# Patient Record
Sex: MALE | Race: WHITE | NOT HISPANIC OR LATINO | Employment: OTHER | URBAN - NONMETROPOLITAN AREA
[De-identification: names, ages, dates, MRNs, and addresses within clinical notes are randomized per-mention and may not be internally consistent; named-entity substitution may affect disease eponyms.]

---

## 2017-01-03 ENCOUNTER — ANESTHESIA EVENT (OUTPATIENT)
Dept: GASTROENTEROLOGY | Facility: HOSPITAL | Age: 72
End: 2017-01-03

## 2017-01-04 ENCOUNTER — ANESTHESIA (OUTPATIENT)
Dept: GASTROENTEROLOGY | Facility: HOSPITAL | Age: 72
End: 2017-01-04

## 2017-01-04 PROCEDURE — 25010000002 PROPOFOL 10 MG/ML EMULSION: Performed by: NURSE ANESTHETIST, CERTIFIED REGISTERED

## 2017-01-04 RX ORDER — PROPOFOL 10 MG/ML
VIAL (ML) INTRAVENOUS AS NEEDED
Status: DISCONTINUED | OUTPATIENT
Start: 2017-01-04 | End: 2017-01-04 | Stop reason: SURG

## 2017-01-04 RX ORDER — LIDOCAINE HYDROCHLORIDE 20 MG/ML
INJECTION, SOLUTION INFILTRATION; PERINEURAL AS NEEDED
Status: DISCONTINUED | OUTPATIENT
Start: 2017-01-04 | End: 2017-01-04 | Stop reason: SURG

## 2017-01-04 RX ADMIN — PROPOFOL 50 MG: 10 INJECTION, EMULSION INTRAVENOUS at 11:55

## 2017-01-04 RX ADMIN — PROPOFOL 100 MG: 10 INJECTION, EMULSION INTRAVENOUS at 11:52

## 2017-01-04 RX ADMIN — LIDOCAINE HYDROCHLORIDE 60 MG: 20 INJECTION, SOLUTION INFILTRATION; PERINEURAL at 11:50

## 2017-01-04 RX ADMIN — PROPOFOL 50 MG: 10 INJECTION, EMULSION INTRAVENOUS at 12:05

## 2017-01-04 RX ADMIN — PROPOFOL 50 MG: 10 INJECTION, EMULSION INTRAVENOUS at 12:00

## 2017-01-04 NOTE — ANESTHESIA PREPROCEDURE EVALUATION
Anesthesia Evaluation     Patient summary reviewed and Nursing notes reviewed    No history of anesthetic complications   Airway   Mallampati: II  TM distance: >3 FB  Neck ROM: full  Dental          Pulmonary    (+) hx of smoking,   Cardiovascular - negative cardio ROS    Neuro/Psych- negative ROS  GI/Hepatic/Renal/Endo    (+)  hiatal hernia,     ROS Comment: HLD    Musculoskeletal     Abdominal    Substance History - negative use     OB/GYN          Other                             Anesthesia Plan    ASA 2     general     intravenous induction   Anesthetic plan and risks discussed with patient.

## 2017-01-09 ENCOUNTER — TELEPHONE (OUTPATIENT)
Dept: GASTROENTEROLOGY | Facility: CLINIC | Age: 72
End: 2017-01-09

## 2017-01-09 NOTE — ANESTHESIA POSTPROCEDURE EVALUATION
Patient: Jonnie Sheets    Procedure Summary     Date Anesthesia Start Anesthesia Stop Room / Location    01/04/17 1148 1220 [facility] ENDOSCOPY 6 / [facility] ENDOSCOPY       Procedure Diagnosis Surgeon Provider    COLONOSCOPY WITH ANESTHESIA (N/A ); ESOPHAGOGASTRODUODENOSCOPY WITH ANESTHESIA (N/A Esophagus) Heme positive stool; Other iron deficiency anemia  (Heme positive stool [R19.5]; Other iron deficiency anemia [D50.8]) MD DAGOBERTO Mota CRNA          Anesthesia Type: general  Last vitals  BP      Temp      Pulse     Resp      SpO2        Post Anesthesia Care and Evaluation    Patient location during evaluation: PACU  Patient participation: complete - patient participated  Level of consciousness: awake and alert  Pain score: 0  Pain management: adequate  Airway patency: patent  Anesthetic complications: No anesthetic complications  Cardiovascular status: acceptable and stable  Respiratory status: acceptable and unassisted  Hydration status: acceptable

## 2017-01-09 NOTE — TELEPHONE ENCOUNTER
I called and let him know the pathology so far was negative for cancer but that does not mean there is not potentially cancerous cells somewhere else in the larger lesion.  As recommended he still needs to have surgical resection of the lesion.  He has an appointment with general surgery on the 17th.  He is to keep that appointment.  I asked him to call me if he had any questions or concerns.

## 2017-01-17 ENCOUNTER — HOSPITAL ENCOUNTER (OUTPATIENT)
Dept: GENERAL RADIOLOGY | Facility: HOSPITAL | Age: 72
Discharge: HOME | End: 2017-01-17
Admitting: SPECIALIST

## 2017-01-17 ENCOUNTER — APPOINTMENT (OUTPATIENT)
Dept: PREADMISSION TESTING | Facility: HOSPITAL | Age: 72
End: 2017-01-17

## 2017-01-17 VITALS
HEIGHT: 64 IN | SYSTOLIC BLOOD PRESSURE: 186 MMHG | RESPIRATION RATE: 20 BRPM | WEIGHT: 165 LBS | HEART RATE: 78 BPM | BODY MASS INDEX: 28.17 KG/M2 | DIASTOLIC BLOOD PRESSURE: 90 MMHG | OXYGEN SATURATION: 94 %

## 2017-01-17 LAB
ALBUMIN SERPL-MCNC: 4.4 G/DL (ref 3.5–5)
ALBUMIN/GLOB SERPL: 1.3 G/DL (ref 1.1–2.5)
ALP SERPL-CCNC: 74 U/L (ref 24–120)
ALT SERPL W P-5'-P-CCNC: 25 U/L (ref 0–54)
ANION GAP SERPL CALCULATED.3IONS-SCNC: 12 MMOL/L (ref 4–13)
AST SERPL-CCNC: 47 U/L (ref 7–45)
BASOPHILS # BLD AUTO: 0.05 10*3/MM3 (ref 0–0.2)
BASOPHILS NFR BLD AUTO: 0.5 % (ref 0–2)
BILIRUB SERPL-MCNC: 0.4 MG/DL (ref 0.1–1)
BUN BLD-MCNC: 8 MG/DL (ref 5–21)
BUN/CREAT SERPL: 7.8 (ref 7–25)
CALCIUM SPEC-SCNC: 8.7 MG/DL (ref 8.4–10.4)
CHLORIDE SERPL-SCNC: 96 MMOL/L (ref 98–110)
CO2 SERPL-SCNC: 32 MMOL/L (ref 24–31)
CREAT BLD-MCNC: 1.02 MG/DL (ref 0.5–1.4)
DEPRECATED RDW RBC AUTO: 55.7 FL (ref 40–54)
EOSINOPHIL # BLD AUTO: 0.16 10*3/MM3 (ref 0–0.7)
EOSINOPHIL NFR BLD AUTO: 1.6 % (ref 0–4)
ERYTHROCYTE [DISTWIDTH] IN BLOOD BY AUTOMATED COUNT: 17.4 % (ref 12–15)
GFR SERPL CREATININE-BSD FRML MDRD: 72 ML/MIN/1.73
GLOBULIN UR ELPH-MCNC: 3.5 GM/DL
GLUCOSE BLD-MCNC: 101 MG/DL (ref 70–100)
HCT VFR BLD AUTO: 42.4 % (ref 40–52)
HGB BLD-MCNC: 13.2 G/DL (ref 14–18)
IMM GRANULOCYTES # BLD: 0.02 10*3/MM3 (ref 0–0.03)
IMM GRANULOCYTES NFR BLD: 0.2 % (ref 0–5)
LYMPHOCYTES # BLD AUTO: 2.32 10*3/MM3 (ref 0.72–4.86)
LYMPHOCYTES NFR BLD AUTO: 23.6 % (ref 15–45)
MCH RBC QN AUTO: 27 PG (ref 28–32)
MCHC RBC AUTO-ENTMCNC: 31.1 G/DL (ref 33–36)
MCV RBC AUTO: 86.9 FL (ref 82–95)
MONOCYTES # BLD AUTO: 1.24 10*3/MM3 (ref 0.19–1.3)
MONOCYTES NFR BLD AUTO: 12.6 % (ref 4–12)
NEUTROPHILS # BLD AUTO: 6.05 10*3/MM3 (ref 1.87–8.4)
NEUTROPHILS NFR BLD AUTO: 61.5 % (ref 39–78)
PLAT MORPH BLD: NORMAL
PLATELET # BLD AUTO: 217 10*3/MM3 (ref 130–400)
PMV BLD AUTO: 12.3 FL (ref 6–12)
POTASSIUM BLD-SCNC: 3.9 MMOL/L (ref 3.5–5.3)
PROT SERPL-MCNC: 7.9 G/DL (ref 6.3–8.7)
RBC # BLD AUTO: 4.88 10*6/MM3 (ref 4.8–5.9)
RBC MORPH BLD: NORMAL
SODIUM BLD-SCNC: 140 MMOL/L (ref 135–145)
WBC MORPH BLD: NORMAL
WBC NRBC COR # BLD: 9.84 10*3/MM3 (ref 4.8–10.8)

## 2017-01-17 PROCEDURE — 93010 ELECTROCARDIOGRAM REPORT: CPT | Performed by: INTERNAL MEDICINE

## 2017-01-19 ENCOUNTER — ANESTHESIA (OUTPATIENT)
Dept: PERIOP | Facility: HOSPITAL | Age: 72
End: 2017-01-19

## 2017-01-19 ENCOUNTER — HOSPITAL ENCOUNTER (INPATIENT)
Facility: HOSPITAL | Age: 72
LOS: 9 days | Discharge: HOME | End: 2017-01-28
Attending: SPECIALIST | Admitting: SPECIALIST

## 2017-01-19 ENCOUNTER — ANESTHESIA EVENT (OUTPATIENT)
Dept: PERIOP | Facility: HOSPITAL | Age: 72
End: 2017-01-19

## 2017-01-19 DIAGNOSIS — C18.9 MALIGNANT NEOPLASM OF COLON, UNSPECIFIED PART OF COLON (HCC): ICD-10-CM

## 2017-01-19 DIAGNOSIS — C18.9 COLON CANCER (HCC): ICD-10-CM

## 2017-01-19 LAB
ABO GROUP BLD: NORMAL
BLD GP AB SCN SERPL QL: NEGATIVE
RH BLD: POSITIVE

## 2017-01-19 PROCEDURE — 25010000002 HYDROMORPHONE PER 4 MG: Performed by: ANESTHESIOLOGY

## 2017-01-19 PROCEDURE — 25010000002 MIDAZOLAM PER 1 MG: Performed by: ANESTHESIOLOGY

## 2017-01-19 PROCEDURE — 0WQF0ZZ REPAIR ABDOMINAL WALL, OPEN APPROACH: ICD-10-PCS | Performed by: SPECIALIST

## 2017-01-19 PROCEDURE — P9041 ALBUMIN (HUMAN),5%, 50ML: HCPCS | Performed by: NURSE ANESTHETIST, CERTIFIED REGISTERED

## 2017-01-19 PROCEDURE — 25010000002 DIAZEPAM PER 5 MG: Performed by: SPECIALIST

## 2017-01-19 PROCEDURE — 25010000002 ONDANSETRON PER 1 MG: Performed by: SPECIALIST

## 2017-01-19 PROCEDURE — 86850 RBC ANTIBODY SCREEN: CPT

## 2017-01-19 PROCEDURE — 94799 UNLISTED PULMONARY SVC/PX: CPT

## 2017-01-19 PROCEDURE — 25010000002 MORPHINE SULFATE (PF) 2 MG/ML SOLUTION: Performed by: SPECIALIST

## 2017-01-19 PROCEDURE — 25010000002 CEFOXITIN: Performed by: SPECIALIST

## 2017-01-19 PROCEDURE — 25010000002 SUCCINYLCHOLINE PER 20 MG: Performed by: NURSE ANESTHETIST, CERTIFIED REGISTERED

## 2017-01-19 PROCEDURE — 25010000002 ALBUMIN HUMAN 5% PER 50 ML: Performed by: NURSE ANESTHETIST, CERTIFIED REGISTERED

## 2017-01-19 PROCEDURE — 86901 BLOOD TYPING SEROLOGIC RH(D): CPT

## 2017-01-19 PROCEDURE — 25010000002 PROPOFOL 10 MG/ML EMULSION: Performed by: NURSE ANESTHETIST, CERTIFIED REGISTERED

## 2017-01-19 PROCEDURE — 25010000002 MIDAZOLAM PER 1 MG: Performed by: NURSE ANESTHETIST, CERTIFIED REGISTERED

## 2017-01-19 PROCEDURE — 94002 VENT MGMT INPAT INIT DAY: CPT

## 2017-01-19 PROCEDURE — 25010000002 CEFOXITIN PER 1 G: Performed by: SPECIALIST

## 2017-01-19 PROCEDURE — 88309 TISSUE EXAM BY PATHOLOGIST: CPT | Performed by: SPECIALIST

## 2017-01-19 PROCEDURE — 86900 BLOOD TYPING SEROLOGIC ABO: CPT

## 2017-01-19 PROCEDURE — C1765 ADHESION BARRIER: HCPCS | Performed by: SPECIALIST

## 2017-01-19 PROCEDURE — 25010000002 NALOXONE PER 1 MG: Performed by: NURSE ANESTHETIST, CERTIFIED REGISTERED

## 2017-01-19 PROCEDURE — 25010000002 ONDANSETRON PER 1 MG: Performed by: NURSE ANESTHETIST, CERTIFIED REGISTERED

## 2017-01-19 PROCEDURE — 25010000002 DEXAMETHASONE PER 1 MG: Performed by: NURSE ANESTHETIST, CERTIFIED REGISTERED

## 2017-01-19 PROCEDURE — 25010000002 METOCLOPRAMIDE PER 10 MG: Performed by: ANESTHESIOLOGY

## 2017-01-19 PROCEDURE — 25010000002 DEXAMETHASONE PER 1 MG: Performed by: ANESTHESIOLOGY

## 2017-01-19 PROCEDURE — 0DTF0ZZ RESECTION OF RIGHT LARGE INTESTINE, OPEN APPROACH: ICD-10-PCS | Performed by: SPECIALIST

## 2017-01-19 RX ORDER — LORAZEPAM 2 MG/ML
1 INJECTION INTRAMUSCULAR EVERY 4 HOURS PRN
Status: DISCONTINUED | OUTPATIENT
Start: 2017-01-19 | End: 2017-01-24 | Stop reason: SDUPTHER

## 2017-01-19 RX ORDER — NALOXONE HCL 0.4 MG/ML
0.4 VIAL (ML) INJECTION
Status: DISCONTINUED | OUTPATIENT
Start: 2017-01-19 | End: 2017-01-28 | Stop reason: HOSPADM

## 2017-01-19 RX ORDER — DEXAMETHASONE SODIUM PHOSPHATE 4 MG/ML
4 INJECTION, SOLUTION INTRA-ARTICULAR; INTRALESIONAL; INTRAMUSCULAR; INTRAVENOUS; SOFT TISSUE ONCE AS NEEDED
Status: COMPLETED | OUTPATIENT
Start: 2017-01-19 | End: 2017-01-19

## 2017-01-19 RX ORDER — LABETALOL HYDROCHLORIDE 5 MG/ML
5 INJECTION, SOLUTION INTRAVENOUS
Status: DISCONTINUED | OUTPATIENT
Start: 2017-01-19 | End: 2017-01-19 | Stop reason: HOSPADM

## 2017-01-19 RX ORDER — DEXTROSE AND SODIUM CHLORIDE 5; .45 G/100ML; G/100ML
75 INJECTION, SOLUTION INTRAVENOUS CONTINUOUS
Status: DISCONTINUED | OUTPATIENT
Start: 2017-01-19 | End: 2017-01-23

## 2017-01-19 RX ORDER — MIDAZOLAM HYDROCHLORIDE 1 MG/ML
INJECTION INTRAMUSCULAR; INTRAVENOUS AS NEEDED
Status: DISCONTINUED | OUTPATIENT
Start: 2017-01-19 | End: 2017-01-19 | Stop reason: SURG

## 2017-01-19 RX ORDER — VASOPRESSIN 20 U/ML
INJECTION PARENTERAL AS NEEDED
Status: DISCONTINUED | OUTPATIENT
Start: 2017-01-19 | End: 2017-01-19 | Stop reason: SURG

## 2017-01-19 RX ORDER — FLUMAZENIL 0.1 MG/ML
0.2 INJECTION INTRAVENOUS AS NEEDED
Status: DISCONTINUED | OUTPATIENT
Start: 2017-01-19 | End: 2017-01-19 | Stop reason: HOSPADM

## 2017-01-19 RX ORDER — SUFENTANIL CITRATE 50 UG/ML
INJECTION EPIDURAL; INTRAVENOUS AS NEEDED
Status: DISCONTINUED | OUTPATIENT
Start: 2017-01-19 | End: 2017-01-19 | Stop reason: SURG

## 2017-01-19 RX ORDER — MIDAZOLAM HYDROCHLORIDE 1 MG/ML
1 INJECTION INTRAMUSCULAR; INTRAVENOUS
Status: DISCONTINUED | OUTPATIENT
Start: 2017-01-19 | End: 2017-01-19 | Stop reason: HOSPADM

## 2017-01-19 RX ORDER — HYDRALAZINE HYDROCHLORIDE 20 MG/ML
5 INJECTION INTRAMUSCULAR; INTRAVENOUS
Status: DISCONTINUED | OUTPATIENT
Start: 2017-01-19 | End: 2017-01-19 | Stop reason: HOSPADM

## 2017-01-19 RX ORDER — NALOXONE HCL 0.4 MG/ML
0.04 VIAL (ML) INJECTION AS NEEDED
Status: DISCONTINUED | OUTPATIENT
Start: 2017-01-19 | End: 2017-01-19 | Stop reason: HOSPADM

## 2017-01-19 RX ORDER — ONDANSETRON 2 MG/ML
4 INJECTION INTRAMUSCULAR; INTRAVENOUS AS NEEDED
Status: DISCONTINUED | OUTPATIENT
Start: 2017-01-19 | End: 2017-01-19 | Stop reason: HOSPADM

## 2017-01-19 RX ORDER — MORPHINE SULFATE 2 MG/ML
1 INJECTION, SOLUTION INTRAMUSCULAR; INTRAVENOUS EVERY 4 HOURS PRN
Status: DISCONTINUED | OUTPATIENT
Start: 2017-01-19 | End: 2017-01-28 | Stop reason: HOSPADM

## 2017-01-19 RX ORDER — ENALAPRILAT 2.5 MG/2ML
1.25 INJECTION INTRAVENOUS EVERY 6 HOURS PRN
Status: DISCONTINUED | OUTPATIENT
Start: 2017-01-19 | End: 2017-01-28 | Stop reason: HOSPADM

## 2017-01-19 RX ORDER — METOCLOPRAMIDE HYDROCHLORIDE 5 MG/ML
5 INJECTION INTRAMUSCULAR; INTRAVENOUS
Status: DISCONTINUED | OUTPATIENT
Start: 2017-01-19 | End: 2017-01-19 | Stop reason: HOSPADM

## 2017-01-19 RX ORDER — NALOXONE HYDROCHLORIDE 0.4 MG/ML
INJECTION, SOLUTION INTRAMUSCULAR; INTRAVENOUS; SUBCUTANEOUS AS NEEDED
Status: DISCONTINUED | OUTPATIENT
Start: 2017-01-19 | End: 2017-01-19 | Stop reason: SURG

## 2017-01-19 RX ORDER — MEPERIDINE HYDROCHLORIDE 25 MG/ML
12.5 INJECTION INTRAMUSCULAR; INTRAVENOUS; SUBCUTANEOUS
Status: DISCONTINUED | OUTPATIENT
Start: 2017-01-19 | End: 2017-01-19 | Stop reason: HOSPADM

## 2017-01-19 RX ORDER — OXYCODONE HYDROCHLORIDE AND ACETAMINOPHEN 5; 325 MG/1; MG/1
1 TABLET ORAL EVERY 4 HOURS PRN
Status: DISCONTINUED | OUTPATIENT
Start: 2017-01-19 | End: 2017-01-27 | Stop reason: SDUPTHER

## 2017-01-19 RX ORDER — PROPOFOL 10 MG/ML
VIAL (ML) INTRAVENOUS AS NEEDED
Status: DISCONTINUED | OUTPATIENT
Start: 2017-01-19 | End: 2017-01-19 | Stop reason: SURG

## 2017-01-19 RX ORDER — IPRATROPIUM BROMIDE AND ALBUTEROL SULFATE 2.5; .5 MG/3ML; MG/3ML
3 SOLUTION RESPIRATORY (INHALATION) ONCE AS NEEDED
Status: DISCONTINUED | OUTPATIENT
Start: 2017-01-19 | End: 2017-01-19 | Stop reason: HOSPADM

## 2017-01-19 RX ORDER — PHENYLEPHRINE HCL IN 0.9% NACL 0.8MG/10ML
SYRINGE (ML) INTRAVENOUS AS NEEDED
Status: DISCONTINUED | OUTPATIENT
Start: 2017-01-19 | End: 2017-01-19 | Stop reason: SURG

## 2017-01-19 RX ORDER — MIDAZOLAM HYDROCHLORIDE 1 MG/ML
2 INJECTION INTRAMUSCULAR; INTRAVENOUS
Status: DISCONTINUED | OUTPATIENT
Start: 2017-01-19 | End: 2017-01-19 | Stop reason: HOSPADM

## 2017-01-19 RX ORDER — SUCCINYLCHOLINE CHLORIDE 20 MG/ML
INJECTION INTRAMUSCULAR; INTRAVENOUS AS NEEDED
Status: DISCONTINUED | OUTPATIENT
Start: 2017-01-19 | End: 2017-01-19 | Stop reason: SURG

## 2017-01-19 RX ORDER — MORPHINE SULFATE 2 MG/ML
2 INJECTION, SOLUTION INTRAMUSCULAR; INTRAVENOUS AS NEEDED
Status: DISCONTINUED | OUTPATIENT
Start: 2017-01-19 | End: 2017-01-19 | Stop reason: HOSPADM

## 2017-01-19 RX ORDER — ONDANSETRON 2 MG/ML
INJECTION INTRAMUSCULAR; INTRAVENOUS AS NEEDED
Status: DISCONTINUED | OUTPATIENT
Start: 2017-01-19 | End: 2017-01-19 | Stop reason: SURG

## 2017-01-19 RX ORDER — ONDANSETRON 2 MG/ML
4 INJECTION INTRAMUSCULAR; INTRAVENOUS EVERY 6 HOURS PRN
Status: COMPLETED | OUTPATIENT
Start: 2017-01-19 | End: 2017-01-23

## 2017-01-19 RX ORDER — NALOXONE HCL 0.4 MG/ML
0.1 VIAL (ML) INJECTION
Status: DISCONTINUED | OUTPATIENT
Start: 2017-01-19 | End: 2017-01-20 | Stop reason: SDUPTHER

## 2017-01-19 RX ORDER — NALOXONE HCL 0.4 MG/ML
0.4 VIAL (ML) INJECTION
Status: DISCONTINUED | OUTPATIENT
Start: 2017-01-19 | End: 2017-01-27

## 2017-01-19 RX ORDER — DEXAMETHASONE SODIUM PHOSPHATE 4 MG/ML
INJECTION, SOLUTION INTRA-ARTICULAR; INTRALESIONAL; INTRAMUSCULAR; INTRAVENOUS; SOFT TISSUE AS NEEDED
Status: DISCONTINUED | OUTPATIENT
Start: 2017-01-19 | End: 2017-01-19 | Stop reason: SURG

## 2017-01-19 RX ORDER — PANTOPRAZOLE SODIUM 40 MG/10ML
40 INJECTION, POWDER, LYOPHILIZED, FOR SOLUTION INTRAVENOUS
Status: DISCONTINUED | OUTPATIENT
Start: 2017-01-20 | End: 2017-01-24 | Stop reason: SDUPTHER

## 2017-01-19 RX ORDER — SUCRALFATE ORAL 1 G/10ML
1 SUSPENSION ORAL EVERY 6 HOURS SCHEDULED
Status: DISCONTINUED | OUTPATIENT
Start: 2017-01-19 | End: 2017-01-28 | Stop reason: HOSPADM

## 2017-01-19 RX ORDER — ALBUMIN, HUMAN INJ 5% 5 %
SOLUTION INTRAVENOUS CONTINUOUS PRN
Status: DISCONTINUED | OUTPATIENT
Start: 2017-01-19 | End: 2017-01-19 | Stop reason: SURG

## 2017-01-19 RX ORDER — FENTANYL CITRATE 50 UG/ML
25 INJECTION, SOLUTION INTRAMUSCULAR; INTRAVENOUS AS NEEDED
Status: DISCONTINUED | OUTPATIENT
Start: 2017-01-19 | End: 2017-01-19 | Stop reason: HOSPADM

## 2017-01-19 RX ORDER — SODIUM CHLORIDE, SODIUM LACTATE, POTASSIUM CHLORIDE, CALCIUM CHLORIDE 600; 310; 30; 20 MG/100ML; MG/100ML; MG/100ML; MG/100ML
100 INJECTION, SOLUTION INTRAVENOUS CONTINUOUS
Status: DISCONTINUED | OUTPATIENT
Start: 2017-01-19 | End: 2017-01-19

## 2017-01-19 RX ORDER — ONDANSETRON 8 MG/1
8 TABLET, ORALLY DISINTEGRATING ORAL EVERY 6 HOURS PRN
Status: DISCONTINUED | OUTPATIENT
Start: 2017-01-19 | End: 2017-01-19 | Stop reason: SDUPTHER

## 2017-01-19 RX ORDER — MORPHINE SULFATE 2 MG/ML
2 INJECTION, SOLUTION INTRAMUSCULAR; INTRAVENOUS EVERY 4 HOURS PRN
Status: DISCONTINUED | OUTPATIENT
Start: 2017-01-19 | End: 2017-01-28 | Stop reason: HOSPADM

## 2017-01-19 RX ORDER — ONDANSETRON 2 MG/ML
4 INJECTION INTRAMUSCULAR; INTRAVENOUS EVERY 6 HOURS PRN
Status: DISCONTINUED | OUTPATIENT
Start: 2017-01-19 | End: 2017-01-20 | Stop reason: SDUPTHER

## 2017-01-19 RX ORDER — ROCURONIUM BROMIDE 10 MG/ML
INJECTION, SOLUTION INTRAVENOUS AS NEEDED
Status: DISCONTINUED | OUTPATIENT
Start: 2017-01-19 | End: 2017-01-19 | Stop reason: SURG

## 2017-01-19 RX ORDER — ACETAMINOPHEN 10 MG/ML
1000 INJECTION, SOLUTION INTRAVENOUS ONCE
Status: COMPLETED | OUTPATIENT
Start: 2017-01-19 | End: 2017-01-19

## 2017-01-19 RX ORDER — SODIUM CHLORIDE, SODIUM LACTATE, POTASSIUM CHLORIDE, CALCIUM CHLORIDE 600; 310; 30; 20 MG/100ML; MG/100ML; MG/100ML; MG/100ML
20 INJECTION, SOLUTION INTRAVENOUS CONTINUOUS
Status: DISCONTINUED | OUTPATIENT
Start: 2017-01-19 | End: 2017-01-19

## 2017-01-19 RX ORDER — METOCLOPRAMIDE HYDROCHLORIDE 5 MG/ML
10 INJECTION INTRAMUSCULAR; INTRAVENOUS ONCE AS NEEDED
Status: COMPLETED | OUTPATIENT
Start: 2017-01-19 | End: 2017-01-19

## 2017-01-19 RX ORDER — SODIUM CHLORIDE 9 MG/ML
INJECTION, SOLUTION INTRAVENOUS AS NEEDED
Status: DISCONTINUED | OUTPATIENT
Start: 2017-01-19 | End: 2017-01-19 | Stop reason: HOSPADM

## 2017-01-19 RX ORDER — LIDOCAINE HYDROCHLORIDE 20 MG/ML
INJECTION, SOLUTION INFILTRATION; PERINEURAL AS NEEDED
Status: DISCONTINUED | OUTPATIENT
Start: 2017-01-19 | End: 2017-01-19 | Stop reason: SURG

## 2017-01-19 RX ORDER — LIDOCAINE HYDROCHLORIDE 40 MG/ML
SOLUTION TOPICAL AS NEEDED
Status: DISCONTINUED | OUTPATIENT
Start: 2017-01-19 | End: 2017-01-19 | Stop reason: SURG

## 2017-01-19 RX ORDER — ONDANSETRON 8 MG/1
8 TABLET, ORALLY DISINTEGRATING ORAL EVERY 6 HOURS PRN
Status: DISCONTINUED | OUTPATIENT
Start: 2017-01-19 | End: 2017-01-27 | Stop reason: SDUPTHER

## 2017-01-19 RX ORDER — DIAZEPAM 5 MG/ML
5 INJECTION, SOLUTION INTRAMUSCULAR; INTRAVENOUS EVERY 4 HOURS PRN
Status: DISCONTINUED | OUTPATIENT
Start: 2017-01-19 | End: 2017-01-28 | Stop reason: HOSPADM

## 2017-01-19 RX ORDER — FAMOTIDINE 10 MG/ML
20 INJECTION, SOLUTION INTRAVENOUS ONCE
Status: COMPLETED | OUTPATIENT
Start: 2017-01-19 | End: 2017-01-19

## 2017-01-19 RX ORDER — MAGNESIUM HYDROXIDE 1200 MG/15ML
LIQUID ORAL AS NEEDED
Status: DISCONTINUED | OUTPATIENT
Start: 2017-01-19 | End: 2017-01-19 | Stop reason: HOSPADM

## 2017-01-19 RX ORDER — PROMETHAZINE HYDROCHLORIDE 25 MG/ML
12.5 INJECTION, SOLUTION INTRAMUSCULAR; INTRAVENOUS EVERY 6 HOURS PRN
Status: DISCONTINUED | OUTPATIENT
Start: 2017-01-19 | End: 2017-01-28 | Stop reason: HOSPADM

## 2017-01-19 RX ORDER — SODIUM CHLORIDE 0.9 % (FLUSH) 0.9 %
1-10 SYRINGE (ML) INJECTION AS NEEDED
Status: DISCONTINUED | OUTPATIENT
Start: 2017-01-19 | End: 2017-01-19 | Stop reason: HOSPADM

## 2017-01-19 RX ADMIN — ALBUMIN (HUMAN): 2.5 SOLUTION INTRAVENOUS at 08:30

## 2017-01-19 RX ADMIN — ALBUMIN (HUMAN): 2.5 SOLUTION INTRAVENOUS at 08:39

## 2017-01-19 RX ADMIN — LIDOCAINE HYDROCHLORIDE 100 MG: 20 INJECTION, SOLUTION INFILTRATION; PERINEURAL at 08:08

## 2017-01-19 RX ADMIN — SUFENTANIL CITRATE 10 MCG: 50 INJECTION, SOLUTION EPIDURAL; INTRAVENOUS at 08:48

## 2017-01-19 RX ADMIN — ONDANSETRON HYDROCHLORIDE 4 MG: 2 SOLUTION INTRAMUSCULAR; INTRAVENOUS at 15:46

## 2017-01-19 RX ADMIN — MIDAZOLAM 3 MG: 1 INJECTION INTRAMUSCULAR; INTRAVENOUS at 08:07

## 2017-01-19 RX ADMIN — NALOXONE HYDROCHLORIDE 0.1 MG: 0.4 INJECTION, SOLUTION INTRAMUSCULAR; INTRAVENOUS; SUBCUTANEOUS at 11:59

## 2017-01-19 RX ADMIN — SODIUM CHLORIDE, POTASSIUM CHLORIDE, SODIUM LACTATE AND CALCIUM CHLORIDE: 600; 310; 30; 20 INJECTION, SOLUTION INTRAVENOUS at 11:10

## 2017-01-19 RX ADMIN — SUFENTANIL CITRATE 10 MCG: 50 INJECTION, SOLUTION EPIDURAL; INTRAVENOUS at 08:08

## 2017-01-19 RX ADMIN — VASOPRESSIN 1 UNITS: 20 INJECTION INTRAVENOUS at 08:21

## 2017-01-19 RX ADMIN — SODIUM CHLORIDE, POTASSIUM CHLORIDE, SODIUM LACTATE AND CALCIUM CHLORIDE: 600; 310; 30; 20 INJECTION, SOLUTION INTRAVENOUS at 09:36

## 2017-01-19 RX ADMIN — NALOXONE HYDROCHLORIDE 0.1 MG: 0.4 INJECTION, SOLUTION INTRAMUSCULAR; INTRAVENOUS; SUBCUTANEOUS at 11:58

## 2017-01-19 RX ADMIN — ROCURONIUM BROMIDE 10 MG: 10 INJECTION INTRAVENOUS at 08:08

## 2017-01-19 RX ADMIN — METRONIDAZOLE 500 MG: 500 INJECTION, SOLUTION INTRAVENOUS at 07:49

## 2017-01-19 RX ADMIN — Medication 80 MCG: at 08:12

## 2017-01-19 RX ADMIN — Medication 80 MCG: at 08:15

## 2017-01-19 RX ADMIN — HYDROMORPHONE HYDROCHLORIDE 0.5 MG: 1 INJECTION, SOLUTION INTRAMUSCULAR; INTRAVENOUS; SUBCUTANEOUS at 13:43

## 2017-01-19 RX ADMIN — ACETAMINOPHEN 1000 MG: 10 INJECTION, SOLUTION INTRAVENOUS at 07:23

## 2017-01-19 RX ADMIN — DIAZEPAM 5 MG: 5 INJECTION, SOLUTION INTRAMUSCULAR; INTRAVENOUS at 17:18

## 2017-01-19 RX ADMIN — SUCCINYLCHOLINE CHLORIDE 140 MG: 20 INJECTION, SOLUTION INTRAMUSCULAR; INTRAVENOUS at 08:08

## 2017-01-19 RX ADMIN — MORPHINE SULFATE 2 MG: 2 INJECTION, SOLUTION INTRAMUSCULAR; INTRAVENOUS at 23:55

## 2017-01-19 RX ADMIN — PROPOFOL 150 MG: 10 INJECTION, EMULSION INTRAVENOUS at 08:08

## 2017-01-19 RX ADMIN — ENALAPRILAT 1.25 MG: 1.25 INJECTION INTRAVENOUS at 16:50

## 2017-01-19 RX ADMIN — METRONIDAZOLE 500 MG: 500 INJECTION, SOLUTION INTRAVENOUS at 22:11

## 2017-01-19 RX ADMIN — SUFENTANIL CITRATE 30 MCG: 50 INJECTION, SOLUTION EPIDURAL; INTRAVENOUS at 08:52

## 2017-01-19 RX ADMIN — DEXAMETHASONE SODIUM PHOSPHATE 4 MG: 4 INJECTION, SOLUTION INTRA-ARTICULAR; INTRALESIONAL; INTRAMUSCULAR; INTRAVENOUS; SOFT TISSUE at 07:23

## 2017-01-19 RX ADMIN — METRONIDAZOLE 500 MG: 500 INJECTION, SOLUTION INTRAVENOUS at 16:49

## 2017-01-19 RX ADMIN — MIDAZOLAM HYDROCHLORIDE 2 MG: 1 INJECTION, SOLUTION INTRAMUSCULAR; INTRAVENOUS at 07:23

## 2017-01-19 RX ADMIN — SUFENTANIL CITRATE 10 MCG: 50 INJECTION, SOLUTION EPIDURAL; INTRAVENOUS at 08:30

## 2017-01-19 RX ADMIN — SUFENTANIL CITRATE 20 MCG: 50 INJECTION, SOLUTION EPIDURAL; INTRAVENOUS at 08:55

## 2017-01-19 RX ADMIN — LIDOCAINE HYDROCHLORIDE 1 EACH: 40 SOLUTION TOPICAL at 08:09

## 2017-01-19 RX ADMIN — SODIUM CHLORIDE, POTASSIUM CHLORIDE, SODIUM LACTATE AND CALCIUM CHLORIDE 20 ML/HR: 600; 310; 30; 20 INJECTION, SOLUTION INTRAVENOUS at 07:23

## 2017-01-19 RX ADMIN — MORPHINE SULFATE 1 MG: 2 INJECTION, SOLUTION INTRAMUSCULAR; INTRAVENOUS at 15:32

## 2017-01-19 RX ADMIN — DEXTROSE AND SODIUM CHLORIDE 125 ML/HR: 5; 450 INJECTION, SOLUTION INTRAVENOUS at 16:49

## 2017-01-19 RX ADMIN — METOCLOPRAMIDE 10 MG: 5 INJECTION, SOLUTION INTRAMUSCULAR; INTRAVENOUS at 07:23

## 2017-01-19 RX ADMIN — CEFOXITIN 2 G: 2 INJECTION, POWDER, FOR SOLUTION INTRAVENOUS at 08:15

## 2017-01-19 RX ADMIN — CEFOXITIN SODIUM 1 G: 1 POWDER, FOR SOLUTION INTRAVENOUS at 15:46

## 2017-01-19 RX ADMIN — Medication 80 MCG: at 08:20

## 2017-01-19 RX ADMIN — SODIUM CHLORIDE, POTASSIUM CHLORIDE, SODIUM LACTATE AND CALCIUM CHLORIDE 100 ML/HR: 600; 310; 30; 20 INJECTION, SOLUTION INTRAVENOUS at 07:21

## 2017-01-19 RX ADMIN — SUFENTANIL CITRATE 10 MCG: 50 INJECTION, SOLUTION EPIDURAL; INTRAVENOUS at 08:45

## 2017-01-19 RX ADMIN — DEXAMETHASONE SODIUM PHOSPHATE 4 MG: 4 INJECTION, SOLUTION INTRAMUSCULAR; INTRAVENOUS at 10:53

## 2017-01-19 RX ADMIN — MIDAZOLAM HYDROCHLORIDE 1 MG: 1 INJECTION, SOLUTION INTRAMUSCULAR; INTRAVENOUS at 07:50

## 2017-01-19 RX ADMIN — SUFENTANIL CITRATE 10 MCG: 50 INJECTION, SOLUTION EPIDURAL; INTRAVENOUS at 08:40

## 2017-01-19 RX ADMIN — CEFOXITIN SODIUM 1 G: 1 POWDER, FOR SOLUTION INTRAVENOUS at 21:13

## 2017-01-19 RX ADMIN — MIDAZOLAM 2 MG: 1 INJECTION INTRAMUSCULAR; INTRAVENOUS at 08:10

## 2017-01-19 RX ADMIN — VASOPRESSIN 1 UNITS: 20 INJECTION INTRAVENOUS at 08:19

## 2017-01-19 RX ADMIN — SODIUM CHLORIDE, POTASSIUM CHLORIDE, SODIUM LACTATE AND CALCIUM CHLORIDE: 600; 310; 30; 20 INJECTION, SOLUTION INTRAVENOUS at 09:22

## 2017-01-19 RX ADMIN — ONDANSETRON 4 MG: 2 INJECTION INTRAMUSCULAR; INTRAVENOUS at 10:53

## 2017-01-19 RX ADMIN — LABETALOL HYDROCHLORIDE 5 MG: 5 INJECTION, SOLUTION INTRAVENOUS at 12:28

## 2017-01-19 RX ADMIN — FAMOTIDINE 20 MG: 10 INJECTION, SOLUTION INTRAVENOUS at 07:23

## 2017-01-19 NOTE — ANESTHESIA POSTPROCEDURE EVALUATION
Patient: Jonnie Sheets    Procedure Summary     Date Anesthesia Start Anesthesia Stop Room / Location    01/19/17 0759 1215 [facility] OR 04 / [facility] OR       Procedure Diagnosis Surgeon Provider    LAPAROSCOPIC ASSISTED RIGHT COLON RESECTION RIGHT, UMBILICAL HERNIA REPAIR (Right Abdomen) (COLON POLYP) MD Ann Marie Ross CRNA          Anesthesia Type: general  Last vitals  /75 (01/19/17 1256)    Temp 97.8 °F (36.6 °C) (01/19/17 1326)    Pulse 83 (01/19/17 1256)   Resp 18 (01/19/17 1326)    SpO2 99 % (01/19/17 1256)      Post Anesthesia Care and Evaluation    Patient location during evaluation: PACU  Patient participation: complete - patient participated  Level of consciousness: awake and alert  Pain management: adequate  Airway patency: patent  Anesthetic complications: No anesthetic complications    Cardiovascular status: acceptable and hemodynamically stable  Respiratory status: acceptable  Hydration status: acceptable    Comments: Pt had prolonged paralysis with succinylcholine while under anesthesia. Still had some weakness at the end of case, pulling 200 cc tidal volumes, so was brought out with ett in place. Shortly thereafter pt had 5 sec head lift, good hand strength, tidal volume with 5 of cpap were 550-600. Extubated. I discussed with pt the new diagnosis of pseudocholinesterase deficiency. WIll follow up tomorrow. Dibucaine sensitivity testing ordered with morning labs.

## 2017-01-19 NOTE — ANESTHESIA PREPROCEDURE EVALUATION
Anesthesia Evaluation     Patient summary reviewed and Nursing notes reviewed    No history of anesthetic complications   Airway   Mallampati: I  TM distance: <3 FB  Neck ROM: full  possible difficult intubation  Dental      Pulmonary     breath sounds clear to auscultation  (+) hx of smoking,   Cardiovascular   Exercise tolerance: good (4-7 METS)    ECG reviewed  Rhythm: regular  Rate: normal    Neuro/Psych  GI/Hepatic/Renal/Endo    (+)  hiatal hernia, GERD,     Musculoskeletal     Abdominal    Substance History      OB/GYN          Other         Other Comment: Glaucoma                  Anesthesia Plan    ASA 2     general   (Pt currently nauseated and vomited several tiimes this morning. Reports he has felt this way siinc taking antibioticsc on empty stomach last night> Recommend RSI.Will pre-tx with pepcid.)  intravenous induction   Anesthetic plan and risks discussed with patient.

## 2017-01-19 NOTE — ANESTHESIA PROCEDURE NOTES
Airway  Urgency: elective    Date/Time: 1/19/2017 8:08 AM  End Time:1/19/2017 8:10 AM  Airway not difficult    General Information and Staff    Patient location during procedure: OR  CRNA: ZULEMA DENISE    Indications and Patient Condition  Indications for airway management: airway protection    Preoxygenated: yes  MILS maintained throughout  Mask difficulty assessment: 1 - vent by mask    Final Airway Details  Final airway type: endotracheal airway      Successful airway: ETT  Cuffed: yes   Successful intubation technique: direct laryngoscopy  Facilitating devices/methods: intubating stylet  Endotracheal tube insertion site: oral  Blade: Yanez  Blade size: #2  ETT size: 7.0 mm  Cormack-Lehane Classification: grade IIa - partial view of glottis  Placement verified by: chest auscultation and capnometry   Cuff volume (mL): 8  Measured from: teeth  ETT to teeth (cm): 22  Number of attempts at approach: 1

## 2017-01-20 LAB
ALBUMIN SERPL-MCNC: 3.9 G/DL (ref 3.5–5)
ALBUMIN/GLOB SERPL: 1.3 G/DL (ref 1.1–2.5)
ALP SERPL-CCNC: 48 U/L (ref 24–120)
ALT SERPL W P-5'-P-CCNC: 18 U/L (ref 0–54)
ANION GAP SERPL CALCULATED.3IONS-SCNC: 12 MMOL/L (ref 4–13)
AST SERPL-CCNC: 40 U/L (ref 7–45)
BILIRUB SERPL-MCNC: 0.4 MG/DL (ref 0.1–1)
BUN BLD-MCNC: 6 MG/DL (ref 5–21)
BUN/CREAT SERPL: 6.9 (ref 7–25)
CALCIUM SPEC-SCNC: 7.9 MG/DL (ref 8.4–10.4)
CHLORIDE SERPL-SCNC: 102 MMOL/L (ref 98–110)
CO2 SERPL-SCNC: 28 MMOL/L (ref 24–31)
CREAT BLD-MCNC: 0.87 MG/DL (ref 0.5–1.4)
CYTO UR: NORMAL
DEPRECATED RDW RBC AUTO: 53.5 FL (ref 40–54)
ERYTHROCYTE [DISTWIDTH] IN BLOOD BY AUTOMATED COUNT: 17 % (ref 12–15)
GFR SERPL CREATININE-BSD FRML MDRD: 86 ML/MIN/1.73
GLOBULIN UR ELPH-MCNC: 3.1 GM/DL
GLUCOSE BLD-MCNC: 141 MG/DL (ref 70–100)
HCT VFR BLD AUTO: 36.2 % (ref 40–52)
HGB BLD-MCNC: 11.2 G/DL (ref 14–18)
LAB AP CASE REPORT: NORMAL
LAB AP CLINICAL INFORMATION: NORMAL
LAB AP SYNOPTIC CHECKLIST: NORMAL
Lab: NORMAL
MCH RBC QN AUTO: 26.8 PG (ref 28–32)
MCHC RBC AUTO-ENTMCNC: 30.9 G/DL (ref 33–36)
MCV RBC AUTO: 86.6 FL (ref 82–95)
PATH REPORT.FINAL DX SPEC: NORMAL
PATH REPORT.GROSS SPEC: NORMAL
PLATELET # BLD AUTO: 201 10*3/MM3 (ref 130–400)
POTASSIUM BLD-SCNC: 3.8 MMOL/L (ref 3.5–5.3)
PROT SERPL-MCNC: 7 G/DL (ref 6.3–8.7)
RBC # BLD AUTO: 4.18 10*6/MM3 (ref 4.8–5.9)
SODIUM BLD-SCNC: 142 MMOL/L (ref 135–145)
WBC NRBC COR # BLD: 15.43 10*3/MM3 (ref 4.8–10.8)

## 2017-01-20 PROCEDURE — 25010000002 DIAZEPAM PER 5 MG: Performed by: SPECIALIST

## 2017-01-20 PROCEDURE — 25010000002 HYDROMORPHONE 1 MG/ML SOLUTION: Performed by: SPECIALIST

## 2017-01-20 PROCEDURE — 25010000002 ENOXAPARIN PER 10 MG: Performed by: SPECIALIST

## 2017-01-20 PROCEDURE — 85027 COMPLETE CBC AUTOMATED: CPT | Performed by: SPECIALIST

## 2017-01-20 PROCEDURE — 80053 COMPREHEN METABOLIC PANEL: CPT | Performed by: SPECIALIST

## 2017-01-20 PROCEDURE — 25010000002 CEFOXITIN: Performed by: SPECIALIST

## 2017-01-20 PROCEDURE — 25010000002 MORPHINE SULFATE (PF) 2 MG/ML SOLUTION: Performed by: SPECIALIST

## 2017-01-20 RX ORDER — LABETALOL HYDROCHLORIDE 5 MG/ML
10 INJECTION, SOLUTION INTRAVENOUS
Status: DISCONTINUED | OUTPATIENT
Start: 2017-01-20 | End: 2017-01-28 | Stop reason: HOSPADM

## 2017-01-20 RX ORDER — NALOXONE HCL 0.4 MG/ML
0.1 VIAL (ML) INJECTION
Status: DISCONTINUED | OUTPATIENT
Start: 2017-01-20 | End: 2017-01-28 | Stop reason: HOSPADM

## 2017-01-20 RX ADMIN — ENALAPRILAT 1.25 MG: 1.25 INJECTION INTRAVENOUS at 16:34

## 2017-01-20 RX ADMIN — DEXTROSE AND SODIUM CHLORIDE 125 ML/HR: 5; 450 INJECTION, SOLUTION INTRAVENOUS at 05:17

## 2017-01-20 RX ADMIN — DEXTROSE AND SODIUM CHLORIDE 125 ML/HR: 5; 450 INJECTION, SOLUTION INTRAVENOUS at 14:25

## 2017-01-20 RX ADMIN — MORPHINE SULFATE 1 MG: 2 INJECTION, SOLUTION INTRAMUSCULAR; INTRAVENOUS at 10:19

## 2017-01-20 RX ADMIN — Medication: at 16:27

## 2017-01-20 RX ADMIN — MORPHINE SULFATE 2 MG: 2 INJECTION, SOLUTION INTRAMUSCULAR; INTRAVENOUS at 03:40

## 2017-01-20 RX ADMIN — METRONIDAZOLE 500 MG: 500 INJECTION, SOLUTION INTRAVENOUS at 16:15

## 2017-01-20 RX ADMIN — METRONIDAZOLE 500 MG: 500 INJECTION, SOLUTION INTRAVENOUS at 09:55

## 2017-01-20 RX ADMIN — ENALAPRILAT 1.25 MG: 1.25 INJECTION INTRAVENOUS at 22:17

## 2017-01-20 RX ADMIN — SUCRALFATE 1 G: 1 SUSPENSION ORAL at 07:44

## 2017-01-20 RX ADMIN — METRONIDAZOLE 500 MG: 500 INJECTION, SOLUTION INTRAVENOUS at 03:34

## 2017-01-20 RX ADMIN — MORPHINE SULFATE 2 MG: 2 INJECTION, SOLUTION INTRAMUSCULAR; INTRAVENOUS at 07:43

## 2017-01-20 RX ADMIN — LABETALOL HYDROCHLORIDE 10 MG: 5 INJECTION, SOLUTION INTRAVENOUS at 19:57

## 2017-01-20 RX ADMIN — DIAZEPAM 5 MG: 5 INJECTION, SOLUTION INTRAMUSCULAR; INTRAVENOUS at 10:57

## 2017-01-20 RX ADMIN — SUCRALFATE 1 G: 1 SUSPENSION ORAL at 12:46

## 2017-01-20 RX ADMIN — SUCRALFATE 1 G: 1 SUSPENSION ORAL at 17:29

## 2017-01-20 RX ADMIN — ENOXAPARIN SODIUM 30 MG: 30 INJECTION SUBCUTANEOUS at 12:48

## 2017-01-20 RX ADMIN — CEFOXITIN SODIUM 1 G: 1 POWDER, FOR SOLUTION INTRAVENOUS at 02:32

## 2017-01-20 RX ADMIN — PANTOPRAZOLE SODIUM 40 MG: 40 INJECTION, POWDER, FOR SOLUTION INTRAVENOUS at 05:13

## 2017-01-20 RX ADMIN — METRONIDAZOLE 500 MG: 500 INJECTION, SOLUTION INTRAVENOUS at 21:19

## 2017-01-21 PROCEDURE — 25010000002 ENOXAPARIN PER 10 MG: Performed by: SPECIALIST

## 2017-01-21 RX ADMIN — SUCRALFATE 1 G: 1 SUSPENSION ORAL at 05:20

## 2017-01-21 RX ADMIN — METRONIDAZOLE 500 MG: 500 INJECTION, SOLUTION INTRAVENOUS at 21:16

## 2017-01-21 RX ADMIN — SUCRALFATE 1 G: 1 SUSPENSION ORAL at 17:07

## 2017-01-21 RX ADMIN — SUCRALFATE 1 G: 1 SUSPENSION ORAL at 12:01

## 2017-01-21 RX ADMIN — ENOXAPARIN SODIUM 30 MG: 30 INJECTION SUBCUTANEOUS at 04:57

## 2017-01-21 RX ADMIN — METHYLCELLULOSE 1000 MG: 500 TABLET ORAL at 17:07

## 2017-01-21 RX ADMIN — DEXTROSE AND SODIUM CHLORIDE 75 ML/HR: 5; 450 INJECTION, SOLUTION INTRAVENOUS at 10:34

## 2017-01-21 RX ADMIN — METHYLCELLULOSE 1000 MG: 500 TABLET ORAL at 08:06

## 2017-01-21 RX ADMIN — ENOXAPARIN SODIUM 30 MG: 30 INJECTION SUBCUTANEOUS at 17:07

## 2017-01-21 RX ADMIN — SUCRALFATE 1 G: 1 SUSPENSION ORAL at 23:23

## 2017-01-21 RX ADMIN — METRONIDAZOLE 500 MG: 500 INJECTION, SOLUTION INTRAVENOUS at 17:06

## 2017-01-21 RX ADMIN — METRONIDAZOLE 500 MG: 500 INJECTION, SOLUTION INTRAVENOUS at 10:29

## 2017-01-21 RX ADMIN — METRONIDAZOLE 500 MG: 500 INJECTION, SOLUTION INTRAVENOUS at 04:58

## 2017-01-21 RX ADMIN — LABETALOL HYDROCHLORIDE 10 MG: 5 INJECTION, SOLUTION INTRAVENOUS at 00:41

## 2017-01-21 RX ADMIN — PANTOPRAZOLE SODIUM 40 MG: 40 INJECTION, POWDER, FOR SOLUTION INTRAVENOUS at 05:20

## 2017-01-21 RX ADMIN — DEXTROSE AND SODIUM CHLORIDE 125 ML/HR: 5; 450 INJECTION, SOLUTION INTRAVENOUS at 01:14

## 2017-01-22 LAB
ALBUMIN SERPL-MCNC: 3.6 G/DL (ref 3.5–5)
ALBUMIN/GLOB SERPL: 1.1 G/DL (ref 1.1–2.5)
ALP SERPL-CCNC: 50 U/L (ref 24–120)
ALT SERPL W P-5'-P-CCNC: 29 U/L (ref 0–54)
ANION GAP SERPL CALCULATED.3IONS-SCNC: 9 MMOL/L (ref 4–13)
AST SERPL-CCNC: 40 U/L (ref 7–45)
BASOPHILS # BLD AUTO: 0.03 10*3/MM3 (ref 0–0.2)
BASOPHILS NFR BLD AUTO: 0.2 % (ref 0–2)
BILIRUB SERPL-MCNC: 0.4 MG/DL (ref 0.1–1)
BUN BLD-MCNC: 5 MG/DL (ref 5–21)
BUN/CREAT SERPL: 6 (ref 7–25)
CALCIUM SPEC-SCNC: 7.5 MG/DL (ref 8.4–10.4)
CHLORIDE SERPL-SCNC: 99 MMOL/L (ref 98–110)
CO2 SERPL-SCNC: 31 MMOL/L (ref 24–31)
CREAT BLD-MCNC: 0.84 MG/DL (ref 0.5–1.4)
DEPRECATED RDW RBC AUTO: 56.3 FL (ref 40–54)
EOSINOPHIL # BLD AUTO: 0.15 10*3/MM3 (ref 0–0.7)
EOSINOPHIL NFR BLD AUTO: 1.1 % (ref 0–4)
ERYTHROCYTE [DISTWIDTH] IN BLOOD BY AUTOMATED COUNT: 17.3 % (ref 12–15)
GFR SERPL CREATININE-BSD FRML MDRD: 90 ML/MIN/1.73
GLOBULIN UR ELPH-MCNC: 3.2 GM/DL
GLUCOSE BLD-MCNC: 138 MG/DL (ref 70–100)
HCT VFR BLD AUTO: 41 % (ref 40–52)
HGB BLD-MCNC: 12.7 G/DL (ref 14–18)
IMM GRANULOCYTES # BLD: 0.06 10*3/MM3 (ref 0–0.03)
IMM GRANULOCYTES NFR BLD: 0.4 % (ref 0–5)
LYMPHOCYTES # BLD AUTO: 1.64 10*3/MM3 (ref 0.72–4.86)
LYMPHOCYTES NFR BLD AUTO: 12.2 % (ref 15–45)
MCH RBC QN AUTO: 27.3 PG (ref 28–32)
MCHC RBC AUTO-ENTMCNC: 31 G/DL (ref 33–36)
MCV RBC AUTO: 88.2 FL (ref 82–95)
MONOCYTES # BLD AUTO: 1.99 10*3/MM3 (ref 0.19–1.3)
MONOCYTES NFR BLD AUTO: 14.8 % (ref 4–12)
NEUTROPHILS # BLD AUTO: 9.57 10*3/MM3 (ref 1.87–8.4)
NEUTROPHILS NFR BLD AUTO: 71.3 % (ref 39–78)
NRBC BLD MANUAL-RTO: 0 /100 WBC (ref 0–0)
PLAT MORPH BLD: NORMAL
PLATELET # BLD AUTO: 241 10*3/MM3 (ref 130–400)
PMV BLD AUTO: 12.5 FL (ref 6–12)
POIKILOCYTOSIS BLD QL SMEAR: NORMAL
POTASSIUM BLD-SCNC: 3 MMOL/L (ref 3.5–5.3)
PROT SERPL-MCNC: 6.8 G/DL (ref 6.3–8.7)
RBC # BLD AUTO: 4.65 10*6/MM3 (ref 4.8–5.9)
SODIUM BLD-SCNC: 139 MMOL/L (ref 135–145)
WBC MORPH BLD: NORMAL
WBC NRBC COR # BLD: 13.44 10*3/MM3 (ref 4.8–10.8)

## 2017-01-22 PROCEDURE — 85007 BL SMEAR W/DIFF WBC COUNT: CPT | Performed by: SPECIALIST

## 2017-01-22 PROCEDURE — 25010000002 ENOXAPARIN PER 10 MG: Performed by: SPECIALIST

## 2017-01-22 PROCEDURE — 25010000002 ONDANSETRON PER 1 MG: Performed by: SPECIALIST

## 2017-01-22 PROCEDURE — 85025 COMPLETE CBC W/AUTO DIFF WBC: CPT | Performed by: SPECIALIST

## 2017-01-22 PROCEDURE — 80053 COMPREHEN METABOLIC PANEL: CPT | Performed by: SPECIALIST

## 2017-01-22 PROCEDURE — 25010000002 PROMETHAZINE PER 50 MG: Performed by: SPECIALIST

## 2017-01-22 RX ORDER — DEXTROSE, SODIUM CHLORIDE, AND POTASSIUM CHLORIDE 5; .45; .3 G/100ML; G/100ML; G/100ML
125 INJECTION INTRAVENOUS CONTINUOUS
Status: DISCONTINUED | OUTPATIENT
Start: 2017-01-22 | End: 2017-01-25

## 2017-01-22 RX ADMIN — DEXTROSE MONOHYDRATE, SODIUM CHLORIDE, AND POTASSIUM CHLORIDE 125 ML/HR: 50; 4.5; 2.98 INJECTION, SOLUTION INTRAVENOUS at 23:43

## 2017-01-22 RX ADMIN — PANTOPRAZOLE SODIUM 40 MG: 40 INJECTION, POWDER, FOR SOLUTION INTRAVENOUS at 05:42

## 2017-01-22 RX ADMIN — LABETALOL HYDROCHLORIDE 10 MG: 5 INJECTION, SOLUTION INTRAVENOUS at 01:00

## 2017-01-22 RX ADMIN — ONDANSETRON HYDROCHLORIDE 4 MG: 2 SOLUTION INTRAMUSCULAR; INTRAVENOUS at 23:43

## 2017-01-22 RX ADMIN — ENALAPRILAT 1.25 MG: 1.25 INJECTION INTRAVENOUS at 22:51

## 2017-01-22 RX ADMIN — DEXTROSE AND SODIUM CHLORIDE 75 ML/HR: 5; 450 INJECTION, SOLUTION INTRAVENOUS at 02:45

## 2017-01-22 RX ADMIN — METRONIDAZOLE 500 MG: 500 INJECTION, SOLUTION INTRAVENOUS at 16:21

## 2017-01-22 RX ADMIN — PROMETHAZINE HYDROCHLORIDE 12.5 MG: 25 INJECTION INTRAMUSCULAR; INTRAVENOUS at 05:39

## 2017-01-22 RX ADMIN — ENOXAPARIN SODIUM 30 MG: 30 INJECTION SUBCUTANEOUS at 05:42

## 2017-01-22 RX ADMIN — DEXTROSE MONOHYDRATE, SODIUM CHLORIDE, AND POTASSIUM CHLORIDE 125 ML/HR: 50; 4.5; 2.98 INJECTION, SOLUTION INTRAVENOUS at 13:52

## 2017-01-22 RX ADMIN — ENOXAPARIN SODIUM 30 MG: 30 INJECTION SUBCUTANEOUS at 18:01

## 2017-01-22 RX ADMIN — PROMETHAZINE HYDROCHLORIDE 12.5 MG: 25 INJECTION INTRAMUSCULAR; INTRAVENOUS at 23:48

## 2017-01-22 RX ADMIN — ONDANSETRON HYDROCHLORIDE 4 MG: 2 SOLUTION INTRAMUSCULAR; INTRAVENOUS at 02:41

## 2017-01-22 RX ADMIN — METRONIDAZOLE 500 MG: 500 INJECTION, SOLUTION INTRAVENOUS at 10:59

## 2017-01-22 RX ADMIN — METRONIDAZOLE 500 MG: 500 INJECTION, SOLUTION INTRAVENOUS at 03:17

## 2017-01-22 RX ADMIN — METRONIDAZOLE 500 MG: 500 INJECTION, SOLUTION INTRAVENOUS at 23:48

## 2017-01-23 LAB
ALBUMIN SERPL-MCNC: 3.4 G/DL (ref 3.5–5)
ALBUMIN/GLOB SERPL: 1.2 G/DL (ref 1.1–2.5)
ALP SERPL-CCNC: 54 U/L (ref 24–120)
ALT SERPL W P-5'-P-CCNC: 27 U/L (ref 0–54)
ANION GAP SERPL CALCULATED.3IONS-SCNC: 12 MMOL/L (ref 4–13)
AST SERPL-CCNC: 36 U/L (ref 7–45)
BACTERIA UR QL AUTO: ABNORMAL /HPF
BILIRUB SERPL-MCNC: 0.4 MG/DL (ref 0.1–1)
BILIRUB UR QL STRIP: ABNORMAL
BUN BLD-MCNC: 6 MG/DL (ref 5–21)
BUN/CREAT SERPL: 7.1 (ref 7–25)
CALCIUM SPEC-SCNC: 8.2 MG/DL (ref 8.4–10.4)
CHLORIDE SERPL-SCNC: 100 MMOL/L (ref 98–110)
CLARITY UR: CLEAR
CO2 SERPL-SCNC: 28 MMOL/L (ref 24–31)
COLOR UR: ABNORMAL
CREAT BLD-MCNC: 0.85 MG/DL (ref 0.5–1.4)
DEPRECATED RDW RBC AUTO: 54.2 FL (ref 40–54)
ERYTHROCYTE [DISTWIDTH] IN BLOOD BY AUTOMATED COUNT: 17 % (ref 12–15)
ERYTHROCYTE [SEDIMENTATION RATE] IN BLOOD: 75 MM/HR (ref 0–15)
GFR SERPL CREATININE-BSD FRML MDRD: 88 ML/MIN/1.73
GLOBULIN UR ELPH-MCNC: 2.9 GM/DL
GLUCOSE BLD-MCNC: 129 MG/DL (ref 70–100)
GLUCOSE UR STRIP-MCNC: NEGATIVE MG/DL
HCT VFR BLD AUTO: 42.2 % (ref 40–52)
HGB BLD-MCNC: 13.1 G/DL (ref 14–18)
HGB UR QL STRIP.AUTO: NEGATIVE
HYALINE CASTS UR QL AUTO: ABNORMAL /LPF
KETONES UR QL STRIP: NEGATIVE
LEUKOCYTE ESTERASE UR QL STRIP.AUTO: ABNORMAL
MCH RBC QN AUTO: 27.2 PG (ref 28–32)
MCHC RBC AUTO-ENTMCNC: 31 G/DL (ref 33–36)
MCV RBC AUTO: 87.7 FL (ref 82–95)
MUCOUS THREADS URNS QL MICRO: ABNORMAL /HPF
NITRITE UR QL STRIP: POSITIVE
PH UR STRIP.AUTO: 5.5 [PH] (ref 5–8)
PLATELET # BLD AUTO: 272 10*3/MM3 (ref 130–400)
PMV BLD AUTO: 12.8 FL (ref 6–12)
POTASSIUM BLD-SCNC: 3.5 MMOL/L (ref 3.5–5.3)
PROT SERPL-MCNC: 6.3 G/DL (ref 6.3–8.7)
PROT UR QL STRIP: ABNORMAL
RBC # BLD AUTO: 4.81 10*6/MM3 (ref 4.8–5.9)
RBC # UR: ABNORMAL /HPF
REF LAB TEST METHOD: ABNORMAL
SODIUM BLD-SCNC: 140 MMOL/L (ref 135–145)
SP GR UR STRIP: 1.02 (ref 1–1.03)
SQUAMOUS #/AREA URNS HPF: ABNORMAL /HPF
UNIDENT CRYS URNS QL MICRO: ABNORMAL /HPF
UROBILINOGEN UR QL STRIP: ABNORMAL
WBC NRBC COR # BLD: 13.95 10*3/MM3 (ref 4.8–10.8)
WBC UR QL AUTO: ABNORMAL /HPF

## 2017-01-23 PROCEDURE — 85651 RBC SED RATE NONAUTOMATED: CPT | Performed by: SPECIALIST

## 2017-01-23 PROCEDURE — 85027 COMPLETE CBC AUTOMATED: CPT | Performed by: SPECIALIST

## 2017-01-23 PROCEDURE — 87086 URINE CULTURE/COLONY COUNT: CPT | Performed by: SPECIALIST

## 2017-01-23 PROCEDURE — 81001 URINALYSIS AUTO W/SCOPE: CPT | Performed by: SPECIALIST

## 2017-01-23 PROCEDURE — 25010000003 POTASSIUM CHLORIDE 10 MEQ/100ML SOLUTION: Performed by: SPECIALIST

## 2017-01-23 PROCEDURE — 25010000002 ONDANSETRON PER 1 MG: Performed by: SPECIALIST

## 2017-01-23 PROCEDURE — 25010000002 ENOXAPARIN PER 10 MG: Performed by: SPECIALIST

## 2017-01-23 PROCEDURE — 25010000002 PROMETHAZINE PER 50 MG: Performed by: SPECIALIST

## 2017-01-23 PROCEDURE — 80053 COMPREHEN METABOLIC PANEL: CPT | Performed by: SPECIALIST

## 2017-01-23 RX ORDER — POTASSIUM CHLORIDE 7.45 MG/ML
10 INJECTION INTRAVENOUS
Status: DISPENSED | OUTPATIENT
Start: 2017-01-23 | End: 2017-01-23

## 2017-01-23 RX ORDER — ONDANSETRON 2 MG/ML
4 INJECTION INTRAMUSCULAR; INTRAVENOUS EVERY 6 HOURS PRN
Status: DISCONTINUED | OUTPATIENT
Start: 2017-01-23 | End: 2017-01-28 | Stop reason: HOSPADM

## 2017-01-23 RX ORDER — ALVIMOPAN 12 MG/1
12 CAPSULE ORAL 2 TIMES DAILY
Status: DISCONTINUED | OUTPATIENT
Start: 2017-01-23 | End: 2017-01-27

## 2017-01-23 RX ORDER — TAMSULOSIN HYDROCHLORIDE 0.4 MG/1
0.8 CAPSULE ORAL DAILY
Status: DISCONTINUED | OUTPATIENT
Start: 2017-01-23 | End: 2017-01-28 | Stop reason: HOSPADM

## 2017-01-23 RX ADMIN — METRONIDAZOLE 500 MG: 500 INJECTION, SOLUTION INTRAVENOUS at 16:29

## 2017-01-23 RX ADMIN — SODIUM CHLORIDE 500 ML: 9 INJECTION, SOLUTION INTRAVENOUS at 00:29

## 2017-01-23 RX ADMIN — POTASSIUM CHLORIDE 10 MEQ: 7.46 INJECTION, SOLUTION INTRAVENOUS at 16:50

## 2017-01-23 RX ADMIN — POTASSIUM CHLORIDE 10 MEQ: 7.46 INJECTION, SOLUTION INTRAVENOUS at 21:59

## 2017-01-23 RX ADMIN — METRONIDAZOLE 500 MG: 500 INJECTION, SOLUTION INTRAVENOUS at 11:08

## 2017-01-23 RX ADMIN — ENOXAPARIN SODIUM 30 MG: 30 INJECTION SUBCUTANEOUS at 06:38

## 2017-01-23 RX ADMIN — SODIUM CHLORIDE, POTASSIUM CHLORIDE, SODIUM LACTATE AND CALCIUM CHLORIDE 1000 ML: 600; 310; 30; 20 INJECTION, SOLUTION INTRAVENOUS at 16:27

## 2017-01-23 RX ADMIN — TAMSULOSIN HYDROCHLORIDE 0.8 MG: 0.4 CAPSULE ORAL at 16:49

## 2017-01-23 RX ADMIN — ONDANSETRON HYDROCHLORIDE 4 MG: 2 SOLUTION INTRAMUSCULAR; INTRAVENOUS at 06:38

## 2017-01-23 RX ADMIN — ALVIMOPAN 12 MG: 12 CAPSULE ORAL at 17:37

## 2017-01-23 RX ADMIN — PANTOPRAZOLE SODIUM 40 MG: 40 INJECTION, POWDER, FOR SOLUTION INTRAVENOUS at 06:59

## 2017-01-23 RX ADMIN — ONDANSETRON HYDROCHLORIDE 4 MG: 2 SOLUTION INTRAMUSCULAR; INTRAVENOUS at 22:01

## 2017-01-23 RX ADMIN — METRONIDAZOLE 500 MG: 500 INJECTION, SOLUTION INTRAVENOUS at 22:00

## 2017-01-23 RX ADMIN — DEXTROSE MONOHYDRATE, SODIUM CHLORIDE, AND POTASSIUM CHLORIDE 125 ML/HR: 50; 4.5; 2.98 INJECTION, SOLUTION INTRAVENOUS at 19:45

## 2017-01-23 RX ADMIN — METRONIDAZOLE 500 MG: 500 INJECTION, SOLUTION INTRAVENOUS at 04:34

## 2017-01-23 RX ADMIN — ENOXAPARIN SODIUM 30 MG: 30 INJECTION SUBCUTANEOUS at 17:39

## 2017-01-23 RX ADMIN — PROMETHAZINE HYDROCHLORIDE 12.5 MG: 25 INJECTION INTRAMUSCULAR; INTRAVENOUS at 06:59

## 2017-01-23 RX ADMIN — POTASSIUM CHLORIDE 10 MEQ: 7.46 INJECTION, SOLUTION INTRAVENOUS at 18:55

## 2017-01-24 LAB
ALBUMIN SERPL-MCNC: 3.3 G/DL (ref 3.5–5)
ALBUMIN/GLOB SERPL: 1 G/DL (ref 1.1–2.5)
ALP SERPL-CCNC: 47 U/L (ref 24–120)
ALT SERPL W P-5'-P-CCNC: 28 U/L (ref 0–54)
ANION GAP SERPL CALCULATED.3IONS-SCNC: 8 MMOL/L (ref 4–13)
AST SERPL-CCNC: 36 U/L (ref 7–45)
BILIRUB SERPL-MCNC: 0.3 MG/DL (ref 0.1–1)
BUN BLD-MCNC: 4 MG/DL (ref 5–21)
BUN/CREAT SERPL: 4.8 (ref 7–25)
CALCIUM SPEC-SCNC: 8.1 MG/DL (ref 8.4–10.4)
CHLORIDE SERPL-SCNC: 100 MMOL/L (ref 98–110)
CO2 SERPL-SCNC: 30 MMOL/L (ref 24–31)
CREAT BLD-MCNC: 0.84 MG/DL (ref 0.5–1.4)
DEPRECATED RDW RBC AUTO: 52.3 FL (ref 40–54)
ERYTHROCYTE [DISTWIDTH] IN BLOOD BY AUTOMATED COUNT: 16.6 % (ref 12–15)
GFR SERPL CREATININE-BSD FRML MDRD: 90 ML/MIN/1.73
GLOBULIN UR ELPH-MCNC: 3.2 GM/DL
GLUCOSE BLD-MCNC: 124 MG/DL (ref 70–100)
HCT VFR BLD AUTO: 37.8 % (ref 40–52)
HGB BLD-MCNC: 11.8 G/DL (ref 14–18)
MCH RBC QN AUTO: 27.1 PG (ref 28–32)
MCHC RBC AUTO-ENTMCNC: 31.2 G/DL (ref 33–36)
MCV RBC AUTO: 86.9 FL (ref 82–95)
PLATELET # BLD AUTO: 235 10*3/MM3 (ref 130–400)
PMV BLD AUTO: 11.9 FL (ref 6–12)
POTASSIUM BLD-SCNC: 4 MMOL/L (ref 3.5–5.3)
PROT SERPL-MCNC: 6.5 G/DL (ref 6.3–8.7)
RBC # BLD AUTO: 4.35 10*6/MM3 (ref 4.8–5.9)
SODIUM BLD-SCNC: 138 MMOL/L (ref 135–145)
WBC NRBC COR # BLD: 10.4 10*3/MM3 (ref 4.8–10.8)

## 2017-01-24 PROCEDURE — 02HV33Z INSERTION OF INFUSION DEVICE INTO SUPERIOR VENA CAVA, PERCUTANEOUS APPROACH: ICD-10-PCS | Performed by: SPECIALIST

## 2017-01-24 PROCEDURE — 25010000003 POTASSIUM CHLORIDE 10 MEQ/100ML SOLUTION: Performed by: SPECIALIST

## 2017-01-24 PROCEDURE — 0D9670Z DRAINAGE OF STOMACH WITH DRAINAGE DEVICE, VIA NATURAL OR ARTIFICIAL OPENING: ICD-10-PCS | Performed by: SPECIALIST

## 2017-01-24 PROCEDURE — 25010000002 ONDANSETRON PER 1 MG: Performed by: SPECIALIST

## 2017-01-24 PROCEDURE — 85027 COMPLETE CBC AUTOMATED: CPT | Performed by: SPECIALIST

## 2017-01-24 PROCEDURE — 25010000002 ENOXAPARIN PER 10 MG: Performed by: SPECIALIST

## 2017-01-24 PROCEDURE — C1751 CATH, INF, PER/CENT/MIDLINE: HCPCS

## 2017-01-24 PROCEDURE — 25010000002 PROMETHAZINE PER 50 MG: Performed by: SPECIALIST

## 2017-01-24 PROCEDURE — 80053 COMPREHEN METABOLIC PANEL: CPT | Performed by: SPECIALIST

## 2017-01-24 RX ORDER — POTASSIUM CHLORIDE 7.45 MG/ML
10 INJECTION INTRAVENOUS
Status: COMPLETED | OUTPATIENT
Start: 2017-01-24 | End: 2017-01-24

## 2017-01-24 RX ORDER — PANTOPRAZOLE SODIUM 40 MG/10ML
40 INJECTION, POWDER, LYOPHILIZED, FOR SOLUTION INTRAVENOUS
Status: DISCONTINUED | OUTPATIENT
Start: 2017-01-25 | End: 2017-01-28 | Stop reason: HOSPADM

## 2017-01-24 RX ORDER — LORAZEPAM 2 MG/ML
1 INJECTION INTRAMUSCULAR EVERY 4 HOURS PRN
Status: DISCONTINUED | OUTPATIENT
Start: 2017-01-24 | End: 2017-01-28 | Stop reason: HOSPADM

## 2017-01-24 RX ADMIN — ENOXAPARIN SODIUM 30 MG: 30 INJECTION SUBCUTANEOUS at 17:31

## 2017-01-24 RX ADMIN — SODIUM CHLORIDE, POTASSIUM CHLORIDE, SODIUM LACTATE AND CALCIUM CHLORIDE 1000 ML: 600; 310; 30; 20 INJECTION, SOLUTION INTRAVENOUS at 08:20

## 2017-01-24 RX ADMIN — SODIUM CHLORIDE, POTASSIUM CHLORIDE, SODIUM LACTATE AND CALCIUM CHLORIDE 500 ML: 600; 310; 30; 20 INJECTION, SOLUTION INTRAVENOUS at 15:30

## 2017-01-24 RX ADMIN — METRONIDAZOLE 500 MG: 500 INJECTION, SOLUTION INTRAVENOUS at 22:44

## 2017-01-24 RX ADMIN — LABETALOL HYDROCHLORIDE 10 MG: 5 INJECTION, SOLUTION INTRAVENOUS at 22:51

## 2017-01-24 RX ADMIN — POTASSIUM CHLORIDE 10 MEQ: 7.46 INJECTION, SOLUTION INTRAVENOUS at 01:55

## 2017-01-24 RX ADMIN — SUCRALFATE 1 G: 1 SUSPENSION ORAL at 23:55

## 2017-01-24 RX ADMIN — PROMETHAZINE HYDROCHLORIDE 12.5 MG: 25 INJECTION INTRAMUSCULAR; INTRAVENOUS at 00:07

## 2017-01-24 RX ADMIN — METRONIDAZOLE 500 MG: 500 INJECTION, SOLUTION INTRAVENOUS at 04:39

## 2017-01-24 RX ADMIN — POTASSIUM CHLORIDE 10 MEQ: 7.46 INJECTION, SOLUTION INTRAVENOUS at 04:38

## 2017-01-24 RX ADMIN — PROMETHAZINE HYDROCHLORIDE 12.5 MG: 25 INJECTION INTRAMUSCULAR; INTRAVENOUS at 06:24

## 2017-01-24 RX ADMIN — ONDANSETRON HYDROCHLORIDE 4 MG: 2 SOLUTION INTRAMUSCULAR; INTRAVENOUS at 10:08

## 2017-01-24 RX ADMIN — SUCRALFATE 1 G: 1 SUSPENSION ORAL at 17:31

## 2017-01-24 RX ADMIN — METHYLCELLULOSE 1000 MG: 500 TABLET ORAL at 17:32

## 2017-01-24 RX ADMIN — PANTOPRAZOLE SODIUM 40 MG: 40 INJECTION, POWDER, FOR SOLUTION INTRAVENOUS at 06:17

## 2017-01-24 RX ADMIN — DEXTROSE MONOHYDRATE, SODIUM CHLORIDE, AND POTASSIUM CHLORIDE 125 ML/HR: 50; 4.5; 2.98 INJECTION, SOLUTION INTRAVENOUS at 06:22

## 2017-01-24 RX ADMIN — METRONIDAZOLE 500 MG: 500 INJECTION, SOLUTION INTRAVENOUS at 17:30

## 2017-01-24 RX ADMIN — ENOXAPARIN SODIUM 30 MG: 30 INJECTION SUBCUTANEOUS at 06:16

## 2017-01-24 RX ADMIN — METRONIDAZOLE 500 MG: 500 INJECTION, SOLUTION INTRAVENOUS at 10:45

## 2017-01-25 LAB
ALBUMIN SERPL-MCNC: 3.4 G/DL (ref 3.5–5)
ALBUMIN/GLOB SERPL: 1.1 G/DL (ref 1.1–2.5)
ALP SERPL-CCNC: 46 U/L (ref 24–120)
ALT SERPL W P-5'-P-CCNC: 27 U/L (ref 0–54)
ANION GAP SERPL CALCULATED.3IONS-SCNC: 8 MMOL/L (ref 4–13)
AST SERPL-CCNC: 44 U/L (ref 7–45)
BACTERIA SPEC AEROBE CULT: NORMAL
BILIRUB SERPL-MCNC: 0.4 MG/DL (ref 0.1–1)
BUN BLD-MCNC: 4 MG/DL (ref 5–21)
BUN/CREAT SERPL: 4.9 (ref 7–25)
CALCIUM SPEC-SCNC: 8 MG/DL (ref 8.4–10.4)
CHLORIDE SERPL-SCNC: 100 MMOL/L (ref 98–110)
CO2 SERPL-SCNC: 32 MMOL/L (ref 24–31)
CREAT BLD-MCNC: 0.81 MG/DL (ref 0.5–1.4)
DEPRECATED RDW RBC AUTO: 52.1 FL (ref 40–54)
ERYTHROCYTE [DISTWIDTH] IN BLOOD BY AUTOMATED COUNT: 16.5 % (ref 12–15)
GFR SERPL CREATININE-BSD FRML MDRD: 93 ML/MIN/1.73
GLOBULIN UR ELPH-MCNC: 3 GM/DL
GLUCOSE BLD-MCNC: 114 MG/DL (ref 70–100)
HCT VFR BLD AUTO: 36.1 % (ref 40–52)
HGB BLD-MCNC: 11.1 G/DL (ref 14–18)
MCH RBC QN AUTO: 26.9 PG (ref 28–32)
MCHC RBC AUTO-ENTMCNC: 30.7 G/DL (ref 33–36)
MCV RBC AUTO: 87.4 FL (ref 82–95)
PLATELET # BLD AUTO: 228 10*3/MM3 (ref 130–400)
PMV BLD AUTO: 12.4 FL (ref 6–12)
POTASSIUM BLD-SCNC: 3.7 MMOL/L (ref 3.5–5.3)
PROT SERPL-MCNC: 6.4 G/DL (ref 6.3–8.7)
RBC # BLD AUTO: 4.13 10*6/MM3 (ref 4.8–5.9)
SODIUM BLD-SCNC: 140 MMOL/L (ref 135–145)
WBC NRBC COR # BLD: 9.87 10*3/MM3 (ref 4.8–10.8)

## 2017-01-25 PROCEDURE — 25010000002 FUROSEMIDE PER 20 MG: Performed by: SPECIALIST

## 2017-01-25 PROCEDURE — 25010000002 ENOXAPARIN PER 10 MG: Performed by: SPECIALIST

## 2017-01-25 PROCEDURE — 85027 COMPLETE CBC AUTOMATED: CPT | Performed by: SPECIALIST

## 2017-01-25 PROCEDURE — 80053 COMPREHEN METABOLIC PANEL: CPT | Performed by: SPECIALIST

## 2017-01-25 RX ORDER — DEXTROSE, SODIUM CHLORIDE, AND POTASSIUM CHLORIDE 5; .45; .15 G/100ML; G/100ML; G/100ML
50 INJECTION INTRAVENOUS CONTINUOUS
Status: DISCONTINUED | OUTPATIENT
Start: 2017-01-25 | End: 2017-01-28 | Stop reason: HOSPADM

## 2017-01-25 RX ORDER — FUROSEMIDE 10 MG/ML
20 INJECTION INTRAMUSCULAR; INTRAVENOUS ONCE
Status: COMPLETED | OUTPATIENT
Start: 2017-01-25 | End: 2017-01-25

## 2017-01-25 RX ORDER — DEXTROSE MONOHYDRATE 100 MG/ML
75 INJECTION, SOLUTION INTRAVENOUS CONTINUOUS
Status: DISCONTINUED | OUTPATIENT
Start: 2017-01-25 | End: 2017-01-25

## 2017-01-25 RX ORDER — GENTAMICIN SULFATE 3 MG/ML
2 SOLUTION/ DROPS OPHTHALMIC 4 TIMES DAILY
Status: DISCONTINUED | OUTPATIENT
Start: 2017-01-25 | End: 2017-01-28 | Stop reason: HOSPADM

## 2017-01-25 RX ADMIN — ALVIMOPAN 12 MG: 12 CAPSULE ORAL at 11:22

## 2017-01-25 RX ADMIN — SUCRALFATE 1 G: 1 SUSPENSION ORAL at 11:22

## 2017-01-25 RX ADMIN — PANTOPRAZOLE SODIUM 40 MG: 40 INJECTION, POWDER, FOR SOLUTION INTRAVENOUS at 07:05

## 2017-01-25 RX ADMIN — METRONIDAZOLE 500 MG: 500 INJECTION, SOLUTION INTRAVENOUS at 09:55

## 2017-01-25 RX ADMIN — TAMSULOSIN HYDROCHLORIDE 0.8 MG: 0.4 CAPSULE ORAL at 11:22

## 2017-01-25 RX ADMIN — POTASSIUM CHLORIDE, DEXTROSE MONOHYDRATE AND SODIUM CHLORIDE 50 ML/HR: 150; 5; 450 INJECTION, SOLUTION INTRAVENOUS at 18:01

## 2017-01-25 RX ADMIN — ALVIMOPAN 12 MG: 12 CAPSULE ORAL at 18:00

## 2017-01-25 RX ADMIN — SUCRALFATE 1 G: 1 SUSPENSION ORAL at 07:05

## 2017-01-25 RX ADMIN — ENOXAPARIN SODIUM 30 MG: 30 INJECTION SUBCUTANEOUS at 18:57

## 2017-01-25 RX ADMIN — METRONIDAZOLE 500 MG: 500 INJECTION, SOLUTION INTRAVENOUS at 22:28

## 2017-01-25 RX ADMIN — SUCRALFATE 1 G: 1 SUSPENSION ORAL at 18:00

## 2017-01-25 RX ADMIN — SUCRALFATE 1 G: 1 SUSPENSION ORAL at 23:50

## 2017-01-25 RX ADMIN — DEXTROSE MONOHYDRATE 75 ML/HR: 100 INJECTION, SOLUTION INTRAVENOUS at 11:22

## 2017-01-25 RX ADMIN — FUROSEMIDE 20 MG: 10 INJECTION, SOLUTION INTRAMUSCULAR; INTRAVENOUS at 10:00

## 2017-01-25 RX ADMIN — ENOXAPARIN SODIUM 30 MG: 30 INJECTION SUBCUTANEOUS at 07:05

## 2017-01-25 RX ADMIN — METRONIDAZOLE 500 MG: 500 INJECTION, SOLUTION INTRAVENOUS at 16:04

## 2017-01-25 RX ADMIN — GENTAMICIN SULFATE 2 DROP: 3 SOLUTION/ DROPS OPHTHALMIC at 20:50

## 2017-01-25 RX ADMIN — DEXTROSE MONOHYDRATE, SODIUM CHLORIDE, AND POTASSIUM CHLORIDE 125 ML/HR: 50; 4.5; 2.98 INJECTION, SOLUTION INTRAVENOUS at 02:24

## 2017-01-25 RX ADMIN — METRONIDAZOLE 500 MG: 500 INJECTION, SOLUTION INTRAVENOUS at 04:41

## 2017-01-26 LAB
ALBUMIN SERPL-MCNC: 3.6 G/DL (ref 3.5–5)
ALBUMIN/GLOB SERPL: 1.2 G/DL (ref 1.1–2.5)
ALP SERPL-CCNC: 49 U/L (ref 24–120)
ALT SERPL W P-5'-P-CCNC: 33 U/L (ref 0–54)
ANION GAP SERPL CALCULATED.3IONS-SCNC: 7 MMOL/L (ref 4–13)
AST SERPL-CCNC: 47 U/L (ref 7–45)
BILIRUB SERPL-MCNC: 0.3 MG/DL (ref 0.1–1)
BUN BLD-MCNC: <2 MG/DL (ref 5–21)
BUN/CREAT SERPL: ABNORMAL (ref 7–25)
CALCIUM SPEC-SCNC: 8.3 MG/DL (ref 8.4–10.4)
CHLORIDE SERPL-SCNC: 96 MMOL/L (ref 98–110)
CO2 SERPL-SCNC: 34 MMOL/L (ref 24–31)
CREAT BLD-MCNC: 0.85 MG/DL (ref 0.5–1.4)
DEPRECATED RDW RBC AUTO: 51.8 FL (ref 40–54)
ERYTHROCYTE [DISTWIDTH] IN BLOOD BY AUTOMATED COUNT: 16.6 % (ref 12–15)
GFR SERPL CREATININE-BSD FRML MDRD: 88 ML/MIN/1.73
GLOBULIN UR ELPH-MCNC: 2.9 GM/DL
GLUCOSE BLD-MCNC: 114 MG/DL (ref 70–100)
HCT VFR BLD AUTO: 37 % (ref 40–52)
HGB BLD-MCNC: 11.4 G/DL (ref 14–18)
MCH RBC QN AUTO: 27 PG (ref 28–32)
MCHC RBC AUTO-ENTMCNC: 30.8 G/DL (ref 33–36)
MCV RBC AUTO: 87.5 FL (ref 82–95)
PLATELET # BLD AUTO: 204 10*3/MM3 (ref 130–400)
PMV BLD AUTO: 12.7 FL (ref 6–12)
POTASSIUM BLD-SCNC: 3.2 MMOL/L (ref 3.5–5.3)
PROT SERPL-MCNC: 6.5 G/DL (ref 6.3–8.7)
RBC # BLD AUTO: 4.23 10*6/MM3 (ref 4.8–5.9)
SODIUM BLD-SCNC: 137 MMOL/L (ref 135–145)
WBC NRBC COR # BLD: 8.78 10*3/MM3 (ref 4.8–10.8)

## 2017-01-26 PROCEDURE — 25010000002 ENOXAPARIN PER 10 MG: Performed by: SPECIALIST

## 2017-01-26 PROCEDURE — 25010000002 FUROSEMIDE PER 20 MG: Performed by: SPECIALIST

## 2017-01-26 PROCEDURE — 85027 COMPLETE CBC AUTOMATED: CPT | Performed by: SPECIALIST

## 2017-01-26 PROCEDURE — 25010000002 POTASSIUM CHLORIDE PER 2 MEQ: Performed by: SPECIALIST

## 2017-01-26 PROCEDURE — 80053 COMPREHEN METABOLIC PANEL: CPT | Performed by: SPECIALIST

## 2017-01-26 RX ORDER — OXYCODONE HYDROCHLORIDE AND ACETAMINOPHEN 5; 325 MG/1; MG/1
1 TABLET ORAL EVERY 4 HOURS PRN
Status: DISCONTINUED | OUTPATIENT
Start: 2017-01-26 | End: 2017-01-27 | Stop reason: SDUPTHER

## 2017-01-26 RX ORDER — POTASSIUM CHLORIDE 14.9 MG/ML
20 INJECTION INTRAVENOUS
Status: COMPLETED | OUTPATIENT
Start: 2017-01-26 | End: 2017-01-26

## 2017-01-26 RX ORDER — MORPHINE SULFATE 4 MG/ML
4 INJECTION, SOLUTION INTRAMUSCULAR; INTRAVENOUS
Status: DISCONTINUED | OUTPATIENT
Start: 2017-01-26 | End: 2017-01-28 | Stop reason: HOSPADM

## 2017-01-26 RX ORDER — FUROSEMIDE 10 MG/ML
20 INJECTION INTRAMUSCULAR; INTRAVENOUS ONCE
Status: COMPLETED | OUTPATIENT
Start: 2017-01-26 | End: 2017-01-26

## 2017-01-26 RX ADMIN — FUROSEMIDE 20 MG: 10 INJECTION, SOLUTION INTRAMUSCULAR; INTRAVENOUS at 11:17

## 2017-01-26 RX ADMIN — METRONIDAZOLE 500 MG: 500 INJECTION, SOLUTION INTRAVENOUS at 21:55

## 2017-01-26 RX ADMIN — ALVIMOPAN 12 MG: 12 CAPSULE ORAL at 17:42

## 2017-01-26 RX ADMIN — ENOXAPARIN SODIUM 30 MG: 30 INJECTION SUBCUTANEOUS at 06:20

## 2017-01-26 RX ADMIN — GENTAMICIN SULFATE 2 DROP: 3 SOLUTION/ DROPS OPHTHALMIC at 17:42

## 2017-01-26 RX ADMIN — POTASSIUM CHLORIDE 20 MEQ: 200 INJECTION, SOLUTION INTRAVENOUS at 17:43

## 2017-01-26 RX ADMIN — GENTAMICIN SULFATE 2 DROP: 3 SOLUTION/ DROPS OPHTHALMIC at 20:29

## 2017-01-26 RX ADMIN — POTASSIUM CHLORIDE 20 MEQ: 200 INJECTION, SOLUTION INTRAVENOUS at 15:20

## 2017-01-26 RX ADMIN — POTASSIUM CHLORIDE 20 MEQ: 200 INJECTION, SOLUTION INTRAVENOUS at 20:28

## 2017-01-26 RX ADMIN — METRONIDAZOLE 500 MG: 500 INJECTION, SOLUTION INTRAVENOUS at 10:27

## 2017-01-26 RX ADMIN — ALVIMOPAN 12 MG: 12 CAPSULE ORAL at 08:17

## 2017-01-26 RX ADMIN — METRONIDAZOLE 500 MG: 500 INJECTION, SOLUTION INTRAVENOUS at 04:24

## 2017-01-26 RX ADMIN — POTASSIUM CHLORIDE 20 MEQ: 200 INJECTION, SOLUTION INTRAVENOUS at 13:52

## 2017-01-26 RX ADMIN — SUCRALFATE 1 G: 1 SUSPENSION ORAL at 17:42

## 2017-01-26 RX ADMIN — POTASSIUM CHLORIDE, DEXTROSE MONOHYDRATE AND SODIUM CHLORIDE 50 ML/HR: 150; 5; 450 INJECTION, SOLUTION INTRAVENOUS at 18:53

## 2017-01-26 RX ADMIN — SUCRALFATE 1 G: 1 SUSPENSION ORAL at 11:19

## 2017-01-26 RX ADMIN — GENTAMICIN SULFATE 2 DROP: 3 SOLUTION/ DROPS OPHTHALMIC at 07:57

## 2017-01-26 RX ADMIN — SUCRALFATE 1 G: 1 SUSPENSION ORAL at 06:20

## 2017-01-26 RX ADMIN — SUCRALFATE 1 G: 1 SUSPENSION ORAL at 23:32

## 2017-01-26 RX ADMIN — GENTAMICIN SULFATE 2 DROP: 3 SOLUTION/ DROPS OPHTHALMIC at 11:20

## 2017-01-26 RX ADMIN — PANTOPRAZOLE SODIUM 40 MG: 40 INJECTION, POWDER, FOR SOLUTION INTRAVENOUS at 06:20

## 2017-01-26 RX ADMIN — LABETALOL HYDROCHLORIDE 10 MG: 5 INJECTION, SOLUTION INTRAVENOUS at 04:50

## 2017-01-26 RX ADMIN — TAMSULOSIN HYDROCHLORIDE 0.8 MG: 0.4 CAPSULE ORAL at 08:16

## 2017-01-26 RX ADMIN — LABETALOL HYDROCHLORIDE 10 MG: 5 INJECTION, SOLUTION INTRAVENOUS at 23:33

## 2017-01-26 RX ADMIN — METRONIDAZOLE 500 MG: 500 INJECTION, SOLUTION INTRAVENOUS at 15:30

## 2017-01-26 RX ADMIN — ASCORBIC ACID, VITAMIN A PALMITATE, CHOLECALCIFEROL, THIAMINE HYDROCHLORIDE, RIBOFLAVIN-5 PHOSPHATE SODIUM, PYRIDOXINE HYDROCHLORIDE, NIACINAMIDE, DEXPANTHENOL, ALPHA-TOCOPHEROL ACETATE, VITAMIN K1, FOLIC ACID, BIOTIN, CYANOCOBALAMIN 75 ML/HR: 200; 3300; 200; 6; 3.6; 6; 40; 15; 10; 150; 600; 60; 5 INJECTION, SOLUTION INTRAVENOUS at 06:21

## 2017-01-26 RX ADMIN — POTASSIUM CHLORIDE 20 MEQ: 200 INJECTION, SOLUTION INTRAVENOUS at 11:17

## 2017-01-26 RX ADMIN — ENOXAPARIN SODIUM 30 MG: 30 INJECTION SUBCUTANEOUS at 17:43

## 2017-01-27 ENCOUNTER — TRANSCRIBE ORDERS (OUTPATIENT)
Dept: ADMINISTRATIVE | Facility: HOSPITAL | Age: 72
End: 2017-01-27

## 2017-01-27 DIAGNOSIS — C18.0 MALIGNANT NEOPLASM OF CECUM (HCC): Primary | ICD-10-CM

## 2017-01-27 LAB — CEA SERPL-MCNC: 2.98 NG/ML (ref 0–5)

## 2017-01-27 PROCEDURE — 82378 CARCINOEMBRYONIC ANTIGEN: CPT | Performed by: NURSE PRACTITIONER

## 2017-01-27 PROCEDURE — 25010000002 ENOXAPARIN PER 10 MG: Performed by: SPECIALIST

## 2017-01-27 PROCEDURE — 86301 IMMUNOASSAY TUMOR CA 19-9: CPT | Performed by: NURSE PRACTITIONER

## 2017-01-27 RX ORDER — OXYCODONE HYDROCHLORIDE AND ACETAMINOPHEN 5; 325 MG/1; MG/1
1 TABLET ORAL EVERY 4 HOURS PRN
Status: DISCONTINUED | OUTPATIENT
Start: 2017-01-27 | End: 2017-01-28 | Stop reason: HOSPADM

## 2017-01-27 RX ORDER — ONDANSETRON 8 MG/1
8 TABLET, ORALLY DISINTEGRATING ORAL EVERY 6 HOURS PRN
Status: DISCONTINUED | OUTPATIENT
Start: 2017-01-27 | End: 2017-01-28 | Stop reason: HOSPADM

## 2017-01-27 RX ORDER — POTASSIUM CHLORIDE 14.9 MG/ML
20 INJECTION INTRAVENOUS
Status: DISCONTINUED | OUTPATIENT
Start: 2017-01-27 | End: 2017-01-27

## 2017-01-27 RX ADMIN — SUCRALFATE 1 G: 1 SUSPENSION ORAL at 11:44

## 2017-01-27 RX ADMIN — GENTAMICIN SULFATE 2 DROP: 3 SOLUTION/ DROPS OPHTHALMIC at 08:22

## 2017-01-27 RX ADMIN — POTASSIUM CHLORIDE, DEXTROSE MONOHYDRATE AND SODIUM CHLORIDE 50 ML/HR: 150; 5; 450 INJECTION, SOLUTION INTRAVENOUS at 16:57

## 2017-01-27 RX ADMIN — ENALAPRILAT 1.25 MG: 1.25 INJECTION INTRAVENOUS at 04:25

## 2017-01-27 RX ADMIN — METRONIDAZOLE 500 MG: 500 INJECTION, SOLUTION INTRAVENOUS at 21:56

## 2017-01-27 RX ADMIN — METRONIDAZOLE 500 MG: 500 INJECTION, SOLUTION INTRAVENOUS at 16:58

## 2017-01-27 RX ADMIN — ALVIMOPAN 12 MG: 12 CAPSULE ORAL at 08:22

## 2017-01-27 RX ADMIN — PANTOPRAZOLE SODIUM 40 MG: 40 INJECTION, POWDER, FOR SOLUTION INTRAVENOUS at 05:39

## 2017-01-27 RX ADMIN — ENOXAPARIN SODIUM 30 MG: 30 INJECTION SUBCUTANEOUS at 05:39

## 2017-01-27 RX ADMIN — GENTAMICIN SULFATE 2 DROP: 3 SOLUTION/ DROPS OPHTHALMIC at 17:03

## 2017-01-27 RX ADMIN — METHYLCELLULOSE 1000 MG: 500 TABLET ORAL at 08:30

## 2017-01-27 RX ADMIN — ENOXAPARIN SODIUM 30 MG: 30 INJECTION SUBCUTANEOUS at 17:03

## 2017-01-27 RX ADMIN — SUCRALFATE 1 G: 1 SUSPENSION ORAL at 05:39

## 2017-01-27 RX ADMIN — TAMSULOSIN HYDROCHLORIDE 0.8 MG: 0.4 CAPSULE ORAL at 08:22

## 2017-01-27 RX ADMIN — METRONIDAZOLE 500 MG: 500 INJECTION, SOLUTION INTRAVENOUS at 04:25

## 2017-01-27 RX ADMIN — SUCRALFATE 1 G: 1 SUSPENSION ORAL at 17:03

## 2017-01-27 RX ADMIN — GENTAMICIN SULFATE 2 DROP: 3 SOLUTION/ DROPS OPHTHALMIC at 11:44

## 2017-01-27 RX ADMIN — GENTAMICIN SULFATE 2 DROP: 3 SOLUTION/ DROPS OPHTHALMIC at 21:56

## 2017-01-27 RX ADMIN — METHYLCELLULOSE 1000 MG: 500 TABLET ORAL at 17:03

## 2017-01-27 RX ADMIN — METRONIDAZOLE 500 MG: 500 INJECTION, SOLUTION INTRAVENOUS at 10:15

## 2017-01-28 VITALS
HEART RATE: 91 BPM | TEMPERATURE: 98.3 F | HEIGHT: 64 IN | DIASTOLIC BLOOD PRESSURE: 88 MMHG | OXYGEN SATURATION: 92 % | SYSTOLIC BLOOD PRESSURE: 149 MMHG | RESPIRATION RATE: 18 BRPM | WEIGHT: 165 LBS | BODY MASS INDEX: 28.17 KG/M2

## 2017-01-28 LAB
ALBUMIN SERPL-MCNC: 3.3 G/DL (ref 3.5–5)
ALBUMIN/GLOB SERPL: 1 G/DL (ref 1.1–2.5)
ALP SERPL-CCNC: 46 U/L (ref 24–120)
ALT SERPL W P-5'-P-CCNC: 29 U/L (ref 0–54)
ANION GAP SERPL CALCULATED.3IONS-SCNC: 8 MMOL/L (ref 4–13)
AST SERPL-CCNC: 43 U/L (ref 7–45)
BILIRUB SERPL-MCNC: 0.3 MG/DL (ref 0.1–1)
BUN BLD-MCNC: 4 MG/DL (ref 5–21)
BUN/CREAT SERPL: 4 (ref 7–25)
CALCIUM SPEC-SCNC: 8.3 MG/DL (ref 8.4–10.4)
CANCER AG19-9 SERPL-ACNC: 1 U/ML (ref 0–35)
CHLORIDE SERPL-SCNC: 106 MMOL/L (ref 98–110)
CO2 SERPL-SCNC: 27 MMOL/L (ref 24–31)
CREAT BLD-MCNC: 0.99 MG/DL (ref 0.5–1.4)
DEPRECATED RDW RBC AUTO: 51 FL (ref 40–54)
ERYTHROCYTE [DISTWIDTH] IN BLOOD BY AUTOMATED COUNT: 16.4 % (ref 12–15)
ERYTHROCYTE [SEDIMENTATION RATE] IN BLOOD: 58 MM/HR (ref 0–15)
GFR SERPL CREATININE-BSD FRML MDRD: 74 ML/MIN/1.73
GLOBULIN UR ELPH-MCNC: 3.2 GM/DL
GLUCOSE BLD-MCNC: 99 MG/DL (ref 70–100)
HCT VFR BLD AUTO: 34.2 % (ref 40–52)
HGB BLD-MCNC: 10.7 G/DL (ref 14–18)
MCH RBC QN AUTO: 27 PG (ref 28–32)
MCHC RBC AUTO-ENTMCNC: 31.3 G/DL (ref 33–36)
MCV RBC AUTO: 86.1 FL (ref 82–95)
PLATELET # BLD AUTO: 195 10*3/MM3 (ref 130–400)
PMV BLD AUTO: 11.7 FL (ref 6–12)
POTASSIUM BLD-SCNC: 3.5 MMOL/L (ref 3.5–5.3)
PROT SERPL-MCNC: 6.5 G/DL (ref 6.3–8.7)
RBC # BLD AUTO: 3.97 10*6/MM3 (ref 4.8–5.9)
SODIUM BLD-SCNC: 141 MMOL/L (ref 135–145)
WBC NRBC COR # BLD: 9.17 10*3/MM3 (ref 4.8–10.8)

## 2017-01-28 PROCEDURE — 85027 COMPLETE CBC AUTOMATED: CPT | Performed by: SPECIALIST

## 2017-01-28 PROCEDURE — 80053 COMPREHEN METABOLIC PANEL: CPT | Performed by: SPECIALIST

## 2017-01-28 PROCEDURE — 85651 RBC SED RATE NONAUTOMATED: CPT | Performed by: SPECIALIST

## 2017-01-28 PROCEDURE — 25010000002 ENOXAPARIN PER 10 MG: Performed by: SPECIALIST

## 2017-01-28 RX ORDER — ONDANSETRON HCL 8 MG
8 TABLET ORAL EVERY 8 HOURS PRN
Qty: 10 TABLET | Refills: 1 | Status: SHIPPED | OUTPATIENT
Start: 2017-01-28 | End: 2018-01-24

## 2017-01-28 RX ORDER — METHYLCELLULOSE 2 G/19G
2 POWDER, FOR SOLUTION ORAL DAILY
Qty: 60 G | Refills: 6 | Status: SHIPPED | OUTPATIENT
Start: 2017-01-28 | End: 2017-02-27

## 2017-01-28 RX ORDER — OXYCODONE HYDROCHLORIDE AND ACETAMINOPHEN 5; 325 MG/1; MG/1
1 TABLET ORAL EVERY 4 HOURS PRN
Qty: 40 TABLET | Refills: 0 | Status: SHIPPED | OUTPATIENT
Start: 2017-01-28 | End: 2018-01-24

## 2017-01-28 RX ADMIN — SUCRALFATE 1 G: 1 SUSPENSION ORAL at 05:54

## 2017-01-28 RX ADMIN — GENTAMICIN SULFATE 2 DROP: 3 SOLUTION/ DROPS OPHTHALMIC at 12:12

## 2017-01-28 RX ADMIN — METHYLCELLULOSE 1000 MG: 500 TABLET ORAL at 08:15

## 2017-01-28 RX ADMIN — SUCRALFATE 1 G: 1 SUSPENSION ORAL at 12:11

## 2017-01-28 RX ADMIN — TAMSULOSIN HYDROCHLORIDE 0.8 MG: 0.4 CAPSULE ORAL at 08:14

## 2017-01-28 RX ADMIN — METRONIDAZOLE 500 MG: 500 INJECTION, SOLUTION INTRAVENOUS at 10:25

## 2017-01-28 RX ADMIN — ENOXAPARIN SODIUM 30 MG: 30 INJECTION SUBCUTANEOUS at 05:54

## 2017-01-28 RX ADMIN — SUCRALFATE 1 G: 1 SUSPENSION ORAL at 00:50

## 2017-01-28 RX ADMIN — PANTOPRAZOLE SODIUM 40 MG: 40 INJECTION, POWDER, FOR SOLUTION INTRAVENOUS at 05:54

## 2017-01-28 RX ADMIN — METRONIDAZOLE 500 MG: 500 INJECTION, SOLUTION INTRAVENOUS at 04:32

## 2017-01-28 RX ADMIN — GENTAMICIN SULFATE 2 DROP: 3 SOLUTION/ DROPS OPHTHALMIC at 08:14

## 2017-03-02 ENCOUNTER — HOSPITAL ENCOUNTER (OUTPATIENT)
Dept: CT IMAGING | Facility: HOSPITAL | Age: 72
Discharge: HOME | End: 2017-03-02
Attending: INTERNAL MEDICINE | Admitting: INTERNAL MEDICINE

## 2017-03-02 DIAGNOSIS — C18.0 MALIGNANT NEOPLASM OF CECUM (HCC): ICD-10-CM

## 2017-03-02 LAB — CREAT BLDA-MCNC: 0.9 MG/DL (ref 0.6–1.3)

## 2017-03-02 PROCEDURE — 74177 CT ABD & PELVIS W/CONTRAST: CPT

## 2017-03-02 PROCEDURE — 0 IOPAMIDOL 61 % SOLUTION: Performed by: INTERNAL MEDICINE

## 2017-03-02 PROCEDURE — 82565 ASSAY OF CREATININE: CPT

## 2017-03-02 RX ADMIN — IOPAMIDOL 100 ML: 612 INJECTION, SOLUTION INTRAVENOUS at 09:15

## 2017-03-24 ENCOUNTER — TRANSCRIBE ORDERS (OUTPATIENT)
Dept: ADMINISTRATIVE | Facility: HOSPITAL | Age: 72
End: 2017-03-24

## 2017-03-24 DIAGNOSIS — Z12.89 ENCOUNTER FOR SCREENING FOR MALIGNANT NEOPLASM OF OTHER SITES: Primary | ICD-10-CM

## 2017-03-24 DIAGNOSIS — C18.2 MALIGNANT NEOPLASM OF ASCENDING COLON (HCC): ICD-10-CM

## 2017-04-11 ENCOUNTER — HOSPITAL ENCOUNTER (OUTPATIENT)
Dept: CT IMAGING | Facility: HOSPITAL | Age: 72
Discharge: HOME | End: 2017-04-11
Attending: INTERNAL MEDICINE | Admitting: INTERNAL MEDICINE

## 2017-04-11 DIAGNOSIS — C18.2 MALIGNANT NEOPLASM OF ASCENDING COLON (HCC): ICD-10-CM

## 2017-04-11 DIAGNOSIS — Z12.89 ENCOUNTER FOR SCREENING FOR MALIGNANT NEOPLASM OF OTHER SITES: ICD-10-CM

## 2017-04-11 PROCEDURE — 82565 ASSAY OF CREATININE: CPT

## 2017-04-11 PROCEDURE — 0 IOPAMIDOL 61 % SOLUTION: Performed by: INTERNAL MEDICINE

## 2017-04-11 PROCEDURE — 71260 CT THORAX DX C+: CPT

## 2017-04-11 RX ADMIN — IOPAMIDOL 100 ML: 612 INJECTION, SOLUTION INTRAVENOUS at 11:30

## 2017-04-12 LAB — CREAT BLDA-MCNC: 1.1 MG/DL (ref 0.6–1.3)

## 2017-04-17 ENCOUNTER — APPOINTMENT (OUTPATIENT)
Dept: LAB | Facility: HOSPITAL | Age: 72
End: 2017-04-17
Attending: SPECIALIST

## 2017-04-17 ENCOUNTER — TRANSCRIBE ORDERS (OUTPATIENT)
Dept: ADMINISTRATIVE | Facility: HOSPITAL | Age: 72
End: 2017-04-17

## 2017-04-17 DIAGNOSIS — Z13.228 SCREENING FOR PHENYLKETONURIA (PKU): Primary | ICD-10-CM

## 2017-04-17 LAB
BASOPHILS # BLD AUTO: 0.05 10*3/MM3 (ref 0–0.2)
BASOPHILS NFR BLD AUTO: 0.4 % (ref 0–2)
DEPRECATED RDW RBC AUTO: 57.3 FL (ref 40–54)
EOSINOPHIL # BLD AUTO: 0.14 10*3/MM3 (ref 0–0.7)
EOSINOPHIL NFR BLD AUTO: 1.1 % (ref 0–4)
ERYTHROCYTE [DISTWIDTH] IN BLOOD BY AUTOMATED COUNT: 18.5 % (ref 12–15)
HCT VFR BLD AUTO: 41.8 % (ref 40–52)
HGB BLD-MCNC: 13.4 G/DL (ref 14–18)
IMM GRANULOCYTES # BLD: 0.03 10*3/MM3 (ref 0–0.03)
IMM GRANULOCYTES NFR BLD: 0.2 % (ref 0–5)
LYMPHOCYTES # BLD AUTO: 2.18 10*3/MM3 (ref 0.72–4.86)
LYMPHOCYTES NFR BLD AUTO: 16.4 % (ref 15–45)
MCH RBC QN AUTO: 27.1 PG (ref 28–32)
MCHC RBC AUTO-ENTMCNC: 32.1 G/DL (ref 33–36)
MCV RBC AUTO: 84.4 FL (ref 82–95)
MONOCYTES # BLD AUTO: 1.48 10*3/MM3 (ref 0.19–1.3)
MONOCYTES NFR BLD AUTO: 11.1 % (ref 4–12)
NEUTROPHILS # BLD AUTO: 9.43 10*3/MM3 (ref 1.87–8.4)
NEUTROPHILS NFR BLD AUTO: 70.8 % (ref 39–78)
PLATELET # BLD AUTO: 227 10*3/MM3 (ref 130–400)
PMV BLD AUTO: 12.4 FL (ref 6–12)
RBC # BLD AUTO: 4.95 10*6/MM3 (ref 4.8–5.9)
WBC NRBC COR # BLD: 13.31 10*3/MM3 (ref 4.8–10.8)

## 2017-04-17 PROCEDURE — 85025 COMPLETE CBC W/AUTO DIFF WBC: CPT | Performed by: SPECIALIST

## 2017-04-17 PROCEDURE — 36415 COLL VENOUS BLD VENIPUNCTURE: CPT | Performed by: SPECIALIST

## 2017-10-09 ENCOUNTER — ANESTHESIA EVENT (OUTPATIENT)
Dept: OPERATING ROOM | Age: 72
End: 2017-10-09

## 2017-10-09 ENCOUNTER — OFFICE VISIT (OUTPATIENT)
Dept: OTOLARYNGOLOGY | Age: 72
End: 2017-10-09
Payer: MEDICARE

## 2017-10-09 VITALS
OXYGEN SATURATION: 98 % | DIASTOLIC BLOOD PRESSURE: 70 MMHG | HEART RATE: 83 BPM | SYSTOLIC BLOOD PRESSURE: 130 MMHG | HEIGHT: 65 IN | TEMPERATURE: 96.6 F | BODY MASS INDEX: 24.66 KG/M2 | RESPIRATION RATE: 20 BRPM | WEIGHT: 148 LBS

## 2017-10-09 DIAGNOSIS — K13.70 LESION OF ORAL MUCOSA: Primary | ICD-10-CM

## 2017-10-09 PROCEDURE — 99203 OFFICE O/P NEW LOW 30 MIN: CPT | Performed by: OTOLARYNGOLOGY

## 2017-10-09 RX ORDER — LOVASTATIN 40 MG/1
40 TABLET ORAL
COMMUNITY
Start: 2016-12-04

## 2017-10-09 RX ORDER — OMEPRAZOLE 20 MG/1
20 CAPSULE, DELAYED RELEASE ORAL
COMMUNITY
Start: 2016-10-06

## 2017-10-09 NOTE — PROGRESS NOTES
[facility]  [address]  [address] [place]  Dept: [phone]  Dept Fax: [phone]  Loc: [phone]    Jodie Faulkner is a 74 y.o. male who presents today for his medical conditions/complaints as noted below. Jodie Faulkner is c/o of Establish Care (ref from [facility] for white spot on throat) and Pharyngitis      History reviewed. No pertinent past medical history. History reviewed. No pertinent surgical history. History reviewed. No pertinent family history. Social History   Substance Use Topics    Smoking status: Current Every Day Smoker     Packs/day: 1.00    Smokeless tobacco: Not on file    Alcohol use Yes      Current Outpatient Prescriptions   Medication Sig Dispense Refill    lovastatin (MEVACOR) 40 MG tablet Take 40 mg by mouth      omeprazole (PRILOSEC) 20 MG delayed release capsule Take 20 mg by mouth       No current facility-administered medications for this visit. No Known Allergies    Subjective:    Dentist recently noted oral mucosal changes and referred pt. No other symptoms but 50 pk-yr history cigarettes. Review of Systems  A 12 point review of systems was completed, reviewed, and scanned to chart per staff. Patient medical history reviewed. Objective:     Physical Exam   Constitutional: He is oriented to person, place, and time. He appears well-developed and well-nourished. HENT:   Head: Normocephalic and atraumatic. Right Ear: Tympanic membrane, external ear and ear canal normal. No drainage. No decreased hearing is noted. Left Ear: Tympanic membrane, external ear and ear canal normal. No drainage. No decreased hearing is noted. Nose: Nose normal. No mucosal edema, rhinorrhea or septal deviation. Right sinus exhibits no maxillary sinus tenderness and no frontal sinus tenderness. Left sinus exhibits no maxillary sinus tenderness and no frontal sinus tenderness.    Mouth/Throat: Uvula is midline and oropharynx is clear and moist. No oral lesions. Lesion of oral mucosa. Leukoplastic whitish discoloration near right faucial arch. No bleeding or pain. Eyes: Conjunctivae and EOM are normal. Pupils are equal, round, and reactive to light. Neck: Normal range of motion. Neck supple. No tracheal deviation present. No thyromegaly present. Pulmonary/Chest: No stridor. Lymphadenopathy:     He has no cervical adenopathy. Neurological: He is alert and oriented to person, place, and time. No cranial nerve deficit. Coordination normal.   Psychiatric: He has a normal mood and affect. His behavior is normal.   Nursing note and vitals reviewed. /70   Pulse 83   Temp 96.6 °F (35.9 °C) (Temporal)   Resp 20   Ht 5' 5\" (1.651 m)   Wt 148 lb (67.1 kg)   SpO2 98%   BMI 24.63 kg/m²     Assessment:     1. Lesion of oral mucosa         Plan:    Area of concern in difficult area to biopsy thus easier for patient to have GA and will bx multiple sites as well as perform Lx Bx. Risk / benefits explained. Pt agrees. HP completed. Laryngoscopy with biopsy possible  And biopsy of oral / OP mucosa right faucial area--multiple sites. Kahlil Babb am scribing for and in the presence of Dr. Tamiko Paulson October 9, 2017/10:29 AM/ Karo Harris. Ross Walsh MD, personally performed the services described in this documentation as scribed by Karo Harris in my presence, and it is both accurate and complete.

## 2017-10-12 ENCOUNTER — TRANSCRIBE ORDERS (OUTPATIENT)
Dept: ADMINISTRATIVE | Facility: HOSPITAL | Age: 72
End: 2017-10-12

## 2017-10-12 DIAGNOSIS — C18.2 MALIGNANT NEOPLASM OF ASCENDING COLON (HCC): ICD-10-CM

## 2017-10-12 DIAGNOSIS — R97.0 ELEVATED CARCINOEMBRYONIC ANTIGEN (CEA): Primary | ICD-10-CM

## 2017-10-17 ENCOUNTER — HOSPITAL ENCOUNTER (OUTPATIENT)
Age: 72
Setting detail: SPECIMEN
Discharge: HOME | End: 2017-10-17
Payer: MEDICARE

## 2017-10-17 ENCOUNTER — HOSPITAL ENCOUNTER (OUTPATIENT)
Age: 72
Setting detail: OUTPATIENT SURGERY
Discharge: HOME | End: 2017-10-17
Attending: OTOLARYNGOLOGY | Admitting: OTOLARYNGOLOGY

## 2017-10-17 ENCOUNTER — ANESTHESIA (OUTPATIENT)
Dept: OPERATING ROOM | Age: 72
End: 2017-10-17
Payer: MEDICARE

## 2017-10-17 VITALS
OXYGEN SATURATION: 96 % | HEIGHT: 65 IN | RESPIRATION RATE: 18 BRPM | BODY MASS INDEX: 24.99 KG/M2 | DIASTOLIC BLOOD PRESSURE: 80 MMHG | SYSTOLIC BLOOD PRESSURE: 160 MMHG | TEMPERATURE: 97 F | WEIGHT: 150 LBS | HEART RATE: 66 BPM

## 2017-10-17 VITALS
RESPIRATION RATE: 1 BRPM | SYSTOLIC BLOOD PRESSURE: 117 MMHG | TEMPERATURE: 97 F | DIASTOLIC BLOOD PRESSURE: 69 MMHG | OXYGEN SATURATION: 99 %

## 2017-10-17 PROCEDURE — G8907 PT DOC NO EVENTS ON DISCHARG: HCPCS

## 2017-10-17 PROCEDURE — 82948 REAGENT STRIP/BLOOD GLUCOSE: CPT

## 2017-10-17 PROCEDURE — G8918 PT W/O PREOP ORDER IV AB PRO: HCPCS

## 2017-10-17 PROCEDURE — 00320 ANES ALL PX NECK NOS 1YR/>: CPT | Performed by: NURSE ANESTHETIST, CERTIFIED REGISTERED

## 2017-10-17 PROCEDURE — 88305 TISSUE EXAM BY PATHOLOGIST: CPT

## 2017-10-17 PROCEDURE — 31535 LARYNGOSCOPY W/BIOPSY: CPT

## 2017-10-17 RX ORDER — GLYCOPYRROLATE 0.2 MG/ML
INJECTION INTRAMUSCULAR; INTRAVENOUS PRN
Status: DISCONTINUED | OUTPATIENT
Start: 2017-10-17 | End: 2017-10-17 | Stop reason: SDUPTHER

## 2017-10-17 RX ORDER — LIDOCAINE HYDROCHLORIDE 10 MG/ML
1 INJECTION, SOLUTION EPIDURAL; INFILTRATION; INTRACAUDAL; PERINEURAL
Status: DISCONTINUED | OUTPATIENT
Start: 2017-10-17 | End: 2017-10-17 | Stop reason: HOSPADM

## 2017-10-17 RX ORDER — ONDANSETRON 2 MG/ML
INJECTION INTRAMUSCULAR; INTRAVENOUS PRN
Status: DISCONTINUED | OUTPATIENT
Start: 2017-10-17 | End: 2017-10-17 | Stop reason: SDUPTHER

## 2017-10-17 RX ORDER — NEOSTIGMINE METHYLSULFATE 1 MG/ML
INJECTION, SOLUTION INTRAVENOUS PRN
Status: DISCONTINUED | OUTPATIENT
Start: 2017-10-17 | End: 2017-10-17 | Stop reason: SDUPTHER

## 2017-10-17 RX ORDER — PROPOFOL 10 MG/ML
INJECTION, EMULSION INTRAVENOUS PRN
Status: DISCONTINUED | OUTPATIENT
Start: 2017-10-17 | End: 2017-10-17 | Stop reason: SDUPTHER

## 2017-10-17 RX ORDER — ROCURONIUM BROMIDE 10 MG/ML
INJECTION, SOLUTION INTRAVENOUS PRN
Status: DISCONTINUED | OUTPATIENT
Start: 2017-10-17 | End: 2017-10-17 | Stop reason: SDUPTHER

## 2017-10-17 RX ORDER — MIDAZOLAM HYDROCHLORIDE 1 MG/ML
INJECTION INTRAMUSCULAR; INTRAVENOUS PRN
Status: DISCONTINUED | OUTPATIENT
Start: 2017-10-17 | End: 2017-10-17 | Stop reason: SDUPTHER

## 2017-10-17 RX ORDER — DEXAMETHASONE SODIUM PHOSPHATE 4 MG/ML
INJECTION, SOLUTION INTRA-ARTICULAR; INTRALESIONAL; INTRAMUSCULAR; INTRAVENOUS; SOFT TISSUE PRN
Status: DISCONTINUED | OUTPATIENT
Start: 2017-10-17 | End: 2017-10-17 | Stop reason: SDUPTHER

## 2017-10-17 RX ORDER — BUPIVACAINE HYDROCHLORIDE 2.5 MG/ML
INJECTION, SOLUTION EPIDURAL; INFILTRATION; INTRACAUDAL PRN
Status: DISCONTINUED | OUTPATIENT
Start: 2017-10-17 | End: 2017-10-17 | Stop reason: HOSPADM

## 2017-10-17 RX ORDER — FENTANYL CITRATE 50 UG/ML
INJECTION, SOLUTION INTRAMUSCULAR; INTRAVENOUS PRN
Status: DISCONTINUED | OUTPATIENT
Start: 2017-10-17 | End: 2017-10-17 | Stop reason: SDUPTHER

## 2017-10-17 RX ORDER — SODIUM CHLORIDE, SODIUM LACTATE, POTASSIUM CHLORIDE, CALCIUM CHLORIDE 600; 310; 30; 20 MG/100ML; MG/100ML; MG/100ML; MG/100ML
INJECTION, SOLUTION INTRAVENOUS CONTINUOUS
Status: DISCONTINUED | OUTPATIENT
Start: 2017-10-17 | End: 2017-10-17 | Stop reason: HOSPADM

## 2017-10-17 RX ADMIN — FENTANYL CITRATE 50 MCG: 50 INJECTION, SOLUTION INTRAMUSCULAR; INTRAVENOUS at 09:06

## 2017-10-17 RX ADMIN — ONDANSETRON 4 MG: 2 INJECTION INTRAMUSCULAR; INTRAVENOUS at 09:27

## 2017-10-17 RX ADMIN — SODIUM CHLORIDE, SODIUM LACTATE, POTASSIUM CHLORIDE, CALCIUM CHLORIDE: 600; 310; 30; 20 INJECTION, SOLUTION INTRAVENOUS at 08:38

## 2017-10-17 RX ADMIN — PROPOFOL 160 MG: 10 INJECTION, EMULSION INTRAVENOUS at 09:10

## 2017-10-17 RX ADMIN — ROCURONIUM BROMIDE 5 MG: 10 INJECTION, SOLUTION INTRAVENOUS at 09:07

## 2017-10-17 RX ADMIN — ROCURONIUM BROMIDE 15 MG: 10 INJECTION, SOLUTION INTRAVENOUS at 09:11

## 2017-10-17 RX ADMIN — MIDAZOLAM HYDROCHLORIDE 2 MG: 1 INJECTION INTRAMUSCULAR; INTRAVENOUS at 09:04

## 2017-10-17 RX ADMIN — GLYCOPYRROLATE 0.4 MG: 0.2 INJECTION INTRAMUSCULAR; INTRAVENOUS at 09:40

## 2017-10-17 RX ADMIN — Medication 2.5 MG: at 10:08

## 2017-10-17 RX ADMIN — DEXAMETHASONE SODIUM PHOSPHATE 12 MG: 4 INJECTION, SOLUTION INTRA-ARTICULAR; INTRALESIONAL; INTRAMUSCULAR; INTRAVENOUS; SOFT TISSUE at 09:28

## 2017-10-17 RX ADMIN — NEOSTIGMINE METHYLSULFATE 3 MG: 1 INJECTION, SOLUTION INTRAVENOUS at 09:41

## 2017-10-17 NOTE — OP NOTE
abnormalities with the  exception of the mucosa of the right oropharyngeal area described  before. The direct laryngoscopy portion of the procedure was  completed and the scope withdrawn. There were no complications. A Ras-Aly mouth retractor was inserted into the oral cavity and  expanded. The mucosa was incised through the mucosa only and in the  plane of the submucosa, several 5 to 5 mm segments of mucosa were  carefully removed and treated atraumatically and sent in two separate  containers. The first labeled anterior superior palate and the second  labeled posterior inferior palate. These were approximately 6 mm x 5  mm sections of mucosa. The integrity of the muscular bed was left  intact. There was no significant bleeding and blood loss for the  entire procedure was less than 5 mL. After applying a small amount of  direct pressure for minimal amounts of oozing, the Ras-Aly mouth  retractor was removed and the patient was awakened and transferred to  Postanesthesia Recovery following the procedure without complication. SUMMARY OF OPERATIVE FINDINGS:  No complications, blood loss less than  5 mL, stable to postop anesthesia.         Alfredo Oseguera    D: 10/17/2017 10:58:11       T: 10/17/2017 17:48:55     [email or web address] [email or web address]  Job#: [id number]     Doc#: [id number]

## 2017-10-17 NOTE — ANESTHESIA POSTPROCEDURE EVALUATION
Department of Anesthesiology  Postprocedure Note    Patient: Joanna Crowell  MRN: [id number]  YOB: 1945  Date of evaluation: 10/17/2017  Time:  9:54 AM     Procedure Summary     Date:  10/17/17 Room / Location:  [facility] OR  / [facility] OR    Anesthesia Start:  0904 Anesthesia Stop:      Procedure:  DIRECT LARYNGOSCOPY AND BIOPSY OF MUCOSA OP OROPHARYNX (N/A Throat) Diagnosis:  (ORAL CAVITY RT TONSIL AREA LESION)    Surgeon:  Brice Kuhn MD Responsible Provider:  Nereyda Belcher CRNA    Anesthesia Type:  general ASA Status:  3          Anesthesia Type: No value filed. Santo Phase I: Santo Score: 8    Santo Phase II:      Last vitals: Reviewed and per EMR flowsheets.        Anesthesia Post Evaluation    Patient location during evaluation: PACU  Patient participation: complete - patient participated  Level of consciousness: awake  Pain score: 0  Airway patency: patent  Nausea & Vomiting: no nausea and no vomiting  Complications: no  Cardiovascular status: blood pressure returned to baseline  Respiratory status: acceptable  Hydration status: euvolemic

## 2017-10-17 NOTE — ANESTHESIA PRE PROCEDURE
Department of Anesthesiology  Preprocedure Note       Name:  Gracia Aguiar   Age:  74 y.o.  :  1945                                          MRN:  [id number]         Date:  10/17/2017      Surgeon: Andreia Torres):  Marcell Brooks MD    Procedure: Procedure(s):  DIRECT LARYNGOSCOPY AND BIOPSY OF MUCOSA OP OROPHARYNX    Medications prior to admission:   Prior to Admission medications    Medication Sig Start Date End Date Taking? Authorizing Provider   lovastatin (MEVACOR) 40 MG tablet Take 40 mg by mouth 16   Historical Provider, MD   omeprazole (PRILOSEC) 20 MG delayed release capsule Take 20 mg by mouth 10/6/16   Historical Provider, MD       Current medications:    Current Facility-Administered Medications   Medication Dose Route Frequency Provider Last Rate Last Dose    lactated ringers infusion   Intravenous Continuous Yvette Starring Rakel, CRNA 125 mL/hr at 10/17/17 0838      lidocaine PF 1 % injection 1 mL  1 mL Intradermal Once PRN Benjamín Roughen, CRNA         Facility-Administered Medications Ordered in Other Encounters   Medication Dose Route Frequency Provider Last Rate Last Dose    midazolam (VERSED) injection    PRN Benjamín Roughen, CRNA   2 mg at 10/17/17 0904    fentaNYL (SUBLIMAZE) injection    PRN Benjamín Roughen, CRNA   50 mcg at 10/17/17 0906    rocuronium (ZEMURON) injection    PRN Benjamín Roughen, CRNA   15 mg at 10/17/17 0911    propofol injection    PRN Benjamín Roughen, CRNA   160 mg at 10/17/17 0910       Allergies: Allergies   Allergen Reactions    Acetylcholinesterase Other (See Comments)     Had trouble coming out of anesthesia, Dr Kitty Galarza inst pt family to make sure pt never receive this again, if he does he will wind up on respirator       Problem List:  There is no problem list on file for this patient.       Past Medical History:        Diagnosis Date    Cancer [facility])     colon    Hyperlipidemia     Prolonged emergence from general anesthesia Past Surgical History:        Procedure Laterality Date    COLECTOMY      18 inches of ascending colon removed January 2017       Social History:    Social History   Substance Use Topics    Smoking status: Current Every Day Smoker     Packs/day: 1.00    Smokeless tobacco: Never Used    Alcohol use Yes                                Ready to quit: Not Answered  Counseling given: Not Answered      Vital Signs (Current):   Vitals:    10/17/17 0825   BP: (!) 169/94   Pulse: 77   Resp: 18   Temp: 99 °F (37.2 °C)   SpO2: 95%   Weight: 150 lb (68 kg)   Height: 5' 5\" (1.651 m)                                              BP Readings from Last 3 Encounters:   10/17/17 (!) 169/94   10/09/17 130/70       NPO Status: Time of last liquid consumption: 2300                        Time of last solid consumption: 2300                        Date of last liquid consumption: 10/16/17                        Date of last solid food consumption: 10/16/17    BMI:   Wt Readings from Last 3 Encounters:   10/17/17 150 lb (68 kg)   10/09/17 148 lb (67.1 kg)     Body mass index is 24.96 kg/m². CBC: No results found for: WBC, RBC, HGB, HCT, MCV, RDW, PLT    CMP: No results found for: NA, K, CL, CO2, BUN, CREATININE, GFRAA, AGRATIO, LABGLOM, GLUCOSE, PROT, CALCIUM, BILITOT, ALKPHOS, AST, ALT    POC Tests: No results for input(s): POCGLU, POCNA, POCK, POCCL, POCBUN, POCHEMO, POCHCT in the last 72 hours.     Coags: No results found for: PROTIME, INR, APTT    HCG (If Applicable): No results found for: PREGTESTUR, PREGSERUM, HCG, HCGQUANT     ABGs: No results found for: PHART, PO2ART, BEA6DQY, CPH8EBX, BEART, S6CATINU     Type & Screen (If Applicable):  No results found for: LABABO, [address]    Anesthesia Evaluation  Patient summary reviewed and Nursing notes reviewed history of anesthetic complications:   Airway: Mallampati: I     Neck ROM: limited  Mouth opening: > = 3 FB Dental: normal exam         Pulmonary:negative ROS and normal exam breath sounds clear to auscultation  (+) decreased breath sounds,                             Cardiovascular:    (+) hyperlipidemia                  Neuro/Psych:   {neg ROS              GI/Hepatic/Renal:   (+) GERD:,           Endo/Other: negative ROS                    Abdominal:           Vascular:                                      Anesthesia Plan      general     ASA 3       Induction: intravenous. MIPS: Prophylactic antiemetics administered. Anesthetic plan and risks discussed with patient and spouse.                       Rebeca High CRNA   10/17/2017

## 2017-10-17 NOTE — BRIEF OP NOTE
Brief Postoperative Note  ______________________________________________________________    Patient: Lori Farias  YOB: 1945  MRN: [id number]  Date of Procedure: 10/17/2017    Pre-Op Diagnosis: ORAL CAVITY RT TONSIL AREA LESION    Post-Op Diagnosis: Same       Procedure(s):  DIRECT LARYNGOSCOPY AND BIOPSY OF MUCOSA OP OROPHARYNX    Anesthesia: General    Surgeon(s):  Leslie Sheikh MD    Staff:  [employer]: Bryant Seaman     Estimated Blood Loss: * No values recorded between 10/17/2017  9:03 AM and 10/17/2017  7:67 AM *    Complications: None    Specimens:   ID Type Source Tests Collected by Time Destination   A : ANTERIOR SUPERIOR PALATE BIOPSY  Tissue Nasopharynx/Oropharynx SURGICAL PATHOLOGY Leslie Sheikh MD 10/17/2017 0932    B : POSTERIOR INFERIOR PALATE BIOPSY  Tissue Nasopharynx/Oropharynx SURGICAL PATHOLOGY Leslie Sheikh MD 10/17/2017 9508        Findings: area of ?  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

## 2017-10-18 ENCOUNTER — APPOINTMENT (OUTPATIENT)
Dept: CT IMAGING | Facility: HOSPITAL | Age: 72
End: 2017-10-18
Attending: INTERNAL MEDICINE

## 2017-10-25 ENCOUNTER — HOSPITAL ENCOUNTER (OUTPATIENT)
Dept: CT IMAGING | Facility: HOSPITAL | Age: 72
Discharge: HOME | End: 2017-10-25
Attending: INTERNAL MEDICINE | Admitting: INTERNAL MEDICINE

## 2017-10-25 DIAGNOSIS — R97.0 ELEVATED CARCINOEMBRYONIC ANTIGEN (CEA): ICD-10-CM

## 2017-10-25 DIAGNOSIS — C18.2 MALIGNANT NEOPLASM OF ASCENDING COLON (HCC): ICD-10-CM

## 2017-10-25 LAB — CREAT BLDA-MCNC: 1 MG/DL (ref 0.6–1.3)

## 2017-10-25 PROCEDURE — 0 IOPAMIDOL 61 % SOLUTION: Performed by: INTERNAL MEDICINE

## 2017-10-25 PROCEDURE — 82565 ASSAY OF CREATININE: CPT

## 2017-10-25 PROCEDURE — 71260 CT THORAX DX C+: CPT

## 2017-10-25 PROCEDURE — 74177 CT ABD & PELVIS W/CONTRAST: CPT

## 2017-10-25 RX ADMIN — IOPAMIDOL 100 ML: 612 INJECTION, SOLUTION INTRAVENOUS at 11:00

## 2017-11-06 ENCOUNTER — OFFICE VISIT (OUTPATIENT)
Dept: OTOLARYNGOLOGY | Age: 72
End: 2017-11-06

## 2017-11-06 VITALS
DIASTOLIC BLOOD PRESSURE: 68 MMHG | OXYGEN SATURATION: 96 % | TEMPERATURE: 96.9 F | HEART RATE: 92 BPM | BODY MASS INDEX: 25.83 KG/M2 | WEIGHT: 155 LBS | HEIGHT: 65 IN | RESPIRATION RATE: 20 BRPM | SYSTOLIC BLOOD PRESSURE: 130 MMHG

## 2017-11-06 DIAGNOSIS — K13.70 LESION OF ORAL MUCOSA: Primary | ICD-10-CM

## 2017-11-06 DIAGNOSIS — Z98.890 POST-OPERATIVE STATE: ICD-10-CM

## 2017-11-06 PROCEDURE — 99024 POSTOP FOLLOW-UP VISIT: CPT | Performed by: OTOLARYNGOLOGY

## 2017-11-06 NOTE — PROGRESS NOTES
The patient is doing well post-operatively with no signs of any complications or infection.     .rbpo

## 2017-11-06 NOTE — PROGRESS NOTES
Minor procedure   The patient is here for followup following their procedure. They have had an uneventful postoperative course and the surgical site is well healed. Questions were answered and there is no need for followup. Biopsy findings indicating dysplasia were discussed and explained. Impression/plan: Followup visit for successful uncomplicated surgical procedure. Follow up 4 months.   Advised to quit tobacco.

## 2018-01-24 ENCOUNTER — OFFICE VISIT (OUTPATIENT)
Dept: GASTROENTEROLOGY | Facility: CLINIC | Age: 73
End: 2018-01-24

## 2018-01-24 VITALS
HEART RATE: 79 BPM | BODY MASS INDEX: 26.33 KG/M2 | OXYGEN SATURATION: 99 % | SYSTOLIC BLOOD PRESSURE: 158 MMHG | TEMPERATURE: 96 F | HEIGHT: 65 IN | WEIGHT: 158 LBS | DIASTOLIC BLOOD PRESSURE: 92 MMHG

## 2018-01-24 DIAGNOSIS — Z85.038 PERSONAL HISTORY OF COLON CANCER: Primary | ICD-10-CM

## 2018-01-24 DIAGNOSIS — I10 HYPERTENSION, UNSPECIFIED TYPE: ICD-10-CM

## 2018-01-24 DIAGNOSIS — Z72.0 TOBACCO USE: ICD-10-CM

## 2018-01-24 PROCEDURE — S0260 H&P FOR SURGERY: HCPCS | Performed by: NURSE PRACTITIONER

## 2018-01-24 NOTE — PROGRESS NOTES
[facility]    Primary Physician Dandy Burton MD    1/24/2018    Jonnie Sheets   1945      Chief Complaint   Patient presents with   • Colonoscopy       Subjective     HPI    Jonnie Sheets is a 72 y.o. male who presents as a referral for preventative maintenance. He has no complaints of nausea or vomiting. No change in bowels. No wt loss. No BRBPR. No melena. No abdominal pain.  Last colonoscopy was 1/2017, recall rec 1 year.  .   The patient doies have history of colon cancer.   There is no family history of colon polyps. There  Is no family history of colon cancer.     COLONOSCOPY BY DR SWAIN,  1/4/17   - Likely malignant tumor in the proximal ascending colon and in the mid ascending colon. Biopsied.  - One 6 mm polyp in the proximal transverse colon, removed with a hot snare. Resected and retrieved.  - Diverticulosis in the sigmoid colon.  - The examination was otherwise normal on direct and retroflexion views.    Clinical Information See result below   Pre-Op Diagnosis:      Anemia, heme-positive stool.   Post-Op Diagnosis:    Colon polyp, colon mass.     Final Diagnosis   1.    Proximal transverse colon, polypectomy:   Adenomatous polyp.       2.    Ascending colon mass, biopsy:   Fragments of tubulovillous adenoma demonstrating moderate cytologic and architectural atypia.      Comment:    If these biopsies represent a small portion of a significantly larger mass, the findings may not be representative.  Clinical correlation and follow-up are recommended.                  1/19/17 dr davies   Procedures Performed  Procedure(s):  LAPAROSCOPIC ASSISTED RIGHT COLON RESECTION RIGHT, UMBILICAL HERNIA REPAIR  Pre-Op Diagnosis: TVA right colon.    Final Diagnosis   Terminal ileum, cecum, appendix, and ascending colon, right hemicolectomy:  Moderately differentiated adenocarcinoma (4.5 cm) arising in a tubulovillous adenoma.  All margins clear of invasive carcinoma.   Negative for lymph  vascular space and perineural invasion.   Incidental separate tubular adenoma identified.   15 lymph nodes negative for metasttaic carcinoma (0/15).  AJCC Pathologic Stage: pT2 N0.    His colon resection was completed it was 4.5 cm a T2 N0 tumor was 0 of 15 lymph nodes the margins are clear.  Currently we will be prepared and discharged to his home to follow-up with me in one week sooner if questions or problems arise.      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    NOTED PROLONG PARALYSIS >2 HOURS WITH SUCCINYLCHOLINE UNDER ANESTHESIA   • Squamous cell carcinoma of oral mucosa    • Umbilical hernia        Past Surgical History:   Procedure Laterality Date   • COLON RESECTION Right 1/19/2017    Procedure: LAPAROSCOPIC ASSISTED RIGHT COLON RESECTION RIGHT, UMBILICAL HERNIA REPAIR;  Surgeon: Mario Ellison MD;  Location: [facility] OR;  Service:    • COLONOSCOPY     • COLONOSCOPY N/A 1/4/2017    Procedure: COLONOSCOPY WITH ANESTHESIA;  Surgeon: Tee Kong MD;  Location: [facility] ENDOSCOPY;  Service:    • ENDOSCOPY N/A 1/4/2017    Procedure: ESOPHAGOGASTRODUODENOSCOPY WITH ANESTHESIA;  Surgeon: Tee Kong MD;  Location: [facility] ENDOSCOPY;  Service:    • KNEE SURGERY      PINS PLACED AND LATER REMOVED S/P FRACTURE   • MOUTH BIOPSY     • TONSILLECTOMY         Outpatient Prescriptions Marked as Taking for the 1/24/18 encounter (Office Visit) with PRATEEK Eisenberg   Medication Sig Dispense Refill   • FIBER, GUAR GUM, PO Take 1 tablet by mouth Daily.     • lovastatin (MEVACOR) 40 MG tablet Take 40 mg by mouth Every Night.     • omeprazole (priLOSEC) 20 MG capsule Take 20 mg by mouth Daily.         Allergies   Allergen Reactions   • Acetylcholinesterase Other (See Comments)     Had trouble coming out of anesthesia, Dr Ellison inst pt  "family to make sure pt never receive this again, if he does he will wind up on respirator       Social History     Social History   • Marital status:      Spouse name: N/A   • Number of children: N/A   • Years of education: N/A     Occupational History   • Not on file.     Social History Main Topics   • Smoking status: Current Every Day Smoker     Years: 55.00     Types: Cigarettes   • Smokeless tobacco: Never Used   • Alcohol use Yes      Comment: 3-4 times a week, liquor   • Drug use: No   • Sexual activity: Defer     Other Topics Concern   • Not on file     Social History Narrative       Family History   Problem Relation Age of Onset   • Heart disease Father    • Colon cancer Neg Hx    • Colon polyps Neg Hx        Review of Systems   Constitutional: Negative for chills and fever.   Respiratory: Negative for cough, shortness of breath and wheezing.    Cardiovascular: Negative for chest pain and palpitations.   Gastrointestinal: Negative for abdominal distention, abdominal pain, anal bleeding, blood in stool, constipation, diarrhea, nausea, rectal pain and vomiting.       Objective     Vitals:    01/24/18 1008   BP: 158/92   Pulse:    Temp:    SpO2:      Last 2 weights    01/24/18  0939   Weight: 71.7 kg (158 lb)     Body mass index is 26.29 kg/(m^2).         1/24/18 9:39 AM  1/24/18 10:08 AM     BP  190/98   158/92     BP Location  Left arm      Patient Position  Sitting      Cuff Size  Adult      Pulse  79      Temp  96 F (35.6 C)      SpO2  99%      Weight   71.7 kg (158 lb)      Height  165.1 cm (65\")            Physical Exam   Constitutional: He appears well-developed and well-nourished. No distress.   Cardiovascular: Normal rate, regular rhythm and normal heart sounds.    Pulmonary/Chest: Effort normal and breath sounds normal.   Abdominal: Soft. Bowel sounds are normal. He exhibits no distension and no mass. There is no tenderness. There is no rebound and no guarding.   Musculoskeletal: He exhibits no " edema.   Neurological: He is alert.   Skin: Skin is warm and dry.   Psychiatric: He has a normal mood and affect. His behavior is normal.   Vitals reviewed.      Imaging Results (most recent)     None          Assessment/Plan     Jonnie was seen today for colonoscopy.    Diagnoses and all orders for this visit:    Personal history of colon cancer  -     Case Request; Standing  -     Case Request    Tobacco use    Hypertension, unspecified type    Other orders  -     Implement Anesthesia Orders Day of Procedure; Standing  -     Obtain Informed Consent; Standing  -     Sod Picosulfate-Mag Ox-Cit Acd (PREPOPIK) 10-3.5-12 MG-GM-GM pack; Take 1 package by mouth See Admin Instructions.       plan for colonoscopy.  I have asked him to start taking b/p readings at home and if continues to run high then he is to contact dr mcdaniel , he verbalizes understanding.  He says has appt either 2/2018 bor 3/2018.       COLONOSCOPY WITH ANESTHESIA (N/A)  All risks, benefits, alternatives, and indications of colonoscopy procedure have been discussed with the patient. Risks to include perforation of the colon requiring possible surgery or colostomy, risk of bleeding from biopsies or removal of colon tissue, possibility of missing a colon polyp or cancer, or adverse drug reaction.  Benefits to include the diagnosis and management of disease of the colon and rectum. Alternatives to include barium enema, radiographic evaluation, lab testing or no intervention. Pt verbalizes understanding and agrees.    I advised the patient of the risks in continuing to use tobacco, and I provided this patient with smoking cessation educational materials.  I also discussed how to quit smoking and the patient has expressed the willingness to quit.      During this visit, I spent 3-10 mintues counseling the patient regarding smoking cessation.                PRATEEK Vazquez      EMR Dragon/transcription disclaimer:  Much of this encounter note is  electronic transcription/translation of spoken language to printed text.  The electronic translation of spoken language may be erroneous, or at times, nonsensical words or phrases may be inadvertently transcribed.  Although I have reviewed the note for such errors, some may still exist.

## 2018-02-05 ENCOUNTER — HOSPITAL ENCOUNTER (OUTPATIENT)
Facility: HOSPITAL | Age: 73
Setting detail: HOSPITAL OUTPATIENT SURGERY
Discharge: HOME | End: 2018-02-05
Attending: INTERNAL MEDICINE | Admitting: INTERNAL MEDICINE

## 2018-02-05 ENCOUNTER — ANESTHESIA EVENT (OUTPATIENT)
Dept: GASTROENTEROLOGY | Facility: HOSPITAL | Age: 73
End: 2018-02-05

## 2018-02-05 ENCOUNTER — ANESTHESIA (OUTPATIENT)
Dept: GASTROENTEROLOGY | Facility: HOSPITAL | Age: 73
End: 2018-02-05

## 2018-02-05 VITALS
HEART RATE: 83 BPM | TEMPERATURE: 96.9 F | OXYGEN SATURATION: 97 % | WEIGHT: 150 LBS | DIASTOLIC BLOOD PRESSURE: 91 MMHG | SYSTOLIC BLOOD PRESSURE: 119 MMHG | BODY MASS INDEX: 24.99 KG/M2 | HEIGHT: 65 IN | RESPIRATION RATE: 14 BRPM

## 2018-02-05 DIAGNOSIS — Z85.038 PERSONAL HISTORY OF COLON CANCER: ICD-10-CM

## 2018-02-05 PROCEDURE — 45385 COLONOSCOPY W/LESION REMOVAL: CPT | Performed by: INTERNAL MEDICINE

## 2018-02-05 PROCEDURE — 25010000002 PROPOFOL 10 MG/ML EMULSION: Performed by: NURSE ANESTHETIST, CERTIFIED REGISTERED

## 2018-02-05 PROCEDURE — 88305 TISSUE EXAM BY PATHOLOGIST: CPT | Performed by: INTERNAL MEDICINE

## 2018-02-05 RX ORDER — SODIUM CHLORIDE 0.9 % (FLUSH) 0.9 %
3 SYRINGE (ML) INJECTION AS NEEDED
Status: DISCONTINUED | OUTPATIENT
Start: 2018-02-05 | End: 2018-02-05 | Stop reason: HOSPADM

## 2018-02-05 RX ORDER — ONDANSETRON 2 MG/ML
4 INJECTION INTRAMUSCULAR; INTRAVENOUS ONCE AS NEEDED
Status: DISCONTINUED | OUTPATIENT
Start: 2018-02-05 | End: 2018-02-05 | Stop reason: HOSPADM

## 2018-02-05 RX ORDER — LIDOCAINE HYDROCHLORIDE 20 MG/ML
INJECTION, SOLUTION INFILTRATION; PERINEURAL AS NEEDED
Status: DISCONTINUED | OUTPATIENT
Start: 2018-02-05 | End: 2018-02-05 | Stop reason: SURG

## 2018-02-05 RX ORDER — SODIUM CHLORIDE 9 MG/ML
500 INJECTION, SOLUTION INTRAVENOUS CONTINUOUS PRN
Status: DISCONTINUED | OUTPATIENT
Start: 2018-02-05 | End: 2018-02-05 | Stop reason: HOSPADM

## 2018-02-05 RX ORDER — PROPOFOL 10 MG/ML
VIAL (ML) INTRAVENOUS AS NEEDED
Status: DISCONTINUED | OUTPATIENT
Start: 2018-02-05 | End: 2018-02-05 | Stop reason: SURG

## 2018-02-05 RX ADMIN — PROPOFOL 100 MG: 10 INJECTION, EMULSION INTRAVENOUS at 12:06

## 2018-02-05 RX ADMIN — PROPOFOL 50 MG: 10 INJECTION, EMULSION INTRAVENOUS at 12:14

## 2018-02-05 RX ADMIN — PROPOFOL 50 MG: 10 INJECTION, EMULSION INTRAVENOUS at 12:10

## 2018-02-05 RX ADMIN — LIDOCAINE HYDROCHLORIDE 50 MG: 20 INJECTION, SOLUTION INFILTRATION; PERINEURAL at 12:03

## 2018-02-05 RX ADMIN — PROPOFOL 50 MG: 10 INJECTION, EMULSION INTRAVENOUS at 12:03

## 2018-02-05 RX ADMIN — LIDOCAINE HYDROCHLORIDE 0.5 ML: 10 INJECTION, SOLUTION EPIDURAL; INFILTRATION; INTRACAUDAL; PERINEURAL at 10:11

## 2018-02-05 RX ADMIN — SODIUM CHLORIDE 500 ML: 9 INJECTION, SOLUTION INTRAVENOUS at 10:11

## 2018-02-05 NOTE — PLAN OF CARE
Problem: Patient Care Overview (Adult)  Goal: Adult Individualization and Mutuality  Outcome: Ongoing (interventions implemented as appropriate)   02/05/18 0953   Individualization   Patient Specific Preferences none   Patient Specific Goals none   Patient Specific Interventions none   Mutuality/Individual Preferences   What Anxieties, Fears or Concerns Do You Have About Your Health or Care? none   What Questions Do You Have About Your Health or Care? none   What Information Would Help Us Give You More Personalized Care? none

## 2018-02-05 NOTE — PLAN OF CARE
Problem: Patient Care Overview (Adult)  Goal: Plan of Care Review  Outcome: Ongoing (interventions implemented as appropriate)   02/05/18 1214   Coping/Psychosocial Response Interventions   Plan Of Care Reviewed With patient   Patient Care Overview   Progress progress toward functional goals as expected   Outcome Evaluation   Outcome Summary/Follow up Plan tolerated procedure well

## 2018-02-05 NOTE — NURSING NOTE
PT. STATES JUST STARTED A BLOOD PRESSURE MEDICATIONS A WEEK AGO. DOESN'T REMEMBER NAME. HE TOOK MEDICATION YESTERDAY AT 0800.

## 2018-02-05 NOTE — ANESTHESIA POSTPROCEDURE EVALUATION
Patient: Jonnie Sheets    Procedure Summary     Date Anesthesia Start Anesthesia Stop Room / Location    02/05/18 1203 1222  [facility] /  [facility]       Procedure Diagnosis Surgeon Provider    COLONOSCOPY WITH ANESTHESIA (N/A ) Personal history of colon cancer  (Personal history of colon cancer [Z85.038]) MD DAGOBERTO Mota CRNA          Anesthesia Type: general  Last vitals  BP   129/75 (02/05/18 0955)   Temp   96.9 °F (36.1 °C) (02/05/18 0955)   Pulse   98 (02/05/18 0955)   Resp   18 (02/05/18 0955)     SpO2   96 % (02/05/18 0955)     Post Anesthesia Care and Evaluation    Patient location during evaluation: PACU  Patient participation: complete - patient participated  Level of consciousness: awake and alert  Pain score: 0  Pain management: adequate  Airway patency: patent  Anesthetic complications: No anesthetic complications    Cardiovascular status: acceptable and stable  Respiratory status: acceptable and unassisted  Hydration status: acceptable

## 2018-02-05 NOTE — PLAN OF CARE
Problem: GI Endoscopy (Adult)  Goal: Signs and Symptoms of Listed Potential Problems Will be Absent or Manageable (GI Endoscopy)  Outcome: Ongoing (interventions implemented as appropriate)   02/05/18 1214   GI Endoscopy   Problems Assessed (GI Endoscopy) all   Problems Present (GI Endoscopy) none

## 2018-02-05 NOTE — H&P (VIEW-ONLY)
[facility]    Primary Physician Dandy Burton MD    1/24/2018    Jonnie Sheets   1945      Chief Complaint   Patient presents with   • Colonoscopy       Subjective     HPI    Jonnie Sheets is a 72 y.o. male who presents as a referral for preventative maintenance. He has no complaints of nausea or vomiting. No change in bowels. No wt loss. No BRBPR. No melena. No abdominal pain.  Last colonoscopy was 1/2017, recall rec 1 year.  .   The patient doies have history of colon cancer.   There is no family history of colon polyps. There  Is no family history of colon cancer.     COLONOSCOPY BY DR SWAIN,  1/4/17   - Likely malignant tumor in the proximal ascending colon and in the mid ascending colon. Biopsied.  - One 6 mm polyp in the proximal transverse colon, removed with a hot snare. Resected and retrieved.  - Diverticulosis in the sigmoid colon.  - The examination was otherwise normal on direct and retroflexion views.    Clinical Information See result below   Pre-Op Diagnosis:      Anemia, heme-positive stool.   Post-Op Diagnosis:    Colon polyp, colon mass.     Final Diagnosis   1.    Proximal transverse colon, polypectomy:   Adenomatous polyp.       2.    Ascending colon mass, biopsy:   Fragments of tubulovillous adenoma demonstrating moderate cytologic and architectural atypia.      Comment:    If these biopsies represent a small portion of a significantly larger mass, the findings may not be representative.  Clinical correlation and follow-up are recommended.                  1/19/17 dr davies   Procedures Performed  Procedure(s):  LAPAROSCOPIC ASSISTED RIGHT COLON RESECTION RIGHT, UMBILICAL HERNIA REPAIR  Pre-Op Diagnosis: TVA right colon.    Final Diagnosis   Terminal ileum, cecum, appendix, and ascending colon, right hemicolectomy:  Moderately differentiated adenocarcinoma (4.5 cm) arising in a tubulovillous adenoma.  All margins clear of invasive carcinoma.   Negative for lymph  vascular space and perineural invasion.   Incidental separate tubular adenoma identified.   15 lymph nodes negative for metasttaic carcinoma (0/15).  AJCC Pathologic Stage: pT2 N0.    His colon resection was completed it was 4.5 cm a T2 N0 tumor was 0 of 15 lymph nodes the margins are clear.  Currently we will be prepared and discharged to his home to follow-up with me in one week sooner if questions or problems arise.      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    NOTED PROLONG PARALYSIS >2 HOURS WITH SUCCINYLCHOLINE UNDER ANESTHESIA   • Squamous cell carcinoma of oral mucosa    • Umbilical hernia        Past Surgical History:   Procedure Laterality Date   • COLON RESECTION Right 1/19/2017    Procedure: LAPAROSCOPIC ASSISTED RIGHT COLON RESECTION RIGHT, UMBILICAL HERNIA REPAIR;  Surgeon: Mario Ellison MD;  Location: [facility] OR;  Service:    • COLONOSCOPY     • COLONOSCOPY N/A 1/4/2017    Procedure: COLONOSCOPY WITH ANESTHESIA;  Surgeon: Tee Kong MD;  Location: [facility] ENDOSCOPY;  Service:    • ENDOSCOPY N/A 1/4/2017    Procedure: ESOPHAGOGASTRODUODENOSCOPY WITH ANESTHESIA;  Surgeon: Tee Kong MD;  Location: [facility] ENDOSCOPY;  Service:    • KNEE SURGERY      PINS PLACED AND LATER REMOVED S/P FRACTURE   • MOUTH BIOPSY     • TONSILLECTOMY         Outpatient Prescriptions Marked as Taking for the 1/24/18 encounter (Office Visit) with PRATEEK Eisenberg   Medication Sig Dispense Refill   • FIBER, GUAR GUM, PO Take 1 tablet by mouth Daily.     • lovastatin (MEVACOR) 40 MG tablet Take 40 mg by mouth Every Night.     • omeprazole (priLOSEC) 20 MG capsule Take 20 mg by mouth Daily.         Allergies   Allergen Reactions   • Acetylcholinesterase Other (See Comments)     Had trouble coming out of anesthesia, Dr Ellison inst pt  "family to make sure pt never receive this again, if he does he will wind up on respirator       Social History     Social History   • Marital status:      Spouse name: N/A   • Number of children: N/A   • Years of education: N/A     Occupational History   • Not on file.     Social History Main Topics   • Smoking status: Current Every Day Smoker     Years: 55.00     Types: Cigarettes   • Smokeless tobacco: Never Used   • Alcohol use Yes      Comment: 3-4 times a week, liquor   • Drug use: No   • Sexual activity: Defer     Other Topics Concern   • Not on file     Social History Narrative       Family History   Problem Relation Age of Onset   • Heart disease Father    • Colon cancer Neg Hx    • Colon polyps Neg Hx        Review of Systems   Constitutional: Negative for chills and fever.   Respiratory: Negative for cough, shortness of breath and wheezing.    Cardiovascular: Negative for chest pain and palpitations.   Gastrointestinal: Negative for abdominal distention, abdominal pain, anal bleeding, blood in stool, constipation, diarrhea, nausea, rectal pain and vomiting.       Objective     Vitals:    01/24/18 1008   BP: 158/92   Pulse:    Temp:    SpO2:      Last 2 weights    01/24/18  0939   Weight: 71.7 kg (158 lb)     Body mass index is 26.29 kg/(m^2).         1/24/18 9:39 AM  1/24/18 10:08 AM     BP  190/98   158/92     BP Location  Left arm      Patient Position  Sitting      Cuff Size  Adult      Pulse  79      Temp  96 F (35.6 C)      SpO2  99%      Weight   71.7 kg (158 lb)      Height  165.1 cm (65\")            Physical Exam   Constitutional: He appears well-developed and well-nourished. No distress.   Cardiovascular: Normal rate, regular rhythm and normal heart sounds.    Pulmonary/Chest: Effort normal and breath sounds normal.   Abdominal: Soft. Bowel sounds are normal. He exhibits no distension and no mass. There is no tenderness. There is no rebound and no guarding.   Musculoskeletal: He exhibits no " edema.   Neurological: He is alert.   Skin: Skin is warm and dry.   Psychiatric: He has a normal mood and affect. His behavior is normal.   Vitals reviewed.      Imaging Results (most recent)     None          Assessment/Plan     Jonnie was seen today for colonoscopy.    Diagnoses and all orders for this visit:    Personal history of colon cancer  -     Case Request; Standing  -     Case Request    Tobacco use    Hypertension, unspecified type    Other orders  -     Implement Anesthesia Orders Day of Procedure; Standing  -     Obtain Informed Consent; Standing  -     Sod Picosulfate-Mag Ox-Cit Acd (PREPOPIK) 10-3.5-12 MG-GM-GM pack; Take 1 package by mouth See Admin Instructions.       plan for colonoscopy.  I have asked him to start taking b/p readings at home and if continues to run high then he is to contact dr mcdaniel , he verbalizes understanding.  He says has appt either 2/2018 bor 3/2018.       COLONOSCOPY WITH ANESTHESIA (N/A)  All risks, benefits, alternatives, and indications of colonoscopy procedure have been discussed with the patient. Risks to include perforation of the colon requiring possible surgery or colostomy, risk of bleeding from biopsies or removal of colon tissue, possibility of missing a colon polyp or cancer, or adverse drug reaction.  Benefits to include the diagnosis and management of disease of the colon and rectum. Alternatives to include barium enema, radiographic evaluation, lab testing or no intervention. Pt verbalizes understanding and agrees.    I advised the patient of the risks in continuing to use tobacco, and I provided this patient with smoking cessation educational materials.  I also discussed how to quit smoking and the patient has expressed the willingness to quit.      During this visit, I spent 3-10 mintues counseling the patient regarding smoking cessation.                PRATEEK Vazquez      EMR Dragon/transcription disclaimer:  Much of this encounter note is  electronic transcription/translation of spoken language to printed text.  The electronic translation of spoken language may be erroneous, or at times, nonsensical words or phrases may be inadvertently transcribed.  Although I have reviewed the note for such errors, some may still exist.

## 2018-02-05 NOTE — PLAN OF CARE
Problem: Patient Care Overview (Adult)  Goal: Plan of Care Review  Outcome: Outcome(s) achieved Date Met: 02/05/18 02/05/18 1226   Coping/Psychosocial Response Interventions   Plan Of Care Reviewed With patient;family   Patient Care Overview   Progress no change   Outcome Evaluation   Outcome Summary/Follow up Plan meets discharge criteria       Problem: GI Endoscopy (Adult)  Goal: Signs and Symptoms of Listed Potential Problems Will be Absent or Manageable (GI Endoscopy)  Outcome: Outcome(s) achieved Date Met: 02/05/18 02/05/18 1226   GI Endoscopy   Problems Assessed (GI Endoscopy) all   Problems Present (GI Endoscopy) none

## 2018-02-05 NOTE — ANESTHESIA PREPROCEDURE EVALUATION
Anesthesia Evaluation     Patient summary reviewed   history of anesthetic complications (pseudocholinesterase deficiency):  NPO Solid Status: > 8 hours       Airway   Mallampati: II  TM distance: >3 FB  Neck ROM: full  Dental      Pulmonary    (+) a smoker,   Cardiovascular   Exercise tolerance: good (4-7 METS)    (+) hyperlipidemia      Neuro/Psych- negative ROS  GI/Hepatic/Renal/Endo    (+)  GERD,     Musculoskeletal     Abdominal    Substance History      OB/GYN          Other                                                Anesthesia Plan    ASA 2     general     intravenous induction   Anesthetic plan and risks discussed with patient.

## 2018-02-07 LAB
LAB AP CASE REPORT: NORMAL
LAB AP CLINICAL INFORMATION: NORMAL
Lab: NORMAL
PATH REPORT.FINAL DX SPEC: NORMAL
PATH REPORT.GROSS SPEC: NORMAL

## 2018-03-05 ENCOUNTER — OFFICE VISIT (OUTPATIENT)
Dept: GASTROENTEROLOGY | Facility: CLINIC | Age: 73
End: 2018-03-05

## 2018-03-05 ENCOUNTER — HOSPITAL ENCOUNTER (INPATIENT)
Facility: HOSPITAL | Age: 73
LOS: 4 days | Discharge: HOME | End: 2018-03-09
Attending: EMERGENCY MEDICINE | Admitting: FAMILY MEDICINE

## 2018-03-05 ENCOUNTER — TELEPHONE (OUTPATIENT)
Dept: GASTROENTEROLOGY | Facility: CLINIC | Age: 73
End: 2018-03-05

## 2018-03-05 ENCOUNTER — LAB (OUTPATIENT)
Dept: LAB | Facility: HOSPITAL | Age: 73
End: 2018-03-05

## 2018-03-05 ENCOUNTER — APPOINTMENT (OUTPATIENT)
Dept: CT IMAGING | Facility: HOSPITAL | Age: 73
End: 2018-03-05

## 2018-03-05 VITALS
TEMPERATURE: 93.5 F | SYSTOLIC BLOOD PRESSURE: 99 MMHG | WEIGHT: 146 LBS | HEART RATE: 83 BPM | BODY MASS INDEX: 24.32 KG/M2 | HEIGHT: 65 IN | DIASTOLIC BLOOD PRESSURE: 58 MMHG | OXYGEN SATURATION: 97 %

## 2018-03-05 DIAGNOSIS — K92.1 BLACK STOOL: ICD-10-CM

## 2018-03-05 DIAGNOSIS — K62.5 RECTAL BLEEDING: ICD-10-CM

## 2018-03-05 DIAGNOSIS — R63.4 WEIGHT LOSS: Primary | ICD-10-CM

## 2018-03-05 DIAGNOSIS — R63.0 ANOREXIA: ICD-10-CM

## 2018-03-05 DIAGNOSIS — E87.5 HYPERKALEMIA: ICD-10-CM

## 2018-03-05 DIAGNOSIS — Z74.09 IMPAIRED FUNCTIONAL MOBILITY, BALANCE, AND ENDURANCE: ICD-10-CM

## 2018-03-05 DIAGNOSIS — R74.8 ELEVATED LIPASE: ICD-10-CM

## 2018-03-05 DIAGNOSIS — N17.9 ACUTE RENAL FAILURE, UNSPECIFIED ACUTE RENAL FAILURE TYPE (HCC): Primary | ICD-10-CM

## 2018-03-05 LAB
ALBUMIN SERPL-MCNC: 3.9 G/DL (ref 3.5–5)
ALBUMIN SERPL-MCNC: 4.4 G/DL (ref 3.5–5)
ALBUMIN/GLOB SERPL: 1.1 G/DL (ref 1.1–2.5)
ALBUMIN/GLOB SERPL: 1.2 G/DL (ref 1.1–2.5)
ALP SERPL-CCNC: 56 U/L (ref 24–120)
ALP SERPL-CCNC: 69 U/L (ref 24–120)
ALT SERPL W P-5'-P-CCNC: 15 U/L (ref 0–54)
ALT SERPL W P-5'-P-CCNC: <15 U/L (ref 0–54)
ANION GAP SERPL CALCULATED.3IONS-SCNC: 21 MMOL/L (ref 4–13)
ANION GAP SERPL CALCULATED.3IONS-SCNC: 26 MMOL/L (ref 4–13)
APTT PPP: 40.9 SECONDS (ref 24.1–34.8)
AST SERPL-CCNC: 22 U/L (ref 7–45)
AST SERPL-CCNC: 22 U/L (ref 7–45)
BASOPHILS # BLD AUTO: 0.04 10*3/MM3 (ref 0–0.2)
BASOPHILS # BLD AUTO: 0.05 10*3/MM3 (ref 0–0.2)
BASOPHILS NFR BLD AUTO: 0.4 % (ref 0–2)
BASOPHILS NFR BLD AUTO: 0.4 % (ref 0–2)
BILIRUB SERPL-MCNC: 0.2 MG/DL (ref 0.1–1)
BILIRUB SERPL-MCNC: 0.2 MG/DL (ref 0.1–1)
BUN BLD-MCNC: 111 MG/DL (ref 5–21)
BUN BLD-MCNC: 114 MG/DL (ref 5–21)
BUN/CREAT SERPL: 8 (ref 7–25)
BUN/CREAT SERPL: 9 (ref 7–25)
CALCIUM SPEC-SCNC: 8.2 MG/DL (ref 8.4–10.4)
CALCIUM SPEC-SCNC: 8.8 MG/DL (ref 8.4–10.4)
CHLORIDE SERPL-SCNC: 100 MMOL/L (ref 98–110)
CHLORIDE SERPL-SCNC: 101 MMOL/L (ref 98–110)
CK SERPL-CCNC: <20 U/L (ref 0–203)
CO2 SERPL-SCNC: 14 MMOL/L (ref 24–31)
CO2 SERPL-SCNC: 15 MMOL/L (ref 24–31)
CREAT BLD-MCNC: 12.73 MG/DL (ref 0.5–1.4)
CREAT BLD-MCNC: 13.9 MG/DL (ref 0.5–1.4)
CRP SERPL-MCNC: 4.73 MG/DL (ref 0–0.99)
DEPRECATED RDW RBC AUTO: 43.8 FL (ref 40–54)
DEPRECATED RDW RBC AUTO: 44.1 FL (ref 40–54)
EOSINOPHIL # BLD AUTO: 0.1 10*3/MM3 (ref 0–0.7)
EOSINOPHIL # BLD AUTO: 0.15 10*3/MM3 (ref 0–0.7)
EOSINOPHIL NFR BLD AUTO: 0.9 % (ref 0–4)
EOSINOPHIL NFR BLD AUTO: 1.1 % (ref 0–4)
ERYTHROCYTE [DISTWIDTH] IN BLOOD BY AUTOMATED COUNT: 13.4 % (ref 12–15)
ERYTHROCYTE [DISTWIDTH] IN BLOOD BY AUTOMATED COUNT: 13.4 % (ref 12–15)
ERYTHROCYTE [SEDIMENTATION RATE] IN BLOOD: 64 MM/HR (ref 0–15)
FERRITIN SERPL-MCNC: 128 NG/ML (ref 17.9–464)
FOLATE SERPL-MCNC: 8.75 NG/ML
GFR SERPL CREATININE-BSD FRML MDRD: 4 ML/MIN/1.73
GFR SERPL CREATININE-BSD FRML MDRD: 4 ML/MIN/1.73
GLOBULIN UR ELPH-MCNC: 3.6 GM/DL
GLOBULIN UR ELPH-MCNC: 3.8 GM/DL
GLUCOSE BLD-MCNC: 128 MG/DL (ref 70–100)
GLUCOSE BLD-MCNC: 96 MG/DL (ref 70–100)
HCT VFR BLD AUTO: 33.9 % (ref 40–52)
HCT VFR BLD AUTO: 34.5 % (ref 40–52)
HGB BLD-MCNC: 11.3 G/DL (ref 14–18)
HGB BLD-MCNC: 11.3 G/DL (ref 14–18)
IMM GRANULOCYTES # BLD: 0.07 10*3/MM3 (ref 0–0.03)
IMM GRANULOCYTES # BLD: 0.15 10*3/MM3 (ref 0–0.03)
IMM GRANULOCYTES NFR BLD: 0.6 % (ref 0–5)
IMM GRANULOCYTES NFR BLD: 1.1 % (ref 0–5)
INR PPP: 1.15 (ref 0.91–1.09)
INR PPP: 1.17 (ref 0.91–1.09)
IRON 24H UR-MRATE: 27 MCG/DL (ref 42–180)
IRON SATN MFR SERPL: 9 % (ref 20–45)
LIPASE SERPL-CCNC: 401 U/L (ref 23–203)
LYMPHOCYTES # BLD AUTO: 1.4 10*3/MM3 (ref 0.72–4.86)
LYMPHOCYTES # BLD AUTO: 1.44 10*3/MM3 (ref 0.72–4.86)
LYMPHOCYTES NFR BLD AUTO: 10.6 % (ref 15–45)
LYMPHOCYTES NFR BLD AUTO: 12.5 % (ref 15–45)
MAGNESIUM SERPL-MCNC: 0.9 MG/DL (ref 1.4–2.2)
MCH RBC QN AUTO: 29.5 PG (ref 28–32)
MCH RBC QN AUTO: 29.7 PG (ref 28–32)
MCHC RBC AUTO-ENTMCNC: 32.8 G/DL (ref 33–36)
MCHC RBC AUTO-ENTMCNC: 33.3 G/DL (ref 33–36)
MCV RBC AUTO: 89.2 FL (ref 82–95)
MCV RBC AUTO: 90.1 FL (ref 82–95)
MONOCYTES # BLD AUTO: 0.91 10*3/MM3 (ref 0.19–1.3)
MONOCYTES # BLD AUTO: 1.19 10*3/MM3 (ref 0.19–1.3)
MONOCYTES NFR BLD AUTO: 8.1 % (ref 4–12)
MONOCYTES NFR BLD AUTO: 8.7 % (ref 4–12)
NEUTROPHILS # BLD AUTO: 10.64 10*3/MM3 (ref 1.87–8.4)
NEUTROPHILS # BLD AUTO: 8.72 10*3/MM3 (ref 1.87–8.4)
NEUTROPHILS NFR BLD AUTO: 77.5 % (ref 39–78)
NEUTROPHILS NFR BLD AUTO: 78.1 % (ref 39–78)
NRBC BLD MANUAL-RTO: 0 /100 WBC (ref 0–0)
NRBC BLD MANUAL-RTO: 0 /100 WBC (ref 0–0)
NT-PROBNP SERPL-MCNC: 307 PG/ML (ref 0–900)
PHOSPHATE SERPL-MCNC: 7.9 MG/DL (ref 2.5–4.5)
PLATELET # BLD AUTO: 258 10*3/MM3 (ref 130–400)
PLATELET # BLD AUTO: 282 10*3/MM3 (ref 130–400)
PMV BLD AUTO: 12.5 FL (ref 6–12)
PMV BLD AUTO: 12.5 FL (ref 6–12)
POTASSIUM BLD-SCNC: 5.2 MMOL/L (ref 3.5–5.3)
POTASSIUM BLD-SCNC: 6.7 MMOL/L (ref 3.5–5.3)
PROT SERPL-MCNC: 7.5 G/DL (ref 6.3–8.7)
PROT SERPL-MCNC: 8.2 G/DL (ref 6.3–8.7)
PROTHROMBIN TIME: 15.1 SECONDS (ref 11.9–14.6)
PROTHROMBIN TIME: 15.3 SECONDS (ref 11.9–14.6)
RBC # BLD AUTO: 3.8 10*6/MM3 (ref 4.8–5.9)
RBC # BLD AUTO: 3.83 10*6/MM3 (ref 4.8–5.9)
RETICS #: 0.05 10*6/MM3 (ref 0.02–0.13)
RETICS/RBC NFR AUTO: 1.2 % (ref 0.6–1.8)
SODIUM BLD-SCNC: 137 MMOL/L (ref 135–145)
SODIUM BLD-SCNC: 140 MMOL/L (ref 135–145)
TIBC SERPL-MCNC: 292 MCG/DL (ref 225–420)
TROPONIN I SERPL-MCNC: <0.012 NG/ML (ref 0–0.03)
TSH SERPL DL<=0.05 MIU/L-ACNC: 2.48 MIU/ML (ref 0.47–4.68)
URATE SERPL-MCNC: 10.7 MG/DL (ref 3.5–8.5)
VIT B12 BLD-MCNC: 923 PG/ML (ref 239–931)
WBC NRBC COR # BLD: 11.24 10*3/MM3 (ref 4.8–10.8)
WBC NRBC COR # BLD: 13.62 10*3/MM3 (ref 4.8–10.8)

## 2018-03-05 PROCEDURE — 82550 ASSAY OF CK (CPK): CPT | Performed by: INTERNAL MEDICINE

## 2018-03-05 PROCEDURE — 86140 C-REACTIVE PROTEIN: CPT | Performed by: INTERNAL MEDICINE

## 2018-03-05 PROCEDURE — 85025 COMPLETE CBC W/AUTO DIFF WBC: CPT | Performed by: NURSE PRACTITIONER

## 2018-03-05 PROCEDURE — 84484 ASSAY OF TROPONIN QUANT: CPT | Performed by: EMERGENCY MEDICINE

## 2018-03-05 PROCEDURE — 82728 ASSAY OF FERRITIN: CPT | Performed by: INTERNAL MEDICINE

## 2018-03-05 PROCEDURE — 93010 ELECTROCARDIOGRAM REPORT: CPT | Performed by: INTERNAL MEDICINE

## 2018-03-05 PROCEDURE — 36415 COLL VENOUS BLD VENIPUNCTURE: CPT | Performed by: NURSE PRACTITIONER

## 2018-03-05 PROCEDURE — 83735 ASSAY OF MAGNESIUM: CPT | Performed by: INTERNAL MEDICINE

## 2018-03-05 PROCEDURE — 84100 ASSAY OF PHOSPHORUS: CPT | Performed by: INTERNAL MEDICINE

## 2018-03-05 PROCEDURE — 84550 ASSAY OF BLOOD/URIC ACID: CPT | Performed by: INTERNAL MEDICINE

## 2018-03-05 PROCEDURE — 86225 DNA ANTIBODY NATIVE: CPT | Performed by: INTERNAL MEDICINE

## 2018-03-05 PROCEDURE — 85610 PROTHROMBIN TIME: CPT | Performed by: EMERGENCY MEDICINE

## 2018-03-05 PROCEDURE — 25010000002 ONDANSETRON PER 1 MG: Performed by: INTERNAL MEDICINE

## 2018-03-05 PROCEDURE — 25010000002 CALCIUM GLUCONATE PER 10 ML: Performed by: INTERNAL MEDICINE

## 2018-03-05 PROCEDURE — 84443 ASSAY THYROID STIM HORMONE: CPT | Performed by: INTERNAL MEDICINE

## 2018-03-05 PROCEDURE — 80053 COMPREHEN METABOLIC PANEL: CPT | Performed by: EMERGENCY MEDICINE

## 2018-03-05 PROCEDURE — 63710000001 INSULIN REGULAR HUMAN PER 5 UNITS: Performed by: INTERNAL MEDICINE

## 2018-03-05 PROCEDURE — 85045 AUTOMATED RETICULOCYTE COUNT: CPT | Performed by: INTERNAL MEDICINE

## 2018-03-05 PROCEDURE — 80053 COMPREHEN METABOLIC PANEL: CPT | Performed by: NURSE PRACTITIONER

## 2018-03-05 PROCEDURE — 85730 THROMBOPLASTIN TIME PARTIAL: CPT | Performed by: EMERGENCY MEDICINE

## 2018-03-05 PROCEDURE — 82746 ASSAY OF FOLIC ACID SERUM: CPT | Performed by: INTERNAL MEDICINE

## 2018-03-05 PROCEDURE — 85025 COMPLETE CBC W/AUTO DIFF WBC: CPT | Performed by: EMERGENCY MEDICINE

## 2018-03-05 PROCEDURE — 85610 PROTHROMBIN TIME: CPT | Performed by: INTERNAL MEDICINE

## 2018-03-05 PROCEDURE — 99284 EMERGENCY DEPT VISIT MOD MDM: CPT

## 2018-03-05 PROCEDURE — 99214 OFFICE O/P EST MOD 30 MIN: CPT | Performed by: NURSE PRACTITIONER

## 2018-03-05 PROCEDURE — 86038 ANTINUCLEAR ANTIBODIES: CPT | Performed by: INTERNAL MEDICINE

## 2018-03-05 PROCEDURE — 71250 CT THORAX DX C-: CPT

## 2018-03-05 PROCEDURE — 83550 IRON BINDING TEST: CPT | Performed by: INTERNAL MEDICINE

## 2018-03-05 PROCEDURE — 82607 VITAMIN B-12: CPT | Performed by: INTERNAL MEDICINE

## 2018-03-05 PROCEDURE — 74176 CT ABD & PELVIS W/O CONTRAST: CPT

## 2018-03-05 PROCEDURE — 93005 ELECTROCARDIOGRAM TRACING: CPT | Performed by: EMERGENCY MEDICINE

## 2018-03-05 PROCEDURE — 83690 ASSAY OF LIPASE: CPT | Performed by: EMERGENCY MEDICINE

## 2018-03-05 PROCEDURE — 85651 RBC SED RATE NONAUTOMATED: CPT | Performed by: INTERNAL MEDICINE

## 2018-03-05 PROCEDURE — 83516 IMMUNOASSAY NONANTIBODY: CPT | Performed by: INTERNAL MEDICINE

## 2018-03-05 PROCEDURE — 86060 ANTISTREPTOLYSIN O TITER: CPT | Performed by: INTERNAL MEDICINE

## 2018-03-05 PROCEDURE — 83880 ASSAY OF NATRIURETIC PEPTIDE: CPT | Performed by: INTERNAL MEDICINE

## 2018-03-05 PROCEDURE — 83540 ASSAY OF IRON: CPT | Performed by: INTERNAL MEDICINE

## 2018-03-05 RX ORDER — MAGNESIUM SULFATE 1 G/100ML
1 INJECTION INTRAVENOUS AS NEEDED
Status: DISCONTINUED | OUTPATIENT
Start: 2018-03-05 | End: 2018-03-06

## 2018-03-05 RX ORDER — SODIUM CHLORIDE 0.9 % (FLUSH) 0.9 %
1-10 SYRINGE (ML) INJECTION AS NEEDED
Status: DISCONTINUED | OUTPATIENT
Start: 2018-03-05 | End: 2018-03-09 | Stop reason: HOSPADM

## 2018-03-05 RX ORDER — CALCIUM GLUCONATE 94 MG/ML
1 INJECTION, SOLUTION INTRAVENOUS ONCE
Status: DISCONTINUED | OUTPATIENT
Start: 2018-03-05 | End: 2018-03-05

## 2018-03-05 RX ORDER — ACETAMINOPHEN 325 MG/1
650 TABLET ORAL EVERY 4 HOURS PRN
Status: DISCONTINUED | OUTPATIENT
Start: 2018-03-05 | End: 2018-03-09 | Stop reason: HOSPADM

## 2018-03-05 RX ORDER — SODIUM CHLORIDE 0.9 % (FLUSH) 0.9 %
10 SYRINGE (ML) INJECTION AS NEEDED
Status: DISCONTINUED | OUTPATIENT
Start: 2018-03-05 | End: 2018-03-09 | Stop reason: HOSPADM

## 2018-03-05 RX ORDER — PANTOPRAZOLE SODIUM 40 MG/1
40 TABLET, DELAYED RELEASE ORAL EVERY MORNING
Status: DISCONTINUED | OUTPATIENT
Start: 2018-03-06 | End: 2018-03-09 | Stop reason: HOSPADM

## 2018-03-05 RX ORDER — SODIUM POLYSTYRENE SULFONATE 15 G/60ML
15 SUSPENSION ORAL; RECTAL ONCE
Status: COMPLETED | OUTPATIENT
Start: 2018-03-05 | End: 2018-03-05

## 2018-03-05 RX ORDER — LISINOPRIL AND HYDROCHLOROTHIAZIDE 12.5; 1 MG/1; MG/1
1 TABLET ORAL DAILY
COMMUNITY
Start: 2018-02-27 | End: 2018-03-09 | Stop reason: HOSPADM

## 2018-03-05 RX ORDER — DEXTROSE MONOHYDRATE 25 G/50ML
50 INJECTION, SOLUTION INTRAVENOUS
Status: DISCONTINUED | OUTPATIENT
Start: 2018-03-05 | End: 2018-03-09 | Stop reason: HOSPADM

## 2018-03-05 RX ORDER — CALCIUM GLUCONATE 94 MG/ML
1 INJECTION, SOLUTION INTRAVENOUS ONCE
Status: COMPLETED | OUTPATIENT
Start: 2018-03-05 | End: 2018-03-05

## 2018-03-05 RX ORDER — SODIUM POLYSTYRENE SULFONATE 15 G/60ML
15 SUSPENSION ORAL; RECTAL ONCE
Status: DISCONTINUED | OUTPATIENT
Start: 2018-03-05 | End: 2018-03-05

## 2018-03-05 RX ORDER — SODIUM CHLORIDE 9 MG/ML
500 INJECTION, SOLUTION INTRAVENOUS ONCE
Status: COMPLETED | OUTPATIENT
Start: 2018-03-05 | End: 2018-03-05

## 2018-03-05 RX ORDER — BISACODYL 5 MG/1
5 TABLET, DELAYED RELEASE ORAL DAILY PRN
Status: DISCONTINUED | OUTPATIENT
Start: 2018-03-05 | End: 2018-03-09 | Stop reason: HOSPADM

## 2018-03-05 RX ORDER — MAGNESIUM SULFATE HEPTAHYDRATE 40 MG/ML
2 INJECTION, SOLUTION INTRAVENOUS AS NEEDED
Status: DISCONTINUED | OUTPATIENT
Start: 2018-03-05 | End: 2018-03-06

## 2018-03-05 RX ORDER — NICOTINE 21 MG/24HR
1 PATCH, TRANSDERMAL 24 HOURS TRANSDERMAL EVERY 24 HOURS
Status: DISCONTINUED | OUTPATIENT
Start: 2018-03-05 | End: 2018-03-09 | Stop reason: HOSPADM

## 2018-03-05 RX ORDER — ACETAMINOPHEN 650 MG/1
650 SUPPOSITORY RECTAL EVERY 4 HOURS PRN
Status: DISCONTINUED | OUTPATIENT
Start: 2018-03-05 | End: 2018-03-09 | Stop reason: HOSPADM

## 2018-03-05 RX ORDER — MAGNESIUM SULFATE HEPTAHYDRATE 40 MG/ML
4 INJECTION, SOLUTION INTRAVENOUS AS NEEDED
Status: DISCONTINUED | OUTPATIENT
Start: 2018-03-05 | End: 2018-03-06

## 2018-03-05 RX ORDER — ONDANSETRON 2 MG/ML
4 INJECTION INTRAMUSCULAR; INTRAVENOUS EVERY 6 HOURS PRN
Status: DISCONTINUED | OUTPATIENT
Start: 2018-03-05 | End: 2018-03-09 | Stop reason: HOSPADM

## 2018-03-05 RX ORDER — DEXTROSE MONOHYDRATE 25 G/50ML
50 INJECTION, SOLUTION INTRAVENOUS
Status: DISCONTINUED | OUTPATIENT
Start: 2018-03-05 | End: 2018-03-05

## 2018-03-05 RX ADMIN — ONDANSETRON 4 MG: 2 INJECTION, SOLUTION INTRAMUSCULAR; INTRAVENOUS at 22:36

## 2018-03-05 RX ADMIN — SODIUM BICARBONATE 175 ML/HR: 84 INJECTION, SOLUTION INTRAVENOUS at 23:07

## 2018-03-05 RX ADMIN — SODIUM CHLORIDE 500 ML: 9 INJECTION, SOLUTION INTRAVENOUS at 19:03

## 2018-03-05 RX ADMIN — SODIUM POLYSTYRENE SULFONATE 15 G: 15 SUSPENSION ORAL; RECTAL at 22:29

## 2018-03-05 RX ADMIN — INSULIN HUMAN 10 UNITS: 100 INJECTION, SOLUTION PARENTERAL at 22:29

## 2018-03-05 RX ADMIN — CALCIUM GLUCONATE 1 G: 94 INJECTION, SOLUTION INTRAVENOUS at 22:29

## 2018-03-05 NOTE — TELEPHONE ENCOUNTER
I called and spoke with him and his wife.  Again the results of his lab test, creatinine is 13.9 with a , I told him to come to the emergency room now.  They are in agreement.  She is going to bring him here to [facility] emergency room.  I did call and speak with the er  Charge nurse /julissa and gave them information/details. I also called Dr. Burton's office as well and informed them.  Dr. Burton is aware.

## 2018-03-05 NOTE — PROGRESS NOTES
"[facility] GASTROENTEROLOGY - OFFICE NOTE    3/5/2018    Jonnie Sheets Jr.   1945    Primary Physician: Dandy Burton MD    Chief Complaint   Patient presents with   • GI Bleeding         HISTORY OF PRESENT ILLNESS    Jonnie Sheets Jr. is a 72 y.o. male presents  with Recent rectal bleeding, weight loss, decreased appetite, and weakness.  He had a colonoscopy by Dr. Kong 02/05/2018 he had one polyp, sigmoid diverticulosis, and internal hemorrhoids.  He was recommended recall colonoscopy in 3 years.  About 12 days ago he noted rectal bleeding and at first he said was red blood but then said was more like a dark color,  may be even black.  Takes that the bleeding lasted about 5 days.  Typically moves his bowels about 4 times a day.  The last few weeks he has noted increased weakness and decreased appetite.  In review of his weight, he has lost 12 pounds since January.  His wife has been giving him nutritional supplements \"bubble hardly take anything in by mouth\".  He did have a fall a few weeks ago and now is tender in his right lower rib area.  She is unsure if he really had a syncopal episode.  He did take some Aleve after the fall for the right rib pain.  No nausea or vomiting.  No abdominal pain.  No fevers or chills.  No hematemesis.  Saw Dr. Burton about a week ago and had labs and was told that his hemoglobin was okay.      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    NOTED PROLONG PARALYSIS >2 HOURS WITH SUCCINYLCHOLINE UNDER ANESTHESIA   • Squamous cell carcinoma of oral mucosa    • Umbilical hernia        Past Surgical History:   Procedure Laterality Date   • COLON RESECTION Right 1/19/2017    Procedure: LAPAROSCOPIC ASSISTED RIGHT COLON RESECTION RIGHT, UMBILICAL HERNIA REPAIR;  " Surgeon: Mario Ellison MD;  Location: [facility] OR;  Service:    • COLONOSCOPY     • COLONOSCOPY N/A 1/4/2017    Procedure: COLONOSCOPY WITH ANESTHESIA;  Surgeon: Tee Kong MD;  Location: [facility] ENDOSCOPY;  Service:    • COLONOSCOPY N/A 2/5/2018    Procedure: COLONOSCOPY WITH ANESTHESIA;  Surgeon: Tee Kong MD;  Location: [facility] ENDOSCOPY;  Service:    • ENDOSCOPY N/A 1/4/2017    Procedure: ESOPHAGOGASTRODUODENOSCOPY WITH ANESTHESIA;  Surgeon: Tee Kong MD;  Location: [facility] ENDOSCOPY;  Service:    • KNEE SURGERY      PINS PLACED AND LATER REMOVED S/P FRACTURE   • MOUTH BIOPSY     • TONSILLECTOMY         Outpatient Prescriptions Marked as Taking for the 3/5/18 encounter (Office Visit) with PRATEEK Eisenberg   Medication Sig Dispense Refill   • FIBER, GUAR GUM, PO Take 1 tablet by mouth As Needed.     • lisinopril-hydrochlorothiazide (PRINZIDE,ZESTORETIC) 10-12.5 MG per tablet      • lovastatin (MEVACOR) 40 MG tablet Take 40 mg by mouth Every Night.     • omeprazole (priLOSEC) 20 MG capsule Take 20 mg by mouth Daily.         Allergies   Allergen Reactions   • Acetylcholinesterase Other (See Comments)     Had trouble coming out of anesthesia, Dr Ellison inst pt family to make sure pt never receive this again, if he does he will wind up on respirator       Social History     Social History   • Marital status:      Spouse name: N/A   • Number of children: N/A   • Years of education: N/A     Occupational History   • Not on file.     Social History Main Topics   • Smoking status: Current Every Day Smoker     Packs/day: 1.00     Years: 55.00     Types: Cigarettes   • Smokeless tobacco: Never Used   • Alcohol use Yes      Comment: 3-4 times a week, liquor   • Drug use: No   • Sexual activity: Defer     Other Topics Concern   • Not on file     Social History Narrative       Family History   Problem Relation Age of Onset   • Heart disease Father    • Colon cancer Neg Hx    • Colon polyps Neg  "Hx        Review of Systems   Constitutional: Positive for appetite change, fatigue and unexpected weight change. Negative for chills and fever.   Respiratory: Negative for cough, shortness of breath and wheezing.    Cardiovascular: Negative for chest pain and palpitations.   Gastrointestinal: Negative for abdominal distention, abdominal pain, constipation, diarrhea, nausea, rectal pain and vomiting.   Genitourinary: Negative for difficulty urinating and hematuria.   Musculoskeletal:        He fell a few weeks ago and now has pain in the right lower rib area   Neurological: Positive for weakness.        Vitals:    03/05/18 1248   BP: 99/58   BP Location: Left arm   Patient Position: Sitting   Cuff Size: Adult   Pulse: 83   Temp: 93.5 °F (34.2 °C)   SpO2: 97%   Weight: 66.2 kg (146 lb)   Height: 165.1 cm (65\")      Body mass index is 24.3 kg/(m^2).    Physical Exam   Constitutional: He is oriented to person, place, and time. He appears well-developed and well-nourished. No distress.   Cardiovascular: Normal rate, regular rhythm and normal heart sounds.    Pulmonary/Chest: Effort normal and breath sounds normal.   Abdominal: Soft. Bowel sounds are normal. He exhibits no distension and no mass. There is no tenderness. There is no rebound and no guarding.   Musculoskeletal: He exhibits no edema.   Right lower rib area tender to palpate.   Neurological: He is alert and oriented to person, place, and time.   Skin: Skin is warm and dry.   Psychiatric: He has a normal mood and affect. His behavior is normal.   Vitals reviewed.      Results for orders placed or performed in visit on 03/05/18   Comprehensive Metabolic Panel   Result Value Ref Range    Glucose 128 (H) 70 - 100 mg/dL     (H) 5 - 21 mg/dL    Creatinine 13.90 (H) 0.50 - 1.40 mg/dL    Sodium 140 135 - 145 mmol/L    Potassium 5.2 3.5 - 5.3 mmol/L    Chloride 100 98 - 110 mmol/L    CO2 14.0 (L) 24.0 - 31.0 mmol/L    Calcium 8.8 8.4 - 10.4 mg/dL    Total " Protein 8.2 6.3 - 8.7 g/dL    Albumin 4.40 3.50 - 5.00 g/dL    ALT (SGPT) <15 0 - 54 U/L    AST (SGOT) 22 7 - 45 U/L    Alkaline Phosphatase 69 24 - 120 U/L    Total Bilirubin 0.2 0.1 - 1.0 mg/dL    eGFR Non African Amer 4 (L) >60 mL/min/1.73    Globulin 3.8 gm/dL    A/G Ratio 1.2 1.1 - 2.5 g/dL    BUN/Creatinine Ratio 8.0 7.0 - 25.0    Anion Gap 26.0 (H) 4.0 - 13.0 mmol/L   CBC Auto Differential   Result Value Ref Range    WBC 13.62 (H) 4.80 - 10.80 10*3/mm3    RBC 3.83 (L) 4.80 - 5.90 10*6/mm3    Hemoglobin 11.3 (L) 14.0 - 18.0 g/dL    Hematocrit 34.5 (L) 40.0 - 52.0 %    MCV 90.1 82.0 - 95.0 fL    MCH 29.5 28.0 - 32.0 pg    MCHC 32.8 (L) 33.0 - 36.0 g/dL    RDW 13.4 12.0 - 15.0 %    RDW-SD 43.8 40.0 - 54.0 fl    MPV 12.5 (H) 6.0 - 12.0 fL    Platelets 282 130 - 400 10*3/mm3    Neutrophil % 78.1 (H) 39.0 - 78.0 %    Lymphocyte % 10.6 (L) 15.0 - 45.0 %    Monocyte % 8.7 4.0 - 12.0 %    Eosinophil % 1.1 0.0 - 4.0 %    Basophil % 0.4 0.0 - 2.0 %    Immature Grans % 1.1 0.0 - 5.0 %    Neutrophils, Absolute 10.64 (H) 1.87 - 8.40 10*3/mm3    Lymphocytes, Absolute 1.44 0.72 - 4.86 10*3/mm3    Monocytes, Absolute 1.19 0.19 - 1.30 10*3/mm3    Eosinophils, Absolute 0.15 0.00 - 0.70 10*3/mm3    Basophils, Absolute 0.05 0.00 - 0.20 10*3/mm3    Immature Grans, Absolute 0.15 (H) 0.00 - 0.03 10*3/mm3    nRBC 0.0 0.0 - 0.0 /100 WBC           ASSESSMENT AND PLAN    Jonnie was seen today for gi bleeding.    Diagnoses and all orders for this visit:    Weight loss    Anorexia  -     Case Request; Standing  -     Case Request  -     Comprehensive Metabolic Panel    Rectal bleeding  -     CBC & Differential  -     CBC Auto Differential    Black stool  -     Case Request; Standing  -     Case Request    Other orders  -     Implement Anesthesia Orders Day of Procedure; Standing  -     Obtain Informed Consent; Standing    Will request recent CBC from Dr. Burton's office.  We will order an additional CBC today as well as chemistry panel.  He  "does have history of colon cancer and is followed by oncology , he had a CT scan of his abdomen and pelvis with contrast October 2017 here at [facility], see report.  I am going schedule him for an EGD to be done this week by Dr. Kong.  He takes Prilosec on a daily basis and have asked him to increase that to twice a day.  I have asked him to stop taking any anti-inflammatories.  May use Tylenol for moderate some pains.  He is very weak, did have a hard time getting up on the exam table today.  His wife states that it was hard for her to get him to this appointment.  She has been encouraging nutritional supplements and I have reinforced that.  After seeing how weak he was today I did recommend that he go to the emergency room.  Dr. Kong did talk to him as well and also recommend going to the emergency room.  The patient declines states that he may \"go in the next couple days\".  We will get him scheduled for the upper endoscopy Wednesday of this week.  In the meantime I have recommended emergency room if worsening symptoms.     ESOPHAGOGASTRODUODENOSCOPY WITH ANESTHESIA (N/A)   Risk, benefits, and alternatives of endoscopy were explained in full.  They understand that there is a risk of bleeding, perforation, and infection.  The risk of perforation goes up with esophageal dilation.  Other options to evaluate UGI complaints could involve barium swallow or UGI series, but these would be diagnostic tests only.  Patient was given time to ask questions.  I answered them to their satisfaction and they are agreeable to proceeding.    I advised the patient of the risks in continuing to use tobacco, and I provided this patient with smoking cessation educational materials.    During this visit, I spent > 3-10 minutes counseling the patient regarding smoking cessation.        Body mass index is 24.3 kg/(m^2).         PRATEEK Vazquez    EMR Dragon/transcription disclaimer:  Much of this encounter note is electronic " transcription/translation of spoken language to printed text.  The electronic translation of spoken language may be erroneous, or at times, nonsensical words or phrases may be inadvertently transcribed.  Although I have reviewed the note for such errors, some may still exist.      Received CBC report from Dr. Burton's office that was done February 27, white blood cell count was 14.3, hemoglobin 12.8, MCV normal, platelets 309.

## 2018-03-06 ENCOUNTER — APPOINTMENT (OUTPATIENT)
Dept: GENERAL RADIOLOGY | Facility: HOSPITAL | Age: 73
End: 2018-03-06

## 2018-03-06 ENCOUNTER — APPOINTMENT (OUTPATIENT)
Dept: ULTRASOUND IMAGING | Facility: HOSPITAL | Age: 73
End: 2018-03-06

## 2018-03-06 LAB
ALBUMIN SERPL-MCNC: 3.6 G/DL (ref 3.5–5)
ALBUMIN SERPL-MCNC: 3.7 G/DL (ref 3.5–5)
ALBUMIN/GLOB SERPL: 1.1 G/DL (ref 1.1–2.5)
ALBUMIN/GLOB SERPL: 1.2 G/DL (ref 1.1–2.5)
ALP SERPL-CCNC: 52 U/L (ref 24–120)
ALP SERPL-CCNC: 54 U/L (ref 24–120)
ALT SERPL W P-5'-P-CCNC: 16 U/L (ref 0–54)
ALT SERPL W P-5'-P-CCNC: <15 U/L (ref 0–54)
AMPHET+METHAMPHET UR QL: NEGATIVE
ANION GAP SERPL CALCULATED.3IONS-SCNC: 20 MMOL/L (ref 4–13)
ANION GAP SERPL CALCULATED.3IONS-SCNC: 22 MMOL/L (ref 4–13)
ARTICHOKE IGE QN: 81 MG/DL (ref 0–99)
AST SERPL-CCNC: 22 U/L (ref 7–45)
AST SERPL-CCNC: 25 U/L (ref 7–45)
BACTERIA UR QL AUTO: ABNORMAL /HPF
BARBITURATES UR QL SCN: NEGATIVE
BENZODIAZ UR QL SCN: NEGATIVE
BILIRUB SERPL-MCNC: 0.1 MG/DL (ref 0.1–1)
BILIRUB SERPL-MCNC: 0.2 MG/DL (ref 0.1–1)
BILIRUB UR QL STRIP: NEGATIVE
BUN BLD-MCNC: 112 MG/DL (ref 5–21)
BUN BLD-MCNC: 113 MG/DL (ref 5–21)
BUN/CREAT SERPL: 9.2 (ref 7–25)
BUN/CREAT SERPL: 9.5 (ref 7–25)
CA-I BLD-MCNC: 4.26 MG/DL (ref 4.6–5.4)
CALCIUM SPEC-SCNC: 8.2 MG/DL (ref 8.4–10.4)
CALCIUM SPEC-SCNC: 8.5 MG/DL (ref 8.4–10.4)
CANNABINOIDS SERPL QL: NEGATIVE
CHLORIDE SERPL-SCNC: 102 MMOL/L (ref 98–110)
CHLORIDE SERPL-SCNC: 104 MMOL/L (ref 98–110)
CHOLEST SERPL-MCNC: 123 MG/DL (ref 130–200)
CK SERPL-CCNC: <20 U/L (ref 0–203)
CLARITY UR: ABNORMAL
CO2 SERPL-SCNC: 16 MMOL/L (ref 24–31)
CO2 SERPL-SCNC: 18 MMOL/L (ref 24–31)
COCAINE UR QL: NEGATIVE
COLOR UR: YELLOW
CREAT BLD-MCNC: 11.88 MG/DL (ref 0.5–1.4)
CREAT BLD-MCNC: 12.19 MG/DL (ref 0.5–1.4)
CREAT UR-MCNC: 312.6 MG/DL
GFR SERPL CREATININE-BSD FRML MDRD: 4 ML/MIN/1.73
GFR SERPL CREATININE-BSD FRML MDRD: 4 ML/MIN/1.73
GLOBULIN UR ELPH-MCNC: 3.1 GM/DL
GLOBULIN UR ELPH-MCNC: 3.2 GM/DL
GLUCOSE BLD-MCNC: 115 MG/DL (ref 70–100)
GLUCOSE BLD-MCNC: 88 MG/DL (ref 70–100)
GLUCOSE BLDC GLUCOMTR-MCNC: 155 MG/DL (ref 70–130)
GLUCOSE UR STRIP-MCNC: NEGATIVE MG/DL
HBV SURFACE AG SERPL QL IA: NEGATIVE
HDLC SERPL-MCNC: 13 MG/DL
HGB UR QL STRIP.AUTO: NEGATIVE
HIV1+2 AB SER QL: NEGATIVE
HYALINE CASTS UR QL AUTO: ABNORMAL /LPF
KETONES UR QL STRIP: ABNORMAL
LDLC/HDLC SERPL: 5.69 {RATIO}
LEUKOCYTE ESTERASE UR QL STRIP.AUTO: ABNORMAL
LIPASE SERPL-CCNC: 357 U/L (ref 23–203)
Lab: ABNORMAL
MAGNESIUM SERPL-MCNC: 0.9 MG/DL (ref 1.4–2.2)
METHADONE UR QL SCN: NEGATIVE
NITRITE UR QL STRIP: NEGATIVE
NT-PROBNP SERPL-MCNC: 311 PG/ML (ref 0–900)
OPIATES UR QL: NEGATIVE
OSMOLALITY UR: 365 MOSM/KG (ref 601–850)
PCP UR QL SCN: NEGATIVE
PH UR STRIP.AUTO: <=5 [PH] (ref 5–8)
PHOSPHATE SERPL-MCNC: 6.8 MG/DL (ref 2.5–4.5)
POTASSIUM BLD-SCNC: 4.8 MMOL/L (ref 3.5–5.3)
POTASSIUM BLD-SCNC: 5 MMOL/L (ref 3.5–5.3)
PROT SERPL-MCNC: 6.8 G/DL (ref 6.3–8.7)
PROT SERPL-MCNC: 6.8 G/DL (ref 6.3–8.7)
PROT UR QL STRIP: ABNORMAL
RBC # UR: ABNORMAL /HPF
REF LAB TEST METHOD: ABNORMAL
SODIUM BLD-SCNC: 140 MMOL/L (ref 135–145)
SODIUM BLD-SCNC: 142 MMOL/L (ref 135–145)
SODIUM UR-SCNC: 39 MMOL/L (ref 30–90)
SP GR UR STRIP: 1.02 (ref 1–1.03)
SQUAMOUS #/AREA URNS HPF: ABNORMAL /HPF
TRIGL SERPL-MCNC: 180 MG/DL (ref 0–149)
UNIDENT CRYS URNS QL MICRO: ABNORMAL /HPF
UROBILINOGEN UR QL STRIP: ABNORMAL
WBC UR QL AUTO: ABNORMAL /HPF

## 2018-03-06 PROCEDURE — 87340 HEPATITIS B SURFACE AG IA: CPT | Performed by: INTERNAL MEDICINE

## 2018-03-06 PROCEDURE — 25010000002 IRON SUCROSE PER 1 MG: Performed by: FAMILY MEDICINE

## 2018-03-06 PROCEDURE — 84100 ASSAY OF PHOSPHORUS: CPT | Performed by: INTERNAL MEDICINE

## 2018-03-06 PROCEDURE — 25010000002 MAGNESIUM SULFATE 2 GM/50ML SOLUTION: Performed by: FAMILY MEDICINE

## 2018-03-06 PROCEDURE — 80307 DRUG TEST PRSMV CHEM ANLYZR: CPT | Performed by: INTERNAL MEDICINE

## 2018-03-06 PROCEDURE — 71045 X-RAY EXAM CHEST 1 VIEW: CPT

## 2018-03-06 PROCEDURE — 87205 SMEAR GRAM STAIN: CPT | Performed by: INTERNAL MEDICINE

## 2018-03-06 PROCEDURE — 80061 LIPID PANEL: CPT | Performed by: INTERNAL MEDICINE

## 2018-03-06 PROCEDURE — 82962 GLUCOSE BLOOD TEST: CPT

## 2018-03-06 PROCEDURE — 82550 ASSAY OF CK (CPK): CPT | Performed by: INTERNAL MEDICINE

## 2018-03-06 PROCEDURE — G0432 EIA HIV-1/HIV-2 SCREEN: HCPCS | Performed by: INTERNAL MEDICINE

## 2018-03-06 PROCEDURE — 76775 US EXAM ABDO BACK WALL LIM: CPT

## 2018-03-06 PROCEDURE — 80053 COMPREHEN METABOLIC PANEL: CPT | Performed by: INTERNAL MEDICINE

## 2018-03-06 PROCEDURE — 25010000002 ONDANSETRON PER 1 MG: Performed by: INTERNAL MEDICINE

## 2018-03-06 PROCEDURE — 83690 ASSAY OF LIPASE: CPT | Performed by: INTERNAL MEDICINE

## 2018-03-06 PROCEDURE — 84300 ASSAY OF URINE SODIUM: CPT | Performed by: INTERNAL MEDICINE

## 2018-03-06 PROCEDURE — 83735 ASSAY OF MAGNESIUM: CPT | Performed by: INTERNAL MEDICINE

## 2018-03-06 PROCEDURE — 87086 URINE CULTURE/COLONY COUNT: CPT | Performed by: INTERNAL MEDICINE

## 2018-03-06 PROCEDURE — 99222 1ST HOSP IP/OBS MODERATE 55: CPT | Performed by: INTERNAL MEDICINE

## 2018-03-06 PROCEDURE — 82330 ASSAY OF CALCIUM: CPT

## 2018-03-06 PROCEDURE — 83880 ASSAY OF NATRIURETIC PEPTIDE: CPT | Performed by: INTERNAL MEDICINE

## 2018-03-06 PROCEDURE — 83935 ASSAY OF URINE OSMOLALITY: CPT | Performed by: INTERNAL MEDICINE

## 2018-03-06 PROCEDURE — 81001 URINALYSIS AUTO W/SCOPE: CPT | Performed by: EMERGENCY MEDICINE

## 2018-03-06 PROCEDURE — 82570 ASSAY OF URINE CREATININE: CPT | Performed by: INTERNAL MEDICINE

## 2018-03-06 RX ORDER — MAGNESIUM SULFATE HEPTAHYDRATE 40 MG/ML
2 INJECTION, SOLUTION INTRAVENOUS ONCE
Status: COMPLETED | OUTPATIENT
Start: 2018-03-06 | End: 2018-03-06

## 2018-03-06 RX ORDER — POLYVINYL ALCOHOL 14 MG/ML
1 SOLUTION/ DROPS OPHTHALMIC
Status: DISCONTINUED | OUTPATIENT
Start: 2018-03-06 | End: 2018-03-09 | Stop reason: HOSPADM

## 2018-03-06 RX ADMIN — POLYVINYL ALCOHOL 1 DROP: 14 SOLUTION/ DROPS OPHTHALMIC at 13:25

## 2018-03-06 RX ADMIN — MAGNESIUM SULFATE HEPTAHYDRATE 4 G: 40 INJECTION, SOLUTION INTRAVENOUS at 03:42

## 2018-03-06 RX ADMIN — IRON SUCROSE 300 MG: 20 INJECTION, SOLUTION INTRAVENOUS at 13:11

## 2018-03-06 RX ADMIN — SODIUM BICARBONATE 150 ML/HR: 84 INJECTION, SOLUTION INTRAVENOUS at 23:49

## 2018-03-06 RX ADMIN — POLYVINYL ALCOHOL 1 DROP: 14 SOLUTION/ DROPS OPHTHALMIC at 15:06

## 2018-03-06 RX ADMIN — MAGNESIUM SULFATE HEPTAHYDRATE 2 G: 40 INJECTION, SOLUTION INTRAVENOUS at 10:32

## 2018-03-06 RX ADMIN — ONDANSETRON 4 MG: 2 INJECTION, SOLUTION INTRAMUSCULAR; INTRAVENOUS at 20:12

## 2018-03-06 RX ADMIN — SODIUM CHLORIDE 1000 ML: 9 INJECTION, SOLUTION INTRAVENOUS at 13:12

## 2018-03-06 RX ADMIN — SODIUM BICARBONATE 150 ML/HR: 84 INJECTION, SOLUTION INTRAVENOUS at 15:06

## 2018-03-06 RX ADMIN — PANTOPRAZOLE SODIUM 40 MG: 40 TABLET, DELAYED RELEASE ORAL at 07:02

## 2018-03-06 NOTE — H&P
[facility] Medicine Services  HISTORY AND PHYSICAL    Date of Admission: 3/5/2018  Primary Care Physician: Dandy Burton MD    Subjective     Chief Complaint: Sent for abnormal labs    History of Present Illness  Patient is a 72-year-old white male past medical history of colon cancer diagnosed approximately a year ago with successful resection.  Patient did not get any chemotherapy or radiation as far as her.  He been his usual state of health until he was seen for his follow-up colonoscopy.  Apparently his blood pressure was elevated at that visit he was referred to his primary care doctor and was placed on a blood pressure medication as a combination of lisinopril and hydrochlorothiazide.  Patient subsequently underwent colonoscopy he has not felt well since.  He had a near syncopal episode followed off the couch after that as well.  He seen his primary care doctor and told his hemoglobin was stable I do not know if her creatinine was done.  He presents with continued rectal bleeding after this his hemoglobin is normal but his creatinine was 13.  He states that all of this problem started after he started the medication approximately a month ago.  He also taken some nonsteroidals Aleve for a few days the last one being taken several days ago for some right-sided pain in his ribs after he fell.  He is also had a poor appetite felt very weak not been drinking much recently he denies fevers or chills he has no history of renal failure his last creatinine was noted to be 1 in October.        Review of Systems   14 point review of systems negative except for as per HPI  Otherwise complete ROS reviewed and negative except as mentioned in the HPI.    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    NOTED PROLONG PARALYSIS >2 HOURS WITH SUCCINYLCHOLINE UNDER ANESTHESIA   • Squamous cell carcinoma of oral mucosa    • Umbilical hernia      Past Surgical History:  Past Surgical History:   Procedure Laterality Date   • COLON RESECTION Right 1/19/2017    Procedure: LAPAROSCOPIC ASSISTED RIGHT COLON RESECTION RIGHT, UMBILICAL HERNIA REPAIR;  Surgeon: Mario Ellison MD;  Location: [facility] OR;  Service:    • COLONOSCOPY     • COLONOSCOPY N/A 1/4/2017    Procedure: COLONOSCOPY WITH ANESTHESIA;  Surgeon: Tee Kong MD;  Location: [facility] ENDOSCOPY;  Service:    • COLONOSCOPY N/A 2/5/2018    Procedure: COLONOSCOPY WITH ANESTHESIA;  Surgeon: Tee Kong MD;  Location: [facility] ENDOSCOPY;  Service:    • ENDOSCOPY N/A 1/4/2017    Procedure: ESOPHAGOGASTRODUODENOSCOPY WITH ANESTHESIA;  Surgeon: Tee Kong MD;  Location: [facility] ENDOSCOPY;  Service:    • KNEE SURGERY      PINS PLACED AND LATER REMOVED S/P FRACTURE   • MOUTH BIOPSY     • TONSILLECTOMY       Social History:  reports that he has been smoking Cigarettes.  He has a 55.00 pack-year smoking history. He has never used smokeless tobacco. He reports that he drinks alcohol. He reports that he does not use illicit drugs.    Family History: family history includes Heart disease in his father. There is no history of Colon cancer or Colon polyps.       Allergies:  Allergies   Allergen Reactions   • Acetylcholinesterase Other (See Comments)     Had trouble coming out of anesthesia, Dr Ellison inst pt family to make sure pt never receive this again, if he does he will wind up on respirator     Medications:  Prior to Admission medications    Medication Sig Start Date End Date Taking? Authorizing Provider   FIBER, GUAR GUM, PO Take 1 tablet by mouth As Needed.    Historical Provider, MD   lisinopril-hydrochlorothiazide (PRINZIDE,ZESTORETIC) 10-12.5 MG per tablet  2/27/18   Historical Provider, MD   lovastatin  "(MEVACOR) 40 MG tablet Take 40 mg by mouth Every Night. 12/4/16   Historical Provider, MD   omeprazole (priLOSEC) 20 MG capsule Take 20 mg by mouth Daily. 10/6/16   Historical Provider, MD     Objective     Vital Signs: /91  Pulse 83  Temp 98.7 °F (37.1 °C) (Oral)   Resp 18  Ht 167.6 cm (66\")  Wt 64.1 kg (141 lb 4.8 oz)  SpO2 99%  BMI 22.81 kg/m2  Physical Exam  Gen. well-nourished well-developed WM in no acute distress  HEENT: Atraumatic normocephalic pupils equal round reactive to light extraocular movements intact sclerae anicteric TMs negative mucous membranes moist neck is supple without lymphadenopathy no JVD is noted no carotid bruits are auscultated oropharynx is without erythema or exudate  Chest: Clear to auscultation percussion  CV: Regular rate and rhythm normal S1-S2 no gallops murmurs or rubs  Abdomen: Soft nontender nondistended active bowel sounds no hepatosplenomegaly no masses no hernias  Extremities: No clubbing edema or cyanosis capillary refill is normal pulses are 2+ and symmetric at radial and dorsalis pedis posterior tibial pulses are intact and 2+ palpable  Neuro: Patient is awake alert and oriented ×3, cranial nerves II through XII are grossly intact, motor is 5 out of 5 bilaterally, DTRs are 2+ and symmetric bilaterally sensory exam is nonfocal  Skin: Warm dry and intact no evidence of breakdown  Sensorium: Normal thought and affect  Nursing notes and vital signs reviewed        Results Reviewed:  Lab Results (last 24 hours)     Procedure Component Value Units Date/Time    CBC & Differential [[id number]] Collected:  03/05/18 1902    Specimen:  Blood Updated:  03/05/18 1911    Narrative:       The following orders were created for panel order CBC & Differential.  Procedure                               Abnormality         Status                     ---------                               -----------         ------                     CBC Auto Differential[[id number]]        " Abnormal            Final result                 Please view results for these tests on the individual orders.    CBC Auto Differential [[id number]]  (Abnormal) Collected:  03/05/18 1902    Specimen:  Blood Updated:  03/05/18 1911     WBC 11.24 (H) 10*3/mm3      RBC 3.80 (L) 10*6/mm3      Hemoglobin 11.3 (L) g/dL      Hematocrit 33.9 (L) %      MCV 89.2 fL      MCH 29.7 pg      MCHC 33.3 g/dL      RDW 13.4 %      RDW-SD 44.1 fl      MPV 12.5 (H) fL      Platelets 258 10*3/mm3      Neutrophil % 77.5 %      Lymphocyte % 12.5 (L) %      Monocyte % 8.1 %      Eosinophil % 0.9 %      Basophil % 0.4 %      Immature Grans % 0.6 %      Neutrophils, Absolute 8.72 (H) 10*3/mm3      Lymphocytes, Absolute 1.40 10*3/mm3      Monocytes, Absolute 0.91 10*3/mm3      Eosinophils, Absolute 0.10 10*3/mm3      Basophils, Absolute 0.04 10*3/mm3      Immature Grans, Absolute 0.07 (H) 10*3/mm3      nRBC 0.0 /100 WBC     Protime-INR [[id number]]  (Abnormal) Collected:  03/05/18 1902    Specimen:  Blood Updated:  03/05/18 1921     Protime 15.1 (H) Seconds      INR 1.15 (H)    aPTT [[id number]]  (Abnormal) Collected:  03/05/18 1902    Specimen:  Blood Updated:  03/05/18 1921     PTT 40.9 (H) seconds     Lipase [[id number]]  (Abnormal) Collected:  03/05/18 1902    Specimen:  Blood Updated:  03/05/18 1922     Lipase 401 (H) U/L     Troponin [[id number]]  (Normal) Collected:  03/05/18 1902    Specimen:  Blood Updated:  03/05/18 1938     Troponin I <0.012 ng/mL     Comprehensive Metabolic Panel [[id number]]  (Abnormal) Collected:  03/05/18 2031    Specimen:  Blood Updated:  03/05/18 2059     Glucose 96 mg/dL       (H) mg/dL      Creatinine 12.73 (H) mg/dL      Sodium 137 mmol/L      Potassium 6.7 (C) mmol/L      Chloride 101 mmol/L      CO2 15.0 (L) mmol/L      Calcium 8.2 (L) mg/dL      Total Protein 7.5 g/dL      Albumin 3.90 g/dL      ALT (SGPT) 15 U/L      AST (SGOT) 22 U/L      Alkaline Phosphatase 56 U/L      Total Bilirubin 0.2 mg/dL       eGFR Non African Amer 4 (L) mL/min/1.73      Globulin 3.6 gm/dL      A/G Ratio 1.1 g/dL      BUN/Creatinine Ratio 9.0     Anion Gap 21.0 (H) mmol/L     Narrative:       The MDRD GFR formula is only valid for adults with stable renal function between ages 18 and 70.    Reticulocytes [[id number]]  (Normal) Collected:  03/05/18 1902    Specimen:  Blood Updated:  03/05/18 2207     Reticulocyte % 1.20 %      Reticulocyte Absolute 0.0457 10*6/mm3     Phosphorus [[id number]]  (Abnormal) Collected:  03/05/18 2031    Specimen:  Blood Updated:  03/05/18 2216     Phosphorus 7.9 (H) mg/dL     CK [[id number]]  (Normal) Collected:  03/05/18 2031    Specimen:  Blood Updated:  03/05/18 2216     Creatine Kinase <20 U/L     C-reactive Protein [[id number]]  (Abnormal) Collected:  03/05/18 2031    Specimen:  Blood Updated:  03/05/18 2216     C-Reactive Protein 4.73 (H) mg/dL     Uric Acid [[id number]]  (Abnormal) Collected:  03/05/18 2031    Specimen:  Blood Updated:  03/05/18 2216     Uric Acid 10.7 (H) mg/dL     Magnesium [[id number]]  (Abnormal) Collected:  03/05/18 2031    Specimen:  Blood Updated:  03/05/18 2218     Magnesium 0.9 (C) mg/dL     Antistreptolysin O Titer [[id number]] Collected:  03/05/18 2217    Specimen:  Blood Updated:  03/05/18 2220    Glomerular Basement Membrane Antibodies [[id number]] Collected:  03/05/18 2217    Specimen:  Blood Updated:  03/05/18 2220    PARUL [[id number]] Collected:  03/05/18 2217    Specimen:  Blood Updated:  03/05/18 2220    Anti-DNA Antibody, Double-stranded [[id number]] Collected:  03/05/18 2217    Specimen:  Blood Updated:  03/05/18 2220    Sedimentation Rate [[id number]]  (Abnormal) Collected:  03/05/18 1902    Specimen:  Blood Updated:  03/05/18 2221     Sed Rate 64 (H) mm/hr     BNP [[id number]]  (Normal) Collected:  03/05/18 2031    Specimen:  Blood Updated:  03/05/18 2222     proBNP 307.0 pg/mL     Protime-INR [[id number]]  (Abnormal) Collected:  03/05/18 2217    Specimen:  Blood Updated:   03/05/18 2235     Protime 15.3 (H) Seconds      INR 1.17 (H)    Iron Profile [[id number]]  (Abnormal) Collected:  03/05/18 2217    Specimen:  Blood Updated:  03/05/18 2246     Iron 27 (L) mcg/dL      TIBC 292 mcg/dL      Iron Saturation 9 (L) %     TSH [[id number]]  (Normal) Collected:  03/05/18 2217    Specimen:  Blood Updated:  03/05/18 2309     TSH 2.480 mIU/mL     Ferritin [[id number]]  (Normal) Collected:  03/05/18 2217    Specimen:  Blood Updated:  03/05/18 2313     Ferritin 128.00 ng/mL     Vitamin B12 [[id number]]  (Normal) Collected:  03/05/18 2217    Specimen:  Blood Updated:  03/05/18 2343     Vitamin B-12 923 pg/mL     Folate [[id number]] Collected:  03/05/18 2217    Specimen:  Blood Updated:  03/05/18 2343     Folate 8.75 ng/mL     Urine Culture - Urine, Urine, Clean Catch [[id number]] Collected:  03/06/18 0006    Specimen:  Urine from Urine, Catheter Updated:  03/06/18 0010    Sodium, Urine, Random - Urine, Clean Catch [[id number]] Collected:  03/06/18 0006    Specimen:  Urine from Urine, Catheter Updated:  03/06/18 0010    Creatinine, Urine, Random - Urine, Clean Catch [[id number]] Collected:  03/06/18 0006    Specimen:  Urine from Urine, Catheter Updated:  03/06/18 0010    Osmolality, Urine - Urine, Clean Catch [[id number]] Collected:  03/06/18 0006    Specimen:  Urine from Urine, Catheter Updated:  03/06/18 0010    Urine Drug Screen - Urine, Clean Catch [[id number]] Collected:  03/06/18 0006    Specimen:  Urine from Urine, Catheter Updated:  03/06/18 0010    Urinalysis, Microscopic Only - Urine, Clean Catch [[id number]] Collected:  03/06/18 0006    Specimen:  Urine from Urine, Catheter Updated:  03/06/18 0015    Urinalysis With / Culture If Indicated - Urine, Clean Catch [[id number]]  (Abnormal) Collected:  03/06/18 0006    Specimen:  Urine from Urine, Catheter Updated:  03/06/18 0024     Color, UA Yellow     Appearance, UA Cloudy (A)     pH, UA <=5.0     Specific Gravity, UA 1.018     Glucose, UA Negative      Ketones, UA Trace (A)     Bilirubin, UA Negative     Blood, UA Negative     Protein, UA 30 mg/dL (1+) (A)     Leuk Esterase, UA Trace (A)     Nitrite, UA Negative     Urobilinogen, UA 0.2 E.U./dL    Eosinophil Smear - Urine, Urine, Clean Catch [[id number]] Collected:  03/06/18 0025    Specimen:  Urine from Urine, Catheter In/Out Updated:  03/06/18 0025        Imaging Results (last 24 hours)     Procedure Component Value Units Date/Time    CT Chest Without Contrast [[id number]] Collected:  03/05/18 1950     Updated:  03/05/18 1955    Narrative:       EXAMINATION: CT CHEST WO CONTRAST-      3/5/2018 7:21 PM CST     HISTORY: Hematuria. Unexplained renal failure.     In order to have a CT radiation dose as low as reasonably achievable  Automated Exposure Control was utilized for adjustment of the mA and/or  KV according to patient size.     DLP in mGycm= 321.     Normal heart size.  Coronary artery calcification.     Hyperexpanded lungs with no pneumonia, pneumothorax, or pleural  effusion.     No mediastinal mass.     Chronic relatively high-grade L1 compression fracture.     Summary:  1. The lungs show evidence of COPD.  2. There is no acute abnormality in the chest.                                   This report was finalized on 03/05/2018 19:51 by Dr. Ben Delgado MD.    CT Abdomen Pelvis Without Contrast [[id number]] Collected:  03/05/18 1952     Updated:  03/05/18 1957    Narrative:       EXAMINATION: CT ABDOMEN PELVIS WO CONTRAST-      3/5/2018 7:21 PM CST     HISTORY: Abdomen pain. Unexplained renal failure. 2 area.     Noncontrast abdomen/pelvis CT compared with 10/25/2017.     In order to have a CT radiation dose as low as reasonably achievable  Automated Exposure Control was utilized for adjustment of the mA and/or  KV according to patient size.     DLP in mGycm= 274.     Normal noncontrast appearance of the liver, gallbladder, pancreas, and  spleen.  Scattered calcified splenic granulomas are again  seen.  Normal and symmetric adrenal glands and kidneys. Renal vascular  calcifications are again seen.     No hydronephrosis. Stable 1.5 cm lower pole right renal cyst.     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 No retroperitoneal hemorrhage.     Sigmoid diverticulosis with no diverticulitis.     Decompressed urinary bladder shows no focal abnormality.     There is no bowel obstruction and no appendicitis or diverticulitis.     Summary:  1. Stable chronic changes.  2. No acute abnormality is seen within the abdomen or pelvis.                                   This report was finalized on 03/05/2018 19:54 by Dr. Ben Delgado MD.        I have personally reviewed and interpreted the radiology studies and ECG obtained at time of admission.     Assessment / Plan     Assessment:   Hospital Problem List     Acute renal failure      1.  Acute renal failure acute kidney injury plans to admit patient to ICU start bicarbonate drip treat hyperkalemia with Kayexalate and calcium gluconate insulin and D50.  Will check renal ultrasound hold lisinopril and HCTZ as well as consult nephrology.  Also avoid NSAIDs I believe hopefully this related to the lisinopril causing this and this will resolve.  2.  Anemia check substrates.  3.  Rectal bleeding since colonoscopy will consult GI as may be due to hemorrhoids or possibly related to his polypectomy.  4.  Tobacco habituation encourage smoking cessation will give nicotine patch.    Patient seen prior to midnight.         Code Status: Full     I discussed the patients findings and my recommendations with the patient and wife    Estimated length of stay to be determined    Porfirio Fisher MD   03/06/18   12:26 AM

## 2018-03-06 NOTE — CONSULTS
Nephrology ([facility]) Consult Note      Patient:  Jonnie Sheets Jr.  YOB: 1945  Date of Service: 3/6/2018  MRN: [id number]   Acct: [id number]   Primary Care Physician: Dandy Burton MD  Advance Directive: Full Code  Admit Date: 3/5/2018       Hospital Day: 1  Referring Provider: Porfirio Fisher MD      Patient personally seen and examined.  Complete chart including Consults, Notes, Operative Reports, Labs, Cardiology, and Radiology studies reviewed as able.        Subjective:  Jonnie Sheets Jr. is a 72 y.o. male  whom we were consulted for acute kidney injury and hyperkalemia.  No prior known renal disease.  Creatinine 1.0 mg in January 2018.  History of colon cancer, hypertension.  Complex recent medical course; had colonoscopy approximately one month ago; since then has had some continued rectal bleeding.  Was started on blood pressure medication for the first time last month (Lisinopril/HCTZ).  Fell at home and injured right flank; has been taking Aleve for pain.  Poor oral intake due to all of these issues.  Presented with weakness, fatigue, decreased urine output.  Outpatient labs showed significantly elevated creatinine and hyperkalemia.  Currently is awake and alert. Complaint of weakness.  Urine output is nonoliguric.    Allergies:  Acetylcholinesterase    Home Meds:  Prescriptions Prior to Admission   Medication Sig Dispense Refill Last Dose   • FIBER, GUAR GUM, PO Take 1 tablet by mouth As Needed.   Taking   • lisinopril-hydrochlorothiazide (PRINZIDE,ZESTORETIC) 10-12.5 MG per tablet    Taking   • lovastatin (MEVACOR) 40 MG tablet Take 40 mg by mouth Every Night.   Taking   • omeprazole (priLOSEC) 20 MG capsule Take 20 mg by mouth Daily.   Taking       Medicines:  Current Facility-Administered Medications   Medication Dose Route Frequency Provider Last Rate Last Dose   • acetaminophen (TYLENOL) tablet 650 mg  650 mg Oral Q4H PRN Porfirio Fisher MD        Or    • acetaminophen (TYLENOL) suppository 650 mg  650 mg Rectal Q4H PRN Porfirio Fisher MD       • bisacodyl (DULCOLAX) EC tablet 5 mg  5 mg Oral Daily PRN Porfirio Fisher MD       • dextrose (D50W) solution 50 mL  50 mL Intravenous Q1H PRN Porfirio Fisher MD       • magnesium sulfate 4 gram infusion - Mg less than or equal to 1mg/dL  4 g Intravenous PRN Porfirio Fisher MD 25 mL/hr at 03/06/18 0342 4 g at 03/06/18 0342    Or   • magnesium sulfate 3 gram infusion (1gm x 3) - Mg 1.1 - 1.5 mg/dL  1 g Intravenous PRN Porfirio Fisher MD        Or   • Magnesium Sulfate 2 gram infusion- Mg 1.6 - 1.9 mg/dL  2 g Intravenous PRN Porfirio Fisher MD       • nicotine (NICODERM CQ) 21 MG/24HR patch 1 patch  1 patch Transdermal Q24H Porfirio Fisher MD       • ondansetron (ZOFRAN) injection 4 mg  4 mg Intravenous Q6H PRN Porfirio Fisher MD   4 mg at 03/05/18 2236   • pantoprazole (PROTONIX) EC tablet 40 mg  40 mg Oral QAM Porfirio Fisher MD   40 mg at 03/06/18 0702   • sodium bicarbonate 8.4 % 150 mEq in dextrose (D5W) 5 % 1,000 mL infusion (greater than 75 mEq)  175 mL/hr Intravenous Continuous Porfirio Fisher  mL/hr at 03/05/18 2307 175 mL/hr at 03/05/18 2307   • sodium chloride 0.9 % flush 1-10 mL  1-10 mL Intravenous PRN Porfirio Fisher MD       • sodium chloride 0.9 % flush 10 mL  10 mL Intravenous PRN Zeferino Lopez DO           Past Medical History:  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    NOTED PROLONG PARALYSIS >2 HOURS WITH SUCCINYLCHOLINE UNDER ANESTHESIA   • Squamous cell carcinoma of oral mucosa    • Umbilical hernia        Past Surgical History:  Past Surgical History:   Procedure Laterality Date   • COLON RESECTION Right 1/19/2017    Procedure: LAPAROSCOPIC ASSISTED RIGHT  COLON RESECTION RIGHT, UMBILICAL HERNIA REPAIR;  Surgeon: Mario Ellison MD;  Location: [facility] OR;  Service:    • COLONOSCOPY     • COLONOSCOPY N/A 1/4/2017    Procedure: COLONOSCOPY WITH ANESTHESIA;  Surgeon: Tee Kong MD;  Location: [facility] ENDOSCOPY;  Service:    • COLONOSCOPY N/A 2/5/2018    Procedure: COLONOSCOPY WITH ANESTHESIA;  Surgeon: Tee Kong MD;  Location: [facility] ENDOSCOPY;  Service:    • ENDOSCOPY N/A 1/4/2017    Procedure: ESOPHAGOGASTRODUODENOSCOPY WITH ANESTHESIA;  Surgeon: Tee Kong MD;  Location: [facility] ENDOSCOPY;  Service:    • KNEE SURGERY      PINS PLACED AND LATER REMOVED S/P FRACTURE   • MOUTH BIOPSY     • TONSILLECTOMY         Family History  Family History   Problem Relation Age of Onset   • Heart disease Father    • Colon cancer Neg Hx    • Colon polyps Neg Hx        Social History  Social History     Social History   • Marital status:      Spouse name: N/A   • Number of children: N/A   • Years of education: N/A     Occupational History   • Not on file.     Social History Main Topics   • Smoking status: Current Every Day Smoker     Packs/day: 1.00     Years: 55.00     Types: Cigarettes   • Smokeless tobacco: Never Used   • Alcohol use Yes      Comment: 3-4 times a week, liquor   • Drug use: No   • Sexual activity: Defer     Other Topics Concern   • Not on file     Social History Narrative         Review of Systems:  History obtained from chart review and the patient  General ROS: Patient complains of fatigue and No fever or chills  Respiratory ROS: No cough, shortness of breath, wheezing  Cardiovascular ROS: No chest pain or palpitations  Gastrointestinal ROS: No abdominal pain or melena  Genito-Urinary ROS: No dysuria or hematuria  Neurological ROS; no headache or dizziness  14 point ROS reviewed with the patient and negative except as noted above and in the HPI unless unable to obtain.    Objective:  BP 95/54  Pulse 77  Temp 97.8 °F (36.6 °C) (Temporal  "Artery )   Resp 14  Ht 167.6 cm (66\")  Wt 64.1 kg (141 lb 4.8 oz)  SpO2 97%  BMI 22.81 kg/m2    Intake/Output Summary (Last 24 hours) at 03/06/18 0845  Last data filed at 03/06/18 0402   Gross per 24 hour   Intake              668 ml   Output              375 ml   Net              293 ml     General: awake/alert    Neck: supple, no JVD  Chest:  clear to auscultation bilaterally without respiratory distress  CVS: regular rate and rhythm  Abdominal: soft, nontender, normal bowel sounds  Extremities: no cyanosis or edema  Skin: warm and dry without rash  Neuro: no focal motor deficits    Labs:    Results from last 7 days  Lab Units 03/05/18  1902 03/05/18  1428   WBC 10*3/mm3 11.24* 13.62*   HEMOGLOBIN g/dL 11.3* 11.3*   HEMATOCRIT % 33.9* 34.5*   PLATELETS 10*3/mm3 258 282           Results from last 7 days  Lab Units 03/06/18  0223 03/06/18  0057 03/05/18  2031   SODIUM mmol/L 142 140 137   POTASSIUM mmol/L 4.8 5.0 6.7*   CHLORIDE mmol/L 102 104 101   CO2 mmol/L 18.0* 16.0* 15.0*   BUN mg/dL 113* 112* 114*   CREATININE mg/dL 11.88* 12.19* 12.73*   CALCIUM mg/dL 8.2* 8.5 8.2*   BILIRUBIN mg/dL 0.2 0.1 0.2   ALK PHOS U/L 52 54 56   ALT (SGPT) U/L 16 <15 15   AST (SGOT) U/L 25 22 22   GLUCOSE mg/dL 115* 88 96       Radiology:   Imaging Results (last 72 hours)     Procedure Component Value Units Date/Time    CT Chest Without Contrast [[id number]] Collected:  03/05/18 1950     Updated:  03/05/18 1955    Narrative:       EXAMINATION: CT CHEST WO CONTRAST-      3/5/2018 7:21 PM CST     HISTORY: Hematuria. Unexplained renal failure.     In order to have a CT radiation dose as low as reasonably achievable  Automated Exposure Control was utilized for adjustment of the mA and/or  KV according to patient size.     DLP in mGycm= 321.     Normal heart size.  Coronary artery calcification.     Hyperexpanded lungs with no pneumonia, pneumothorax, or pleural  effusion.     No mediastinal mass.     Chronic relatively high-grade L1 " compression fracture.     Summary:  1. The lungs show evidence of COPD.  2. There is no acute abnormality in the chest.                                   This report was finalized on 03/05/2018 19:51 by Dr. Ben Delgado MD.    CT Abdomen Pelvis Without Contrast [[id number]] Collected:  03/05/18 1952     Updated:  03/05/18 1957    Narrative:       EXAMINATION: CT ABDOMEN PELVIS WO CONTRAST-      3/5/2018 7:21 PM CST     HISTORY: Abdomen pain. Unexplained renal failure. 2 area.     Noncontrast abdomen/pelvis CT compared with 10/25/2017.     In order to have a CT radiation dose as low as reasonably achievable  Automated Exposure Control was utilized for adjustment of the mA and/or  KV according to patient size.     DLP in mGycm= 274.     Normal noncontrast appearance of the liver, gallbladder, pancreas, and  spleen.  Scattered calcified splenic granulomas are again seen.  Normal and symmetric adrenal glands and kidneys. Renal vascular  calcifications are again seen.     No hydronephrosis. Stable 1.5 cm lower pole right renal cyst.     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 No retroperitoneal hemorrhage.     Sigmoid diverticulosis with no diverticulitis.     Decompressed urinary bladder shows no focal abnormality.     There is no bowel obstruction and no appendicitis or diverticulitis.     Summary:  1. Stable chronic changes.  2. No acute abnormality is seen within the abdomen or pelvis.                                   This report was finalized on 03/05/2018 19:54 by Dr. Ben Delgado MD.          Culture:         Assessment   1.  Acute kidney injury--prerenal versus acute tubular necrosis  2.  Hyperkalemia  3.  Essential hypertension--now HYPOtensive  4.  Rectal bleeding  5.  Colon cancer; status post resection in 2017  6.  Metabolic acidosis  7.  Hypomagnesemia     Plan:  1.  Continue bicarbonate drip  2.  Replace magnesium  3.  Hold BP medications  4.  Renal function improved  somewhat overnight.  Potassium normalized with medical treatment. Remains uremic.  No urgent indication for dialysis; will follow closely.  Discussed possibility of dialysis with patient and family; they agree to proceed with access placement and dialysis if necessary.  5.  Further assessment and plan pending Dr Hoover's evaluation of patient.    Thank you for the consult, we appreciate the opportunity to provide care to your patients.  Feel free to contact me if I can be of any further assistance.      Italo Fisher, APRN  3/6/2018  8:45 AM

## 2018-03-06 NOTE — PROGRESS NOTES
Discharge Planning Assessment   [place]     Patient Name: Jonnie Sheets Jr.  MRN: [id number]  Today's Date: 3/6/2018    Admit Date: 3/5/2018          Discharge Needs Assessment       03/06/18 0952    Living Environment    Lives With spouse    Living Arrangements house    Transportation Available car;family or friend will provide    Living Environment    Provides Primary Care For no one    Quality Of Family Relationships supportive    Able to Return to Prior Living Arrangements yes    Discharge Needs Assessment    Concerns To Be Addressed denies needs/concerns at this time    Readmission Within The Last 30 Days no previous admission in last 30 days    Anticipated Changes Related to Illness none    Equipment Currently Used at Home none    Equipment Needed After Discharge none    Discharge Planning Comments Spoke to patient regarding discharge plan/needs.  Patient resides at home with his spouse.  Patient has a PCP and RX coverage.  Patient states he is independent and drives.  Patient also states he owns a RW but doesn't use it.  Patient plans to return home at discharge and denies discharge needs at this time.            Discharge Plan     None        Discharge Placement     No information found                Demographic Summary     None            Functional Status     None            Psychosocial     None            Abuse/Neglect     None            Legal     None            Substance Abuse     None            Patient Forms     None          CASSY Nolen

## 2018-03-06 NOTE — PLAN OF CARE
Problem: Patient Care Overview (Adult)  Goal: Plan of Care Review  Outcome: Ongoing (interventions implemented as appropriate)    Goal: Adult Individualization and Mutuality  Outcome: Ongoing (interventions implemented as appropriate)    Goal: Discharge Needs Assessment  Outcome: Ongoing (interventions implemented as appropriate)      Problem: Renal Failure/Kidney Injury, Acute (Adult)  Goal: Signs and Symptoms of Listed Potential Problems Will be Absent or Manageable (Renal Failure/Kidney Injury, Acute)  Outcome: Ongoing (interventions implemented as appropriate)      Problem: Nutrition, Imbalanced: Inadequate Oral Intake (Adult)  Goal: Identify Related Risk Factors and Signs and Symptoms  Outcome: Ongoing (interventions implemented as appropriate)    Goal: Improved Oral Intake  Outcome: Ongoing (interventions implemented as appropriate)    Goal: Prevent Further Weight Loss  Outcome: Ongoing (interventions implemented as appropriate)      Problem: Fall Risk (Adult)  Goal: Identify Related Risk Factors and Signs and Symptoms  Outcome: Ongoing (interventions implemented as appropriate)    Goal: Absence of Falls  Outcome: Ongoing (interventions implemented as appropriate)      Problem: Skin Integrity Impairment, Risk/Actual (Adult)  Goal: Identify Related Risk Factors and Signs and Symptoms  Outcome: Ongoing (interventions implemented as appropriate)    Goal: Skin Integrity/Wound Healing  Outcome: Ongoing (interventions implemented as appropriate)

## 2018-03-06 NOTE — PLAN OF CARE
Problem: Patient Care Overview (Adult)  Goal: Plan of Care Review  Outcome: Ongoing (interventions implemented as appropriate)   03/06/18 1522   Coping/Psychosocial Response Interventions   Plan Of Care Reviewed With patient;spouse   Patient Care Overview   Progress no change   Outcome Evaluation   Outcome Summary/Follow up Plan Regular/Renal diet. Oral intake 25% of one meal intake. Adding Ensure clear bid, apple flavor. cont to follow       Problem: Nutrition, Imbalanced: Inadequate Oral Intake (Adult)  Goal: Identify Related Risk Factors and Signs and Symptoms  Outcome: Ongoing (interventions implemented as appropriate)

## 2018-03-06 NOTE — ED PROVIDER NOTES
Subjective     History provided by:  Patient and spouse   used: No      Patient presents to the emergency department after having some abnormal outpatient labs which showed elevated BUN and creatinine.  Patient notes that he began feeling ill 3 weeks ago when he fell off the couch and landed on the floor.  He notes that he has had pain in his right lateral chest/abdomen since he fell.  He notes that one week ago he began feeling weak, lightheaded, and tired.  He also notes that he had decreased urinary output at that time and still has decreased urinary output although he does still make urine.  He denies any vomiting, diarrhea, chest pain, shortness of breath, fevers.  Patient recently began to have a workup for rectal bleeding and has been seeing gastroenterology.          Review of Systems   All other systems reviewed and are negative.      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    NOTED PROLONG PARALYSIS >2 HOURS WITH SUCCINYLCHOLINE UNDER ANESTHESIA   • Squamous cell carcinoma of oral mucosa    • Umbilical hernia        Allergies   Allergen Reactions   • Acetylcholinesterase Other (See Comments)     Had trouble coming out of anesthesia, Dr Ellison inst pt family to make sure pt never receive this again, if he does he will wind up on respirator       Past Surgical History:   Procedure Laterality Date   • COLON RESECTION Right 1/19/2017    Procedure: LAPAROSCOPIC ASSISTED RIGHT COLON RESECTION RIGHT, UMBILICAL HERNIA REPAIR;  Surgeon: Mario Ellison MD;  Location: [facility] OR;  Service:    • COLONOSCOPY     • COLONOSCOPY N/A 1/4/2017    Procedure: COLONOSCOPY WITH ANESTHESIA;  Surgeon: Tee Kong MD;  Location: [facility] ENDOSCOPY;  Service:    • COLONOSCOPY N/A 2/5/2018     Procedure: COLONOSCOPY WITH ANESTHESIA;  Surgeon: Tee Kong MD;  Location: [facility] ENDOSCOPY;  Service:    • ENDOSCOPY N/A 1/4/2017    Procedure: ESOPHAGOGASTRODUODENOSCOPY WITH ANESTHESIA;  Surgeon: Tee Kong MD;  Location: [facility] ENDOSCOPY;  Service:    • KNEE SURGERY      PINS PLACED AND LATER REMOVED S/P FRACTURE   • MOUTH BIOPSY     • TONSILLECTOMY         Family History   Problem Relation Age of Onset   • Heart disease Father    • Colon cancer Neg Hx    • Colon polyps Neg Hx        Social History     Social History   • Marital status:      Social History Main Topics   • Smoking status: Current Every Day Smoker     Packs/day: 1.00     Years: 55.00     Types: Cigarettes   • Smokeless tobacco: Never Used   • Alcohol use Yes      Comment: 3-4 times a week, liquor   • Drug use: No   • Sexual activity: Defer           Objective   Physical Exam   Constitutional: He is oriented to person, place, and time. He appears well-developed. No distress.   HENT:   Head: Normocephalic and atraumatic.   Eyes: Conjunctivae and EOM are normal. Pupils are equal, round, and reactive to light.   Neck: Normal range of motion.   Cardiovascular: Normal rate, regular rhythm and normal heart sounds.    Pulmonary/Chest: Effort normal and breath sounds normal.   Abdominal: Soft. There is tenderness.   Right upper quadrant tenderness   Musculoskeletal: He exhibits no edema or deformity.   Right CVA tenderness.  Right posterior lower ribs have tenderness laterally   Neurological: He is alert and oriented to person, place, and time.   Skin: Skin is warm and dry.   Psychiatric: He has a normal mood and affect. His behavior is normal. Thought content normal.       Critical Care  Performed by: MARILU TELLO  Authorized by: MARILU TELLO     Critical care provider statement:     Critical care time (minutes):  40    Critical care was necessary to treat or prevent imminent or life-threatening deterioration of the  following conditions:  Dehydration and renal failure    Critical care was time spent personally by me on the following activities:  Development of treatment plan with patient or surrogate, discussions with consultants, discussions with primary provider, evaluation of patient's response to treatment, examination of patient, ordering and review of laboratory studies, ordering and review of radiographic studies and re-evaluation of patient's condition    I assumed direction of critical care for this patient from another provider in my specialty: no           EKG performed at 1849 shows normal sinus rhythm with a normal axis, normal rate of 79 beats minute, normal intervals, normal ST segments, inverted T waves in leads V2 and V3    ED Course  ED Course                  MDM  Number of Diagnoses or Management Options  Acute renal failure, unspecified acute renal failure type: new and requires workup  Elevated lipase: new and requires workup  Hyperkalemia: new and requires workup  Diagnosis management comments: Patient presents to the emergency room from outpatient facility for abnormal labs.  Patient notes that he has had right-sided back/abdominal pain since he fell off of a couch 3 weeks ago.  He has tenderness to his right flank.  CTA of chest/abdomen/pelvis are all unremarkable.  Patient unable to provide a urine sample here in the emergency department labs show decreased bicarbonate, elevated potassium, elevated BUN, elevated creatinine, elevated lipase.  EKG shows no peaked T waves but does show inverted T waves in leads V2 and V3.  I spoke to the [facility] nurse practitioner who is working with Dr. Hoover who notes that they will see the patient when he is upstairs in a room.  She recommends treating the patient medically now and he will need to get dialysis, possibly this evening.  I spoke to Dr. Fisher who agrees to admit the patient to the intensive care unit.  He notes that he will put in orders for medications for  the patient in the computer.  Patient will be going to the intensive care unit.  He appears stable at time of disposition.  He and his wife are agreeable with plan of care.       Amount and/or Complexity of Data Reviewed  Clinical lab tests: ordered and reviewed  Tests in the radiology section of CPT®: ordered and reviewed  Obtain history from someone other than the patient: yes  Discuss the patient with other providers: yes  Independent visualization of images, tracings, or specimens: yes    Risk of Complications, Morbidity, and/or Mortality  Presenting problems: high  Diagnostic procedures: high  Management options: high    Critical Care  Total time providing critical care: 30-74 minutes      Final diagnoses:   Acute renal failure, unspecified acute renal failure type   Hyperkalemia   Elevated lipase            Zeferino Lopez, DO  03/05/18 2128      Please note that at 2158 I spoke to Dr. Hoover on the phone who gave me verbal orders to put in a sodium bicarbonate drip.  I read the drip back to him and he agreed.     Zeferino Lopez, DO  03/05/18 2200

## 2018-03-06 NOTE — CONSULTS
"        [facility]astroenterology  Inpatient Consult Note  Today's date:  03/06/18    Jonnie Sheets Jr.  1945       Referring Provider: Porfirio Fisher MD  Primary Physician: Dandy Burton MD   Primary Gastroenterologist: Dr. Tee Kong    Date of Admission: 3/5/2018  Date of Service:  03/06/18    Reason for Consultation/Chief Complaint: Rectal bleeding    History of present illness:  This is a very pleasant 72-year-old male who was seen in our office yesterday by both PRATEEK Grady as well as Dr. Tee Kong.  The patient presented to our office with complaints of rectal bleeding, weight loss, decreased appetite and weakness.  Patient's last colonoscopy was performed on 2/5/18 he had one polyp, sigmoid diverticulosis and internal hemorrhoids.  According to our records the patient had stated that approximately 12 days ago he noted rectal bleeding and first said it was red blood and became darker and then black.  He stated that the bleeding lasted about 5 hours.  He stated that he typically had bowel movements about 4 times a day.  He began to notice increased weakness and decreased appetite.  It was noted that the patient had lost 12 pounds since January.  The patient's wife reported that he had fallen a few weeks ago and did have tenderness in his right lower rib area.  She was unsure if he had a syncopal episode.  He had been taking Aleve for pain after his fall.     Records were requested from Dr. Burton's office of patient's CBC prior PRATEEK Grady.  Her assessment and plan noted that the patient was very weak and had difficulty getting up on the exam table.  She is wife had noted that it was very hard for him to come to the appointment.  She spoke with Dr. Kong who agreed with PRATEEK Grady that it was recommended the patient go to the emergency room.  The patient declined stated \"I will go in the next couple of days.\"  According to records in afternoon labs were received and " the patient's creatinine was found to be 13.9 with a BUN of 111.  Creatinine had been 1.0 in January 2018.  Malinda CHANDRA called and spoke with both the patient and his wife and encourage them to come to the emergency room.  She also discussed the patient's case with the charge nurse Lyndsay and informed Dr. Burton's office as well.    Today the patient states that he is feeling some better.  He states he is still very weak however.  He denies any bright red blood per rectum or black tarry stools.  Nursing confirms this.  He has had 3 bowel movements.  He denies any nausea, vomiting, epigastric pain, dysphagia, pyrosis or hematemesis.    Labs and imaging: Iron/anemia panel revealed an iron of 27 and iron saturation of 9, CMP today revealed any notable 11 and BUN of 113, magnesium 0.9, phosphorus elevated 6.8.  Hyperkalemia.  CBC yesterday revealed hemoglobin of 11 as well as WBC of 11 otherwise unremarkable.  Urine culture pending urine osmolality 365.  CT of abdomen and pelvis revealed stable chronic changes no acute abnormality noted.    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    NOTED PROLONG PARALYSIS >2 HOURS WITH SUCCINYLCHOLINE UNDER ANESTHESIA   • Squamous cell carcinoma of oral mucosa    • Umbilical hernia        Past Surgical History:   Procedure Laterality Date   • COLON RESECTION Right 1/19/2017    Procedure: LAPAROSCOPIC ASSISTED RIGHT COLON RESECTION RIGHT, UMBILICAL HERNIA REPAIR;  Surgeon: Mario Ellison MD;  Location: [facility] OR;  Service:    • COLONOSCOPY     • COLONOSCOPY N/A 1/4/2017    Procedure: COLONOSCOPY WITH ANESTHESIA;  Surgeon: Tee Kong MD;  Location: [facility] ENDOSCOPY;  Service:    • COLONOSCOPY N/A 2/5/2018    Procedure: COLONOSCOPY WITH ANESTHESIA;  Surgeon: Tee  "FABIAN Kong MD;  Location: [facility] ENDOSCOPY;  Service:    • ENDOSCOPY N/A 1/4/2017    Procedure: ESOPHAGOGASTRODUODENOSCOPY WITH ANESTHESIA;  Surgeon: Tee Kong MD;  Location: [facility] ENDOSCOPY;  Service:    • KNEE SURGERY      PINS PLACED AND LATER REMOVED S/P FRACTURE   • MOUTH BIOPSY     • TONSILLECTOMY          Allergies   Allergen Reactions   • Acetylcholinesterase Other (See Comments)     Had trouble coming out of anesthesia, Dr Ellison inst pt family to make sure pt never receive this again, if he does he will wind up on respirator       Prescriptions Prior to Admission   Medication Sig Dispense Refill Last Dose   • FIBER, GUAR GUM, PO Take 1 tablet by mouth As Needed.   Taking   • lisinopril-hydrochlorothiazide (PRINZIDE,ZESTORETIC) 10-12.5 MG per tablet    Taking   • lovastatin (MEVACOR) 40 MG tablet Take 40 mg by mouth Every Night.   Taking   • omeprazole (priLOSEC) 20 MG capsule Take 20 mg by mouth Daily.   Taking       Hospital Medications (active)       Dose Frequency Start End    acetaminophen (TYLENOL) suppository 650 mg 650 mg Every 4 Hours PRN 3/5/2018     Sig - Route: Insert 1 suppository into the rectum Every 4 (Four) Hours As Needed for Fever (temperature greater than 101.5 F or headache). - Rectal    Linked Group 1:  \"Or\" Linked Group Details        acetaminophen (TYLENOL) tablet 650 mg 650 mg Every 4 Hours PRN 3/5/2018     Sig - Route: Take 2 tablets by mouth Every 4 (Four) Hours As Needed for Headache or Fever (fever greater than 101.5 F). - Oral    Linked Group 1:  \"Or\" Linked Group Details        bisacodyl (DULCOLAX) EC tablet 5 mg 5 mg Daily PRN 3/5/2018     Sig - Route: Take 1 tablet by mouth Daily As Needed for Constipation. - Oral    calcium gluconate injection 1 g 1 g Once 3/5/2018 3/5/2018    Sig - Route: Infuse 10 mL into a venous catheter 1 (One) Time. - Intravenous    dextrose (D50W) solution 50 mL 50 mL Every 1 Hour PRN 3/5/2018     Sig - Route: Infuse 50 mL into a venous " "catheter Every 1 (One) Hour As Needed for Low Blood Sugar. - Intravenous    insulin regular (humuLIN R,novoLIN R) injection 10 Units 10 Units Once 3/5/2018 3/5/2018    Sig - Route: Infuse 10 Units into a venous catheter 1 (One) Time. - Intravenous    iron sucrose (VENOFER) 300 mg in sodium chloride 0.9 % 100 mL IVPB 300 mg Every 24 Hours 3/6/2018 3/9/2018    Sig - Route: Infuse 300 mg into a venous catheter Daily. - Intravenous    magnesium sulfate 2g/50 mL (PREMIX) infusion 2 g Once 3/6/2018     Sig - Route: Infuse 50 mL into a venous catheter 1 (One) Time. - Intravenous    nicotine (NICODERM CQ) 21 MG/24HR patch 1 patch 1 patch Every 24 Hours 3/5/2018     Sig - Route: Place 1 patch on the skin Daily. - Transdermal    ondansetron (ZOFRAN) injection 4 mg 4 mg Every 6 Hours PRN 3/5/2018     Sig - Route: Infuse 2 mL into a venous catheter Every 6 (Six) Hours As Needed for Nausea or Vomiting. - Intravenous    pantoprazole (PROTONIX) EC tablet 40 mg 40 mg Every Morning 3/6/2018     Sig - Route: Take 1 tablet by mouth Every Morning. - Oral    sodium bicarbonate 8.4 % 150 mEq in dextrose (D5W) 5 % 1,000 mL infusion (greater than 75 mEq) 175 mL/hr Continuous 3/5/2018     Sig - Route: Infuse 175 mL/hr into a venous catheter Continuous. - Intravenous    sodium chloride 0.9 % flush 1-10 mL 1-10 mL As Needed 3/5/2018     Sig - Route: Infuse 1-10 mL into a venous catheter As Needed for Line Care. - Intravenous    sodium chloride 0.9 % flush 10 mL 10 mL As Needed 3/5/2018     Sig - Route: Infuse 10 mL into a venous catheter As Needed for Line Care. - Intravenous    Linked Group 2:  \"And\" Linked Group Details        sodium chloride 0.9 % infusion 500 mL 500 mL Once 3/5/2018 3/5/2018    Sig - Route: Infuse 500 mL into a venous catheter 1 (One) Time. - Intravenous    sodium polystyrene (KAYEXALATE) 15 GM/60ML suspension 15 g 15 g Once 3/5/2018 3/5/2018    Sig - Route: Take 60 mL by mouth 1 (One) Time. - Oral    calcium gluconate " "injection 1 g (Discontinued) 1 g Once 3/5/2018 3/5/2018    Sig - Route: Infuse 10 mL into a venous catheter 1 (One) Time. - Intravenous    dextrose (D50W) solution 50 mL (Discontinued) 50 mL Every 1 Hour PRN 3/5/2018 3/5/2018    Sig - Route: Infuse 50 mL into a venous catheter Every 1 (One) Hour As Needed for Low Blood Sugar. - Intravenous    Magnesium Sulfate 2 gram infusion- Mg 1.6 - 1.9 mg/dL (Discontinued) 2 g As Needed 3/5/2018 3/6/2018    Sig - Route: Infuse 50 mL into a venous catheter As Needed (Mg 1.6 - 1.9 mg/dL). - Intravenous    Linked Group 3:  \"Or\" Linked Group Details        magnesium sulfate 3 gram infusion (1gm x 3) - Mg 1.1 - 1.5 mg/dL (Discontinued) 1 g As Needed 3/5/2018 3/6/2018    Sig - Route: Infuse 100 mL into a venous catheter As Needed (Mg 1.1 - 1.5 mg/dL). - Intravenous    Linked Group 3:  \"Or\" Linked Group Details        magnesium sulfate 4 gram infusion - Mg less than or equal to 1mg/dL (Discontinued) 4 g As Needed 3/5/2018 3/6/2018    Sig - Route: Infuse 100 mL into a venous catheter As Needed (Mg less than or equal to 1mg/dL). - Intravenous    Linked Group 3:  \"Or\" Linked Group Details        sodium bicarbonate 8.4 % 150 mEq in dextrose (D5W) 5 % 1,000 mL infusion (greater than 75 mEq) (Discontinued) 150 mL/hr Continuous 3/5/2018 3/5/2018    Sig - Route: Infuse 150 mL/hr into a venous catheter Continuous. - Intravenous    Reason for Discontinue: Duplicate order    sodium polystyrene (KAYEXALATE) 15 GM/60ML suspension 15 g (Discontinued) 15 g Once 3/5/2018 3/5/2018    Sig - Route: Take 60 mL by mouth 1 (One) Time. - Oral          Social History   Substance Use Topics   • Smoking status: Current Every Day Smoker     Packs/day: 1.00     Years: 55.00     Types: Cigarettes   • Smokeless tobacco: Never Used   • Alcohol use Yes      Comment: 3-4 times a week, liquor        Past Family History:  Family History   Problem Relation Age of Onset   • Heart disease Father    • Colon cancer Neg Hx  " "  • Colon polyps Neg Hx        Review of Systems:  Review of Systems   Constitutional: Negative for fever and unexpected weight change.   HENT: Negative for hearing loss.    Eyes: Negative for visual disturbance.   Respiratory: Negative for cough.    Cardiovascular: Negative for chest pain.   Gastrointestinal:        See HPI   Endocrine: Negative for cold intolerance and heat intolerance.   Genitourinary: Negative for dysuria.   Musculoskeletal:        The patient states \"I am sore on the right rib cage\"   Neurological: Negative for seizures.   Psychiatric/Behavioral: Negative for hallucinations.       Physical Exam:  Temp:  [93.5 °F (34.2 °C)-98.9 °F (37.2 °C)] 97.3 °F (36.3 °C)  Heart Rate:  [77-95] 77  Resp:  [14-18] 14  BP: ()/(45-91) 95/54  Body mass index is 22.81 kg/(m^2).    Intake/Output Summary (Last 24 hours) at 03/06/18 1048  Last data filed at 03/06/18 1032   Gross per 24 hour   Intake             1468 ml   Output              375 ml   Net             1093 ml     I/O this shift:  In: 800 [I.V.:800]  Out: -   Physical Exam   Constitutional: He is oriented to person, place, and time. He appears well-developed and well-nourished.   HENT:   Mouth/Throat: Oropharynx is clear and moist.   Eyes: EOM are normal.   Cardiovascular: Normal rate, regular rhythm and normal heart sounds.  Exam reveals no gallop and no friction rub.    No murmur heard.  Pulmonary/Chest: Effort normal and breath sounds normal. He has no wheezes. He has no rales.   Abdominal: Soft. Bowel sounds are normal. He exhibits no distension. There is no hepatosplenomegaly. There is no tenderness. There is no rigidity, no rebound and no guarding.   Musculoskeletal: Normal range of motion. He exhibits no edema, tenderness or deformity.   Neurological: He is alert and oriented to person, place, and time.   Skin: Skin is warm and dry. No rash noted.   Psychiatric: He has a normal mood and affect. His behavior is normal. Judgment and thought " content normal.   Vitals reviewed.    I have performed the physical exam and agree with the findings as noted above.   LEVAR Nicolas MD    Results Review:  Lab Results (last 24 hours)     Procedure Component Value Units Date/Time    CBC & Differential [[id number]] Collected:  03/05/18 1902    Specimen:  Blood Updated:  03/05/18 1911    Narrative:       The following orders were created for panel order CBC & Differential.  Procedure                               Abnormality         Status                     ---------                               -----------         ------                     CBC Auto Differential[[id number]]        Abnormal            Final result                 Please view results for these tests on the individual orders.    CBC Auto Differential [[id number]]  (Abnormal) Collected:  03/05/18 1902    Specimen:  Blood Updated:  03/05/18 1911     WBC 11.24 (H) 10*3/mm3      RBC 3.80 (L) 10*6/mm3      Hemoglobin 11.3 (L) g/dL      Hematocrit 33.9 (L) %      MCV 89.2 fL      MCH 29.7 pg      MCHC 33.3 g/dL      RDW 13.4 %      RDW-SD 44.1 fl      MPV 12.5 (H) fL      Platelets 258 10*3/mm3      Neutrophil % 77.5 %      Lymphocyte % 12.5 (L) %      Monocyte % 8.1 %      Eosinophil % 0.9 %      Basophil % 0.4 %      Immature Grans % 0.6 %      Neutrophils, Absolute 8.72 (H) 10*3/mm3      Lymphocytes, Absolute 1.40 10*3/mm3      Monocytes, Absolute 0.91 10*3/mm3      Eosinophils, Absolute 0.10 10*3/mm3      Basophils, Absolute 0.04 10*3/mm3      Immature Grans, Absolute 0.07 (H) 10*3/mm3      nRBC 0.0 /100 WBC     Protime-INR [[id number]]  (Abnormal) Collected:  03/05/18 1902    Specimen:  Blood Updated:  03/05/18 1921     Protime 15.1 (H) Seconds      INR 1.15 (H)    aPTT [[id number]]  (Abnormal) Collected:  03/05/18 1902    Specimen:  Blood Updated:  03/05/18 1921     PTT 40.9 (H) seconds     Lipase [[id number]]  (Abnormal) Collected:  03/05/18 1902    Specimen:  Blood Updated:  03/05/18 1922     Lipase 401 (H)  U/L     Troponin [[id number]]  (Normal) Collected:  03/05/18 1902    Specimen:  Blood Updated:  03/05/18 1938     Troponin I <0.012 ng/mL     Comprehensive Metabolic Panel [[id number]]  (Abnormal) Collected:  03/05/18 2031    Specimen:  Blood Updated:  03/05/18 2059     Glucose 96 mg/dL       (H) mg/dL      Creatinine 12.73 (H) mg/dL      Sodium 137 mmol/L      Potassium 6.7 (C) mmol/L      Chloride 101 mmol/L      CO2 15.0 (L) mmol/L      Calcium 8.2 (L) mg/dL      Total Protein 7.5 g/dL      Albumin 3.90 g/dL      ALT (SGPT) 15 U/L      AST (SGOT) 22 U/L      Alkaline Phosphatase 56 U/L      Total Bilirubin 0.2 mg/dL      eGFR Non African Amer 4 (L) mL/min/1.73      Globulin 3.6 gm/dL      A/G Ratio 1.1 g/dL      BUN/Creatinine Ratio 9.0     Anion Gap 21.0 (H) mmol/L     Narrative:       The MDRD GFR formula is only valid for adults with stable renal function between ages 18 and 70.    Reticulocytes [[id number]]  (Normal) Collected:  03/05/18 1902    Specimen:  Blood Updated:  03/05/18 2207     Reticulocyte % 1.20 %      Reticulocyte Absolute 0.0457 10*6/mm3     Phosphorus [[id number]]  (Abnormal) Collected:  03/05/18 2031    Specimen:  Blood Updated:  03/05/18 2216     Phosphorus 7.9 (H) mg/dL     CK [[id number]]  (Normal) Collected:  03/05/18 2031    Specimen:  Blood Updated:  03/05/18 2216     Creatine Kinase <20 U/L     C-reactive Protein [[id number]]  (Abnormal) Collected:  03/05/18 2031    Specimen:  Blood Updated:  03/05/18 2216     C-Reactive Protein 4.73 (H) mg/dL     Uric Acid [[id number]]  (Abnormal) Collected:  03/05/18 2031    Specimen:  Blood Updated:  03/05/18 2216     Uric Acid 10.7 (H) mg/dL     Magnesium [[id number]]  (Abnormal) Collected:  03/05/18 2031    Specimen:  Blood Updated:  03/05/18 2218     Magnesium 0.9 (C) mg/dL     Antistreptolysin O Titer [[id number]] Collected:  03/05/18 2217    Specimen:  Blood Updated:  03/05/18 2220    Glomerular Basement Membrane Antibodies [[id number]]  Collected:  03/05/18 2217    Specimen:  Blood Updated:  03/05/18 2220    PARUL [[id number]] Collected:  03/05/18 2217    Specimen:  Blood Updated:  03/05/18 2220    Anti-DNA Antibody, Double-stranded [[id number]] Collected:  03/05/18 2217    Specimen:  Blood Updated:  03/05/18 2220    Sedimentation Rate [[id number]]  (Abnormal) Collected:  03/05/18 1902    Specimen:  Blood Updated:  03/05/18 2221     Sed Rate 64 (H) mm/hr     BNP [[id number]]  (Normal) Collected:  03/05/18 2031    Specimen:  Blood Updated:  03/05/18 2222     proBNP 307.0 pg/mL     Protime-INR [[id number]]  (Abnormal) Collected:  03/05/18 2217    Specimen:  Blood Updated:  03/05/18 2235     Protime 15.3 (H) Seconds      INR 1.17 (H)    Iron Profile [[id number]]  (Abnormal) Collected:  03/05/18 2217    Specimen:  Blood Updated:  03/05/18 2246     Iron 27 (L) mcg/dL      TIBC 292 mcg/dL      Iron Saturation 9 (L) %     TSH [[id number]]  (Normal) Collected:  03/05/18 2217    Specimen:  Blood Updated:  03/05/18 2309     TSH 2.480 mIU/mL     Ferritin [[id number]]  (Normal) Collected:  03/05/18 2217    Specimen:  Blood Updated:  03/05/18 2313     Ferritin 128.00 ng/mL     Vitamin B12 [[id number]]  (Normal) Collected:  03/05/18 2217    Specimen:  Blood Updated:  03/05/18 2343     Vitamin B-12 923 pg/mL     Folate [[id number]] Collected:  03/05/18 2217    Specimen:  Blood Updated:  03/05/18 2343     Folate 8.75 ng/mL     Urine Culture - Urine, Urine, Clean Catch [[id number]] Collected:  03/06/18 0006    Specimen:  Urine from Urine, Catheter Updated:  03/06/18 0010    Urinalysis With / Culture If Indicated - Urine, Clean Catch [[id number]]  (Abnormal) Collected:  03/06/18 0006    Specimen:  Urine from Urine, Catheter Updated:  03/06/18 0024     Color, UA Yellow     Appearance, UA Cloudy (A)     pH, UA <=5.0     Specific Gravity, UA 1.018     Glucose, UA Negative     Ketones, UA Trace (A)     Bilirubin, UA Negative     Blood, UA Negative     Protein, UA 30 mg/dL (1+) (A)      Leuk Esterase, UA Trace (A)     Nitrite, UA Negative     Urobilinogen, UA 0.2 E.U./dL    Eosinophil Smear - Urine, Urine, Clean Catch [[id number]] Collected:  03/06/18 0025    Specimen:  Urine from Urine, Catheter In/Out Updated:  03/06/18 0028    Sodium, Urine, Random - Urine, Clean Catch [[id number]]  (Normal) Collected:  03/06/18 0006    Specimen:  Urine from Urine, Catheter Updated:  03/06/18 0038     Sodium, Urine 39 mmol/L     Urinalysis, Microscopic Only - Urine, Clean Catch [[id number]]  (Abnormal) Collected:  03/06/18 0006    Specimen:  Urine from Urine, Catheter Updated:  03/06/18 0045     RBC, UA 0-2 (A) /HPF      WBC, UA 3-5 (A) /HPF      Bacteria, UA None Seen /HPF      Squamous Epithelial Cells, UA 0-2 /HPF      Hyaline Casts, UA 0-2 /LPF      Amorphous Urate Crystals, UA Moderate/2+ /HPF      Methodology Manual Light Microscopy    Calcium, Ionized [[id number]]  (Abnormal) Collected:  03/06/18 0037    Specimen:  Blood Updated:  03/06/18 0046     Ionized Calcium 4.26 (L) mg/dL      Collected by [id number]    Urine Drug Screen - Urine, Clean Catch [[id number]]  (Normal) Collected:  03/06/18 0006    Specimen:  Urine from Urine, Catheter Updated:  03/06/18 0048     Amphetamine Screen, Urine Negative     Barbiturates Screen, Urine Negative     Benzodiazepine Screen, Urine Negative     Cocaine Screen, Urine Negative     Methadone Screen, Urine Negative     Opiate Screen Negative     Phencyclidine (PCP), Urine Negative     THC, Screen, Urine Negative    Narrative:       Negative Thresholds For Drugs Screened in Urine:    Amphetamines          500 ng/ml  Barbiturates          200 ng/ml  Benzodiazepines       200 ng/ml  Cocaine               150 ng/ml  Methadone             150 ng/ml  Opiates               300 ng/ml  Phencyclidine         25 ng/ml  THC                      50 ng/ml    The normal value for all drugs tested is negative. This report includes final unconfirmed screening results.  A positive result by this  assay can be, at your request, sent to the Reference Lab for confirmation by gas chromatography. Unconfirmed results must not be used for non-medical purposes, such as employment or legal testing. Clinical consideration should be applied to any drug of abuse test result, particularly when unconfirmed results are used.    Comprehensive Metabolic Panel [[id number]]  (Abnormal) Collected:  03/06/18 0057    Specimen:  Blood from Arm, Left Updated:  03/06/18 0123     Glucose 88 mg/dL       (H) mg/dL      Creatinine 12.19 (H) mg/dL      Sodium 140 mmol/L      Potassium 5.0 mmol/L      Chloride 104 mmol/L      CO2 16.0 (L) mmol/L      Calcium 8.5 mg/dL      Total Protein 6.8 g/dL      Albumin 3.70 g/dL      ALT (SGPT) <15 U/L      AST (SGOT) 22 U/L      Alkaline Phosphatase 54 U/L      Total Bilirubin 0.1 mg/dL      eGFR Non African Amer 4 (L) mL/min/1.73      Globulin 3.1 gm/dL      A/G Ratio 1.2 g/dL      BUN/Creatinine Ratio 9.2     Anion Gap 20.0 (H) mmol/L     Narrative:       The MDRD GFR formula is only valid for adults with stable renal function between ages 18 and 70.    Creatinine, Urine, Random - Urine, Clean Catch [[id number]] Collected:  03/06/18 0006    Specimen:  Urine from Urine, Catheter Updated:  03/06/18 0129     Creatinine, Urine 312.6 mg/dL     Osmolality, Urine - Urine, Clean Catch [[id number]]  (Abnormal) Collected:  03/06/18 0006    Specimen:  Urine from Urine, Catheter Updated:  03/06/18 0139     Osmolality, Urine 365 (L) mOsm/kg     Phosphorus [[id number]]  (Abnormal) Collected:  03/06/18 0223    Specimen:  Blood Updated:  03/06/18 0244     Phosphorus 6.8 (H) mg/dL     CK [[id number]]  (Normal) Collected:  03/06/18 0223    Specimen:  Blood Updated:  03/06/18 0244     Creatine Kinase <20 U/L     Comprehensive Metabolic Panel [[id number]]  (Abnormal) Collected:  03/06/18 0223    Specimen:  Blood Updated:  03/06/18 0244     Glucose 115 (H) mg/dL       (H) mg/dL      Creatinine 11.88 (H) mg/dL       Sodium 142 mmol/L      Potassium 4.8 mmol/L      Chloride 102 mmol/L      CO2 18.0 (L) mmol/L      Calcium 8.2 (L) mg/dL      Total Protein 6.8 g/dL      Albumin 3.60 g/dL      ALT (SGPT) 16 U/L      AST (SGOT) 25 U/L      Alkaline Phosphatase 52 U/L      Total Bilirubin 0.2 mg/dL      eGFR Non African Amer 4 (L) mL/min/1.73      Globulin 3.2 gm/dL      A/G Ratio 1.1 g/dL      BUN/Creatinine Ratio 9.5     Anion Gap 22.0 (H) mmol/L     Narrative:       The MDRD GFR formula is only valid for adults with stable renal function between ages 18 and 70.    Lipase [[id number]]  (Abnormal) Collected:  03/06/18 0223    Specimen:  Blood Updated:  03/06/18 0244     Lipase 357 (H) U/L     BNP [[id number]]  (Normal) Collected:  03/06/18 0223    Specimen:  Blood Updated:  03/06/18 0252     proBNP 311.0 pg/mL     Lipid Panel [[id number]]  (Abnormal) Collected:  03/06/18 0223    Specimen:  Blood Updated:  03/06/18 0254     Total Cholesterol 123 (L) mg/dL      Triglycerides 180 (H) mg/dL      HDL Cholesterol 13 (L) mg/dL      LDL Cholesterol  81 mg/dL      LDL/HDL Ratio 5.69    Magnesium [[id number]]  (Abnormal) Collected:  03/06/18 0223    Specimen:  Blood Updated:  03/06/18 0258     Magnesium 0.9 (C) mg/dL     Hepatitis B Surface Antigen [[id number]]  (Normal) Collected:  03/06/18 0223    Specimen:  Blood Updated:  03/06/18 0312     Hepatitis B Surface Ag Negative    HIV-1 & HIV-2 Antibodies [[id number]] Collected:  03/06/18 0223    Specimen:  Blood Updated:  03/06/18 0406    Narrative:       The following orders were created for panel order HIV-1 & HIV-2 Antibodies.  Procedure                               Abnormality         Status                     ---------                               -----------         ------                     HIV-1 & HIV-2 Antibodies[[id number]]     Normal              Final result                 Please view results for these tests on the individual orders.    HIV-1 & HIV-2 Antibodies [[id number]]   (Normal) Collected:  03/06/18 0223    Specimen:  Blood Updated:  03/06/18 0406     HIV-1/ HIV-2 Negative          Radiology Review:  Imaging Results (last 72 hours)     Procedure Component Value Units Date/Time    CT Chest Without Contrast [[id number]] Collected:  03/05/18 1950     Updated:  03/05/18 1955    Narrative:       EXAMINATION: CT CHEST WO CONTRAST-      3/5/2018 7:21 PM CST     HISTORY: Hematuria. Unexplained renal failure.     In order to have a CT radiation dose as low as reasonably achievable  Automated Exposure Control was utilized for adjustment of the mA and/or  KV according to patient size.     DLP in mGycm= 321.     Normal heart size.  Coronary artery calcification.     Hyperexpanded lungs with no pneumonia, pneumothorax, or pleural  effusion.     No mediastinal mass.     Chronic relatively high-grade L1 compression fracture.     Summary:  1. The lungs show evidence of COPD.  2. There is no acute abnormality in the chest.                                   This report was finalized on 03/05/2018 19:51 by Dr. Ben Delgado MD.    CT Abdomen Pelvis Without Contrast [[id number]] Collected:  03/05/18 1952     Updated:  03/05/18 1957    Narrative:       EXAMINATION: CT ABDOMEN PELVIS WO CONTRAST-      3/5/2018 7:21 PM CST     HISTORY: Abdomen pain. Unexplained renal failure. 2 area.     Noncontrast abdomen/pelvis CT compared with 10/25/2017.     In order to have a CT radiation dose as low as reasonably achievable  Automated Exposure Control was utilized for adjustment of the mA and/or  KV according to patient size.     DLP in mGycm= 274.     Normal noncontrast appearance of the liver, gallbladder, pancreas, and  spleen.  Scattered calcified splenic granulomas are again seen.  Normal and symmetric adrenal glands and kidneys. Renal vascular  calcifications are again seen.     No hydronephrosis. Stable 1.5 cm lower pole right renal cyst.     Focal aneurysmal dilation of the infrarenal abdominal aorta  is  unchanged. No retroperitoneal hemorrhage.     Sigmoid diverticulosis with no diverticulitis.     Decompressed urinary bladder shows no focal abnormality.     There is no bowel obstruction and no appendicitis or diverticulitis.     Summary:  1. Stable chronic changes.  2. No acute abnormality is seen within the abdomen or pelvis.                                   This report was finalized on 03/05/2018 19:54 by Dr. Ben Delgado MD.    US Renal Artery Complete [[id number]] Updated:  03/06/18 0949    US Renal Bilateral [[id number]] Collected:  03/06/18 0958     Updated:  03/06/18 1008    Narrative:       EXAMINATION: US RENAL BILATERAL- 3/6/2018 9:58 AM CST     HISTORY: Acute kidney injury     COMPARISON: CT abdomen and pelvis without contrast 3/5/2018     FINDINGS:  Transabdominal sonographic evaluation of the kidneys and urinary bladder  was performed in the transverse and longitudinal projections.     The aorta is obscured and therefore not evaluated.     The right kidney appears normal in size and echogenicity, measuring 11.2  cm in length. There is no sign of collecting system dilatation or solid  renal mass. A cyst is seen extending from the right kidney which  measures 1.5 x 1.0 x 1.2 cm in size.     Left kidney appears normal in size and echogenicity, measuring 11.6 cm  in length. There is no sign of collecting system dilatation or solid  renal mass.     The urinary bladder is completely decompressed around a Howard catheter  and therefore not evaluated.       Impression:       Small right renal cyst. Otherwise, normal appearance of the  kidneys.  This report was finalized on 03/06/2018 10:02 by Dr. Steve Yanez MD.          Impression/Plan:  Patient Active Problem List   Diagnosis Code   • Colon cancer C18.9   • Personal history of colon cancer Z85.038   • Anorexia R63.0   • Black stool K92.1   • Acute renal failure N17.9     Rectal bleeding/melena  Currently not an active problem.  He is up-to-date on  colonoscopy.  We will consider endoscopy pending response to treatment for acute renal failure.    Acute renal failure  This is new and undergoing evaluation.  This is the cause of his recent deterioration with regards to appetite and failure to thrive.    History of colon cancer status post resection in 2017  He is up-to-date on colonoscopy.      PRATEEK Last  03/06/18   10:48 AM    Isa Nicolas MD  [facility]  03/06/18  12:57 PM    Much of this encounter note is an electronic transcription/translation of spoken language to printed text. The electronic translation of spoken language may permit erroneous, or at times, nonsensical words or phrases to be inadvertently transcribed; although I have reviewed the note for such errors, some may still exist.

## 2018-03-06 NOTE — PROGRESS NOTES
[facility]  INPATIENT PROGRESS NOTE    Length of Stay: 1  Date of Admission: 3/5/2018  Primary Care Physician: Dandy Burton MD    Subjective   Chief Complaint: Acute kidney failure    HPI   Fairly good urine output.  Kidney functions improving slowly.  Patient pain of right rib pain from a fall 3 weeks ago, right rib series.  Discussed patient but not taken any more NSAIDs.  Hyperkalemia, resolved.  Patient denies any shortness breath.    Review of Systems   Constitutional: Positive for activity change, appetite change and fatigue. Negative for chills and fever.   HENT: Negative for hearing loss, nosebleeds, tinnitus and trouble swallowing.    Eyes: Negative for visual disturbance.   Respiratory: Negative for cough, chest tightness, shortness of breath and wheezing.    Cardiovascular: Positive for chest pain. Negative for palpitations and leg swelling.        Right lower rib pain   Gastrointestinal: Negative for abdominal distention, abdominal pain, blood in stool, constipation, diarrhea, nausea and vomiting.   Endocrine: Negative for cold intolerance, heat intolerance, polydipsia, polyphagia and polyuria.   Genitourinary: Negative for decreased urine volume, difficulty urinating, dysuria, flank pain, frequency and hematuria.   Musculoskeletal: Negative for arthralgias, joint swelling and myalgias.   Skin: Negative for rash.   Allergic/Immunologic: Negative for immunocompromised state.   Neurological: Positive for weakness. Negative for dizziness, syncope, light-headedness and headaches.   Hematological: Negative for adenopathy. Does not bruise/bleed easily.   Psychiatric/Behavioral: Negative for confusion and sleep disturbance. The patient is not nervous/anxious.           All pertinent negatives and positives are as above. All other systems have been reviewed and are negative unless otherwise stated.     Objective    Temp:  [93.5 °F (34.2 °C)-98.9 °F (37.2  °C)] 97.8 °F (36.6 °C)  Heart Rate:  [77-95] 77  Resp:  [14-18] 14  BP: ()/(45-91) 95/54    Intake/Output Summary (Last 24 hours) at 03/06/18 0829  Last data filed at 03/06/18 0402   Gross per 24 hour   Intake              668 ml   Output              375 ml   Net              293 ml     Physical Exam   Constitutional: He is oriented to person, place, and time. He appears well-developed and well-nourished.   HENT:   Head: Normocephalic and atraumatic.   Eyes: Conjunctivae and EOM are normal. Pupils are equal, round, and reactive to light.   Neck: Neck supple. No JVD present. No thyromegaly present.   Cardiovascular: Normal rate, regular rhythm, normal heart sounds and intact distal pulses.  Exam reveals no gallop and no friction rub.    No murmur heard.  Pulmonary/Chest: Effort normal and breath sounds normal. No respiratory distress. He has no wheezes. He has no rales. He exhibits no tenderness.   Abdominal: Soft. Bowel sounds are normal. He exhibits no distension. There is no tenderness. There is no rebound and no guarding.   Musculoskeletal: Normal range of motion. He exhibits no edema, tenderness or deformity.   Right lower rib pain, reproducible to palpation   Lymphadenopathy:     He has no cervical adenopathy.   Neurological: He is alert and oriented to person, place, and time. He displays normal reflexes. No cranial nerve deficit. He exhibits normal muscle tone.   Skin: Skin is warm and dry. No rash noted.   Psychiatric: He has a normal mood and affect. His behavior is normal. Judgment and thought content normal.   Nursing note and vitals reviewed.      Results Review:  Lab Results (last 24 hours)     Procedure Component Value Units Date/Time    CBC & Differential [[id number]] Collected:  03/05/18 1902    Specimen:  Blood Updated:  03/05/18 1911    Narrative:       The following orders were created for panel order CBC & Differential.  Procedure                               Abnormality         Status                      ---------                               -----------         ------                     CBC Auto Differential[[id number]]        Abnormal            Final result                 Please view results for these tests on the individual orders.    CBC Auto Differential [[id number]]  (Abnormal) Collected:  03/05/18 1902    Specimen:  Blood Updated:  03/05/18 1911     WBC 11.24 (H) 10*3/mm3      RBC 3.80 (L) 10*6/mm3      Hemoglobin 11.3 (L) g/dL      Hematocrit 33.9 (L) %      MCV 89.2 fL      MCH 29.7 pg      MCHC 33.3 g/dL      RDW 13.4 %      RDW-SD 44.1 fl      MPV 12.5 (H) fL      Platelets 258 10*3/mm3      Neutrophil % 77.5 %      Lymphocyte % 12.5 (L) %      Monocyte % 8.1 %      Eosinophil % 0.9 %      Basophil % 0.4 %      Immature Grans % 0.6 %      Neutrophils, Absolute 8.72 (H) 10*3/mm3      Lymphocytes, Absolute 1.40 10*3/mm3      Monocytes, Absolute 0.91 10*3/mm3      Eosinophils, Absolute 0.10 10*3/mm3      Basophils, Absolute 0.04 10*3/mm3      Immature Grans, Absolute 0.07 (H) 10*3/mm3      nRBC 0.0 /100 WBC     Protime-INR [[id number]]  (Abnormal) Collected:  03/05/18 1902    Specimen:  Blood Updated:  03/05/18 1921     Protime 15.1 (H) Seconds      INR 1.15 (H)    aPTT [[id number]]  (Abnormal) Collected:  03/05/18 1902    Specimen:  Blood Updated:  03/05/18 1921     PTT 40.9 (H) seconds     Lipase [[id number]]  (Abnormal) Collected:  03/05/18 1902    Specimen:  Blood Updated:  03/05/18 1922     Lipase 401 (H) U/L     Troponin [[id number]]  (Normal) Collected:  03/05/18 1902    Specimen:  Blood Updated:  03/05/18 1938     Troponin I <0.012 ng/mL     Comprehensive Metabolic Panel [[id number]]  (Abnormal) Collected:  03/05/18 2031    Specimen:  Blood Updated:  03/05/18 2059     Glucose 96 mg/dL       (H) mg/dL      Creatinine 12.73 (H) mg/dL      Sodium 137 mmol/L      Potassium 6.7 (C) mmol/L      Chloride 101 mmol/L      CO2 15.0 (L) mmol/L      Calcium 8.2 (L) mg/dL      Total Protein  7.5 g/dL      Albumin 3.90 g/dL      ALT (SGPT) 15 U/L      AST (SGOT) 22 U/L      Alkaline Phosphatase 56 U/L      Total Bilirubin 0.2 mg/dL      eGFR Non African Amer 4 (L) mL/min/1.73      Globulin 3.6 gm/dL      A/G Ratio 1.1 g/dL      BUN/Creatinine Ratio 9.0     Anion Gap 21.0 (H) mmol/L     Narrative:       The MDRD GFR formula is only valid for adults with stable renal function between ages 18 and 70.    Reticulocytes [[id number]]  (Normal) Collected:  03/05/18 1902    Specimen:  Blood Updated:  03/05/18 2207     Reticulocyte % 1.20 %      Reticulocyte Absolute 0.0457 10*6/mm3     Phosphorus [[id number]]  (Abnormal) Collected:  03/05/18 2031    Specimen:  Blood Updated:  03/05/18 2216     Phosphorus 7.9 (H) mg/dL     CK [[id number]]  (Normal) Collected:  03/05/18 2031    Specimen:  Blood Updated:  03/05/18 2216     Creatine Kinase <20 U/L     C-reactive Protein [[id number]]  (Abnormal) Collected:  03/05/18 2031    Specimen:  Blood Updated:  03/05/18 2216     C-Reactive Protein 4.73 (H) mg/dL     Uric Acid [[id number]]  (Abnormal) Collected:  03/05/18 2031    Specimen:  Blood Updated:  03/05/18 2216     Uric Acid 10.7 (H) mg/dL     Magnesium [[id number]]  (Abnormal) Collected:  03/05/18 2031    Specimen:  Blood Updated:  03/05/18 2218     Magnesium 0.9 (C) mg/dL     Antistreptolysin O Titer [[id number]] Collected:  03/05/18 2217    Specimen:  Blood Updated:  03/05/18 2220    Glomerular Basement Membrane Antibodies [[id number]] Collected:  03/05/18 2217    Specimen:  Blood Updated:  03/05/18 2220    PARUL [[id number]] Collected:  03/05/18 2217    Specimen:  Blood Updated:  03/05/18 2220    Anti-DNA Antibody, Double-stranded [[id number]] Collected:  03/05/18 2217    Specimen:  Blood Updated:  03/05/18 2220    Sedimentation Rate [[id number]]  (Abnormal) Collected:  03/05/18 1902    Specimen:  Blood Updated:  03/05/18 2221     Sed Rate 64 (H) mm/hr     BNP [[id number]]  (Normal) Collected:  03/05/18 2031    Specimen:  Blood  Updated:  03/05/18 2222     proBNP 307.0 pg/mL     Protime-INR [[id number]]  (Abnormal) Collected:  03/05/18 2217    Specimen:  Blood Updated:  03/05/18 2235     Protime 15.3 (H) Seconds      INR 1.17 (H)    Iron Profile [[id number]]  (Abnormal) Collected:  03/05/18 2217    Specimen:  Blood Updated:  03/05/18 2246     Iron 27 (L) mcg/dL      TIBC 292 mcg/dL      Iron Saturation 9 (L) %     TSH [[id number]]  (Normal) Collected:  03/05/18 2217    Specimen:  Blood Updated:  03/05/18 2309     TSH 2.480 mIU/mL     Ferritin [[id number]]  (Normal) Collected:  03/05/18 2217    Specimen:  Blood Updated:  03/05/18 2313     Ferritin 128.00 ng/mL     Vitamin B12 [[id number]]  (Normal) Collected:  03/05/18 2217    Specimen:  Blood Updated:  03/05/18 2343     Vitamin B-12 923 pg/mL     Folate [[id number]] Collected:  03/05/18 2217    Specimen:  Blood Updated:  03/05/18 2343     Folate 8.75 ng/mL     Urine Culture - Urine, Urine, Clean Catch [[id number]] Collected:  03/06/18 0006    Specimen:  Urine from Urine, Catheter Updated:  03/06/18 0010    Urinalysis With / Culture If Indicated - Urine, Clean Catch [[id number]]  (Abnormal) Collected:  03/06/18 0006    Specimen:  Urine from Urine, Catheter Updated:  03/06/18 0024     Color, UA Yellow     Appearance, UA Cloudy (A)     pH, UA <=5.0     Specific Gravity, UA 1.018     Glucose, UA Negative     Ketones, UA Trace (A)     Bilirubin, UA Negative     Blood, UA Negative     Protein, UA 30 mg/dL (1+) (A)     Leuk Esterase, UA Trace (A)     Nitrite, UA Negative     Urobilinogen, UA 0.2 E.U./dL    Eosinophil Smear - Urine, Urine, Clean Catch [[id number]] Collected:  03/06/18 0025    Specimen:  Urine from Urine, Catheter In/Out Updated:  03/06/18 0028    Sodium, Urine, Random - Urine, Clean Catch [[id number]]  (Normal) Collected:  03/06/18 0006    Specimen:  Urine from Urine, Catheter Updated:  03/06/18 0038     Sodium, Urine 39 mmol/L     Urinalysis, Microscopic Only - Urine, Clean Catch  [[id number]]  (Abnormal) Collected:  03/06/18 0006    Specimen:  Urine from Urine, Catheter Updated:  03/06/18 0045     RBC, UA 0-2 (A) /HPF      WBC, UA 3-5 (A) /HPF      Bacteria, UA None Seen /HPF      Squamous Epithelial Cells, UA 0-2 /HPF      Hyaline Casts, UA 0-2 /LPF      Amorphous Urate Crystals, UA Moderate/2+ /HPF      Methodology Manual Light Microscopy    Calcium, Ionized [[id number]]  (Abnormal) Collected:  03/06/18 0037    Specimen:  Blood Updated:  03/06/18 0046     Ionized Calcium 4.26 (L) mg/dL      Collected by [id number]    Urine Drug Screen - Urine, Clean Catch [[id number]]  (Normal) Collected:  03/06/18 0006    Specimen:  Urine from Urine, Catheter Updated:  03/06/18 0048     Amphetamine Screen, Urine Negative     Barbiturates Screen, Urine Negative     Benzodiazepine Screen, Urine Negative     Cocaine Screen, Urine Negative     Methadone Screen, Urine Negative     Opiate Screen Negative     Phencyclidine (PCP), Urine Negative     THC, Screen, Urine Negative    Narrative:       Negative Thresholds For Drugs Screened in Urine:    Amphetamines          500 ng/ml  Barbiturates          200 ng/ml  Benzodiazepines       200 ng/ml  Cocaine               150 ng/ml  Methadone             150 ng/ml  Opiates               300 ng/ml  Phencyclidine         25 ng/ml  THC                      50 ng/ml    The normal value for all drugs tested is negative. This report includes final unconfirmed screening results.  A positive result by this assay can be, at your request, sent to the Reference Lab for confirmation by gas chromatography. Unconfirmed results must not be used for non-medical purposes, such as employment or legal testing. Clinical consideration should be applied to any drug of abuse test result, particularly when unconfirmed results are used.    Comprehensive Metabolic Panel [[id number]]  (Abnormal) Collected:  03/06/18 0057    Specimen:  Blood from Arm, Left Updated:  03/06/18 0123     Glucose 88 mg/dL        (H) mg/dL      Creatinine 12.19 (H) mg/dL      Sodium 140 mmol/L      Potassium 5.0 mmol/L      Chloride 104 mmol/L      CO2 16.0 (L) mmol/L      Calcium 8.5 mg/dL      Total Protein 6.8 g/dL      Albumin 3.70 g/dL      ALT (SGPT) <15 U/L      AST (SGOT) 22 U/L      Alkaline Phosphatase 54 U/L      Total Bilirubin 0.1 mg/dL      eGFR Non African Amer 4 (L) mL/min/1.73      Globulin 3.1 gm/dL      A/G Ratio 1.2 g/dL      BUN/Creatinine Ratio 9.2     Anion Gap 20.0 (H) mmol/L     Narrative:       The MDRD GFR formula is only valid for adults with stable renal function between ages 18 and 70.    Creatinine, Urine, Random - Urine, Clean Catch [[id number]] Collected:  03/06/18 0006    Specimen:  Urine from Urine, Catheter Updated:  03/06/18 0129     Creatinine, Urine 312.6 mg/dL     Osmolality, Urine - Urine, Clean Catch [[id number]]  (Abnormal) Collected:  03/06/18 0006    Specimen:  Urine from Urine, Catheter Updated:  03/06/18 0139     Osmolality, Urine 365 (L) mOsm/kg     Phosphorus [[id number]]  (Abnormal) Collected:  03/06/18 0223    Specimen:  Blood Updated:  03/06/18 0244     Phosphorus 6.8 (H) mg/dL     CK [[id number]]  (Normal) Collected:  03/06/18 0223    Specimen:  Blood Updated:  03/06/18 0244     Creatine Kinase <20 U/L     Comprehensive Metabolic Panel [[id number]]  (Abnormal) Collected:  03/06/18 0223    Specimen:  Blood Updated:  03/06/18 0244     Glucose 115 (H) mg/dL       (H) mg/dL      Creatinine 11.88 (H) mg/dL      Sodium 142 mmol/L      Potassium 4.8 mmol/L      Chloride 102 mmol/L      CO2 18.0 (L) mmol/L      Calcium 8.2 (L) mg/dL      Total Protein 6.8 g/dL      Albumin 3.60 g/dL      ALT (SGPT) 16 U/L      AST (SGOT) 25 U/L      Alkaline Phosphatase 52 U/L      Total Bilirubin 0.2 mg/dL      eGFR Non African Amer 4 (L) mL/min/1.73      Globulin 3.2 gm/dL      A/G Ratio 1.1 g/dL      BUN/Creatinine Ratio 9.5     Anion Gap 22.0 (H) mmol/L     Narrative:       The MDRD GFR formula is  only valid for adults with stable renal function between ages 18 and 70.    Lipase [[id number]]  (Abnormal) Collected:  03/06/18 0223    Specimen:  Blood Updated:  03/06/18 0244     Lipase 357 (H) U/L     BNP [[id number]]  (Normal) Collected:  03/06/18 0223    Specimen:  Blood Updated:  03/06/18 0252     proBNP 311.0 pg/mL     Lipid Panel [[id number]]  (Abnormal) Collected:  03/06/18 0223    Specimen:  Blood Updated:  03/06/18 0254     Total Cholesterol 123 (L) mg/dL      Triglycerides 180 (H) mg/dL      HDL Cholesterol 13 (L) mg/dL      LDL Cholesterol  81 mg/dL      LDL/HDL Ratio 5.69    Magnesium [[id number]]  (Abnormal) Collected:  03/06/18 0223    Specimen:  Blood Updated:  03/06/18 0258     Magnesium 0.9 (C) mg/dL     Hepatitis B Surface Antigen [[id number]]  (Normal) Collected:  03/06/18 0223    Specimen:  Blood Updated:  03/06/18 0312     Hepatitis B Surface Ag Negative    HIV-1 & HIV-2 Antibodies [[id number]] Collected:  03/06/18 0223    Specimen:  Blood Updated:  03/06/18 0406    Narrative:       The following orders were created for panel order HIV-1 & HIV-2 Antibodies.  Procedure                               Abnormality         Status                     ---------                               -----------         ------                     HIV-1 & HIV-2 Antibodies[[id number]]     Normal              Final result                 Please view results for these tests on the individual orders.    HIV-1 & HIV-2 Antibodies [[id number]]  (Normal) Collected:  03/06/18 0223    Specimen:  Blood Updated:  03/06/18 0406     HIV-1/ HIV-2 Negative           Cultures:       Radiology Data:    Imaging Results (last 24 hours)     Procedure Component Value Units Date/Time    CT Chest Without Contrast [[id number]] Collected:  03/05/18 1950     Updated:  03/05/18 1955    Narrative:       EXAMINATION: CT CHEST WO CONTRAST-      3/5/2018 7:21 PM CST     HISTORY: Hematuria. Unexplained renal failure.     In order to have a CT radiation  dose as low as reasonably achievable  Automated Exposure Control was utilized for adjustment of the mA and/or  KV according to patient size.     DLP in mGycm= 321.     Normal heart size.  Coronary artery calcification.     Hyperexpanded lungs with no pneumonia, pneumothorax, or pleural  effusion.     No mediastinal mass.     Chronic relatively high-grade L1 compression fracture.     Summary:  1. The lungs show evidence of COPD.  2. There is no acute abnormality in the chest.                                   This report was finalized on 03/05/2018 19:51 by Dr. Ben Delgado MD.    CT Abdomen Pelvis Without Contrast [[id number]] Collected:  03/05/18 1952     Updated:  03/05/18 1957    Narrative:       EXAMINATION: CT ABDOMEN PELVIS WO CONTRAST-      3/5/2018 7:21 PM CST     HISTORY: Abdomen pain. Unexplained renal failure. 2 area.     Noncontrast abdomen/pelvis CT compared with 10/25/2017.     In order to have a CT radiation dose as low as reasonably achievable  Automated Exposure Control was utilized for adjustment of the mA and/or  KV according to patient size.     DLP in mGycm= 274.     Normal noncontrast appearance of the liver, gallbladder, pancreas, and  spleen.  Scattered calcified splenic granulomas are again seen.  Normal and symmetric adrenal glands and kidneys. Renal vascular  calcifications are again seen.     No hydronephrosis. Stable 1.5 cm lower pole right renal cyst.     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 No retroperitoneal hemorrhage.     Sigmoid diverticulosis with no diverticulitis.     Decompressed urinary bladder shows no focal abnormality.     There is no bowel obstruction and no appendicitis or diverticulitis.     Summary:  1. Stable chronic changes.  2. No acute abnormality is seen within the abdomen or pelvis.                                   This report was finalized on 03/05/2018 19:54 by Dr. Ben Delgado MD.          Allergies   Allergen Reactions    • Acetylcholinesterase Other (See Comments)     Had trouble coming out of anesthesia, Dr Ellison inst pt family to make sure pt never receive this again, if he does he will wind up on respirator       Scheduled meds:     nicotine 1 patch Transdermal Q24H   pantoprazole 40 mg Oral QAM       PRN meds:  •  acetaminophen **OR** acetaminophen  •  bisacodyl  •  dextrose  •  magnesium sulfate **OR** magnesium sulfate in D5W 1g/100mL (PREMIX) **OR** magnesium sulfate  •  ondansetron  •  sodium chloride  •  Insert peripheral IV **AND** sodium chloride    Assessment/Plan     Active Problems:    Acute renal failure      Plan:  Acute renal failure- improving. Bicarbonate drip.  Consult nephrology.  No NSAIDs.    Hyperkalemia.- Resolve.  Status post Kayexalate and D50 with insulin 3/5/18.    Hypomagnesia - mag standing order.     Anemia- stable. Iron def. venofer x3 days. 3/6.     Rectal bleeding- insult GI.  Status post colonoscopy 2/5.  Plan for EGD by GI.    Tobacco abuse- consult about smoking cessation.  Nicotine patch.    GERD-Protonix    History of colon cancer/colon resection 1/2017.    Right lower rib pain from a fall about 3 weeks ago.  Right rib series..    Discharge Planning:  unknown    Casimiro Hadley MD   03/06/18   8:29 AM

## 2018-03-07 LAB
ALBUMIN SERPL-MCNC: 3.2 G/DL (ref 3.5–5)
ALBUMIN/GLOB SERPL: 1.2 G/DL (ref 1.1–2.5)
ALP SERPL-CCNC: 42 U/L (ref 24–120)
ALT SERPL W P-5'-P-CCNC: 17 U/L (ref 0–54)
ANA SER QL: NEGATIVE
ANION GAP SERPL CALCULATED.3IONS-SCNC: 13 MMOL/L (ref 4–13)
ASO AB SERPL-ACNC: 51.6 IU/ML (ref 0–200)
AST SERPL-CCNC: 21 U/L (ref 7–45)
BASOPHILS # BLD AUTO: 0.02 10*3/MM3 (ref 0–0.2)
BASOPHILS NFR BLD AUTO: 0.3 % (ref 0–2)
BILIRUB SERPL-MCNC: <0.1 MG/DL (ref 0.1–1)
BUN BLD-MCNC: 86 MG/DL (ref 5–21)
BUN/CREAT SERPL: 14.8 (ref 7–25)
CALCIUM SPEC-SCNC: 7.5 MG/DL (ref 8.4–10.4)
CHLORIDE SERPL-SCNC: 94 MMOL/L (ref 98–110)
CO2 SERPL-SCNC: 34 MMOL/L (ref 24–31)
CREAT BLD-MCNC: 5.82 MG/DL (ref 0.5–1.4)
DEPRECATED RDW RBC AUTO: 40.7 FL (ref 40–54)
DSDNA AB SER-ACNC: 1 IU/ML (ref 0–9)
EOSINOPHIL # BLD AUTO: 0.05 10*3/MM3 (ref 0–0.7)
EOSINOPHIL NFR BLD AUTO: 0.8 % (ref 0–4)
EOSINOPHIL SPEC QL WRIGHT STN: NORMAL %
ERYTHROCYTE [DISTWIDTH] IN BLOOD BY AUTOMATED COUNT: 13.1 % (ref 12–15)
GFR SERPL CREATININE-BSD FRML MDRD: 10 ML/MIN/1.73
GLOBULIN UR ELPH-MCNC: 2.6 GM/DL
GLUCOSE BLD-MCNC: 120 MG/DL (ref 70–100)
HBA1C MFR BLD: 6.1 %
HCT VFR BLD AUTO: 23.4 % (ref 40–52)
HCT VFR BLD AUTO: 23.6 % (ref 40–52)
HGB BLD-MCNC: 7.8 G/DL (ref 14–18)
HGB BLD-MCNC: 8.1 G/DL (ref 14–18)
IMM GRANULOCYTES # BLD: 0.03 10*3/MM3 (ref 0–0.03)
IMM GRANULOCYTES NFR BLD: 0.5 % (ref 0–5)
LYMPHOCYTES # BLD AUTO: 0.46 10*3/MM3 (ref 0.72–4.86)
LYMPHOCYTES NFR BLD AUTO: 7.1 % (ref 15–45)
MAGNESIUM SERPL-MCNC: 2 MG/DL (ref 1.4–2.2)
MCH RBC QN AUTO: 29.6 PG (ref 28–32)
MCHC RBC AUTO-ENTMCNC: 34.3 G/DL (ref 33–36)
MCV RBC AUTO: 86.1 FL (ref 82–95)
MONOCYTES # BLD AUTO: 0.37 10*3/MM3 (ref 0.19–1.3)
MONOCYTES NFR BLD AUTO: 5.7 % (ref 4–12)
NEUTROPHILS # BLD AUTO: 5.58 10*3/MM3 (ref 1.87–8.4)
NEUTROPHILS NFR BLD AUTO: 85.6 % (ref 39–78)
NRBC BLD MANUAL-RTO: 0 /100 WBC (ref 0–0)
PLATELET # BLD AUTO: 186 10*3/MM3 (ref 130–400)
PMV BLD AUTO: 12.4 FL (ref 6–12)
POTASSIUM BLD-SCNC: 3.3 MMOL/L (ref 3.5–5.3)
PROT SERPL-MCNC: 5.8 G/DL (ref 6.3–8.7)
RBC # BLD AUTO: 2.74 10*6/MM3 (ref 4.8–5.9)
SODIUM BLD-SCNC: 141 MMOL/L (ref 135–145)
WBC NRBC COR # BLD: 6.51 10*3/MM3 (ref 4.8–10.8)

## 2018-03-07 PROCEDURE — 83735 ASSAY OF MAGNESIUM: CPT | Performed by: FAMILY MEDICINE

## 2018-03-07 PROCEDURE — 83036 HEMOGLOBIN GLYCOSYLATED A1C: CPT | Performed by: FAMILY MEDICINE

## 2018-03-07 PROCEDURE — 85025 COMPLETE CBC W/AUTO DIFF WBC: CPT | Performed by: FAMILY MEDICINE

## 2018-03-07 PROCEDURE — 99232 SBSQ HOSP IP/OBS MODERATE 35: CPT | Performed by: INTERNAL MEDICINE

## 2018-03-07 PROCEDURE — 85014 HEMATOCRIT: CPT | Performed by: FAMILY MEDICINE

## 2018-03-07 PROCEDURE — 80053 COMPREHEN METABOLIC PANEL: CPT | Performed by: FAMILY MEDICINE

## 2018-03-07 PROCEDURE — 85018 HEMOGLOBIN: CPT | Performed by: FAMILY MEDICINE

## 2018-03-07 PROCEDURE — 25010000002 IRON SUCROSE PER 1 MG: Performed by: FAMILY MEDICINE

## 2018-03-07 RX ORDER — SODIUM CHLORIDE 9 MG/ML
125 INJECTION, SOLUTION INTRAVENOUS CONTINUOUS
Status: DISCONTINUED | OUTPATIENT
Start: 2018-03-07 | End: 2018-03-08

## 2018-03-07 RX ORDER — GENTAMICIN SULFATE 3 MG/ML
2 SOLUTION/ DROPS OPHTHALMIC EVERY 8 HOURS SCHEDULED
Status: DISCONTINUED | OUTPATIENT
Start: 2018-03-07 | End: 2018-03-09 | Stop reason: HOSPADM

## 2018-03-07 RX ORDER — POTASSIUM CHLORIDE 750 MG/1
40 CAPSULE, EXTENDED RELEASE ORAL ONCE
Status: COMPLETED | OUTPATIENT
Start: 2018-03-07 | End: 2018-03-07

## 2018-03-07 RX ORDER — POTASSIUM CHLORIDE 20 MEQ/1
20 TABLET, EXTENDED RELEASE ORAL ONCE
Status: DISCONTINUED | OUTPATIENT
Start: 2018-03-07 | End: 2018-03-07

## 2018-03-07 RX ADMIN — GENTAMICIN SULFATE 2 DROP: 3 SOLUTION/ DROPS OPHTHALMIC at 22:16

## 2018-03-07 RX ADMIN — IRON SUCROSE 300 MG: 20 INJECTION, SOLUTION INTRAVENOUS at 10:13

## 2018-03-07 RX ADMIN — ACETAMINOPHEN 650 MG: 325 TABLET, FILM COATED ORAL at 00:20

## 2018-03-07 RX ADMIN — SODIUM CHLORIDE 100 ML/HR: 9 INJECTION, SOLUTION INTRAVENOUS at 10:13

## 2018-03-07 RX ADMIN — GENTAMICIN SULFATE 2 DROP: 3 SOLUTION/ DROPS OPHTHALMIC at 11:26

## 2018-03-07 RX ADMIN — SODIUM BICARBONATE 150 ML/HR: 84 INJECTION, SOLUTION INTRAVENOUS at 06:20

## 2018-03-07 RX ADMIN — POTASSIUM CHLORIDE 40 MEQ: 750 CAPSULE, EXTENDED RELEASE ORAL at 10:13

## 2018-03-07 RX ADMIN — POLYVINYL ALCOHOL 1 DROP: 14 SOLUTION/ DROPS OPHTHALMIC at 02:31

## 2018-03-07 RX ADMIN — SODIUM CHLORIDE 125 ML/HR: 9 INJECTION, SOLUTION INTRAVENOUS at 19:54

## 2018-03-07 RX ADMIN — PANTOPRAZOLE SODIUM 40 MG: 40 TABLET, DELAYED RELEASE ORAL at 06:20

## 2018-03-07 RX ADMIN — POLYVINYL ALCOHOL 1 DROP: 14 SOLUTION/ DROPS OPHTHALMIC at 08:27

## 2018-03-07 NOTE — PLAN OF CARE
Problem: Patient Care Overview (Adult)  Goal: Plan of Care Review  Outcome: Ongoing (interventions implemented as appropriate)   03/06/18 3493   Coping/Psychosocial Response Interventions   Plan Of Care Reviewed With patient   Patient Care Overview   Progress improving   Outcome Evaluation   Outcome Summary/Follow up Plan labs improving, producing some urine

## 2018-03-07 NOTE — PLAN OF CARE
Problem: Patient Care Overview (Adult)  Goal: Plan of Care Review  Outcome: Ongoing (interventions implemented as appropriate)   03/07/18 5150   Outcome Evaluation   Outcome Summary/Follow up Plan H&H check Q8. VSS. Pt voiced no complaints today. Fluids changed to NS and KCL 40mEq given today.       Problem: Renal Failure/Kidney Injury, Acute (Adult)  Goal: Signs and Symptoms of Listed Potential Problems Will be Absent or Manageable (Renal Failure/Kidney Injury, Acute)  Outcome: Ongoing (interventions implemented as appropriate)      Problem: Fall Risk (Adult)  Goal: Absence of Falls  Outcome: Ongoing (interventions implemented as appropriate)      Problem: Skin Integrity Impairment, Risk/Actual (Adult)  Goal: Identify Related Risk Factors and Signs and Symptoms  Outcome: Ongoing (interventions implemented as appropriate)

## 2018-03-07 NOTE — PROGRESS NOTES
Nephrology ([facility]) Progress Note      Patient:  Jonnie Sheets Jr.  YOB: 1945  Date of Service: 3/7/2018  MRN: [id number]   Acct: [id number]   Primary Care Physician: Dandy Burton MD  Advance Directive: Full Code  Admit Date: 3/5/2018       Hospital Day: 2  Referring Provider: Porfirio Fisher MD      Patient personally seen and examined.  Complete chart including Consults, Notes, Operative Reports, Labs, Cardiology, and Radiology studies reviewed as able.        Subjective:  Jonnie Sheets Jr. is a 72 y.o. male  whom we were consulted for acute kidney injury and hyperkalemia.  No prior known renal disease.  Creatinine 1.0 mg in January 2018.  History of colon cancer, hypertension.  Complex recent medical course; had colonoscopy approximately one month ago; since then has had some continued rectal bleeding.  Was started on blood pressure medication for the first time last month (Lisinopril/HCTZ).  Fell at home and injured right flank; has been taking Aleve for pain.  Poor oral intake due to all of these issues.  Outpatient labs incidental finding of significantly elevated creatinine and hyperkalemia.  Hospital course for improving renal function with IV volume replacement.    Today is feeling better.  No new overnight issues.  Urine output improving/nonoliguric.    Allergies:  Acetylcholinesterase    Home Meds:  Prescriptions Prior to Admission   Medication Sig Dispense Refill Last Dose   • FIBER, GUAR GUM, PO Take 1 tablet by mouth Daily As Needed (constipation).   Past Month at Unknown time   • lisinopril-hydrochlorothiazide (PRINZIDE,ZESTORETIC) 10-12.5 MG per tablet Take 1 tablet by mouth Daily.   Past Week at Unknown time   • lovastatin (MEVACOR) 40 MG tablet Take 40 mg by mouth Every Night.   Past Week at Unknown time   • omeprazole (priLOSEC) 20 MG capsule Take 20 mg by mouth Daily.   Past Week at Unknown time       Medicines:  Current Facility-Administered  Medications   Medication Dose Route Frequency Provider Last Rate Last Dose   • acetaminophen (TYLENOL) tablet 650 mg  650 mg Oral Q4H PRN Porfirio Fisher MD   650 mg at 03/07/18 0020    Or   • acetaminophen (TYLENOL) suppository 650 mg  650 mg Rectal Q4H PRN Porfirio Fisher MD       • bisacodyl (DULCOLAX) EC tablet 5 mg  5 mg Oral Daily PRN Porfirio Fisher MD       • dextrose (D50W) solution 50 mL  50 mL Intravenous Q1H PRN Porfirio Fisher MD       • iron sucrose (VENOFER) 300 mg in sodium chloride 0.9 % 100 mL IVPB  300 mg Intravenous Q24H Casimiro Hadley MD   300 mg at 03/06/18 1311   • nicotine (NICODERM CQ) 21 MG/24HR patch 1 patch  1 patch Transdermal Q24H Porfirio Fisher MD       • ondansetron (ZOFRAN) injection 4 mg  4 mg Intravenous Q6H PRN Porfirio Fisher MD   4 mg at 03/06/18 2012   • pantoprazole (PROTONIX) EC tablet 40 mg  40 mg Oral QAM Porfirio Fisher MD   40 mg at 03/07/18 0620   • polyvinyl alcohol (LIQUIFILM) 1.4 % ophthalmic solution 1 drop  1 drop Both Eyes Q1H PRN Casimiro Hadley MD   1 drop at 03/07/18 0827   • sodium bicarbonate 8.4 % 150 mEq in dextrose (D5W) 5 % 1,000 mL infusion (greater than 75 mEq)  150 mL/hr Intravenous Continuous Rohan Hoover  mL/hr at 03/07/18 0620 150 mL/hr at 03/07/18 0620   • sodium chloride 0.9 % flush 1-10 mL  1-10 mL Intravenous PRN Porfirio Fisher MD       • sodium chloride 0.9 % flush 10 mL  10 mL Intravenous PRN Zeferino Lopez DO           Past Medical History:  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    NOTED PROLONG PARALYSIS >2 HOURS WITH SUCCINYLCHOLINE UNDER ANESTHESIA   • Squamous cell carcinoma of oral mucosa    • Umbilical hernia        Past Surgical History:  Past Surgical History:   Procedure Laterality Date   •  "COLON RESECTION Right 1/19/2017    Procedure: LAPAROSCOPIC ASSISTED RIGHT COLON RESECTION RIGHT, UMBILICAL HERNIA REPAIR;  Surgeon: Mario Ellison MD;  Location: [facility] OR;  Service:    • COLONOSCOPY     • COLONOSCOPY N/A 1/4/2017    Procedure: COLONOSCOPY WITH ANESTHESIA;  Surgeon: Tee Kong MD;  Location: [facility] ENDOSCOPY;  Service:    • COLONOSCOPY N/A 2/5/2018    Procedure: COLONOSCOPY WITH ANESTHESIA;  Surgeon: Tee Kong MD;  Location: [facility] ENDOSCOPY;  Service:    • ENDOSCOPY N/A 1/4/2017    Procedure: ESOPHAGOGASTRODUODENOSCOPY WITH ANESTHESIA;  Surgeon: Tee Kong MD;  Location: [facility] ENDOSCOPY;  Service:    • KNEE SURGERY      PINS PLACED AND LATER REMOVED S/P FRACTURE   • MOUTH BIOPSY     • TONSILLECTOMY         Family History  Family History   Problem Relation Age of Onset   • Heart disease Father    • Colon cancer Neg Hx    • Colon polyps Neg Hx        Social History  Social History     Social History   • Marital status:      Spouse name: N/A   • Number of children: N/A   • Years of education: N/A     Occupational History   • Not on file.     Social History Main Topics   • Smoking status: Current Every Day Smoker     Packs/day: 1.00     Years: 55.00     Types: Cigarettes   • Smokeless tobacco: Never Used   • Alcohol use Yes      Comment: 3-4 times a week, liquor   • Drug use: No   • Sexual activity: Defer     Other Topics Concern   • Not on file     Social History Narrative         Review of Systems:  History obtained from chart review and the patient  General ROS: Patient complains of fatigue and No fever or chills  Respiratory ROS: No cough, shortness of breath, wheezing  Cardiovascular ROS: No chest pain or palpitations  Gastrointestinal ROS: No abdominal pain or melena  Genito-Urinary ROS: No dysuria or hematuria  Neurological ROS: no headache or dizziness    Objective:  BP 93/47  Pulse 80  Temp 99 °F (37.2 °C) (Oral)   Resp 16  Ht 167.6 cm (66\")  Wt 64.1 kg " (141 lb 4.8 oz)  SpO2 95%  BMI 22.81 kg/m2    Intake/Output Summary (Last 24 hours) at 03/07/18 0919  Last data filed at 03/07/18 0354   Gross per 24 hour   Intake             3800 ml   Output             1925 ml   Net             1875 ml     General: awake/alert    Neck: supple, no JVD  Chest:  clear to auscultation bilaterally without respiratory distress  CVS: regular rate and rhythm  Abdominal: soft, nontender, normal bowel sounds  Extremities: no cyanosis or edema  Skin: warm and dry without rash  Neuro: no focal motor deficits    Labs:    Results from last 7 days  Lab Units 03/07/18  0303 03/05/18  1902 03/05/18  1428   WBC 10*3/mm3 6.51 11.24* 13.62*   HEMOGLOBIN g/dL 8.1* 11.3* 11.3*   HEMATOCRIT % 23.6* 33.9* 34.5*   PLATELETS 10*3/mm3 186 258 282           Results from last 7 days  Lab Units 03/07/18  0303 03/06/18  0223 03/06/18  0057   SODIUM mmol/L 141 142 140   POTASSIUM mmol/L 3.3* 4.8 5.0   CHLORIDE mmol/L 94* 102 104   CO2 mmol/L 34.0* 18.0* 16.0*   BUN mg/dL 86* 113* 112*   CREATININE mg/dL 5.82* 11.88* 12.19*   CALCIUM mg/dL 7.5* 8.2* 8.5   BILIRUBIN mg/dL <0.1* 0.2 0.1   ALK PHOS U/L 42 52 54   ALT (SGPT) U/L 17 16 <15   AST (SGOT) U/L 21 25 22   GLUCOSE mg/dL 120* 115* 88       Radiology:   Imaging Results (last 72 hours)     Procedure Component Value Units Date/Time    CT Chest Without Contrast [[id number]] Collected:  03/05/18 1950     Updated:  03/05/18 1955    Narrative:       EXAMINATION: CT CHEST WO CONTRAST-      3/5/2018 7:21 PM CST     HISTORY: Hematuria. Unexplained renal failure.     In order to have a CT radiation dose as low as reasonably achievable  Automated Exposure Control was utilized for adjustment of the mA and/or  KV according to patient size.     DLP in mGycm= 321.     Normal heart size.  Coronary artery calcification.     Hyperexpanded lungs with no pneumonia, pneumothorax, or pleural  effusion.     No mediastinal mass.     Chronic relatively high-grade L1 compression  fracture.     Summary:  1. The lungs show evidence of COPD.  2. There is no acute abnormality in the chest.                                   This report was finalized on 03/05/2018 19:51 by Dr. Ben Delgado MD.    CT Abdomen Pelvis Without Contrast [[id number]] Collected:  03/05/18 1952     Updated:  03/05/18 1957    Narrative:       EXAMINATION: CT ABDOMEN PELVIS WO CONTRAST-      3/5/2018 7:21 PM CST     HISTORY: Abdomen pain. Unexplained renal failure. 2 area.     Noncontrast abdomen/pelvis CT compared with 10/25/2017.     In order to have a CT radiation dose as low as reasonably achievable  Automated Exposure Control was utilized for adjustment of the mA and/or  KV according to patient size.     DLP in mGycm= 274.     Normal noncontrast appearance of the liver, gallbladder, pancreas, and  spleen.  Scattered calcified splenic granulomas are again seen.  Normal and symmetric adrenal glands and kidneys. Renal vascular  calcifications are again seen.     No hydronephrosis. Stable 1.5 cm lower pole right renal cyst.     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 No retroperitoneal hemorrhage.     Sigmoid diverticulosis with no diverticulitis.     Decompressed urinary bladder shows no focal abnormality.     There is no bowel obstruction and no appendicitis or diverticulitis.     Summary:  1. Stable chronic changes.  2. No acute abnormality is seen within the abdomen or pelvis.                                   This report was finalized on 03/05/2018 19:54 by Dr. Ben Delgado MD.    US Renal Bilateral [[id number]] Collected:  03/06/18 0958     Updated:  03/06/18 1008    Narrative:       EXAMINATION: US RENAL BILATERAL- 3/6/2018 9:58 AM CST     HISTORY: Acute kidney injury     COMPARISON: CT abdomen and pelvis without contrast 3/5/2018     FINDINGS:  Transabdominal sonographic evaluation of the kidneys and urinary bladder  was performed in the transverse and longitudinal projections.      The aorta is obscured and therefore not evaluated.     The right kidney appears normal in size and echogenicity, measuring 11.2  cm in length. There is no sign of collecting system dilatation or solid  renal mass. A cyst is seen extending from the right kidney which  measures 1.5 x 1.0 x 1.2 cm in size.     Left kidney appears normal in size and echogenicity, measuring 11.6 cm  in length. There is no sign of collecting system dilatation or solid  renal mass.     The urinary bladder is completely decompressed around a Howard catheter  and therefore not evaluated.       Impression:       Small right renal cyst. Otherwise, normal appearance of the  kidneys.  This report was finalized on 03/06/2018 10:02 by Dr. Steve Yanez MD.    US Renal Artery Complete [[id number]] Updated:  03/06/18 1111    XR Chest 1 View [[id number]] Collected:  03/06/18 1405     Updated:  03/06/18 1409    Narrative:       EXAMINATION:   XR CHEST 1 VW-  3/6/2018 2:05 PM CST     HISTORY: Broken ribs     Frontal upright radiograph of the chest 3/6/2018 1:58 PM CST     COMPARISON: January 17, 2017.     FINDINGS:   The lungs are clear. Calcified granulomas noted in the right lung. Ribs  are best evaluated with rib series.. The cardiomediastinal silhouette  and pulmonary vascularity are within normal limits.      The osseous structures and surrounding soft tissues demonstrate no acute  abnormality.       Impression:       1. No radiographic evidence of acute cardiopulmonary process.        This report was finalized on 03/06/2018 14:05 by Dr. Olegario Graham MD.          Culture:  Urine Culture   Date Value Ref Range Status   03/06/2018 No growth at 24 hours  Preliminary         Assessment   1.  Acute kidney injury due to acute tubular necrosis--improving  2.  Hypokalemia  3.  Metabolic acidosis--resolving  4.  Anemia secondary to blood loss  5.  Nephrogenic cyst on renal ultrasound    Plan:  1.  Replace potassium  2.  Change IV fluid to normal  saline  3.  Continue to monitor renal function      Italo Fisher, APRN  3/7/2018  9:19 AM

## 2018-03-07 NOTE — PROGRESS NOTES
[facility] Medicine Services  INPATIENT PROGRESS NOTE    Length of Stay: 2  Date of Admission: 3/5/2018  Primary Care Physician: Dandy Burton MD    Subjective   Chief Complaint: Acute kidney failure.  Right rib pain.    HPI   Kidney functions improving.  Unable to right rib series.  Chest x-ray shows no acute changes.  Hypokalemia, replace by mouth potassium.  Anemia, worsening of hemoglobin.  Plan for hemoglobin every 8 hours and Hemoccult.  She denies any chest pain or shortness of breath.  Overall, patient states doing better.    Review of Systems   Constitutional: Positive for activity change, appetite change and fatigue. Negative for chills and fever.   HENT: Negative for hearing loss, nosebleeds, tinnitus and trouble swallowing.    Eyes: Negative for visual disturbance.   Respiratory: Negative for cough, chest tightness, shortness of breath and wheezing.    Cardiovascular: Positive for chest pain. Negative for palpitations and leg swelling.        Right lower rib pain   Gastrointestinal: Negative for abdominal distention, abdominal pain, blood in stool, constipation, diarrhea, nausea and vomiting.   Endocrine: Negative for cold intolerance, heat intolerance, polydipsia, polyphagia and polyuria.   Genitourinary: Negative for decreased urine volume, difficulty urinating, dysuria, flank pain, frequency and hematuria.   Musculoskeletal: Negative for arthralgias, joint swelling and myalgias.   Skin: Negative for rash.   Allergic/Immunologic: Negative for immunocompromised state.   Neurological: Positive for weakness. Negative for dizziness, syncope, light-headedness and headaches.   Hematological: Negative for adenopathy. Does not bruise/bleed easily.   Psychiatric/Behavioral: Negative for confusion and sleep disturbance. The patient is not nervous/anxious.           All pertinent negatives and positives are as above. All other systems have been reviewed and are negative  unless otherwise stated.     Objective    Temp:  [97.1 °F (36.2 °C)-99 °F (37.2 °C)] 99 °F (37.2 °C)  Heart Rate:  [] 80  Resp:  [14-19] 16  BP: ()/(47-97) 93/47    Intake/Output Summary (Last 24 hours) at 03/07/18 0938  Last data filed at 03/07/18 0354   Gross per 24 hour   Intake             3800 ml   Output             1925 ml   Net             1875 ml     Physical Exam  Constitutional: He is oriented to person, place, and time. He appears well-developed and well-nourished.   HENT:   Head: Normocephalic and atraumatic.   Eyes: Conjunctivae and EOM are normal. Pupils are equal, round, and reactive to light.   Neck: Neck supple. No JVD present. No thyromegaly present.   Cardiovascular: Normal rate, regular rhythm, normal heart sounds and intact distal pulses.  Exam reveals no gallop and no friction rub.    No murmur heard.  Pulmonary/Chest: Effort normal and breath sounds normal. No respiratory distress. He has no wheezes. He has no rales. He exhibits no tenderness.   Abdominal: Soft. Bowel sounds are normal. He exhibits no distension. There is no tenderness. There is no rebound and no guarding.   Musculoskeletal: Normal range of motion. He exhibits no edema, tenderness or deformity.   Right lower rib pain, reproducible to palpation   Lymphadenopathy:     He has no cervical adenopathy.   Neurological: He is alert and oriented to person, place, and time. He displays normal reflexes. No cranial nerve deficit. He exhibits normal muscle tone.   Skin: Skin is warm and dry. No rash noted.   Psychiatric: He has a normal mood and affect. His behavior is normal. Judgment and thought content normal.   Nursing note and vitals reviewed.  Results Review:  Lab Results (last 24 hours)     Procedure Component Value Units Date/Time    POC Glucose Once [[id number]]  (Abnormal) Collected:  03/06/18 1945    Specimen:  Blood Updated:  03/06/18 1957     Glucose 155 (H) mg/dL       : [id number] Dustin WhitneyMeter ID:  [id number]       CBC & Differential [[id number]] Collected:  03/07/18 0303    Specimen:  Blood Updated:  03/07/18 0344    Narrative:       The following orders were created for panel order CBC & Differential.  Procedure                               Abnormality         Status                     ---------                               -----------         ------                     CBC Auto Differential[[id number]]        Abnormal            Final result                 Please view results for these tests on the individual orders.    CBC Auto Differential [[id number]]  (Abnormal) Collected:  03/07/18 0303    Specimen:  Blood Updated:  03/07/18 0344     WBC 6.51 10*3/mm3      RBC 2.74 (L) 10*6/mm3      Hemoglobin 8.1 (L) g/dL      Hematocrit 23.6 (L) %      MCV 86.1 fL      MCH 29.6 pg      MCHC 34.3 g/dL      RDW 13.1 %      RDW-SD 40.7 fl      MPV 12.4 (H) fL      Platelets 186 10*3/mm3      Neutrophil % 85.6 (H) %      Lymphocyte % 7.1 (L) %      Monocyte % 5.7 %      Eosinophil % 0.8 %      Basophil % 0.3 %      Immature Grans % 0.5 %      Neutrophils, Absolute 5.58 10*3/mm3      Lymphocytes, Absolute 0.46 (L) 10*3/mm3      Monocytes, Absolute 0.37 10*3/mm3      Eosinophils, Absolute 0.05 10*3/mm3      Basophils, Absolute 0.02 10*3/mm3      Immature Grans, Absolute 0.03 10*3/mm3      nRBC 0.0 /100 WBC     Comprehensive Metabolic Panel [[id number]]  (Abnormal) Collected:  03/07/18 0303    Specimen:  Blood Updated:  03/07/18 0404     Glucose 120 (H) mg/dL      BUN 86 (H) mg/dL      Creatinine 5.82 (H) mg/dL      Sodium 141 mmol/L      Potassium 3.3 (L) mmol/L      Chloride 94 (L) mmol/L      CO2 34.0 (H) mmol/L      Calcium 7.5 (L) mg/dL      Total Protein 5.8 (L) g/dL      Albumin 3.20 (L) g/dL      ALT (SGPT) 17 U/L      AST (SGOT) 21 U/L      Alkaline Phosphatase 42 U/L      Total Bilirubin <0.1 (L) mg/dL      eGFR Non African Amer 10 (L) mL/min/1.73      Globulin 2.6 gm/dL      A/G Ratio 1.2 g/dL      BUN/Creatinine  Ratio 14.8     Anion Gap 13.0 mmol/L     Narrative:       The MDRD GFR formula is only valid for adults with stable renal function between ages 18 and 70.    Magnesium [[id number]]  (Normal) Collected:  03/07/18 0303    Specimen:  Blood Updated:  03/07/18 0405     Magnesium 2.0 mg/dL     Hemoglobin A1c [[id number]] Collected:  03/07/18 0303    Specimen:  Blood Updated:  03/07/18 0437     Hemoglobin A1C 6.1 %     Narrative:       Less than 6.0           Non-Diabetic Range  6.0-7.0                 ADA Therapeutic Target  Greater than 7.0        Action Suggested    Urine Culture - Urine, Urine, Clean Catch [[id number]]  (Normal) Collected:  03/06/18 0006    Specimen:  Urine from Urine, Catheter Updated:  03/07/18 0737     Urine Culture No growth at 24 hours           Cultures:  Urine Culture   Date Value Ref Range Status   03/06/2018 No growth at 24 hours  Preliminary       Radiology Data:    Imaging Results (last 24 hours)     Procedure Component Value Units Date/Time    US Renal Bilateral [[id number]] Collected:  03/06/18 0958     Updated:  03/06/18 1008    Narrative:       EXAMINATION: US RENAL BILATERAL- 3/6/2018 9:58 AM CST     HISTORY: Acute kidney injury     COMPARISON: CT abdomen and pelvis without contrast 3/5/2018     FINDINGS:  Transabdominal sonographic evaluation of the kidneys and urinary bladder  was performed in the transverse and longitudinal projections.     The aorta is obscured and therefore not evaluated.     The right kidney appears normal in size and echogenicity, measuring 11.2  cm in length. There is no sign of collecting system dilatation or solid  renal mass. A cyst is seen extending from the right kidney which  measures 1.5 x 1.0 x 1.2 cm in size.     Left kidney appears normal in size and echogenicity, measuring 11.6 cm  in length. There is no sign of collecting system dilatation or solid  renal mass.     The urinary bladder is completely decompressed around a Howard catheter  and therefore not  evaluated.       Impression:       Small right renal cyst. Otherwise, normal appearance of the  kidneys.  This report was finalized on 03/06/2018 10:02 by Dr. Steve Yanez MD.    US Renal Artery Complete [[id number]] Updated:  03/06/18 1111    XR Chest 1 View [[id number]] Collected:  03/06/18 1405     Updated:  03/06/18 1409    Narrative:       EXAMINATION:   XR CHEST 1 VW-  3/6/2018 2:05 PM CST     HISTORY: Broken ribs     Frontal upright radiograph of the chest 3/6/2018 1:58 PM CST     COMPARISON: January 17, 2017.     FINDINGS:   The lungs are clear. Calcified granulomas noted in the right lung. Ribs  are best evaluated with rib series.. The cardiomediastinal silhouette  and pulmonary vascularity are within normal limits.      The osseous structures and surrounding soft tissues demonstrate no acute  abnormality.       Impression:       1. No radiographic evidence of acute cardiopulmonary process.        This report was finalized on 03/06/2018 14:05 by Dr. Olegario Graham MD.          Allergies   Allergen Reactions   • Acetylcholinesterase Other (See Comments)     Had trouble coming out of anesthesia, Dr Ellison inst pt family to make sure pt never receive this again, if he does he will wind up on respirator       Scheduled meds:     iron sucrose (VENOFER) IVPB 300 mg Intravenous Q24H   nicotine 1 patch Transdermal Q24H   pantoprazole 40 mg Oral QAM   potassium chloride 20 mEq Oral Once       PRN meds:  •  acetaminophen **OR** acetaminophen  •  bisacodyl  •  dextrose  •  ondansetron  •  polyvinyl alcohol  •  sodium chloride  •  Insert peripheral IV **AND** sodium chloride    Assessment/Plan     Active Problems:    Acute renal failure      Plan:  Acute renal failure- improving. Bicarbonate drip DC by nephrology. Cont IV hydration.  Consult nephrology.  No NSAIDs.     Hypokalemia.- Potassium replaced by nephrology.     Hypomagnesia - resolved.     Anemia- Hb drop. Iron def. venofer x3 days. 3/6.      Rectal bleeding-  insult GI.  Status post colonoscopy .  Possible endoscopy by GI.  Hemoglobin Q's 8 hours.  Hemoccult.  No sign of acute bleed.     Tobacco abuse- consult about smoking cessation.  Nicotine patch.     GERD-Protonix     History of colon cancer/colon resection 2017.     Right lower rib pain from a fall about 3 weeks ago.  Chest xray - neg.      Discharge Plannin-2 days. Transfer to medical floor.     Casimiro Hadley MD   18   9:27 AM

## 2018-03-07 NOTE — PLAN OF CARE
Problem: Patient Care Overview (Adult)  Goal: Plan of Care Review  Outcome: Ongoing (interventions implemented as appropriate)   03/07/18 9454   Coping/Psychosocial Response Interventions   Plan Of Care Reviewed With patient;spouse   Patient Care Overview   Progress improving   Outcome Evaluation   Outcome Summary/Follow up Plan VSS occassional SBP in the 80s with MAP of >60, placed on 2L NC due to sats dropping into the 80s when sleeping, labs improving this morning, urine output adequate, continue to monitor      Goal: Adult Individualization and Mutuality  Outcome: Ongoing (interventions implemented as appropriate)    Goal: Discharge Needs Assessment  Outcome: Ongoing (interventions implemented as appropriate)      Problem: Renal Failure/Kidney Injury, Acute (Adult)  Goal: Signs and Symptoms of Listed Potential Problems Will be Absent or Manageable (Renal Failure/Kidney Injury, Acute)  Outcome: Ongoing (interventions implemented as appropriate)      Problem: Nutrition, Imbalanced: Inadequate Oral Intake (Adult)  Goal: Identify Related Risk Factors and Signs and Symptoms  Outcome: Ongoing (interventions implemented as appropriate)    Goal: Improved Oral Intake  Outcome: Ongoing (interventions implemented as appropriate)    Goal: Prevent Further Weight Loss  Outcome: Ongoing (interventions implemented as appropriate)      Problem: Fall Risk (Adult)  Goal: Identify Related Risk Factors and Signs and Symptoms  Outcome: Ongoing (interventions implemented as appropriate)    Goal: Absence of Falls  Outcome: Ongoing (interventions implemented as appropriate)      Problem: Skin Integrity Impairment, Risk/Actual (Adult)  Goal: Identify Related Risk Factors and Signs and Symptoms  Outcome: Ongoing (interventions implemented as appropriate)    Goal: Skin Integrity/Wound Healing  Outcome: Ongoing (interventions implemented as appropriate)

## 2018-03-07 NOTE — PROGRESS NOTES
"      [facility] Gastroenterology  Inpatient Progress Note  Today's date:  03/07/18    Jonnie Sheets Jr.  1945       Reason for Follow Up:  Rectal bleeding    Subjective:   The patient is in bed this morning with his wife at bedside.  He states that he is feeling much better.  He states that he ate a full diet and was very hungry.  He states that it tasted \"good.\"  The patient states that he did have a bowel movement this morning and denied any melena or hematochezia.  Nursing confirms this.  The patient denies any nausea, vomiting, pyrosis or hematemesis.  He denies any fever or chills.  He tells me that his episode of blood in the commode was about 2 weeks ago when he was taking Advil after falling and injuring his ribs.    Allergies   Allergen Reactions   • Acetylcholinesterase Other (See Comments)     Had trouble coming out of anesthesia, Dr Ellison inst pt family to make sure pt never receive this again, if he does he will wind up on respirator       Current Facility-Administered Medications:   •  acetaminophen (TYLENOL) tablet 650 mg, 650 mg, Oral, Q4H PRN, 650 mg at 03/07/18 0020 **OR** acetaminophen (TYLENOL) suppository 650 mg, 650 mg, Rectal, Q4H PRN, Porfirio Fisher MD  •  bisacodyl (DULCOLAX) EC tablet 5 mg, 5 mg, Oral, Daily PRN, Porfirio Fisher MD  •  dextrose (D50W) solution 50 mL, 50 mL, Intravenous, Q1H PRN, Porfirio Fisher MD  •  gentamicin (GARAMYCIN) 0.3 % ophthalmic solution 2 drop, 2 drop, Right Eye, Q8H, Casimiro Hadley MD  •  iron sucrose (VENOFER) 300 mg in sodium chloride 0.9 % 100 mL IVPB, 300 mg, Intravenous, Q24H, Casimiro Hadley MD, 300 mg at 03/07/18 1013  •  nicotine (NICODERM CQ) 21 MG/24HR patch 1 patch, 1 patch, Transdermal, Q24H, Porfirio Fisher MD  •  ondansetron (ZOFRAN) injection 4 mg, 4 mg, Intravenous, Q6H PRN, Porfirio Fisher MD, 4 mg at 03/06/18 2012  •  pantoprazole (PROTONIX) EC tablet 40 mg, 40 mg, Oral, [facility], 40 mg at 03/07/18 " 0620  •  polyvinyl alcohol (LIQUIFILM) 1.4 % ophthalmic solution 1 drop, 1 drop, Both Eyes, Q1H PRN, Casimiro Hadley MD, 1 drop at 03/07/18 0827  •  sodium chloride 0.9 % flush 1-10 mL, 1-10 mL, Intravenous, PRN, Porfirio Fisher MD  •  Insert peripheral IV, , , Once **AND** sodium chloride 0.9 % flush 10 mL, 10 mL, Intravenous, PRN, Zeferino Lopez,   •  sodium chloride 0.9 % infusion, 100 mL/hr, Intravenous, Continuous, Italo Fisher, APRN, Last Rate: 100 mL/hr at 03/07/18 1013, 100 mL/hr at 03/07/18 1013    Review of Systems:   Review of Systems   Constitutional: Negative for fever and unexpected weight change.   HENT: Negative for hearing loss.    Eyes: Negative for visual disturbance.   Respiratory: Negative for cough.    Cardiovascular: Negative for chest pain.   Gastrointestinal:        See HPI   Endocrine: Negative for cold intolerance and heat intolerance.   Genitourinary: Negative for dysuria.   Neurological: Negative for seizures.   Psychiatric/Behavioral: Negative for hallucinations.        Vital Signs:  Temp:  [97.1 °F (36.2 °C)-99 °F (37.2 °C)] 97.8 °F (36.6 °C)  Heart Rate:  [] 85  Resp:  [14-19] 16  BP: ()/(47-97) 110/61  Body mass index is 22.81 kg/(m^2).     Intake/Output Summary (Last 24 hours) at 03/07/18 1049  Last data filed at 03/07/18 0900   Gross per 24 hour   Intake             3635 ml   Output             2325 ml   Net             1310 ml     I/O this shift:  In: 635 [I.V.:635]  Out: 400 [Urine:400]  Physical Exam:  Physical Exam   Constitutional: He appears well-developed.   Cardiovascular: Normal rate and regular rhythm.    Pulmonary/Chest: Effort normal.   Abdominal: Soft. Bowel sounds are normal. There is no tenderness.   Neurological: He is alert.   Psychiatric: He has a normal mood and affect.     I have performed the physical exam and agree with the findings as noted above.   LEVAR Nicolas MD     Results Review:   I have reviewed all of the patient's current test  results      Results from last 7 days  Lab Units 03/07/18  0303 03/05/18  1902 03/05/18  1428   WBC 10*3/mm3 6.51 11.24* 13.62*   HEMOGLOBIN g/dL 8.1* 11.3* 11.3*   HEMATOCRIT % 23.6* 33.9* 34.5*   PLATELETS 10*3/mm3 186 258 282         Results from last 7 days  Lab Units 03/07/18  0303 03/06/18  0223 03/06/18  0057   SODIUM mmol/L 141 142 140   POTASSIUM mmol/L 3.3* 4.8 5.0   CHLORIDE mmol/L 94* 102 104   CO2 mmol/L 34.0* 18.0* 16.0*   BUN mg/dL 86* 113* 112*   CREATININE mg/dL 5.82* 11.88* 12.19*   CALCIUM mg/dL 7.5* 8.2* 8.5   BILIRUBIN mg/dL <0.1* 0.2 0.1   ALK PHOS U/L 42 52 54   ALT (SGPT) U/L 17 16 <15   AST (SGOT) U/L 21 25 22   GLUCOSE mg/dL 120* 115* 88         Results from last 7 days  Lab Units 03/05/18  2217 03/05/18  1902   INR  1.17* 1.15*         Lab Results  Lab Value Date/Time   LIPASE 357 (H) 03/06/2018 0223   LIPASE 401 (H) 03/05/2018 1902       Radiology Review:  Imaging Results (last 24 hours)     Procedure Component Value Units Date/Time    US Renal Artery Complete [[id number]] Updated:  03/06/18 1111    XR Chest 1 View [[id number]] Collected:  03/06/18 1405     Updated:  03/06/18 1409    Narrative:       EXAMINATION:   XR CHEST 1 VW-  3/6/2018 2:05 PM CST     HISTORY: Broken ribs     Frontal upright radiograph of the chest 3/6/2018 1:58 PM CST     COMPARISON: January 17, 2017.     FINDINGS:   The lungs are clear. Calcified granulomas noted in the right lung. Ribs  are best evaluated with rib series.. The cardiomediastinal silhouette  and pulmonary vascularity are within normal limits.      The osseous structures and surrounding soft tissues demonstrate no acute  abnormality.       Impression:       1. No radiographic evidence of acute cardiopulmonary process.        This report was finalized on 03/06/2018 14:05 by Dr. Olegario Graham MD.          Impression/Plan:  Patient Active Problem List   Diagnosis Code   • Colon cancer C18.9   • Personal history of colon cancer Z85.038   • Anorexia R63.0    • Black stool K92.1   • Acute renal failure N17.9     Rectal bleeding/melena--resolved  Currently not an active problem.  He is up-to-date on colonoscopy.  We will consider endoscopy pending response to treatment for acute renal failure.  I offered to proceed tomorrow however he does not feel he is up to this.  He would like to wait yet another day and maybe even do as an outpatient.  He has not had any further bleeding.  This occurred approximately 2 weeks ago of interest, the patient fell about that same time and was taking Advil for he thought might be fractured ribs.     Acute renal failure  This is new and undergoing evaluation.  This is the cause of his recent deterioration with regards to appetite and failure to thrive.  Renal function much better today with a creatinine of 5.82 today decreased from 11.8 yesterday     History of colon cancer status post resection in 2017  He is up-to-date on colonoscopy.      PRATEEK Last  03/07/18  10:49 AM    Isa Nicolas MD  [facility] Gastroenterology  03/07/18  12:40 PM    Much of this encounter note is an electronic transcription/translation of spoken language to printed text. The electronic translation of spoken language may permit erroneous, or at times, nonsensical words or phrases to be inadvertently transcribed; although I have reviewed the note for such errors, some may still exist.

## 2018-03-08 ENCOUNTER — APPOINTMENT (OUTPATIENT)
Dept: GENERAL RADIOLOGY | Facility: HOSPITAL | Age: 73
End: 2018-03-08

## 2018-03-08 LAB
ALBUMIN SERPL-MCNC: 2.8 G/DL (ref 3.5–5)
ALBUMIN/GLOB SERPL: 1 G/DL (ref 1.1–2.5)
ALP SERPL-CCNC: 38 U/L (ref 24–120)
ALT SERPL W P-5'-P-CCNC: 18 U/L (ref 0–54)
ANION GAP SERPL CALCULATED.3IONS-SCNC: ABNORMAL MMOL/L (ref 4–13)
AST SERPL-CCNC: 25 U/L (ref 7–45)
BACTERIA SPEC AEROBE CULT: NORMAL
BASOPHILS # BLD AUTO: 0.04 10*3/MM3 (ref 0–0.2)
BASOPHILS NFR BLD AUTO: 0.7 % (ref 0–2)
BILIRUB SERPL-MCNC: <0.1 MG/DL (ref 0.1–1)
BUN BLD-MCNC: 49 MG/DL (ref 5–21)
BUN/CREAT SERPL: 19.6 (ref 7–25)
CALCIUM SPEC-SCNC: 6.9 MG/DL (ref 8.4–10.4)
CHLORIDE SERPL-SCNC: 100 MMOL/L (ref 98–110)
CO2 SERPL-SCNC: >40 MMOL/L (ref 24–31)
CREAT BLD-MCNC: 2.5 MG/DL (ref 0.5–1.4)
DEPRECATED RDW RBC AUTO: 45.1 FL (ref 40–54)
EOSINOPHIL # BLD AUTO: 0.38 10*3/MM3 (ref 0–0.7)
EOSINOPHIL NFR BLD AUTO: 6.2 % (ref 0–4)
ERYTHROCYTE [DISTWIDTH] IN BLOOD BY AUTOMATED COUNT: 13.5 % (ref 12–15)
GFR SERPL CREATININE-BSD FRML MDRD: 25 ML/MIN/1.73
GLOBULIN UR ELPH-MCNC: 2.7 GM/DL
GLUCOSE BLD-MCNC: 115 MG/DL (ref 70–100)
HCT VFR BLD AUTO: 22.4 % (ref 40–52)
HCT VFR BLD AUTO: 24.3 % (ref 40–52)
HEMOCCULT STL QL: NEGATIVE
HGB BLD-MCNC: 7.4 G/DL (ref 14–18)
HGB BLD-MCNC: 7.9 G/DL (ref 14–18)
IMM GRANULOCYTES # BLD: 0.06 10*3/MM3 (ref 0–0.03)
IMM GRANULOCYTES NFR BLD: 1 % (ref 0–5)
LYMPHOCYTES # BLD AUTO: 1.78 10*3/MM3 (ref 0.72–4.86)
LYMPHOCYTES NFR BLD AUTO: 29.2 % (ref 15–45)
MCH RBC QN AUTO: 29.8 PG (ref 28–32)
MCHC RBC AUTO-ENTMCNC: 32.5 G/DL (ref 33–36)
MCV RBC AUTO: 91.7 FL (ref 82–95)
MONOCYTES # BLD AUTO: 0.82 10*3/MM3 (ref 0.19–1.3)
MONOCYTES NFR BLD AUTO: 13.4 % (ref 4–12)
NEUTROPHILS # BLD AUTO: 3.02 10*3/MM3 (ref 1.87–8.4)
NEUTROPHILS NFR BLD AUTO: 49.5 % (ref 39–78)
NRBC BLD MANUAL-RTO: 0 /100 WBC (ref 0–0)
PLATELET # BLD AUTO: 166 10*3/MM3 (ref 130–400)
PMV BLD AUTO: 12.6 FL (ref 6–12)
POTASSIUM BLD-SCNC: 3.4 MMOL/L (ref 3.5–5.3)
PROT SERPL-MCNC: 5.5 G/DL (ref 6.3–8.7)
RBC # BLD AUTO: 2.65 10*6/MM3 (ref 4.8–5.9)
SODIUM BLD-SCNC: 147 MMOL/L (ref 135–145)
WBC NRBC COR # BLD: 6.1 10*3/MM3 (ref 4.8–10.8)

## 2018-03-08 PROCEDURE — 85025 COMPLETE CBC W/AUTO DIFF WBC: CPT | Performed by: FAMILY MEDICINE

## 2018-03-08 PROCEDURE — G8978 MOBILITY CURRENT STATUS: HCPCS

## 2018-03-08 PROCEDURE — 99232 SBSQ HOSP IP/OBS MODERATE 35: CPT | Performed by: INTERNAL MEDICINE

## 2018-03-08 PROCEDURE — G8979 MOBILITY GOAL STATUS: HCPCS

## 2018-03-08 PROCEDURE — 25010000002 IRON SUCROSE PER 1 MG: Performed by: FAMILY MEDICINE

## 2018-03-08 PROCEDURE — 80053 COMPREHEN METABOLIC PANEL: CPT | Performed by: FAMILY MEDICINE

## 2018-03-08 PROCEDURE — 82272 OCCULT BLD FECES 1-3 TESTS: CPT | Performed by: FAMILY MEDICINE

## 2018-03-08 PROCEDURE — 25810000003 POTASSIUM CHLORIDE PER 2 MEQ: Performed by: FAMILY MEDICINE

## 2018-03-08 PROCEDURE — 71100 X-RAY EXAM RIBS UNI 2 VIEWS: CPT

## 2018-03-08 PROCEDURE — 97161 PT EVAL LOW COMPLEX 20 MIN: CPT

## 2018-03-08 RX ORDER — SODIUM CHLORIDE AND POTASSIUM CHLORIDE 150; 450 MG/100ML; MG/100ML
100 INJECTION, SOLUTION INTRAVENOUS CONTINUOUS
Status: DISCONTINUED | OUTPATIENT
Start: 2018-03-08 | End: 2018-03-09 | Stop reason: HOSPADM

## 2018-03-08 RX ORDER — POTASSIUM CHLORIDE 750 MG/1
40 CAPSULE, EXTENDED RELEASE ORAL ONCE
Status: COMPLETED | OUTPATIENT
Start: 2018-03-08 | End: 2018-03-08

## 2018-03-08 RX ADMIN — POTASSIUM CHLORIDE 40 MEQ: 750 CAPSULE, EXTENDED RELEASE ORAL at 09:57

## 2018-03-08 RX ADMIN — GENTAMICIN SULFATE 2 DROP: 3 SOLUTION/ DROPS OPHTHALMIC at 06:14

## 2018-03-08 RX ADMIN — SODIUM CHLORIDE AND POTASSIUM CHLORIDE 100 ML/HR: 4.5; 1.49 INJECTION, SOLUTION INTRAVENOUS at 09:56

## 2018-03-08 RX ADMIN — GENTAMICIN SULFATE 2 DROP: 3 SOLUTION/ DROPS OPHTHALMIC at 21:12

## 2018-03-08 RX ADMIN — GENTAMICIN SULFATE 2 DROP: 3 SOLUTION/ DROPS OPHTHALMIC at 14:12

## 2018-03-08 RX ADMIN — IRON SUCROSE 300 MG: 20 INJECTION, SOLUTION INTRAVENOUS at 09:56

## 2018-03-08 RX ADMIN — SODIUM CHLORIDE 125 ML/HR: 9 INJECTION, SOLUTION INTRAVENOUS at 04:59

## 2018-03-08 RX ADMIN — PANTOPRAZOLE SODIUM 40 MG: 40 TABLET, DELAYED RELEASE ORAL at 07:44

## 2018-03-08 NOTE — PLAN OF CARE
Problem: Nutrition, Imbalanced: Inadequate Oral Intake (Adult)  Goal: Identify Related Risk Factors and Signs and Symptoms  Outcome: Outcome(s) achieved Date Met: 03/08/18      Problem: Fall Risk (Adult)  Goal: Identify Related Risk Factors and Signs and Symptoms  Outcome: Outcome(s) achieved Date Met: 03/08/18      Problem: Skin Integrity Impairment, Risk/Actual (Adult)  Goal: Identify Related Risk Factors and Signs and Symptoms  Outcome: Outcome(s) achieved Date Met: 03/08/18

## 2018-03-08 NOTE — PROGRESS NOTES
[facility] Gastroenterology  Inpatient Progress Note  Today's date:  03/08/18    Pauline Aguirre Jr.  1945       Reason for Follow Up:  Rectal bleeding/melena    Subjective:   The patient is lying in bed this morning with wife at bedside.  He is getting ready to be transferred from the unit to the nursing floor.  He states that he is feeling very well.  He states that he did have a bowel movement this morning but showed no black tarry stools or bright red blood per rectum.  Nursing confirms this.  He denies any nausea, vomiting, epigastric pain, dysphagia, pyrosis or hematemesis.  He denies any fever or chills.    His Hb which was trending downward with hydration is stabilizing over the past day as noted below:  Results for PAULINE AGUIRRE JR. (MRN [id number]) as of 3/8/2018 09:49   Ref. Range 3/5/2018 14:28 3/5/2018 19:02 3/7/2018 03:03 3/7/2018 16:47 3/7/2018 23:04 3/8/2018 06:32   Hemoglobin Latest Ref Range: 14.0 - 18.0 g/dL 11.3 (L) 11.3 (L) 8.1 (L) 7.8 (L) 7.4 (L) 7.9 (L)       Allergies   Allergen Reactions   • Acetylcholinesterase Other (See Comments)     Had trouble coming out of anesthesia, Dr Ellison inst pt family to make sure pt never receive this again, if he does he will wind up on respirator       Current Facility-Administered Medications:   •  acetaminophen (TYLENOL) tablet 650 mg, 650 mg, Oral, Q4H PRN, 650 mg at 03/07/18 0020 **OR** acetaminophen (TYLENOL) suppository 650 mg, 650 mg, Rectal, Q4H PRN, Porfirio Fisher MD  •  bisacodyl (DULCOLAX) EC tablet 5 mg, 5 mg, Oral, Daily PRN, Porfirio Fisher MD  •  dextrose (D50W) solution 50 mL, 50 mL, Intravenous, Q1H PRN, Porfirio Fisher MD  •  gentamicin (GARAMYCIN) 0.3 % ophthalmic solution 2 drop, 2 drop, Right Eye, Q8H, Casimiro Hadley MD, 2 drop at 03/08/18 0614  •  nicotine (NICODERM CQ) 21 MG/24HR patch 1 patch, 1 patch, Transdermal, Q24H, Porfirio Fisher MD  •  ondansetron (ZOFRAN) injection 4 mg, 4 mg, Intravenous, Q6H PRN,  Porfirio Fisher MD, 4 mg at 03/06/18 2012  •  pantoprazole (PROTONIX) EC tablet 40 mg, 40 mg, Oral, QAM, Porfirio Fisher MD, 40 mg at 03/08/18 0744  •  polyvinyl alcohol (LIQUIFILM) 1.4 % ophthalmic solution 1 drop, 1 drop, Both Eyes, Q1H PRN, Casimiro Hadley MD, 1 drop at 03/07/18 0827  •  sodium chloride 0.45 % with KCl 20 mEq/L infusion, 100 mL/hr, Intravenous, Continuous, Casimiro Hadley MD, Last Rate: 100 mL/hr at 03/08/18 0956, 100 mL/hr at 03/08/18 0956  •  sodium chloride 0.9 % flush 1-10 mL, 1-10 mL, Intravenous, PRN, Porfirio Fisher MD  •  Insert peripheral IV, , , Once **AND** sodium chloride 0.9 % flush 10 mL, 10 mL, Intravenous, PRN, Zeferino Lopez,     Review of Systems:   Review of Systems   Constitutional: Negative for fever and unexpected weight change.   HENT: Negative for hearing loss.    Eyes: Negative for visual disturbance.   Respiratory: Negative for cough.    Cardiovascular: Negative for chest pain.   Gastrointestinal:        See HPI   Endocrine: Negative for cold intolerance and heat intolerance.   Genitourinary: Negative for dysuria.   Neurological: Negative for seizures.   Psychiatric/Behavioral: Negative for hallucinations.        Vital Signs:  Temp:  [97.9 °F (36.6 °C)-98.6 °F (37 °C)] 98.2 °F (36.8 °C)  Heart Rate:  [74-92] 89  Resp:  [13-20] 19  BP: ()/(47-74) 109/59  Body mass index is 24.63 kg/(m^2).     Intake/Output Summary (Last 24 hours) at 03/08/18 1038  Last data filed at 03/08/18 0800   Gross per 24 hour   Intake           2662.3 ml   Output             2275 ml   Net            387.3 ml     I/O this shift:  In: 351 [I.V.:351]  Out: 300 [Urine:300]  Physical Exam:  Physical Exam   Constitutional: He appears well-developed.   Cardiovascular: Normal rate and regular rhythm.    Pulmonary/Chest: Effort normal.   Abdominal: Soft. Bowel sounds are normal.   Neurological: He is alert.   Psychiatric: He has a normal mood and affect.     I have performed the physical exam  and agree with the findings as noted above.   LEVAR Nicolas MD     Results Review:   I have reviewed all of the patient's current test results      Results from last 7 days  Lab Units 03/08/18  0632 03/07/18  2304 03/07/18  1647 03/07/18  0303 03/05/18  1902   WBC 10*3/mm3 6.10  --   --  6.51 11.24*   HEMOGLOBIN g/dL 7.9* 7.4* 7.8* 8.1* 11.3*   HEMATOCRIT % 24.3* 22.4* 23.4* 23.6* 33.9*   PLATELETS 10*3/mm3 166  --   --  186 258         Results from last 7 days  Lab Units 03/08/18  0632 03/07/18  0303 03/06/18  0223   SODIUM mmol/L 147* 141 142   POTASSIUM mmol/L 3.4* 3.3* 4.8   CHLORIDE mmol/L 100 94* 102   CO2 mmol/L >40.0* 34.0* 18.0*   BUN mg/dL 49* 86* 113*   CREATININE mg/dL 2.50* 5.82* 11.88*   CALCIUM mg/dL 6.9* 7.5* 8.2*   BILIRUBIN mg/dL <0.1* <0.1* 0.2   ALK PHOS U/L 38 42 52   ALT (SGPT) U/L 18 17 16   AST (SGOT) U/L 25 21 25   GLUCOSE mg/dL 115* 120* 115*         Results from last 7 days  Lab Units 03/05/18  2217 03/05/18 1902   INR  1.17* 1.15*         Lab Results  Lab Value Date/Time   LIPASE 357 (H) 03/06/2018 0223   LIPASE 401 (H) 03/05/2018 1902       Radiology Review:  Imaging Results (last 24 hours)     ** No results found for the last 24 hours. **          Impression/Plan:  Patient Active Problem List   Diagnosis Code   • Colon cancer C18.9   • Personal history of colon cancer Z85.038   • Anorexia R63.0   • Black stool K92.1   • Acute renal failure N17.9     Rectal bleeding/melena--resolved  Currently not an active problem.  He is up-to-date on colonoscopy.  He is finally agreeable to endoscopy tomorrow.   He has not had any further bleeding.  This occurred approximately 2 weeks ago. Of interest, the patient fell about that same time and was taking Advil for he thought might be fractured ribs.      Acute renal failure  This is new and undergoing evaluation.  This is the cause of his recent deterioration with regards to appetite and failure to thrive.  Renal function much better today with a  creatinine of 2.50 today decreased from 5.82 yesterday.  He was initially admitted with a creatinine of 13.9      History of colon cancer status post resection in 2017  He is up-to-date on colonoscopy.    Anemia  The patient has had no rectal bleeding, melena or hematemesis since hospitalization.  He is receiving Venofer.        PRATEEK Last  03/08/18  10:38 AM    Isa Nicolas MD  [facility]enterology  03/08/18  12:13 PM    Much of this encounter note is an electronic transcription/translation of spoken language to printed text. The electronic translation of spoken language may permit erroneous, or at times, nonsensical words or phrases to be inadvertently transcribed; although I have reviewed the note for such errors, some may still exist.

## 2018-03-08 NOTE — PROGRESS NOTES
[facility] Medicine Services  INPATIENT PROGRESS NOTE    Length of Stay: 3  Date of Admission: 3/5/2018  Primary Care Physician: Dandy Burton MD    Subjective   Chief Complaint: Acute kidney failure.  Right rib pain.    HPI   Kidney functions improving.  Patient be transferred to medical floor.  Plan for rib series.  Anemia stable.  No sign of acute bleed.  Hypokalemia, replace potassium.  Hypernatremia, change fluid to half-normal saline with 20 KCl.  She denies any chest pain or shortness of breath.    Review of Systems   Constitutional: Positive for activity change, appetite change and fatigue. Negative for chills and fever.   HENT: Negative for hearing loss, nosebleeds, tinnitus and trouble swallowing.    Eyes: Negative for visual disturbance.   Respiratory: Negative for cough, chest tightness, shortness of breath and wheezing.    Cardiovascular: Positive for chest pain. Negative for palpitations and leg swelling.        Right lower rib pain   Gastrointestinal: Negative for abdominal distention, abdominal pain, blood in stool, constipation, diarrhea, nausea and vomiting.   Endocrine: Negative for cold intolerance, heat intolerance, polydipsia, polyphagia and polyuria.   Genitourinary: Negative for decreased urine volume, difficulty urinating, dysuria, flank pain, frequency and hematuria.   Musculoskeletal: Negative for arthralgias, joint swelling and myalgias.   Skin: Negative for rash.   Allergic/Immunologic: Negative for immunocompromised state.   Neurological: Positive for weakness. Negative for dizziness, syncope, light-headedness and headaches.   Hematological: Negative for adenopathy. Does not bruise/bleed easily.   Psychiatric/Behavioral: Negative for confusion and sleep disturbance. The patient is not nervous/anxious.            All pertinent negatives and positives are as above. All other systems have been reviewed and are negative unless otherwise stated.      Objective    Temp:  [97.9 °F (36.6 °C)-98.6 °F (37 °C)] 98 °F (36.7 °C)  Heart Rate:  [74-92] 89  Resp:  [13-20] 19  BP: ()/(47-74) 108/68    Intake/Output Summary (Last 24 hours) at 03/08/18 0855  Last data filed at 03/08/18 0500   Gross per 24 hour   Intake           2946.3 ml   Output             2375 ml   Net            571.3 ml     Physical Exam  Constitutional: He is oriented to person, place, and time. He appears well-developed and well-nourished.   HENT:   Head: Normocephalic and atraumatic.   Eyes: Conjunctivae and EOM are normal. Pupils are equal, round, and reactive to light.   Neck: Neck supple. No JVD present. No thyromegaly present.   Cardiovascular: Normal rate, regular rhythm, normal heart sounds and intact distal pulses.  Exam reveals no gallop and no friction rub.    No murmur heard.  Pulmonary/Chest: Effort normal and breath sounds normal. No respiratory distress. He has no wheezes. He has no rales. He exhibits no tenderness.   Abdominal: Soft. Bowel sounds are normal. He exhibits no distension. There is no tenderness. There is no rebound and no guarding.   Musculoskeletal: Normal range of motion. He exhibits no edema, tenderness or deformity.   Right lower rib pain, reproducible to palpation   Lymphadenopathy:     He has no cervical adenopathy.   Neurological: He is alert and oriented to person, place, and time. He displays normal reflexes. No cranial nerve deficit. He exhibits normal muscle tone.   Skin: Skin is warm and dry. No rash noted.   Psychiatric: He has a normal mood and affect. His behavior is normal. Judgment and thought content normal.   Nursing note and vitals reviewed.  Results Review:  Lab Results (last 24 hours)     Procedure Component Value Units Date/Time    PARUL [[id number]] Collected:  03/05/18 2217    Specimen:  Blood Updated:  03/07/18 1015     PARUL Direct Negative    Narrative:       Performed at:  [address] [place] [address]  [id number]  Lab  Director: Girma Gagnon PhD, Phone:  [phone]    Anti-DNA Antibody, Double-stranded [[id number]] Collected:  03/05/18 2217    Specimen:  Blood Updated:  03/07/18 1015     Anti-DNA (DS) Ab Qn 1 IU/mL                                          Negative      <5                                     Equivocal  5 - 9                                     Positive      >9       Narrative:       Performed at:  [address] [place] [address]  [id number]  : Girma Gagnon PhD, Phone:  [phone]    Antistreptolysin O Titer [[id number]] Collected:  03/05/18 2217    Specimen:  Blood Updated:  03/07/18 1117     ASO 51.6 IU/mL     Narrative:       Performed at:  [address] [place] [address]  [id number]  : Girma Gagnon PhD, Phone:  [phone]    Eosinophil Smear - Urine, Urine, Clean Catch [[id number]] Collected:  03/06/18 0025    Specimen:  Urine from Urine, Catheter In/Out Updated:  03/07/18 1710     Eosinophil, Urine No Eosinophils Seen %                                         <5% few or none seen       Narrative:       Performed at:  [address] [place] [address]  [id number]  : Girma Gagnon PhD, Phone:  [phone]    Hemoglobin & Hematocrit, Blood [[id number]]  (Abnormal) Collected:  03/07/18 1647    Specimen:  Blood Updated:  03/07/18 1758     Hemoglobin 7.8 (L) g/dL      Hematocrit 23.4 (L) %     Hemoglobin & Hematocrit, Blood [[id number]]  (Abnormal) Collected:  03/07/18 2304    Specimen:  Blood Updated:  03/08/18 0023     Hemoglobin 7.4 (L) g/dL      Hematocrit 22.4 (L) %     Urine Culture - Urine, Urine, Clean Catch [[id number]]  (Normal) Collected:  03/06/18 0006    Specimen:  Urine from Urine, Catheter Updated:  03/08/18 0634     Urine Culture No growth at 2 days    CBC & Differential [[id number]] Collected:  03/08/18 0632    Specimen:  Blood Updated:  03/08/18 0656    Narrative:       The following orders were created  for panel order CBC & Differential.  Procedure                               Abnormality         Status                     ---------                               -----------         ------                     CBC Auto Differential[[id number]]        Abnormal            Final result                 Please view results for these tests on the individual orders.    CBC Auto Differential [[id number]]  (Abnormal) Collected:  03/08/18 0632    Specimen:  Blood Updated:  03/08/18 0656     WBC 6.10 10*3/mm3      RBC 2.65 (L) 10*6/mm3      Hemoglobin 7.9 (L) g/dL      Hematocrit 24.3 (L) %      MCV 91.7 fL      MCH 29.8 pg      MCHC 32.5 (L) g/dL      RDW 13.5 %      RDW-SD 45.1 fl      MPV 12.6 (H) fL      Platelets 166 10*3/mm3      Neutrophil % 49.5 %      Lymphocyte % 29.2 %      Monocyte % 13.4 (H) %      Eosinophil % 6.2 (H) %      Basophil % 0.7 %      Immature Grans % 1.0 %      Neutrophils, Absolute 3.02 10*3/mm3      Lymphocytes, Absolute 1.78 10*3/mm3      Monocytes, Absolute 0.82 10*3/mm3      Eosinophils, Absolute 0.38 10*3/mm3      Basophils, Absolute 0.04 10*3/mm3      Immature Grans, Absolute 0.06 (H) 10*3/mm3      nRBC 0.0 /100 WBC     Comprehensive Metabolic Panel [[id number]]  (Abnormal) Collected:  03/08/18 0632    Specimen:  Blood Updated:  03/08/18 0713     Glucose 115 (H) mg/dL      BUN 49 (H) mg/dL      Creatinine 2.50 (H) mg/dL      Sodium 147 (H) mmol/L      Potassium 3.4 (L) mmol/L      Chloride 100 mmol/L      CO2 >40.0 (C) mmol/L      Calcium 6.9 (L) mg/dL      Total Protein 5.5 (L) g/dL      Albumin 2.80 (L) g/dL      ALT (SGPT) 18 U/L      AST (SGOT) 25 U/L      Alkaline Phosphatase 38 U/L      Total Bilirubin <0.1 (L) mg/dL      eGFR Non African Amer 25 (L) mL/min/1.73      Globulin 2.7 gm/dL      A/G Ratio 1.0 (L) g/dL      BUN/Creatinine Ratio 19.6     Anion Gap -- mmol/L       Unable to calculate Anion Gap.       Narrative:       The MDRD GFR formula is only valid for adults with stable renal  function between ages 18 and 70.           Cultures:  Urine Culture   Date Value Ref Range Status   2018 No growth at 2 days  Final       Radiology Data:    Imaging Results (last 24 hours)     ** No results found for the last 24 hours. **          Allergies   Allergen Reactions   • Acetylcholinesterase Other (See Comments)     Had trouble coming out of anesthesia, Dr Ellison inst pt family to make sure pt never receive this again, if he does he will wind up on respirator       Scheduled meds:     gentamicin 2 drop Right Eye Q8H   iron sucrose (VENOFER) IVPB 300 mg Intravenous Q24H   nicotine 1 patch Transdermal Q24H   pantoprazole 40 mg Oral QAM       PRN meds:  •  acetaminophen **OR** acetaminophen  •  bisacodyl  •  dextrose  •  ondansetron  •  polyvinyl alcohol  •  sodium chloride  •  Insert peripheral IV **AND** sodium chloride    Assessment/Plan     Active Problems:    Acute renal failure      Plan:  Acute renal failure- improving. Bicarbonate drip DC by nephrology. Cont IV hydration.  Consult nephrology.  No NSAIDs.      Hypokalemia.- po potassium.     Hypernatremia- Change fluid to half-normal saline with 20 KCl.      Hypomagnesia - resolved.      Anemia- stable. Iron def. Venofer x3 days. 3/6.      Rectal bleeding- insult GI.  Status post colonoscopy .  Possible endoscopy by GI.  No sign of acute bleed.      Tobacco abuse- consult about smoking cessation.  Nicotine patch.      GERD-Protonix      History of colon cancer/colon resection 2017.      Right lower rib pain from a fall about 3 weeks ago.  Chest xray - neg. .  Right rib x-ray.      Discharge Plannin-2 days. Transfer to medical floor.     Casimiro Hadley MD   18   8:55 AM

## 2018-03-08 NOTE — PLAN OF CARE
Problem: Patient Care Overview (Adult)  Goal: Plan of Care Review  Outcome: Ongoing (interventions implemented as appropriate)   03/08/18 5075   Coping/Psychosocial Response Interventions   Plan Of Care Reviewed With patient   Patient Care Overview   Progress improving   Outcome Evaluation   Outcome Summary/Follow up Plan pt transferred from unit this shift. Alert and oriented. Denies any pain or SOA. Wife at bedside. Voiding per urinal.        Problem: Renal Failure/Kidney Injury, Acute (Adult)  Goal: Signs and Symptoms of Listed Potential Problems Will be Absent or Manageable (Renal Failure/Kidney Injury, Acute)  Outcome: Ongoing (interventions implemented as appropriate)      Problem: Nutrition, Imbalanced: Inadequate Oral Intake (Adult)  Goal: Improved Oral Intake  Outcome: Ongoing (interventions implemented as appropriate)    Goal: Prevent Further Weight Loss  Outcome: Ongoing (interventions implemented as appropriate)      Problem: Fall Risk (Adult)  Goal: Absence of Falls  Outcome: Ongoing (interventions implemented as appropriate)      Problem: Skin Integrity Impairment, Risk/Actual (Adult)  Goal: Skin Integrity/Wound Healing  Outcome: Ongoing (interventions implemented as appropriate)

## 2018-03-08 NOTE — THERAPY EVALUATION
Acute Care - Physical Therapy Initial Evaluation  [facility]     Patient Name: Jonnie Sheets Jr.  : 1945  MRN: [id number]  Today's Date: 3/8/2018   Onset of Illness/Injury or Date of Surgery Date: 18  Date of Referral to PT: 18  Referring Physician: Dr. Hadley      Admit Date: 3/5/2018     Visit Dx:    ICD-10-CM ICD-9-CM   1. Acute renal failure, unspecified acute renal failure type N17.9 584.9   2. Hyperkalemia E87.5 276.7   3. Elevated lipase R74.8 790.5   4. Impaired functional mobility, balance, and endurance Z74.09 V49.89     Patient Active Problem List   Diagnosis   • Colon cancer   • Personal history of colon cancer   • Anorexia   • Black stool   • Acute renal failure     Past Medical History:   Diagnosis Date   • Bronchitis     in past   • Colon cancer 2017    T2N0   • Colon polyp    • Diverticulitis    • Glaucoma    • Hemoglobin low    • Hiatal hernia    • Hyperlipidemia    • Pseudocholinesterase deficiency 2017    NOTED PROLONG PARALYSIS >2 HOURS WITH SUCCINYLCHOLINE UNDER ANESTHESIA   • Squamous cell carcinoma of oral mucosa    • Umbilical hernia      Past Surgical History:   Procedure Laterality Date   • COLON RESECTION Right 2017    Procedure: LAPAROSCOPIC ASSISTED RIGHT COLON RESECTION RIGHT, UMBILICAL HERNIA REPAIR;  Surgeon: Mario Ellison MD;  Location: [facility] OR;  Service:    • COLONOSCOPY     • COLONOSCOPY N/A 2017    Procedure: COLONOSCOPY WITH ANESTHESIA;  Surgeon: Tee Kong MD;  Location: [facility] ENDOSCOPY;  Service:    • COLONOSCOPY N/A 2018    Procedure: COLONOSCOPY WITH ANESTHESIA;  Surgeon: Tee Kong MD;  Location: [facility] ENDOSCOPY;  Service:    • ENDOSCOPY N/A 2017    Procedure: ESOPHAGOGASTRODUODENOSCOPY WITH ANESTHESIA;  Surgeon: Tee Kong MD;  Location: [facility] ENDOSCOPY;  Service:    • KNEE SURGERY      PINS PLACED AND LATER REMOVED S/P FRACTURE   • MOUTH BIOPSY     • TONSILLECTOMY            PT ASSESSMENT (last 72  "hours)      PT Evaluation       03/08/18 1015 03/06/18 0952    Rehab Evaluation    Document Type evaluation   see MAR  -PB (r) JM (t) PB (c)     Subjective Information agree to therapy;complains of;pain  -PB (r) JM (t) PB (c)     Patient Effort, Rehab Treatment good  -PB (r) JM (t) PB (c)     Symptoms Noted During/After Treatment none  -PB (r) JM (t) PB (c)     General Information    Patient Profile Review yes  -PB (r) JM (t) PB (c)     Onset of Illness/Injury or Date of Surgery Date 03/05/18  -PB (r) JM (t) PB (c)     Referring Physician Dr. Hadley  -PB (r) JM (t) PB (c)     General Observations Pt jamil's in bed, awake and alert, wife at bedside.  -PB (r) JM (t) PB (c)     Pertinent History Of Current Problem Pt admitted for renal failure and rectal bleeding. History of recent fall due to \"black out\" while gettting up from the couch causing a broken rib.   -PB (r) JM (t) PB (c)     Precautions/Limitations fall precautions  -PB (r) JM (t) PB (c)     Prior Level of Function independent:;all household mobility;community mobility;ADL's  -PB (r) JM (t) PB (c)     Equipment Currently Used at Home bath bench;walker, rolling  -PB (r) JM (t) PB (c) none  -CE    Plans/Goals Discussed With patient;spouse/S.O.;agreed upon  -PB (r) JM (t) PB (c)     Risks Reviewed patient:;spouse/S.O.:;LOB;nausea/vomiting;dizziness;increased discomfort;lines disloged  -PB (r) JM (t) PB (c)     Benefits Reviewed patient:;spouse/S.O.:;improve function;increase independence;increase balance;increase strength;decrease pain;increase knowledge  -PB (r) JM (t) PB (c)     Barriers to Rehab none identified  -PB (r) JM (t) PB (c)     Living Environment    Lives With spouse  -PB (r) JM (t) PB (c) spouse  -CE    Living Arrangements house  -PB (r) JM (t) PB (c) house  -CE    Home Accessibility bed and bath on same level;tub/shower is not walk in;stairs within home  -PB (r) JM (t) PB (c)     Number of Stairs Within Home 20   two flights, but pt states he " does not use them.  -PB (r) JM (t) PB (c)     Stair Railings at Home inside, present at both sides  -PB (r) JM (t) PB (c)     Transportation Available car;family or friend will provide  -PB (r) JM (t) PB (c) car;family or friend will provide  -CE    Clinical Impression    Date of Referral to PT 03/07/18  -PB (r) JM (t) PB (c)     PT Diagnosis impaired balance and activity tolerance  -PB (r) JM (t) PB (c)     Functional Level At Time Of Evaluation able to perform bed mobility and t/f with supervison, ambulate 200 feet CGA with HHA  -PB (r) JM (t) PB (c)     Patient/Family Goals Statement return home   -PB (r) JM (t) PB (c)     Criteria for Skilled Therapeutic Interventions Met yes;treatment indicated  -PB (r) JM (t) PB (c)     Impairments Found (describe specific impairments) aerobic capacity/endurance;gait, locomotion, and balance  -PB (r) JM (t) PB (c)     Rehab Potential good, to achieve stated therapy goals  -PB (r) JM (t) PB (c)     Predicted Duration of Therapy Intervention (days/wks) until d/c  -PB (r) JM (t) PB (c)     Pain Assessment    Pain Assessment 0-10  -PB (r) JM (t) PB (c)     Pain Score 2  -PB (r) JM (t) PB (c)     Pain Type Acute pain  -PB (r) JM (t) PB (c)     Pain Location Rib cage  -PB (r) JM (t) PB (c)     Pain Orientation Right  -PB (r) JM (t) PB (c)     Pain Intervention(s) Medication (See MAR);Repositioned;Ambulation/increased activity  -PB (r) JM (t) PB (c)     Response to Interventions tolerated  -PB (r) JM (t) PB (c)     Result of Injury Yes  -PB (r) JM (t) PB (c)     Work-Related Injury No  -PB (r) JM (t) PB (c)     Cognitive Assessment/Intervention    Current Cognitive/Communication Assessment functional  -PB (r) JM (t) PB (c)     Orientation Status oriented x 4  -PB (r) JM (t) PB (c)     Follows Commands/Answers Questions 100% of the time;able to follow multi-step instructions;able to follow single-step instructions  -PB (r) NÉSTOR (t) ASHLY (c)     Personal Safety WNL/WFL  -ASHLY (r) NÉSTOR (t) PB  (c)     Personal Safety Interventions fall prevention program maintained;gait belt;nonskid shoes/slippers when out of bed;supervised activity  -PB (r) JM (t) PB (c)     ROM (Range of Motion)    General ROM Detail BLE WFL  -PB (r) JM (t) PB (c)     MMT (Manual Muscle Testing)    General MMT Assessment Detail BLE WFL  -PB (r) JM (t) PB (c)     Bed Mobility, Assessment/Treatment    Bed Mobility, Assistive Device bed rails;head of bed elevated  -PB (r) JM (t) PB (c)     Bed Mobility, Scoot/Bridge, [place] supervision required  -PB (r) JM (t) PB (c)     Bed Mob, Supine to Sit, [place] supervision required  -PB (r) JM (t) PB (c)     Bed Mobility, Impairments pain  -PB (r) JM (t) PB (c)     Transfer Assessment/Treatment    Transfers, Sit-Stand [place] supervision required  -PB (r) JM (t) PB (c)     Transfers, Stand-Sit [place] supervision required  -PB (r) JM (t) PB (c)     Transfer, Safety Issues step length decreased;balance decreased during turns  -PB (r) JM (t) PB (c)     Transfer, Impairments impaired balance  -PB (r) JM (t) PB (c)     Gait Assessment/Treatment    Gait, [place] Level contact guard assist;hand held assist  -PB (r) JM (t) PB (c)     Gait, Distance (Feet) 200  -PB (r) JM (t) PB (c)     Gait, Gait Deviations bilateral:;aisha decreased;double stance time increased;forward flexed posture;stride length decreased;step length decreased  -PB (r) JM (t) PB (c)     Gait, Safety Issues step length decreased;balance decreased during turns  -PB (r) JM (t) PB (c)     Gait, Impairments impaired balance  -PB (r) JM (t) PB (c)     Gait, Comment Pt had 2 instances of LOB during gait and required an HHA throughout  -PB (r) JM (t) PB (c)     Motor Skills/Interventions    Additional Documentation Balance Skills Training (Group)  -PB (r) JM (t) PB (c)     Balance Skills Training    Sitting-Level of Assistance Close supervision  -PB (r) JM (t) PB (c)     Sitting-Balance Support Right upper extremity  supported;Left upper extremity supported;Feet supported  -PB (r) NÉSTOR (t) PB (c)     Standing-Level of Assistance Close supervision  -PB (r) NÉSTOR (t) PB (c)     Static Standing Balance Support Right upper extremity supported  -PB (r) NÉSTOR (t) PB (c)     Gait Balance-Level of Assistance Contact guard  -PB (r) JM (t) PB (c)     Gait Balance Support Right upper extremity supported   HHA  -PB (r) JM (t) PB (c)     Positioning and Restraints    Pre-Treatment Position in bed  -PB (r) NÉSTOR (t) PB (c)     Post Treatment Position wheelchair  -PB (r) NÉSTOR (t) PB (c)     In Wheelchair sitting;with nsg  -PB (r) NÉSTOR (t) PB (c)       03/05/18 2210       General Information    Equipment Currently Used at Home none  -SB     Living Environment    Lives With spouse  -SB     Living Arrangements house  -SB     Home Accessibility no concerns  -SB     Stair Railings at Home outside, present at both sides  -SB     Type of Financial/Environmental Concern none  -SB     Transportation Available car;family or friend will provide  -SB       User Key  (r) = Recorded By, (t) = Taken By, (c) = Cosigned By    Initials Name Provider Type    SB Samantha Simpson, RN Registered Nurse    ASHLY Hancock, PT DPT Physical Therapist    JAMIR Marcum, BSW     NÉSTOR Zuniga, PT Student PT Student          Physical Therapy Education     Title: PT OT SLP Therapies (Done)     Topic: Physical Therapy (Done)     Point: Mobility training (Done)    Learning Progress Summary    Learner Readiness Method Response Comment Documented by Status   Patient Acceptance E VU Role of PT, benefits of activity, t/f techniques  03/08/18 1152 Done                      User Key     Initials Effective Dates Name Provider Type Discipline     01/10/18 -  Vitaliy Zuniga, PT Student PT Student PT                PT Recommendation and Plan  Anticipated Discharge Disposition: home with assist  Planned Therapy Interventions: gait training, balance training,  patient/family education, transfer training, strengthening  PT Frequency: daily, 2 times/day, per priority policy  Plan of Care Review  Plan Of Care Reviewed With: patient  Outcome Summary/Follow up Plan: PT ruthy complete. Pt was able to perform bed mobility and t/f with supervision and ambulate 200 feet CGA with HHA. Pt demonstrated formal and functional strength and activity tolerance but during ambulation exhibited several instances of LOB requiring HHA to correct. Pt will benefit from therapy for progressive ambulation, gait training, and dynamic balance training. ANticipate discharge home with assist.           IP PT Goals       03/08/18 1153          Gait Training PT LTG    Gait Training Goal PT LTG, Date Established 03/08/18  -PB (r) JM (t) PB (c)      Gait Training Goal PT LTG, Time to Achieve by discharge  -PB (r) JM (t) PB (c)      Gait Training Goal PT LTG, [place] Level supervision required  -PB (r) JM (t) PB (c)      Gait Training Goal PT LTG, Distance to Achieve 800 feet no LOB  -PB (r) JM (t) PB (c)      Dynamic Standing Balance PT LTG    Dynamic Standing Balance PT LTG, Date Established 03/08/18  -PB (r) JM (t) PB (c)      Dynamic Standing Balance PT LTG, Time to Achieve by discharge  -PB (r) JM (t) PB (c)      Dynamic Standing Balance PT LTG, [place] Level supervision required  -PB (r) JM (t) PB (c)      Dynamic Standing Balance PT LTG, Additional Goal side stepping, backwards walking, tandem walking 20 steps no LOB  -PB (r) JM (t) PB (c)        User Key  (r) = Recorded By, (t) = Taken By, (c) = Cosigned By    Initials Name Provider Type    PB Max Hancock, PT DPT Physical Therapist    NÉSTOR Zuniga, PT Student PT Student                Outcome Measures       03/08/18 1100          How much help from another person do you currently need...    Turning from your back to your side while in flat bed without using bedrails? 4  -PB (r) JM (t) PB (c)      Moving from lying on back to  sitting on the side of a flat bed without bedrails? 4  -PB (r) JM (t) PB (c)      Moving to and from a bed to a chair (including a wheelchair)? 3  -PB (r) JM (t) PB (c)      Standing up from a chair using your arms (e.g., wheelchair, bedside chair)? 3  -PB (r) JM (t) PB (c)      Climbing 3-5 steps with a railing? 3  -PB (r) JM (t) PB (c)      To walk in hospital room? 3  -PB (r) JM (t) PB (c)      AM-PAC 6 Clicks Score 20  -PB (r) JM (t)      Functional Assessment    Outcome Measure Options AM-PAC 6 Clicks Basic Mobility (PT)  -PB (r) JM (t) PB (c)        User Key  (r) = Recorded By, (t) = Taken By, (c) = Cosigned By    Initials Name Provider Type    ASHLY Hancock, PT DPT Physical Therapist    NÉSTOR Zuniga, PT Student PT Student           Time Calculation:         PT Charges       03/08/18 1151          Time Calculation    Start Time 1015  -PB (r) JM (t) PB (c)      Stop Time 1055  -PB (r) JM (t) PB (c)      Time Calculation (min) 40 min  -PB (r) JM (t)      PT Received On 03/08/18  -PB (r) JM (t) PB (c)      PT Goal Re-Cert Due Date 03/18/18  -PB (r) JM (t) PB (c)        User Key  (r) = Recorded By, (t) = Taken By, (c) = Cosigned By    Initials Name Provider Type    ASHLY Hancock, PT DPT Physical Therapist    NÉSTOR Zuniga, PT Student PT Student          Therapy Charges for Today     Code Description Service Date Service Provider Modifiers Qty    60512041813 HC PT EVAL LOW COMPLEXITY 3 3/8/2018 Vitaliy Zuniga, PT Student GP 1          PT G-Codes  Outcome Measure Options: AM-PAC 6 Clicks Basic Mobility (PT)  Score: 20  Functional Limitation: Mobility: Walking and moving around  Mobility: Walking and Moving Around Current Status (): At least 20 percent but less than 40 percent impaired, limited or restricted  Mobility: Walking and Moving Around Goal Status (): At least 1 percent but less than 20 percent impaired, limited or restricted      Vitaliy Zuniga, PT  Student  3/8/2018

## 2018-03-08 NOTE — PLAN OF CARE
Problem: Patient Care Overview (Adult)  Goal: Plan of Care Review  Outcome: Ongoing (interventions implemented as appropriate)   03/08/18 1153   Coping/Psychosocial Response Interventions   Plan Of Care Reviewed With patient   Outcome Evaluation   Outcome Summary/Follow up Plan PT domenicaal complete. Pt was able to perform bed mobility and t/f with supervision and ambulate 200 feet CGA with HHA. Pt demonstrated formal and functional strength and activity tolerance but during ambulation exhibited several instances of LOB requiring HHA to correct. Pt will benefit from therapy for progressive ambulation, gait training, and dynamic balance training. ANticipate discharge home with assist.        Problem: Inpatient Physical Therapy  Goal: Gait Training Goal LTG- PT  Outcome: Ongoing (interventions implemented as appropriate)   03/08/18 1153   Gait Training PT LTG   Gait Training Goal PT LTG, Date Established 03/08/18   Gait Training Goal PT LTG, Time to Achieve by discharge   Gait Training Goal PT LTG, [place] Level supervision required   Gait Training Goal PT LTG, Distance to Achieve 800 feet no LOB     Goal: Dynamic Standing Balance Goal LTG- PT  Outcome: Ongoing (interventions implemented as appropriate)   03/08/18 1153   Dynamic Standing Balance PT LTG   Dynamic Standing Balance PT LTG, Date Established 03/08/18   Dynamic Standing Balance PT LTG, Time to Achieve by discharge   Dynamic Standing Balance PT LTG, [place] Level supervision required   Dynamic Standing Balance PT LTG, Additional Goal side stepping, backwards walking, tandem walking 20 steps no LOB

## 2018-03-08 NOTE — PROGRESS NOTES
Nephrology ([facility]) Progress Note      Patient:  Jonnie Sheets Jr.  YOB: 1945  Date of Service: 3/8/2018  MRN: [id number]   Acct: [id number]   Primary Care Physician: Dandy Burton MD  Advance Directive: Full Code  Admit Date: 3/5/2018       Hospital Day: 3  Referring Provider: Porfirio Fisher MD      Patient personally seen and examined.  Complete chart including Consults, Notes, Operative Reports, Labs, Cardiology, and Radiology studies reviewed as able.        Subjective:  Jonnie Sheets Jr. is a 72 y.o. male  whom we were consulted for acute kidney injury and hyperkalemia.  No prior known renal disease.  Creatinine 1.0 mg in January 2018.  History of colon cancer, hypertension.  Complex recent medical course; had colonoscopy approximately one month ago; since then has had some continued rectal bleeding.  Was started on blood pressure medication for the first time last month (Lisinopril/HCTZ).  Fell at home and injured right flank; has been taking Aleve for pain.  Poor oral intake due to all of these issues.  Outpatient labs incidental finding of significantly elevated creatinine and hyperkalemia.  Hospital course remarkable for improving renal function with IV volume replacement.    Today is feeling better.  No new overnight issues.  Urine output nonoliguric.    Allergies:  Acetylcholinesterase    Home Meds:  Prescriptions Prior to Admission   Medication Sig Dispense Refill Last Dose   • FIBER, GUAR GUM, PO Take 1 tablet by mouth Daily As Needed (constipation).   Past Month at Unknown time   • lisinopril-hydrochlorothiazide (PRINZIDE,ZESTORETIC) 10-12.5 MG per tablet Take 1 tablet by mouth Daily.   Past Week at Unknown time   • lovastatin (MEVACOR) 40 MG tablet Take 40 mg by mouth Every Night.   Past Week at Unknown time   • omeprazole (priLOSEC) 20 MG capsule Take 20 mg by mouth Daily.   Past Week at Unknown time       Medicines:  Current Facility-Administered  Medications   Medication Dose Route Frequency Provider Last Rate Last Dose   • acetaminophen (TYLENOL) tablet 650 mg  650 mg Oral Q4H PRN Porfirio Fisher MD   650 mg at 03/07/18 0020    Or   • acetaminophen (TYLENOL) suppository 650 mg  650 mg Rectal Q4H PRN Porfirio Fisher MD       • bisacodyl (DULCOLAX) EC tablet 5 mg  5 mg Oral Daily PRN Porfirio Fisher MD       • dextrose (D50W) solution 50 mL  50 mL Intravenous Q1H PRN Porfirio Fisher MD       • gentamicin (GARAMYCIN) 0.3 % ophthalmic solution 2 drop  2 drop Right Eye Q8H Casimiro Hadley MD   2 drop at 03/08/18 0614   • iron sucrose (VENOFER) 300 mg in sodium chloride 0.9 % 100 mL IVPB  300 mg Intravenous Q24H Casimiro Hadley MD   300 mg at 03/07/18 1013   • nicotine (NICODERM CQ) 21 MG/24HR patch 1 patch  1 patch Transdermal Q24H Porfirio Fisher MD       • ondansetron (ZOFRAN) injection 4 mg  4 mg Intravenous Q6H PRN Porfirio Fisher MD   4 mg at 03/06/18 2012   • pantoprazole (PROTONIX) EC tablet 40 mg  40 mg Oral QAM Porfirio Fisher MD   40 mg at 03/08/18 0744   • polyvinyl alcohol (LIQUIFILM) 1.4 % ophthalmic solution 1 drop  1 drop Both Eyes Q1H PRN Casimiro Hadley MD   1 drop at 03/07/18 0827   • potassium chloride (MICRO-K) CR capsule 40 mEq  40 mEq Oral Once Casimiro Hadley MD       • sodium chloride 0.45 % with KCl 20 mEq/L infusion  100 mL/hr Intravenous Continuous Casimiro Hadley MD       • sodium chloride 0.9 % flush 1-10 mL  1-10 mL Intravenous PRN Porfirio Fisher MD       • sodium chloride 0.9 % flush 10 mL  10 mL Intravenous PRN Zeferino Lopez DO           Past Medical History:  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    NOTED PROLONG PARALYSIS >2 HOURS WITH SUCCINYLCHOLINE UNDER ANESTHESIA   • Squamous cell carcinoma of  oral mucosa    • Umbilical hernia        Past Surgical History:  Past Surgical History:   Procedure Laterality Date   • COLON RESECTION Right 1/19/2017    Procedure: LAPAROSCOPIC ASSISTED RIGHT COLON RESECTION RIGHT, UMBILICAL HERNIA REPAIR;  Surgeon: Mario Ellison MD;  Location: [facility] OR;  Service:    • COLONOSCOPY     • COLONOSCOPY N/A 1/4/2017    Procedure: COLONOSCOPY WITH ANESTHESIA;  Surgeon: Tee Kong MD;  Location: [facility] ENDOSCOPY;  Service:    • COLONOSCOPY N/A 2/5/2018    Procedure: COLONOSCOPY WITH ANESTHESIA;  Surgeon: Tee Kong MD;  Location: [facility] ENDOSCOPY;  Service:    • ENDOSCOPY N/A 1/4/2017    Procedure: ESOPHAGOGASTRODUODENOSCOPY WITH ANESTHESIA;  Surgeon: Tee Kong MD;  Location: [facility] ENDOSCOPY;  Service:    • KNEE SURGERY      PINS PLACED AND LATER REMOVED S/P FRACTURE   • MOUTH BIOPSY     • TONSILLECTOMY         Family History  Family History   Problem Relation Age of Onset   • Heart disease Father    • Colon cancer Neg Hx    • Colon polyps Neg Hx        Social History  Social History     Social History   • Marital status:      Spouse name: N/A   • Number of children: N/A   • Years of education: N/A     Occupational History   • Not on file.     Social History Main Topics   • Smoking status: Current Every Day Smoker     Packs/day: 1.00     Years: 55.00     Types: Cigarettes   • Smokeless tobacco: Never Used   • Alcohol use Yes      Comment: 3-4 times a week, liquor   • Drug use: No   • Sexual activity: Defer     Other Topics Concern   • Not on file     Social History Narrative         Review of Systems:  History obtained from chart review and the patient  General ROS: Patient complains of fatigue and No fever or chills  Respiratory ROS: No cough, shortness of breath, wheezing  Cardiovascular ROS: No chest pain or palpitations  Gastrointestinal ROS: No abdominal pain or melena  Genito-Urinary ROS: No dysuria or hematuria  Neurological ROS: no headache or  "dizziness    Objective:  /59  Pulse 89  Temp 98.2 °F (36.8 °C) (Oral)   Resp 19  Ht 167.6 cm (66\")  Wt 69.2 kg (152 lb 9.6 oz)  SpO2 93%  BMI 24.63 kg/m2    Intake/Output Summary (Last 24 hours) at 03/08/18 0948  Last data filed at 03/08/18 0800   Gross per 24 hour   Intake           2662.3 ml   Output             2275 ml   Net            387.3 ml     General: awake/alert    Neck: supple, no JVD  Chest:  clear to auscultation bilaterally without respiratory distress  CVS: regular rate and rhythm  Abdominal: soft, nontender, normal bowel sounds  Extremities: no cyanosis or edema  Skin: warm and dry without rash  Neuro: no focal motor deficits    Labs:    Results from last 7 days  Lab Units 03/08/18  0632 03/07/18  2304 03/07/18  1647 03/07/18  0303 03/05/18  1902   WBC 10*3/mm3 6.10  --   --  6.51 11.24*   HEMOGLOBIN g/dL 7.9* 7.4* 7.8* 8.1* 11.3*   HEMATOCRIT % 24.3* 22.4* 23.4* 23.6* 33.9*   PLATELETS 10*3/mm3 166  --   --  186 258           Results from last 7 days  Lab Units 03/08/18  0632 03/07/18  0303 03/06/18  0223   SODIUM mmol/L 147* 141 142   POTASSIUM mmol/L 3.4* 3.3* 4.8   CHLORIDE mmol/L 100 94* 102   CO2 mmol/L >40.0* 34.0* 18.0*   BUN mg/dL 49* 86* 113*   CREATININE mg/dL 2.50* 5.82* 11.88*   CALCIUM mg/dL 6.9* 7.5* 8.2*   BILIRUBIN mg/dL <0.1* <0.1* 0.2   ALK PHOS U/L 38 42 52   ALT (SGPT) U/L 18 17 16   AST (SGOT) U/L 25 21 25   GLUCOSE mg/dL 115* 120* 115*       Radiology:   Imaging Results (last 72 hours)     Procedure Component Value Units Date/Time    CT Chest Without Contrast [[id number]] Collected:  03/05/18 1950     Updated:  03/05/18 1955    Narrative:       EXAMINATION: CT CHEST WO CONTRAST-      3/5/2018 7:21 PM CST     HISTORY: Hematuria. Unexplained renal failure.     In order to have a CT radiation dose as low as reasonably achievable  Automated Exposure Control was utilized for adjustment of the mA and/or  KV according to patient size.     DLP in mGycm= 321.     Normal " heart size.  Coronary artery calcification.     Hyperexpanded lungs with no pneumonia, pneumothorax, or pleural  effusion.     No mediastinal mass.     Chronic relatively high-grade L1 compression fracture.     Summary:  1. The lungs show evidence of COPD.  2. There is no acute abnormality in the chest.                                   This report was finalized on 03/05/2018 19:51 by Dr. Ben Delgado MD.    CT Abdomen Pelvis Without Contrast [[id number]] Collected:  03/05/18 1952     Updated:  03/05/18 1957    Narrative:       EXAMINATION: CT ABDOMEN PELVIS WO CONTRAST-      3/5/2018 7:21 PM CST     HISTORY: Abdomen pain. Unexplained renal failure. 2 area.     Noncontrast abdomen/pelvis CT compared with 10/25/2017.     In order to have a CT radiation dose as low as reasonably achievable  Automated Exposure Control was utilized for adjustment of the mA and/or  KV according to patient size.     DLP in mGycm= 274.     Normal noncontrast appearance of the liver, gallbladder, pancreas, and  spleen.  Scattered calcified splenic granulomas are again seen.  Normal and symmetric adrenal glands and kidneys. Renal vascular  calcifications are again seen.     No hydronephrosis. Stable 1.5 cm lower pole right renal cyst.     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 No retroperitoneal hemorrhage.     Sigmoid diverticulosis with no diverticulitis.     Decompressed urinary bladder shows no focal abnormality.     There is no bowel obstruction and no appendicitis or diverticulitis.     Summary:  1. Stable chronic changes.  2. No acute abnormality is seen within the abdomen or pelvis.                                   This report was finalized on 03/05/2018 19:54 by Dr. Ben Delgado MD.    US Renal Bilateral [[id number]] Collected:  03/06/18 0958     Updated:  03/06/18 1008    Narrative:       EXAMINATION: US RENAL BILATERAL- 3/6/2018 9:58 AM CST     HISTORY: Acute kidney injury     COMPARISON: CT  abdomen and pelvis without contrast 3/5/2018     FINDINGS:  Transabdominal sonographic evaluation of the kidneys and urinary bladder  was performed in the transverse and longitudinal projections.     The aorta is obscured and therefore not evaluated.     The right kidney appears normal in size and echogenicity, measuring 11.2  cm in length. There is no sign of collecting system dilatation or solid  renal mass. A cyst is seen extending from the right kidney which  measures 1.5 x 1.0 x 1.2 cm in size.     Left kidney appears normal in size and echogenicity, measuring 11.6 cm  in length. There is no sign of collecting system dilatation or solid  renal mass.     The urinary bladder is completely decompressed around a Howard catheter  and therefore not evaluated.       Impression:       Small right renal cyst. Otherwise, normal appearance of the  kidneys.  This report was finalized on 03/06/2018 10:02 by Dr. Steve Yanez MD.    US Renal Artery Complete [[id number]] Updated:  03/06/18 1111    XR Chest 1 View [[id number]] Collected:  03/06/18 1405     Updated:  03/06/18 1409    Narrative:       EXAMINATION:   XR CHEST 1 VW-  3/6/2018 2:05 PM CST     HISTORY: Broken ribs     Frontal upright radiograph of the chest 3/6/2018 1:58 PM CST     COMPARISON: January 17, 2017.     FINDINGS:   The lungs are clear. Calcified granulomas noted in the right lung. Ribs  are best evaluated with rib series.. The cardiomediastinal silhouette  and pulmonary vascularity are within normal limits.      The osseous structures and surrounding soft tissues demonstrate no acute  abnormality.       Impression:       1. No radiographic evidence of acute cardiopulmonary process.        This report was finalized on 03/06/2018 14:05 by Dr. Olegario Graham MD.          Culture:  Urine Culture   Date Value Ref Range Status   03/06/2018 No growth at 24 hours  Preliminary         Assessment   1.  Acute kidney injury due to acute tubular necrosis--improving  2.   Hypokalemia  3.  Hypernatremia  4.  Metabolic acidosis--resolving  4.  Anemia secondary to blood loss  5.  Nephrogenic cyst on renal ultrasound    Plan:  1.  Replace potassium  2.  Change IV fluid to half normal saline  3.  Continue to monitor renal function  4.  OK to move to medical floor from renal standpoint      Italo Fisher, PRATEEK  3/8/2018  9:48 AM

## 2018-03-09 ENCOUNTER — ANESTHESIA EVENT (OUTPATIENT)
Dept: GASTROENTEROLOGY | Facility: HOSPITAL | Age: 73
End: 2018-03-09

## 2018-03-09 ENCOUNTER — ANESTHESIA (OUTPATIENT)
Dept: GASTROENTEROLOGY | Facility: HOSPITAL | Age: 73
End: 2018-03-09

## 2018-03-09 VITALS
HEIGHT: 66 IN | WEIGHT: 148.8 LBS | DIASTOLIC BLOOD PRESSURE: 70 MMHG | RESPIRATION RATE: 18 BRPM | BODY MASS INDEX: 23.91 KG/M2 | SYSTOLIC BLOOD PRESSURE: 143 MMHG | TEMPERATURE: 97.9 F | OXYGEN SATURATION: 93 % | HEART RATE: 77 BPM

## 2018-03-09 LAB
ALBUMIN SERPL-MCNC: 3.1 G/DL (ref 3.5–5)
ALBUMIN/GLOB SERPL: 1.1 G/DL (ref 1.1–2.5)
ALP SERPL-CCNC: 45 U/L (ref 24–120)
ALT SERPL W P-5'-P-CCNC: 19 U/L (ref 0–54)
ANION GAP SERPL CALCULATED.3IONS-SCNC: 8 MMOL/L (ref 4–13)
AST SERPL-CCNC: 30 U/L (ref 7–45)
BASOPHILS # BLD AUTO: 0.05 10*3/MM3 (ref 0–0.2)
BASOPHILS NFR BLD AUTO: 0.8 % (ref 0–2)
BILIRUB SERPL-MCNC: 0.2 MG/DL (ref 0.1–1)
BUN BLD-MCNC: 26 MG/DL (ref 5–21)
BUN/CREAT SERPL: 18.1 (ref 7–25)
CALCIUM SPEC-SCNC: 7.5 MG/DL (ref 8.4–10.4)
CHLORIDE SERPL-SCNC: 106 MMOL/L (ref 98–110)
CO2 SERPL-SCNC: 31 MMOL/L (ref 24–31)
CREAT BLD-MCNC: 1.44 MG/DL (ref 0.5–1.4)
DEPRECATED RDW RBC AUTO: 45.3 FL (ref 40–54)
EOSINOPHIL # BLD AUTO: 0.51 10*3/MM3 (ref 0–0.7)
EOSINOPHIL NFR BLD AUTO: 7.8 % (ref 0–4)
ERYTHROCYTE [DISTWIDTH] IN BLOOD BY AUTOMATED COUNT: 13.5 % (ref 12–15)
GBM IGG SER-ACNC: 4 UNITS (ref 0–20)
GFR SERPL CREATININE-BSD FRML MDRD: 48 ML/MIN/1.73
GLOBULIN UR ELPH-MCNC: 2.7 GM/DL
GLUCOSE BLD-MCNC: 92 MG/DL (ref 70–100)
HCT VFR BLD AUTO: 24.5 % (ref 40–52)
HGB BLD-MCNC: 8 G/DL (ref 14–18)
IMM GRANULOCYTES # BLD: 0.02 10*3/MM3 (ref 0–0.03)
IMM GRANULOCYTES NFR BLD: 0.3 % (ref 0–5)
LYMPHOCYTES # BLD AUTO: 1.43 10*3/MM3 (ref 0.72–4.86)
LYMPHOCYTES NFR BLD AUTO: 21.7 % (ref 15–45)
MCH RBC QN AUTO: 29.9 PG (ref 28–32)
MCHC RBC AUTO-ENTMCNC: 32.7 G/DL (ref 33–36)
MCV RBC AUTO: 91.4 FL (ref 82–95)
MONOCYTES # BLD AUTO: 0.91 10*3/MM3 (ref 0.19–1.3)
MONOCYTES NFR BLD AUTO: 13.8 % (ref 4–12)
NEUTROPHILS # BLD AUTO: 3.66 10*3/MM3 (ref 1.87–8.4)
NEUTROPHILS NFR BLD AUTO: 55.6 % (ref 39–78)
NRBC BLD MANUAL-RTO: 0 /100 WBC (ref 0–0)
PLATELET # BLD AUTO: 173 10*3/MM3 (ref 130–400)
PMV BLD AUTO: 12.6 FL (ref 6–12)
POTASSIUM BLD-SCNC: 4.2 MMOL/L (ref 3.5–5.3)
PROT SERPL-MCNC: 5.8 G/DL (ref 6.3–8.7)
RBC # BLD AUTO: 2.68 10*6/MM3 (ref 4.8–5.9)
SODIUM BLD-SCNC: 145 MMOL/L (ref 135–145)
WBC NRBC COR # BLD: 6.58 10*3/MM3 (ref 4.8–10.8)

## 2018-03-09 PROCEDURE — 25010000002 PROPOFOL 10 MG/ML EMULSION: Performed by: NURSE ANESTHETIST, CERTIFIED REGISTERED

## 2018-03-09 PROCEDURE — 87081 CULTURE SCREEN ONLY: CPT | Performed by: INTERNAL MEDICINE

## 2018-03-09 PROCEDURE — 43239 EGD BIOPSY SINGLE/MULTIPLE: CPT | Performed by: INTERNAL MEDICINE

## 2018-03-09 PROCEDURE — 0DD68ZX EXTRACTION OF STOMACH, VIA NATURAL OR ARTIFICIAL OPENING ENDOSCOPIC, DIAGNOSTIC: ICD-10-PCS | Performed by: INTERNAL MEDICINE

## 2018-03-09 PROCEDURE — 85025 COMPLETE CBC W/AUTO DIFF WBC: CPT | Performed by: FAMILY MEDICINE

## 2018-03-09 PROCEDURE — 80053 COMPREHEN METABOLIC PANEL: CPT | Performed by: FAMILY MEDICINE

## 2018-03-09 RX ORDER — PROPOFOL 10 MG/ML
VIAL (ML) INTRAVENOUS AS NEEDED
Status: DISCONTINUED | OUTPATIENT
Start: 2018-03-09 | End: 2018-03-09 | Stop reason: SURG

## 2018-03-09 RX ORDER — SODIUM CHLORIDE 9 MG/ML
100 INJECTION, SOLUTION INTRAVENOUS CONTINUOUS
Status: CANCELLED | OUTPATIENT
Start: 2018-03-09

## 2018-03-09 RX ORDER — SODIUM CHLORIDE 0.9 % (FLUSH) 0.9 %
3 SYRINGE (ML) INJECTION AS NEEDED
Status: DISCONTINUED | OUTPATIENT
Start: 2018-03-09 | End: 2018-03-09 | Stop reason: HOSPADM

## 2018-03-09 RX ORDER — FERROUS SULFATE 325(65) MG
325 TABLET ORAL
Start: 2018-03-09

## 2018-03-09 RX ORDER — NICOTINE 21 MG/24HR
1 PATCH, TRANSDERMAL 24 HOURS TRANSDERMAL EVERY 24 HOURS
Qty: 30 PATCH | Refills: 1 | Status: ON HOLD | OUTPATIENT
Start: 2018-03-09 | End: 2019-04-24

## 2018-03-09 RX ORDER — SODIUM CHLORIDE 9 MG/ML
500 INJECTION, SOLUTION INTRAVENOUS CONTINUOUS PRN
Status: DISCONTINUED | OUTPATIENT
Start: 2018-03-09 | End: 2018-03-09 | Stop reason: HOSPADM

## 2018-03-09 RX ORDER — LIDOCAINE HYDROCHLORIDE 20 MG/ML
INJECTION, SOLUTION INFILTRATION; PERINEURAL AS NEEDED
Status: DISCONTINUED | OUTPATIENT
Start: 2018-03-09 | End: 2018-03-09 | Stop reason: SURG

## 2018-03-09 RX ORDER — GENTAMICIN SULFATE 3 MG/ML
2 SOLUTION/ DROPS OPHTHALMIC EVERY 8 HOURS SCHEDULED
Status: ON HOLD
Start: 2018-03-09 | End: 2019-04-24

## 2018-03-09 RX ORDER — SODIUM CHLORIDE 0.9 % (FLUSH) 0.9 %
1-10 SYRINGE (ML) INJECTION AS NEEDED
Status: CANCELLED | OUTPATIENT
Start: 2018-03-09

## 2018-03-09 RX ADMIN — PROPOFOL 20 MG: 10 INJECTION, EMULSION INTRAVENOUS at 08:13

## 2018-03-09 RX ADMIN — LIDOCAINE HYDROCHLORIDE 50 MG: 20 INJECTION, SOLUTION INFILTRATION; PERINEURAL at 08:12

## 2018-03-09 RX ADMIN — PROPOFOL 50 MG: 10 INJECTION, EMULSION INTRAVENOUS at 08:12

## 2018-03-09 RX ADMIN — GENTAMICIN SULFATE 2 DROP: 3 SOLUTION/ DROPS OPHTHALMIC at 14:09

## 2018-03-09 RX ADMIN — SODIUM CHLORIDE 500 ML: 0.9 INJECTION, SOLUTION INTRAVENOUS at 07:53

## 2018-03-09 RX ADMIN — GENTAMICIN SULFATE 2 DROP: 3 SOLUTION/ DROPS OPHTHALMIC at 06:19

## 2018-03-09 NOTE — DISCHARGE SUMMARY
[facility] Medicine Services  DISCHARGE SUMMARY       Date of Admission: 3/5/2018  Date of Discharge:  3/9/2018  Primary Care Physician: Dandy Burton MD    Presenting Problem/History of Present Illness:  Acute renal failure, unspecified acute renal failure type [N17.9]     Final Discharge Diagnoses:  Hospital Problem List     Acute renal failure          Consults: GI and nephrology.    Procedures Performed:   EGD  - Normal esophagus.  - Normal stomach. Biopsied.  - Duodenitis.    Pertinent Test Results:   IMPRESSION: Right rib x-ray  1. No evidence of displaced right rib fracture or pneumothorax.    Summary: CT scan abdomen pelvic  1. Stable chronic changes.  2. No acute abnormality is seen within the abdomen or pelvis.    Summary: CT scan of chest  1. The lungs show evidence of COPD.  2. There is no acute abnormality in the chest.    IMPRESSION: Ultrasound the kidneys bilateral  Small right renal cyst. Otherwise, normal appearance of the  kidneys.      Chief Complaint on Day of Discharge: none    History of Present Illness on Day of Discharge:   Patient is a 72-year-old white male past medical history of colon cancer diagnosed approximately a year ago with successful resection.  Patient did not get any chemotherapy or radiation as far as her.  He been his usual state of health until he was seen for his follow-up colonoscopy.  Apparently his blood pressure was elevated at that visit he was referred to his primary care doctor and was placed on a blood pressure medication as a combination of lisinopril and hydrochlorothiazide.  Patient subsequently underwent colonoscopy he has not felt well since.  He had a near syncopal episode followed off the couch after that as well.  He seen his primary care doctor and told his hemoglobin was stable I do not know if her creatinine was done.  He presents with continued rectal bleeding after this his hemoglobin is normal but his creatinine  "was 13.  He states that all of this problem started after he started the medication approximately a month ago.  He also taken some nonsteroidals Aleve for a few days the last one being taken several days ago for some right-sided pain in his ribs after he fell.  He is also had a poor appetite felt very weak not been drinking much recently he denies fevers or chills he has no history of renal failure his last creatinine was noted to be 1 in October.     Hospital Course:  The patient is a 72 y.o. male who presented to [facility] with acute renal failure .  Patient initially was started, bicarbonate drip, IV hydration.  Patient kidney began to improve as time goes by.  Discussed patient  not take to  NSAID.  Creatinine level went  from 11-1.4.  Patient also had a positive Hemoccult.  GI was consulted in case.  Patient had EGD today shows duodenitis.  Patient also instructed to avoid NSAIDs and antihistamine and aspirin.  Hypokalemia, resolved.  Hypernatremia is resolved.  Hypo-magnesium is resolved.  As for patient's iron deficiency anemia, patient was treated with Venofer.  Patient continues home iron supplement.  Pt to continues Prilosec.  Discussed patient by cutting back smoking and stopping.  As for rib pain from previous fall, x-ray shows no rib fracture.  Tylenol for pain only.     Condition on Discharge:  stable    Physical Exam on Discharge:  /70 (BP Location: Left arm, Patient Position: Lying)  Pulse 77  Temp 97.9 °F (36.6 °C) (Oral)   Resp 18  Ht 167.6 cm (66\")  Wt 67.5 kg (148 lb 12.8 oz)  SpO2 93%  BMI 24.02 kg/m2  Physical Exam   Constitutional: He is oriented to person, place, and time. He appears well-developed and well-nourished.   HENT:   Head: Normocephalic and atraumatic.   Eyes: Conjunctivae and EOM are normal. Pupils are equal, round, and reactive to light.   Neck: Neck supple. No JVD present. No thyromegaly present.   Cardiovascular: Normal rate, regular rhythm, normal " heart sounds and intact distal pulses.  Exam reveals no gallop and no friction rub.    No murmur heard.  Pulmonary/Chest: Effort normal and breath sounds normal. No respiratory distress. He has no wheezes. He has no rales. He exhibits no tenderness.   Abdominal: Soft. Bowel sounds are normal. He exhibits no distension. There is no tenderness. There is no rebound and no guarding.   Musculoskeletal: Normal range of motion. He exhibits no edema, tenderness or deformity.   Lymphadenopathy:     He has no cervical adenopathy.   Neurological: He is alert and oriented to person, place, and time. He displays normal reflexes. No cranial nerve deficit. He exhibits normal muscle tone.   Skin: Skin is warm and dry. No rash noted.   Psychiatric: He has a normal mood and affect. His behavior is normal. Judgment and thought content normal.   Nursing note and vitals reviewed.        Discharge Disposition:  Home or Self Care    Discharge Medications:   Jonnie Sheets Jr.   Home Medication Instructions JAYLEN:[id number]    Printed on:03/09/18 1028   Medication Information                      ferrous sulfate (IRON SUPPLEMENT) 325 (65 FE) MG tablet  Take 1 tablet by mouth Daily With Breakfast.             gentamicin (GARAMYCIN) 0.3 % ophthalmic solution  Administer 2 drops to the right eye Every 8 (Eight) Hours.             lovastatin (MEVACOR) 40 MG tablet  Take 40 mg by mouth Every Night.             nicotine (NICODERM CQ) 21 MG/24HR patch  Place 1 patch on the skin Daily.             omeprazole (priLOSEC) 20 MG capsule  Take 20 mg by mouth Daily.                 Discharge Diet:   Diet Instructions     Advance Diet As Tolerated                     Activity at Discharge:   Activity Instructions     Activity as Tolerated                     Discharge Care Plan/Instructions:     Follow-up Appointments:   Follow-up PCP 1 week    Casimiro Hadley MD  03/09/18  12:07 PM    Time: Greater than 30 minutes

## 2018-03-09 NOTE — PERIOPERATIVE NURSING NOTE
Incision documentation in LDA's from 1/19/2017 removed in preparation for upgrade per request of Clinical Informatics.

## 2018-03-09 NOTE — PLAN OF CARE
Problem: Patient Care Overview (Adult)  Goal: Plan of Care Review  Outcome: Ongoing (interventions implemented as appropriate)   03/08/18 1745 03/09/18 0353   Coping/Psychosocial Response Interventions   Plan Of Care Reviewed With patient --    Patient Care Overview   Progress improving --    Outcome Evaluation   Outcome Summary/Follow up Plan --  no c/o pain. good output. encouraged pt to drink fluids. vss. pt resting comfortably       Problem: Renal Failure/Kidney Injury, Acute (Adult)  Goal: Signs and Symptoms of Listed Potential Problems Will be Absent or Manageable (Renal Failure/Kidney Injury, Acute)  Outcome: Ongoing (interventions implemented as appropriate)      Problem: Nutrition, Imbalanced: Inadequate Oral Intake (Adult)  Goal: Improved Oral Intake  Outcome: Ongoing (interventions implemented as appropriate)    Goal: Prevent Further Weight Loss  Outcome: Ongoing (interventions implemented as appropriate)      Problem: Fall Risk (Adult)  Goal: Absence of Falls  Outcome: Ongoing (interventions implemented as appropriate)      Problem: Skin Integrity Impairment, Risk/Actual (Adult)  Goal: Skin Integrity/Wound Healing  Outcome: Ongoing (interventions implemented as appropriate)

## 2018-03-09 NOTE — PLAN OF CARE
Problem: Patient Care Overview (Adult)  Goal: Plan of Care Review  Outcome: Ongoing (interventions implemented as appropriate)   03/09/18 0813   Coping/Psychosocial Response Interventions   Plan Of Care Reviewed With patient   Patient Care Overview   Progress improving   Outcome Evaluation   Outcome Summary/Follow up Plan pt tolerating prcoedure well        Problem: GI Endoscopy (Adult)  Goal: Signs and Symptoms of Listed Potential Problems Will be Absent or Manageable (GI Endoscopy)  Outcome: Ongoing (interventions implemented as appropriate)   03/09/18 0813   GI Endoscopy   Problems Assessed (GI Endoscopy) all   Problems Present (GI Endoscopy) none

## 2018-03-09 NOTE — PLAN OF CARE
Problem: GI Endoscopy (Adult)  Goal: Signs and Symptoms of Listed Potential Problems Will be Absent or Manageable (GI Endoscopy)  Outcome: Ongoing (interventions implemented as appropriate)   03/09/18 0822   GI Endoscopy   Problems Assessed (GI Endoscopy) all   Problems Present (GI Endoscopy) none

## 2018-03-09 NOTE — THERAPY DISCHARGE NOTE
Acute Care - Physical Therapy Discharge Summary  [facility]       Patient Name: Jonnie Sheets Jr.  : 1945  MRN: [id number]    Today's Date: 3/9/2018  Onset of Illness/Injury or Date of Surgery Date: 18    Date of Referral to PT: 18  Referring Physician: Dr. Hadley      Admit Date: 3/5/2018      PT Recommendation and Plan    Visit Dx:    ICD-10-CM ICD-9-CM   1. Acute renal failure, unspecified acute renal failure type N17.9 584.9   2. Hyperkalemia E87.5 276.7   3. Elevated lipase R74.8 790.5   4. Impaired functional mobility, balance, and endurance Z74.09 V49.89   5. Black stool K92.1 792.1             Outcome Measures       18 1100          How much help from another person do you currently need...    Turning from your back to your side while in flat bed without using bedrails? 4  -PB (r) JM (t) PB (c)      Moving from lying on back to sitting on the side of a flat bed without bedrails? 4  -PB (r) JM (t) PB (c)      Moving to and from a bed to a chair (including a wheelchair)? 3  -PB (r) JM (t) PB (c)      Standing up from a chair using your arms (e.g., wheelchair, bedside chair)? 3  -PB (r) JM (t) PB (c)      Climbing 3-5 steps with a railing? 3  -PB (r) JM (t) PB (c)      To walk in hospital room? 3  -PB (r) JM (t) PB (c)      AM-PAC 6 Clicks Score 20  -PB (r) JM (t)      Functional Assessment    Outcome Measure Options AM-PAC 6 Clicks Basic Mobility (PT)  -PB (r) JM (t) PB (c)        User Key  (r) = Recorded By, (t) = Taken By, (c) = Cosigned By    Initials Name Provider Type    ASHLY Hancock, PT DPT Physical Therapist    NÉSTOR Zuniga, PT Student PT Student                      IP PT Goals       18 1600 18 1153       Gait Training PT LTG    Gait Training Goal PT LTG, Date Established  18  -ASHLY (gerson) NÉSTOR (leonel) ASHLY (amelia)     Gait Training Goal PT LTG, Time to Achieve  by discharge  -ASHLY (gerson) NÉSTOR (leonel) ASHLY (amelia)     Gait Training Goal PT LTG, [place] Level  supervision  required  -PB (r) JM (t) PB (c)     Gait Training Goal PT LTG, Distance to Achieve  800 feet no LOB  -PB (r) JM (t) PB (c)     Gait Training Goal PT LTG, Date Goal Reviewed 03/09/18  -NW      Gait Training Goal PT LTG, Outcome goal not met  -NW      Gait Training Goal PT LTG, Reason Goal Not Met discharged from facility  -NW      Dynamic Standing Balance PT LTG    Dynamic Standing Balance PT LTG, Date Established  03/08/18  -PB (r) JM (t) PB (c)     Dynamic Standing Balance PT LTG, Time to Achieve  by discharge  -PB (r) JM (t) PB (c)     Dynamic Standing Balance PT LTG, [place] Level  supervision required  -PB (r) JM (t) PB (c)     Dynamic Standing Balance PT LTG, Additional Goal  side stepping, backwards walking, tandem walking 20 steps no LOB  -PB (r) JM (t) PB (c)     Dynamic Standing Balance PT LTG, Date Goal Reviewed 03/09/18  -NW      Dynamic Standing Balance PT LTG, Outcome goal not met  -NW      Dynamic Standing Balance PT LTG, Reason Goal Not Met discharged from facility  -NW        User Key  (r) = Recorded By, (t) = Taken By, (c) = Cosigned By    Initials Name Provider Type    NW Zoie Liao, GARTH Physical Therapy Assistant    ASHLY Hancock, PT DPT Physical Therapist    NÉSTOR Zuniga, PT Student PT Student              PT Discharge Summary  Anticipated Discharge Disposition: home  Reason for Discharge: Discharge from facility  Outcomes Achieved: Refer to plan of care for updates on goals achieved  Discharge Destination: Home      Zoie Liao PTA   3/9/2018

## 2018-03-09 NOTE — ANESTHESIA PREPROCEDURE EVALUATION
Anesthesia Evaluation     Patient summary reviewed   history of anesthetic complications (pseudocholinesterase deficiency):  NPO Solid Status: > 8 hours  NPO Liquid Status: > 8 hours           Airway   Mallampati: II  TM distance: >3 FB  Neck ROM: full  Dental          Pulmonary - normal exam    breath sounds clear to auscultation  (+) a smoker (1 ppd) Current,   (-) asthma, recent URI, sleep apnea  Cardiovascular - normal exam  Exercise tolerance: good (4-7 METS)    ECG reviewed  Rhythm: regular  Rate: normal    (+) hypertension well controlled, hyperlipidemia,   (-) pacemaker, past MI, angina, cardiac stents      Neuro/Psych  (-) seizures, TIA, CVA  GI/Hepatic/Renal/Endo    (+)  GERD well controlled,  renal disease ARF,   (-) liver disease, diabetes, hypothyroidism, hyperthyroidism    Musculoskeletal     Abdominal    Substance History      OB/GYN          Other        ROS/Med Hx Other: Reports bruised/cracked rib on the right side from a fall a few weeks ago                  Anesthesia Plan    ASA 2     general     intravenous induction   Anesthetic plan and risks discussed with patient.

## 2018-03-09 NOTE — H&P (VIEW-ONLY)
[facility]  Inpatient Progress Note  Today's date:  03/08/18    Pauline Aguirre Jr.  1945       Reason for Follow Up:  Rectal bleeding/melena    Subjective:   The patient is lying in bed this morning with wife at bedside.  He is getting ready to be transferred from the unit to the nursing floor.  He states that he is feeling very well.  He states that he did have a bowel movement this morning but showed no black tarry stools or bright red blood per rectum.  Nursing confirms this.  He denies any nausea, vomiting, epigastric pain, dysphagia, pyrosis or hematemesis.  He denies any fever or chills.    His Hb which was trending downward with hydration is stabilizing over the past day as noted below:  Results for PAULINE AGUIRRE JR. (MRN [id number]) as of 3/8/2018 09:49   Ref. Range 3/5/2018 14:28 3/5/2018 19:02 3/7/2018 03:03 3/7/2018 16:47 3/7/2018 23:04 3/8/2018 06:32   Hemoglobin Latest Ref Range: 14.0 - 18.0 g/dL 11.3 (L) 11.3 (L) 8.1 (L) 7.8 (L) 7.4 (L) 7.9 (L)       Allergies   Allergen Reactions   • Acetylcholinesterase Other (See Comments)     Had trouble coming out of anesthesia, Dr Ellison inst pt family to make sure pt never receive this again, if he does he will wind up on respirator       Current Facility-Administered Medications:   •  acetaminophen (TYLENOL) tablet 650 mg, 650 mg, Oral, Q4H PRN, 650 mg at 03/07/18 0020 **OR** acetaminophen (TYLENOL) suppository 650 mg, 650 mg, Rectal, Q4H PRN, Porfirio Fisher MD  •  bisacodyl (DULCOLAX) EC tablet 5 mg, 5 mg, Oral, Daily PRN, Porfirio Fisher MD  •  dextrose (D50W) solution 50 mL, 50 mL, Intravenous, Q1H PRN, Porfirio Fisher MD  •  gentamicin (GARAMYCIN) 0.3 % ophthalmic solution 2 drop, 2 drop, Right Eye, Q8H, Casimiro Hadley MD, 2 drop at 03/08/18 0614  •  nicotine (NICODERM CQ) 21 MG/24HR patch 1 patch, 1 patch, Transdermal, Q24H, Porfirio Fisher MD  •  ondansetron (ZOFRAN) injection 4 mg, 4 mg, Intravenous, Q6H PRN,  Porfirio Fisher MD, 4 mg at 03/06/18 2012  •  pantoprazole (PROTONIX) EC tablet 40 mg, 40 mg, Oral, QAM, Porfirio Fisher MD, 40 mg at 03/08/18 0744  •  polyvinyl alcohol (LIQUIFILM) 1.4 % ophthalmic solution 1 drop, 1 drop, Both Eyes, Q1H PRN, Casimiro Hadley MD, 1 drop at 03/07/18 0827  •  sodium chloride 0.45 % with KCl 20 mEq/L infusion, 100 mL/hr, Intravenous, Continuous, Casimiro Hadley MD, Last Rate: 100 mL/hr at 03/08/18 0956, 100 mL/hr at 03/08/18 0956  •  sodium chloride 0.9 % flush 1-10 mL, 1-10 mL, Intravenous, PRN, Porfirio Fisher MD  •  Insert peripheral IV, , , Once **AND** sodium chloride 0.9 % flush 10 mL, 10 mL, Intravenous, PRN, Zeferino Lopez,     Review of Systems:   Review of Systems   Constitutional: Negative for fever and unexpected weight change.   HENT: Negative for hearing loss.    Eyes: Negative for visual disturbance.   Respiratory: Negative for cough.    Cardiovascular: Negative for chest pain.   Gastrointestinal:        See HPI   Endocrine: Negative for cold intolerance and heat intolerance.   Genitourinary: Negative for dysuria.   Neurological: Negative for seizures.   Psychiatric/Behavioral: Negative for hallucinations.        Vital Signs:  Temp:  [97.9 °F (36.6 °C)-98.6 °F (37 °C)] 98.2 °F (36.8 °C)  Heart Rate:  [74-92] 89  Resp:  [13-20] 19  BP: ()/(47-74) 109/59  Body mass index is 24.63 kg/(m^2).     Intake/Output Summary (Last 24 hours) at 03/08/18 1038  Last data filed at 03/08/18 0800   Gross per 24 hour   Intake           2662.3 ml   Output             2275 ml   Net            387.3 ml     I/O this shift:  In: 351 [I.V.:351]  Out: 300 [Urine:300]  Physical Exam:  Physical Exam   Constitutional: He appears well-developed.   Cardiovascular: Normal rate and regular rhythm.    Pulmonary/Chest: Effort normal.   Abdominal: Soft. Bowel sounds are normal.   Neurological: He is alert.   Psychiatric: He has a normal mood and affect.     I have performed the physical exam  and agree with the findings as noted above.   LEVAR Nicolas MD     Results Review:   I have reviewed all of the patient's current test results      Results from last 7 days  Lab Units 03/08/18  0632 03/07/18  2304 03/07/18  1647 03/07/18  0303 03/05/18  1902   WBC 10*3/mm3 6.10  --   --  6.51 11.24*   HEMOGLOBIN g/dL 7.9* 7.4* 7.8* 8.1* 11.3*   HEMATOCRIT % 24.3* 22.4* 23.4* 23.6* 33.9*   PLATELETS 10*3/mm3 166  --   --  186 258         Results from last 7 days  Lab Units 03/08/18  0632 03/07/18  0303 03/06/18  0223   SODIUM mmol/L 147* 141 142   POTASSIUM mmol/L 3.4* 3.3* 4.8   CHLORIDE mmol/L 100 94* 102   CO2 mmol/L >40.0* 34.0* 18.0*   BUN mg/dL 49* 86* 113*   CREATININE mg/dL 2.50* 5.82* 11.88*   CALCIUM mg/dL 6.9* 7.5* 8.2*   BILIRUBIN mg/dL <0.1* <0.1* 0.2   ALK PHOS U/L 38 42 52   ALT (SGPT) U/L 18 17 16   AST (SGOT) U/L 25 21 25   GLUCOSE mg/dL 115* 120* 115*         Results from last 7 days  Lab Units 03/05/18  2217 03/05/18 1902   INR  1.17* 1.15*         Lab Results  Lab Value Date/Time   LIPASE 357 (H) 03/06/2018 0223   LIPASE 401 (H) 03/05/2018 1902       Radiology Review:  Imaging Results (last 24 hours)     ** No results found for the last 24 hours. **          Impression/Plan:  Patient Active Problem List   Diagnosis Code   • Colon cancer C18.9   • Personal history of colon cancer Z85.038   • Anorexia R63.0   • Black stool K92.1   • Acute renal failure N17.9     Rectal bleeding/melena--resolved  Currently not an active problem.  He is up-to-date on colonoscopy.  He is finally agreeable to endoscopy tomorrow.   He has not had any further bleeding.  This occurred approximately 2 weeks ago. Of interest, the patient fell about that same time and was taking Advil for he thought might be fractured ribs.      Acute renal failure  This is new and undergoing evaluation.  This is the cause of his recent deterioration with regards to appetite and failure to thrive.  Renal function much better today with a  creatinine of 2.50 today decreased from 5.82 yesterday.  He was initially admitted with a creatinine of 13.9      History of colon cancer status post resection in 2017  He is up-to-date on colonoscopy.    Anemia  The patient has had no rectal bleeding, melena or hematemesis since hospitalization.  He is receiving Venofer.        PRATEEK Last  03/08/18  10:38 AM    Isa Nicolas MD  [facility]  03/08/18  12:13 PM    Much of this encounter note is an electronic transcription/translation of spoken language to printed text. The electronic translation of spoken language may permit erroneous, or at times, nonsensical words or phrases to be inadvertently transcribed; although I have reviewed the note for such errors, some may still exist.

## 2018-03-09 NOTE — ANESTHESIA POSTPROCEDURE EVALUATION
Patient: Jonnie Sheets Jr.    Procedure Summary     Date Anesthesia Start Anesthesia Stop Room / Location    03/09/18 0806 0817  [facility] / [facility]       Procedure Diagnosis Surgeon Provider    ESOPHAGOGASTRODUODENOSCOPY WITH ANESTHESIA (N/A Esophagus) Black stool  (Black stool [K92.1]) DO Ann Marie Crockett CRNA          Anesthesia Type: general  Last vitals  BP   137/67 (03/09/18 0418)   Temp   98.6 °F (37 °C) (03/09/18 0418)   Pulse   84 (03/09/18 0418)   Resp   20 (03/09/18 0418)     SpO2   91 % (03/09/18 0418)     Post Anesthesia Care and Evaluation    Patient location during evaluation: PHASE II  Patient participation: complete - patient participated  Level of consciousness: awake and alert  Pain score: 0  Pain management: adequate  Airway patency: patent  Anesthetic complications: No anesthetic complications    Cardiovascular status: acceptable and stable  Respiratory status: acceptable and unassisted  Hydration status: stable

## 2018-03-10 LAB — UREASE TISS QL: NEGATIVE

## 2018-04-04 ENCOUNTER — OFFICE VISIT (OUTPATIENT)
Dept: OTOLARYNGOLOGY | Age: 73
End: 2018-04-04
Payer: MEDICARE

## 2018-04-04 VITALS
RESPIRATION RATE: 18 BRPM | TEMPERATURE: 97.8 F | DIASTOLIC BLOOD PRESSURE: 80 MMHG | SYSTOLIC BLOOD PRESSURE: 140 MMHG | OXYGEN SATURATION: 98 % | HEART RATE: 71 BPM

## 2018-04-04 DIAGNOSIS — K13.70 LESION OF ORAL MUCOSA: Primary | ICD-10-CM

## 2018-04-04 PROCEDURE — 1123F ACP DISCUSS/DSCN MKR DOCD: CPT | Performed by: OTOLARYNGOLOGY

## 2018-04-04 PROCEDURE — 31575 DIAGNOSTIC LARYNGOSCOPY: CPT | Performed by: OTOLARYNGOLOGY

## 2018-04-04 PROCEDURE — 4004F PT TOBACCO SCREEN RCVD TLK: CPT | Performed by: OTOLARYNGOLOGY

## 2018-04-04 PROCEDURE — 3017F COLORECTAL CA SCREEN DOC REV: CPT | Performed by: OTOLARYNGOLOGY

## 2018-04-04 PROCEDURE — G8428 CUR MEDS NOT DOCUMENT: HCPCS | Performed by: OTOLARYNGOLOGY

## 2018-04-04 PROCEDURE — 4040F PNEUMOC VAC/ADMIN/RCVD: CPT | Performed by: OTOLARYNGOLOGY

## 2018-04-04 PROCEDURE — G8419 CALC BMI OUT NRM PARAM NOF/U: HCPCS | Performed by: OTOLARYNGOLOGY

## 2018-04-04 PROCEDURE — 99214 OFFICE O/P EST MOD 30 MIN: CPT | Performed by: OTOLARYNGOLOGY

## 2018-04-04 RX ORDER — FERROUS SULFATE 325(65) MG
325 TABLET ORAL
COMMUNITY
Start: 2018-03-09

## 2018-04-04 RX ORDER — LISINOPRIL 10 MG/1
10 TABLET ORAL 2 TIMES DAILY
COMMUNITY
Start: 2018-03-16 | End: 2018-08-13

## 2018-08-13 ENCOUNTER — OFFICE VISIT (OUTPATIENT)
Dept: OTOLARYNGOLOGY | Age: 73
End: 2018-08-13
Payer: MEDICARE

## 2018-08-13 VITALS
SYSTOLIC BLOOD PRESSURE: 138 MMHG | OXYGEN SATURATION: 98 % | HEART RATE: 89 BPM | WEIGHT: 150 LBS | DIASTOLIC BLOOD PRESSURE: 86 MMHG | HEIGHT: 65 IN | TEMPERATURE: 98 F | RESPIRATION RATE: 16 BRPM | BODY MASS INDEX: 24.99 KG/M2

## 2018-08-13 DIAGNOSIS — K13.70 LESION OF ORAL MUCOSA: Primary | ICD-10-CM

## 2018-08-13 PROCEDURE — 4040F PNEUMOC VAC/ADMIN/RCVD: CPT | Performed by: OTOLARYNGOLOGY

## 2018-08-13 PROCEDURE — 4004F PT TOBACCO SCREEN RCVD TLK: CPT | Performed by: OTOLARYNGOLOGY

## 2018-08-13 PROCEDURE — 99214 OFFICE O/P EST MOD 30 MIN: CPT | Performed by: OTOLARYNGOLOGY

## 2018-08-13 PROCEDURE — 3017F COLORECTAL CA SCREEN DOC REV: CPT | Performed by: OTOLARYNGOLOGY

## 2018-08-13 PROCEDURE — 1101F PT FALLS ASSESS-DOCD LE1/YR: CPT | Performed by: OTOLARYNGOLOGY

## 2018-08-13 PROCEDURE — 1123F ACP DISCUSS/DSCN MKR DOCD: CPT | Performed by: OTOLARYNGOLOGY

## 2018-08-13 PROCEDURE — G8427 DOCREV CUR MEDS BY ELIG CLIN: HCPCS | Performed by: OTOLARYNGOLOGY

## 2018-08-13 PROCEDURE — G8420 CALC BMI NORM PARAMETERS: HCPCS | Performed by: OTOLARYNGOLOGY

## 2018-08-13 RX ORDER — LISINOPRIL 20 MG/1
20 TABLET ORAL DAILY
COMMUNITY
Start: 2018-07-14 | End: 2019-08-19

## 2018-09-10 LAB
ALBUMIN SERPL-MCNC: 4.7 G/DL (ref 3.5–5.2)
ALP BLD-CCNC: 68 U/L (ref 40–130)
ALT SERPL-CCNC: 7 U/L (ref 5–41)
ANION GAP SERPL CALCULATED.3IONS-SCNC: 21 MMOL/L (ref 7–19)
AST SERPL-CCNC: 42 U/L (ref 5–40)
BASOPHILS ABSOLUTE: 0.1 K/UL (ref 0–0.2)
BASOPHILS RELATIVE PERCENT: 0.7 % (ref 0–1)
BILIRUB SERPL-MCNC: 0.6 MG/DL (ref 0.2–1.2)
BUN BLDV-MCNC: 6 MG/DL (ref 8–23)
CALCIUM SERPL-MCNC: 8.2 MG/DL (ref 8.8–10.2)
CHLORIDE BLD-SCNC: 98 MMOL/L (ref 98–111)
CHOLESTEROL, TOTAL: 166 MG/DL (ref 160–199)
CO2: 27 MMOL/L (ref 22–29)
CREAT SERPL-MCNC: 0.7 MG/DL (ref 0.5–1.2)
EOSINOPHILS ABSOLUTE: 0.2 K/UL (ref 0–0.6)
EOSINOPHILS RELATIVE PERCENT: 2 % (ref 0–5)
GFR NON-AFRICAN AMERICAN: >60
GLUCOSE BLD-MCNC: 98 MG/DL (ref 74–109)
HCT VFR BLD CALC: 41.1 % (ref 42–52)
HDLC SERPL-MCNC: 69 MG/DL (ref 55–121)
HEMOGLOBIN: 13 G/DL (ref 14–18)
LDL CHOLESTEROL CALCULATED: 71 MG/DL
LYMPHOCYTES ABSOLUTE: 1.8 K/UL (ref 1.1–4.5)
LYMPHOCYTES RELATIVE PERCENT: 21.1 % (ref 20–40)
MCH RBC QN AUTO: 31.6 PG (ref 27–31)
MCHC RBC AUTO-ENTMCNC: 31.6 G/DL (ref 33–37)
MCV RBC AUTO: 100 FL (ref 80–94)
MONOCYTES ABSOLUTE: 0.9 K/UL (ref 0–0.9)
MONOCYTES RELATIVE PERCENT: 10.6 % (ref 0–10)
NEUTROPHILS ABSOLUTE: 5.5 K/UL (ref 1.5–7.5)
NEUTROPHILS RELATIVE PERCENT: 65 % (ref 50–65)
PDW BLD-RTO: 14.1 % (ref 11.5–14.5)
PLATELET # BLD: 221 K/UL (ref 130–400)
PMV BLD AUTO: 12.3 FL (ref 9.4–12.4)
POTASSIUM SERPL-SCNC: 3.4 MMOL/L (ref 3.5–5)
RBC # BLD: 4.11 M/UL (ref 4.7–6.1)
SODIUM BLD-SCNC: 146 MMOL/L (ref 136–145)
TOTAL PROTEIN: 7.3 G/DL (ref 6.6–8.7)
TRIGL SERPL-MCNC: 132 MG/DL (ref 0–149)
WBC # BLD: 8.4 K/UL (ref 4.8–10.8)

## 2018-11-28 LAB
ANION GAP SERPL CALCULATED.3IONS-SCNC: 15 MMOL/L (ref 7–19)
BUN BLDV-MCNC: 7 MG/DL (ref 8–23)
CALCIUM SERPL-MCNC: 6.6 MG/DL (ref 8.8–10.2)
CHLORIDE BLD-SCNC: 98 MMOL/L (ref 98–111)
CO2: 33 MMOL/L (ref 22–29)
CREAT SERPL-MCNC: 1 MG/DL (ref 0.5–1.2)
CREATININE URINE: 227.6 MG/DL (ref 4.2–622)
GFR NON-AFRICAN AMERICAN: >60
GLUCOSE BLD-MCNC: 105 MG/DL (ref 74–109)
MICROALBUMIN UR-MCNC: 16.9 MG/DL (ref 0–19)
MICROALBUMIN/CREAT UR-RTO: 74.3 MG/G
POTASSIUM SERPL-SCNC: 2.8 MMOL/L (ref 3.5–5)
SODIUM BLD-SCNC: 146 MMOL/L (ref 136–145)

## 2019-02-13 ENCOUNTER — OFFICE VISIT (OUTPATIENT)
Dept: OTOLARYNGOLOGY | Age: 74
End: 2019-02-13
Payer: MEDICARE

## 2019-02-13 VITALS
HEIGHT: 65 IN | BODY MASS INDEX: 25.46 KG/M2 | SYSTOLIC BLOOD PRESSURE: 94 MMHG | WEIGHT: 152.8 LBS | DIASTOLIC BLOOD PRESSURE: 60 MMHG | RESPIRATION RATE: 16 BRPM | TEMPERATURE: 97.9 F | OXYGEN SATURATION: 98 % | HEART RATE: 88 BPM

## 2019-02-13 DIAGNOSIS — K13.70 LESION OF ORAL MUCOSA: Primary | ICD-10-CM

## 2019-02-13 PROCEDURE — 4040F PNEUMOC VAC/ADMIN/RCVD: CPT | Performed by: OTOLARYNGOLOGY

## 2019-02-13 PROCEDURE — G8484 FLU IMMUNIZE NO ADMIN: HCPCS | Performed by: OTOLARYNGOLOGY

## 2019-02-13 PROCEDURE — 1101F PT FALLS ASSESS-DOCD LE1/YR: CPT | Performed by: OTOLARYNGOLOGY

## 2019-02-13 PROCEDURE — 1123F ACP DISCUSS/DSCN MKR DOCD: CPT | Performed by: OTOLARYNGOLOGY

## 2019-02-13 PROCEDURE — 3017F COLORECTAL CA SCREEN DOC REV: CPT | Performed by: OTOLARYNGOLOGY

## 2019-02-13 PROCEDURE — G8419 CALC BMI OUT NRM PARAM NOF/U: HCPCS | Performed by: OTOLARYNGOLOGY

## 2019-02-13 PROCEDURE — 99214 OFFICE O/P EST MOD 30 MIN: CPT | Performed by: OTOLARYNGOLOGY

## 2019-02-13 PROCEDURE — G8427 DOCREV CUR MEDS BY ELIG CLIN: HCPCS | Performed by: OTOLARYNGOLOGY

## 2019-02-13 PROCEDURE — 4004F PT TOBACCO SCREEN RCVD TLK: CPT | Performed by: OTOLARYNGOLOGY

## 2019-02-13 RX ORDER — AMLODIPINE BESYLATE 5 MG/1
5 TABLET ORAL DAILY
COMMUNITY
Start: 2019-01-07 | End: 2019-08-19

## 2019-04-24 ENCOUNTER — APPOINTMENT (OUTPATIENT)
Dept: GENERAL RADIOLOGY | Facility: HOSPITAL | Age: 74
End: 2019-04-24

## 2019-04-24 ENCOUNTER — HOSPITAL ENCOUNTER (INPATIENT)
Facility: HOSPITAL | Age: 74
LOS: 6 days | Discharge: HOME HEALTH | End: 2019-04-30
Attending: EMERGENCY MEDICINE | Admitting: FAMILY MEDICINE

## 2019-04-24 ENCOUNTER — APPOINTMENT (OUTPATIENT)
Dept: CT IMAGING | Facility: HOSPITAL | Age: 74
End: 2019-04-24

## 2019-04-24 DIAGNOSIS — N17.9 AKI (ACUTE KIDNEY INJURY) (HCC): ICD-10-CM

## 2019-04-24 DIAGNOSIS — Z74.09 IMPAIRED MOBILITY: ICD-10-CM

## 2019-04-24 DIAGNOSIS — A41.9 SEPTIC SHOCK (HCC): Primary | ICD-10-CM

## 2019-04-24 DIAGNOSIS — R65.21 SEPTIC SHOCK (HCC): Primary | ICD-10-CM

## 2019-04-24 DIAGNOSIS — K57.92 DIVERTICULITIS: ICD-10-CM

## 2019-04-24 DIAGNOSIS — E87.20 METABOLIC ACIDOSIS: ICD-10-CM

## 2019-04-24 PROBLEM — K57.30 DIVERTICULAR DISEASE OF LARGE INTESTINE WITH COMPLICATION: Status: ACTIVE | Noted: 2019-04-24

## 2019-04-24 PROBLEM — K57.32 DIVERTICULITIS LARGE INTESTINE W/O PERFORATION OR ABSCESS W/O BLEEDING: Status: ACTIVE | Noted: 2019-04-24

## 2019-04-24 PROBLEM — R19.7 DIARRHEA: Status: ACTIVE | Noted: 2019-04-24

## 2019-04-24 PROBLEM — E87.29 HIGH ANION GAP METABOLIC ACIDOSIS: Status: ACTIVE | Noted: 2019-04-24

## 2019-04-24 PROBLEM — A04.72 C. DIFFICILE COLITIS: Status: ACTIVE | Noted: 2019-04-24

## 2019-04-24 PROBLEM — D72.829 LEUKOCYTOSIS: Status: ACTIVE | Noted: 2019-04-24

## 2019-04-24 LAB
ABO GROUP BLD: NORMAL
ADV 40+41 DNA STL QL NAA+NON-PROBE: NOT DETECTED
ALBUMIN SERPL-MCNC: 4.5 G/DL (ref 3.5–5)
ALBUMIN/GLOB SERPL: 1.2 G/DL (ref 1.1–2.5)
ALP SERPL-CCNC: 62 U/L (ref 24–120)
ALT SERPL W P-5'-P-CCNC: <15 U/L (ref 0–54)
AMYLASE SERPL-CCNC: 162 U/L (ref 30–110)
ANION GAP SERPL CALCULATED.3IONS-SCNC: 25 MMOL/L (ref 4–13)
APTT PPP: 41.3 SECONDS (ref 24.1–35)
ARTERIAL PATENCY WRIST A: ABNORMAL
AST SERPL-CCNC: 22 U/L (ref 7–45)
ASTRO TYP 1-8 RNA STL QL NAA+NON-PROBE: NOT DETECTED
ATMOSPHERIC PRESS: 748 MMHG
BASE EXCESS BLDA CALC-SCNC: -14.7 MMOL/L (ref 0–2)
BASOPHILS # BLD AUTO: 0.11 10*3/MM3 (ref 0–0.2)
BASOPHILS NFR BLD AUTO: 0.5 % (ref 0–2)
BDY SITE: ABNORMAL
BILIRUB SERPL-MCNC: 0.6 MG/DL (ref 0.1–1)
BLD GP AB SCN SERPL QL: NEGATIVE
BODY TEMPERATURE: 37 C
BUN BLD-MCNC: 48 MG/DL (ref 5–21)
BUN/CREAT SERPL: 3.8 (ref 7–25)
C CAYETANENSIS DNA STL QL NAA+NON-PROBE: NOT DETECTED
C DIFF TOX GENS STL QL NAA+PROBE: DETECTED
CALCIUM SPEC-SCNC: 7.9 MG/DL (ref 8.4–10.4)
CAMPY SP DNA.DIARRHEA STL QL NAA+PROBE: NOT DETECTED
CHLORIDE SERPL-SCNC: 103 MMOL/L (ref 98–110)
CO2 SERPL-SCNC: 13 MMOL/L (ref 24–31)
CREAT BLD-MCNC: 12.67 MG/DL (ref 0.5–1.4)
CRYPTOSP STL CULT: NOT DETECTED
D-LACTATE SERPL-SCNC: 1.1 MMOL/L (ref 0.5–2)
D-LACTATE SERPL-SCNC: 4 MMOL/L (ref 0.5–2)
DEPRECATED RDW RBC AUTO: 49.3 FL (ref 40–54)
E COLI DNA SPEC QL NAA+PROBE: NOT DETECTED
E HISTOLYT AG STL-ACNC: NOT DETECTED
EAEC PAA PLAS AGGR+AATA ST NAA+NON-PRB: NOT DETECTED
EC STX1 + STX2 GENES STL NAA+PROBE: NOT DETECTED
EOSINOPHIL # BLD AUTO: 0.03 10*3/MM3 (ref 0–0.7)
EOSINOPHIL NFR BLD AUTO: 0.1 % (ref 0–4)
EPEC EAE GENE STL QL NAA+NON-PROBE: NOT DETECTED
ERYTHROCYTE [DISTWIDTH] IN BLOOD BY AUTOMATED COUNT: 14.4 % (ref 12–15)
ETEC LTA+ST1A+ST1B TOX ST NAA+NON-PROBE: NOT DETECTED
G LAMBLIA DNA SPEC QL NAA+PROBE: NOT DETECTED
GFR SERPL CREATININE-BSD FRML MDRD: 4 ML/MIN/1.73
GFR SERPL CREATININE-BSD FRML MDRD: ABNORMAL ML/MIN/1.73
GLOBULIN UR ELPH-MCNC: 3.7 GM/DL
GLUCOSE BLD-MCNC: 201 MG/DL (ref 70–100)
HCO3 BLDA-SCNC: 10.7 MMOL/L (ref 20–26)
HCT VFR BLD AUTO: 40 % (ref 40–52)
HGB BLD-MCNC: 12.9 G/DL (ref 14–18)
HOLD SPECIMEN: NORMAL
IMM GRANULOCYTES # BLD AUTO: 0.49 10*3/MM3 (ref 0–0.05)
IMM GRANULOCYTES NFR BLD AUTO: 2.2 % (ref 0–5)
INR PPP: 1.42 (ref 0.91–1.09)
INR PPP: 1.69 (ref 0.91–1.09)
LIPASE SERPL-CCNC: 280 U/L (ref 23–203)
LYMPHOCYTES # BLD AUTO: 2.41 10*3/MM3 (ref 0.72–4.86)
LYMPHOCYTES NFR BLD AUTO: 11 % (ref 15–45)
Lab: ABNORMAL
MCH RBC QN AUTO: 30.4 PG (ref 28–32)
MCHC RBC AUTO-ENTMCNC: 32.3 G/DL (ref 33–36)
MCV RBC AUTO: 94.1 FL (ref 82–95)
MODALITY: ABNORMAL
MONOCYTES # BLD AUTO: 1.93 10*3/MM3 (ref 0.19–1.3)
MONOCYTES NFR BLD AUTO: 8.8 % (ref 4–12)
NEUTROPHILS # BLD AUTO: 16.98 10*3/MM3 (ref 1.87–8.4)
NEUTROPHILS NFR BLD AUTO: 77.4 % (ref 39–78)
NOROVIRUS GI+II RNA STL QL NAA+NON-PROBE: NOT DETECTED
NRBC BLD AUTO-RTO: 0 /100 WBC (ref 0–0.2)
P SHIGELLOIDES DNA STL QL NAA+NON-PROBE: NOT DETECTED
PCO2 BLDA: 24.1 MM HG (ref 35–45)
PH BLDA: 7.26 PH UNITS (ref 7.35–7.45)
PLATELET # BLD AUTO: 494 10*3/MM3 (ref 130–400)
PMV BLD AUTO: 11.6 FL (ref 6–12)
PO2 BLDA: 108 MM HG (ref 83–108)
POTASSIUM BLD-SCNC: 4.1 MMOL/L (ref 3.5–5.3)
PROCALCITONIN SERPL-MCNC: 1.05 NG/ML
PROT SERPL-MCNC: 8.2 G/DL (ref 6.3–8.7)
PROTHROMBIN TIME: 17.8 SECONDS (ref 11.9–14.6)
PROTHROMBIN TIME: 20.5 SECONDS (ref 11.9–14.6)
RBC # BLD AUTO: 4.25 10*6/MM3 (ref 4.8–5.9)
RH BLD: POSITIVE
RV RNA STL NAA+PROBE: NOT DETECTED
SALMONELLA DNA SPEC QL NAA+PROBE: NOT DETECTED
SAO2 % BLDCOA: 98.2 % (ref 94–99)
SAPO I+II+IV+V RNA STL QL NAA+NON-PROBE: NOT DETECTED
SHIGELLA SP+EIEC IPAH STL QL NAA+PROBE: NOT DETECTED
SODIUM BLD-SCNC: 141 MMOL/L (ref 135–145)
T&S EXPIRATION DATE: NORMAL
V CHOLERAE DNA SPEC QL NAA+PROBE: NOT DETECTED
VENTILATOR MODE: ABNORMAL
VIBRIO DNA SPEC NAA+PROBE: NOT DETECTED
WBC NRBC COR # BLD: 21.95 10*3/MM3 (ref 4.8–10.8)
WHOLE BLOOD HOLD SPECIMEN: NORMAL
WHOLE BLOOD HOLD SPECIMEN: NORMAL
YERSINIA STL CULT: NOT DETECTED

## 2019-04-24 PROCEDURE — 25010000002 ENOXAPARIN PER 10 MG: Performed by: SPECIALIST

## 2019-04-24 PROCEDURE — 25010000002 ONDANSETRON PER 1 MG: Performed by: EMERGENCY MEDICINE

## 2019-04-24 PROCEDURE — 82150 ASSAY OF AMYLASE: CPT | Performed by: SPECIALIST

## 2019-04-24 PROCEDURE — 86850 RBC ANTIBODY SCREEN: CPT | Performed by: EMERGENCY MEDICINE

## 2019-04-24 PROCEDURE — 71045 X-RAY EXAM CHEST 1 VIEW: CPT

## 2019-04-24 PROCEDURE — 93005 ELECTROCARDIOGRAM TRACING: CPT | Performed by: EMERGENCY MEDICINE

## 2019-04-24 PROCEDURE — 85730 THROMBOPLASTIN TIME PARTIAL: CPT | Performed by: EMERGENCY MEDICINE

## 2019-04-24 PROCEDURE — 36600 WITHDRAWAL OF ARTERIAL BLOOD: CPT

## 2019-04-24 PROCEDURE — 85610 PROTHROMBIN TIME: CPT | Performed by: SPECIALIST

## 2019-04-24 PROCEDURE — 25010000002 PIPERACILLIN SOD-TAZOBACTAM PER 1 G: Performed by: EMERGENCY MEDICINE

## 2019-04-24 PROCEDURE — 99284 EMERGENCY DEPT VISIT MOD MDM: CPT

## 2019-04-24 PROCEDURE — 25010000002 LEVOFLOXACIN PER 250 MG: Performed by: EMERGENCY MEDICINE

## 2019-04-24 PROCEDURE — 85025 COMPLETE CBC W/AUTO DIFF WBC: CPT | Performed by: EMERGENCY MEDICINE

## 2019-04-24 PROCEDURE — 94799 UNLISTED PULMONARY SVC/PX: CPT

## 2019-04-24 PROCEDURE — 82803 BLOOD GASES ANY COMBINATION: CPT

## 2019-04-24 PROCEDURE — 86706 HEP B SURFACE ANTIBODY: CPT | Performed by: INTERNAL MEDICINE

## 2019-04-24 PROCEDURE — 87340 HEPATITIS B SURFACE AG IA: CPT | Performed by: INTERNAL MEDICINE

## 2019-04-24 PROCEDURE — 85610 PROTHROMBIN TIME: CPT | Performed by: EMERGENCY MEDICINE

## 2019-04-24 PROCEDURE — 86901 BLOOD TYPING SEROLOGIC RH(D): CPT | Performed by: EMERGENCY MEDICINE

## 2019-04-24 PROCEDURE — 83605 ASSAY OF LACTIC ACID: CPT | Performed by: EMERGENCY MEDICINE

## 2019-04-24 PROCEDURE — 80053 COMPREHEN METABOLIC PANEL: CPT | Performed by: EMERGENCY MEDICINE

## 2019-04-24 PROCEDURE — 83690 ASSAY OF LIPASE: CPT | Performed by: EMERGENCY MEDICINE

## 2019-04-24 PROCEDURE — 74176 CT ABD & PELVIS W/O CONTRAST: CPT

## 2019-04-24 PROCEDURE — 87040 BLOOD CULTURE FOR BACTERIA: CPT | Performed by: EMERGENCY MEDICINE

## 2019-04-24 PROCEDURE — 25010000002 CEFOXITIN: Performed by: SPECIALIST

## 2019-04-24 PROCEDURE — 84145 PROCALCITONIN (PCT): CPT | Performed by: EMERGENCY MEDICINE

## 2019-04-24 PROCEDURE — 86900 BLOOD TYPING SEROLOGIC ABO: CPT | Performed by: EMERGENCY MEDICINE

## 2019-04-24 PROCEDURE — 87507 IADNA-DNA/RNA PROBE TQ 12-25: CPT | Performed by: INTERNAL MEDICINE

## 2019-04-24 PROCEDURE — 93010 ELECTROCARDIOGRAM REPORT: CPT | Performed by: INTERNAL MEDICINE

## 2019-04-24 RX ORDER — ONDANSETRON 2 MG/ML
4 INJECTION INTRAMUSCULAR; INTRAVENOUS ONCE
Status: COMPLETED | OUTPATIENT
Start: 2019-04-24 | End: 2019-04-24

## 2019-04-24 RX ORDER — SUCRALFATE ORAL 1 G/10ML
1 SUSPENSION ORAL EVERY 6 HOURS SCHEDULED
Status: DISCONTINUED | OUTPATIENT
Start: 2019-04-24 | End: 2019-04-27

## 2019-04-24 RX ORDER — SIMETHICONE 80 MG
80 TABLET,CHEWABLE ORAL 4 TIMES DAILY PRN
Status: DISCONTINUED | OUTPATIENT
Start: 2019-04-24 | End: 2019-04-30 | Stop reason: HOSPADM

## 2019-04-24 RX ORDER — FAMOTIDINE 20 MG/1
20 TABLET, FILM COATED ORAL DAILY
Status: DISCONTINUED | OUTPATIENT
Start: 2019-04-24 | End: 2019-04-30 | Stop reason: HOSPADM

## 2019-04-24 RX ORDER — AMLODIPINE BESYLATE 5 MG/1
5 TABLET ORAL DAILY
COMMUNITY
End: 2019-04-30 | Stop reason: HOSPADM

## 2019-04-24 RX ORDER — SODIUM CHLORIDE 0.9 % (FLUSH) 0.9 %
1-10 SYRINGE (ML) INJECTION AS NEEDED
Status: DISCONTINUED | OUTPATIENT
Start: 2019-04-24 | End: 2019-04-30 | Stop reason: HOSPADM

## 2019-04-24 RX ORDER — ONDANSETRON HCL IN 0.9 % NACL 8 MG/50 ML
8 INTRAVENOUS SOLUTION, PIGGYBACK (ML) INTRAVENOUS EVERY 6 HOURS PRN
Status: DISCONTINUED | OUTPATIENT
Start: 2019-04-24 | End: 2019-04-30 | Stop reason: SDUPTHER

## 2019-04-24 RX ORDER — ONDANSETRON 2 MG/ML
4 INJECTION INTRAMUSCULAR; INTRAVENOUS EVERY 6 HOURS PRN
Status: DISCONTINUED | OUTPATIENT
Start: 2019-04-24 | End: 2019-04-30 | Stop reason: HOSPADM

## 2019-04-24 RX ORDER — MAGNESIUM GLUCONATE 27 MG(500)
500 TABLET ORAL DAILY
COMMUNITY
End: 2019-04-30 | Stop reason: HOSPADM

## 2019-04-24 RX ORDER — METRONIDAZOLE 500 MG/1
500 TABLET ORAL 3 TIMES DAILY
COMMUNITY
End: 2019-04-30 | Stop reason: HOSPADM

## 2019-04-24 RX ORDER — LEVOFLOXACIN 5 MG/ML
750 INJECTION, SOLUTION INTRAVENOUS ONCE
Status: COMPLETED | OUTPATIENT
Start: 2019-04-24 | End: 2019-04-24

## 2019-04-24 RX ORDER — ONDANSETRON 8 MG/1
8 TABLET, ORALLY DISINTEGRATING ORAL EVERY 6 HOURS PRN
Status: DISCONTINUED | OUTPATIENT
Start: 2019-04-24 | End: 2019-04-30 | Stop reason: HOSPADM

## 2019-04-24 RX ORDER — CIPROFLOXACIN 500 MG/1
500 TABLET, FILM COATED ORAL 2 TIMES DAILY
COMMUNITY
End: 2019-04-30 | Stop reason: HOSPADM

## 2019-04-24 RX ORDER — SODIUM CHLORIDE, SODIUM LACTATE, POTASSIUM CHLORIDE, CALCIUM CHLORIDE 600; 310; 30; 20 MG/100ML; MG/100ML; MG/100ML; MG/100ML
50 INJECTION, SOLUTION INTRAVENOUS CONTINUOUS
Status: DISCONTINUED | OUTPATIENT
Start: 2019-04-24 | End: 2019-04-30

## 2019-04-24 RX ORDER — DEXTROSE, SODIUM CHLORIDE, AND POTASSIUM CHLORIDE 5; .45; .15 G/100ML; G/100ML; G/100ML
125 INJECTION INTRAVENOUS CONTINUOUS
Status: DISCONTINUED | OUTPATIENT
Start: 2019-04-24 | End: 2019-04-24

## 2019-04-24 RX ORDER — ONDANSETRON 4 MG/1
4 TABLET, FILM COATED ORAL EVERY 6 HOURS PRN
Status: DISCONTINUED | OUTPATIENT
Start: 2019-04-24 | End: 2019-04-30 | Stop reason: HOSPADM

## 2019-04-24 RX ORDER — FAMOTIDINE 10 MG/ML
20 INJECTION, SOLUTION INTRAVENOUS DAILY
Status: DISCONTINUED | OUTPATIENT
Start: 2019-04-24 | End: 2019-04-26

## 2019-04-24 RX ORDER — SODIUM CHLORIDE 0.9 % (FLUSH) 0.9 %
3 SYRINGE (ML) INJECTION EVERY 12 HOURS SCHEDULED
Status: DISCONTINUED | OUTPATIENT
Start: 2019-04-24 | End: 2019-04-30 | Stop reason: HOSPADM

## 2019-04-24 RX ORDER — LISINOPRIL 20 MG/1
20 TABLET ORAL DAILY
COMMUNITY
End: 2019-04-30 | Stop reason: HOSPADM

## 2019-04-24 RX ADMIN — VANCOMYCIN HYDROCHLORIDE 125 MG: KIT at 18:14

## 2019-04-24 RX ADMIN — SODIUM CHLORIDE, PRESERVATIVE FREE 3 ML: 5 INJECTION INTRAVENOUS at 20:33

## 2019-04-24 RX ADMIN — TAZOBACTAM SODIUM AND PIPERACILLIN SODIUM 4.5 G: 500; 4 INJECTION, SOLUTION INTRAVENOUS at 23:31

## 2019-04-24 RX ADMIN — PIPERACILLIN SODIUM AND TAZOBACTAM SODIUM 4.5 G: 4; .5 INJECTION, POWDER, LYOPHILIZED, FOR SOLUTION INTRAVENOUS at 12:44

## 2019-04-24 RX ADMIN — LEVOFLOXACIN 750 MG: 750 INJECTION, SOLUTION INTRAVENOUS at 12:27

## 2019-04-24 RX ADMIN — ONDANSETRON HYDROCHLORIDE 4 MG: 2 INJECTION, SOLUTION INTRAMUSCULAR; INTRAVENOUS at 13:04

## 2019-04-24 RX ADMIN — SUCRALFATE 1 G: 1 SUSPENSION ORAL at 18:14

## 2019-04-24 RX ADMIN — SODIUM CHLORIDE, POTASSIUM CHLORIDE, SODIUM LACTATE AND CALCIUM CHLORIDE 100 ML/HR: 600; 310; 30; 20 INJECTION, SOLUTION INTRAVENOUS at 14:51

## 2019-04-24 RX ADMIN — VANCOMYCIN HYDROCHLORIDE 125 MG: KIT at 23:29

## 2019-04-24 RX ADMIN — SUCRALFATE 1 G: 1 SUSPENSION ORAL at 23:29

## 2019-04-24 RX ADMIN — ENOXAPARIN SODIUM 30 MG: 30 INJECTION SUBCUTANEOUS at 18:14

## 2019-04-24 RX ADMIN — FAMOTIDINE 20 MG: 10 INJECTION, SOLUTION INTRAVENOUS at 18:14

## 2019-04-24 RX ADMIN — SODIUM BICARBONATE 75 ML/HR: 84 INJECTION, SOLUTION INTRAVENOUS at 18:13

## 2019-04-24 RX ADMIN — METRONIDAZOLE 500 MG: 500 INJECTION, SOLUTION INTRAVENOUS at 14:51

## 2019-04-24 RX ADMIN — NOREPINEPHRINE BITARTRATE 0.02 MCG/KG/MIN: 1 INJECTION, SOLUTION, CONCENTRATE INTRAVENOUS at 13:30

## 2019-04-24 RX ADMIN — CEFOXITIN 2 G: 2 INJECTION, POWDER, FOR SOLUTION INTRAVENOUS at 15:34

## 2019-04-24 RX ADMIN — SODIUM CHLORIDE, POTASSIUM CHLORIDE, SODIUM LACTATE AND CALCIUM CHLORIDE 1000 ML: 600; 310; 30; 20 INJECTION, SOLUTION INTRAVENOUS at 13:36

## 2019-04-24 RX ADMIN — SODIUM CHLORIDE, POTASSIUM CHLORIDE, SODIUM LACTATE AND CALCIUM CHLORIDE 1905 ML: 600; 310; 30; 20 INJECTION, SOLUTION INTRAVENOUS at 12:26

## 2019-04-25 ENCOUNTER — APPOINTMENT (OUTPATIENT)
Dept: ULTRASOUND IMAGING | Facility: HOSPITAL | Age: 74
End: 2019-04-25

## 2019-04-25 PROBLEM — E87.6 HYPOKALEMIA: Status: ACTIVE | Noted: 2019-04-25

## 2019-04-25 PROBLEM — D64.9 ANEMIA: Status: ACTIVE | Noted: 2019-04-25

## 2019-04-25 LAB
ALBUMIN SERPL-MCNC: 3 G/DL (ref 3.5–5)
ALBUMIN/GLOB SERPL: 1.2 G/DL (ref 1.1–2.5)
ALP SERPL-CCNC: 32 U/L (ref 24–120)
ALT SERPL W P-5'-P-CCNC: 18 U/L (ref 0–54)
ANION GAP SERPL CALCULATED.3IONS-SCNC: 18 MMOL/L (ref 4–13)
AST SERPL-CCNC: 25 U/L (ref 7–45)
BILIRUB SERPL-MCNC: 0.6 MG/DL (ref 0.1–1)
BUN BLD-MCNC: 47 MG/DL (ref 5–21)
BUN/CREAT SERPL: 4.6 (ref 7–25)
CALCIUM SPEC-SCNC: 6.8 MG/DL (ref 8.4–10.4)
CHLORIDE SERPL-SCNC: 105 MMOL/L (ref 98–110)
CO2 SERPL-SCNC: 16 MMOL/L (ref 24–31)
CREAT BLD-MCNC: 10.28 MG/DL (ref 0.5–1.4)
CREAT UR-MCNC: 179.1 MG/DL
DEPRECATED RDW RBC AUTO: 48.3 FL (ref 40–54)
ERYTHROCYTE [DISTWIDTH] IN BLOOD BY AUTOMATED COUNT: 14.2 % (ref 12–15)
ERYTHROCYTE [SEDIMENTATION RATE] IN BLOOD: 42 MM/HR (ref 0–15)
GFR SERPL CREATININE-BSD FRML MDRD: 5 ML/MIN/1.73
GFR SERPL CREATININE-BSD FRML MDRD: ABNORMAL ML/MIN/1.73
GLOBULIN UR ELPH-MCNC: 2.6 GM/DL
GLUCOSE BLD-MCNC: 101 MG/DL (ref 70–100)
HBV SURFACE AB SER QL: <5
HBV SURFACE AB SER RIA-ACNC: ABNORMAL
HBV SURFACE AG SERPL QL IA: NEGATIVE
HCT VFR BLD AUTO: 32.2 % (ref 40–52)
HGB BLD-MCNC: 10.6 G/DL (ref 14–18)
MAGNESIUM SERPL-MCNC: 1.2 MG/DL (ref 1.4–2.2)
MAGNESIUM SERPL-MCNC: <0.6 MG/DL (ref 1.4–2.2)
MCH RBC QN AUTO: 30.4 PG (ref 28–32)
MCHC RBC AUTO-ENTMCNC: 32.9 G/DL (ref 33–36)
MCV RBC AUTO: 92.3 FL (ref 82–95)
PLATELET # BLD AUTO: 268 10*3/MM3 (ref 130–400)
PMV BLD AUTO: 11.8 FL (ref 6–12)
POTASSIUM BLD-SCNC: 3.3 MMOL/L (ref 3.5–5.3)
POTASSIUM BLD-SCNC: 3.4 MMOL/L (ref 3.5–5.3)
PROT SERPL-MCNC: 5.6 G/DL (ref 6.3–8.7)
RBC # BLD AUTO: 3.49 10*6/MM3 (ref 4.8–5.9)
SODIUM BLD-SCNC: 139 MMOL/L (ref 135–145)
SODIUM UR-SCNC: 42 MMOL/L (ref 30–90)
WBC NRBC COR # BLD: 13.22 10*3/MM3 (ref 4.8–10.8)

## 2019-04-25 PROCEDURE — 82570 ASSAY OF URINE CREATININE: CPT | Performed by: INTERNAL MEDICINE

## 2019-04-25 PROCEDURE — 25010000002 ENOXAPARIN PER 10 MG: Performed by: SPECIALIST

## 2019-04-25 PROCEDURE — 25010000002 MAGNESIUM SULFATE 2 GM/50ML SOLUTION: Performed by: INTERNAL MEDICINE

## 2019-04-25 PROCEDURE — 76775 US EXAM ABDO BACK WALL LIM: CPT

## 2019-04-25 PROCEDURE — 36415 COLL VENOUS BLD VENIPUNCTURE: CPT | Performed by: SPECIALIST

## 2019-04-25 PROCEDURE — 25010000002 PIPERACILLIN SOD-TAZOBACTAM PER 1 G: Performed by: INTERNAL MEDICINE

## 2019-04-25 PROCEDURE — 83735 ASSAY OF MAGNESIUM: CPT | Performed by: INTERNAL MEDICINE

## 2019-04-25 PROCEDURE — 25010000002 HEPARIN (PORCINE) PER 1000 UNITS

## 2019-04-25 PROCEDURE — 25010000003 POTASSIUM CHLORIDE PER 2 MEQ: Performed by: INTERNAL MEDICINE

## 2019-04-25 PROCEDURE — 80053 COMPREHEN METABOLIC PANEL: CPT | Performed by: SPECIALIST

## 2019-04-25 PROCEDURE — 94799 UNLISTED PULMONARY SVC/PX: CPT

## 2019-04-25 PROCEDURE — 83735 ASSAY OF MAGNESIUM: CPT | Performed by: NURSE PRACTITIONER

## 2019-04-25 PROCEDURE — 85027 COMPLETE CBC AUTOMATED: CPT | Performed by: SPECIALIST

## 2019-04-25 PROCEDURE — 85651 RBC SED RATE NONAUTOMATED: CPT | Performed by: SPECIALIST

## 2019-04-25 PROCEDURE — 84300 ASSAY OF URINE SODIUM: CPT | Performed by: INTERNAL MEDICINE

## 2019-04-25 PROCEDURE — 84132 ASSAY OF SERUM POTASSIUM: CPT | Performed by: INTERNAL MEDICINE

## 2019-04-25 PROCEDURE — 94760 N-INVAS EAR/PLS OXIMETRY 1: CPT

## 2019-04-25 RX ORDER — MAGNESIUM SULFATE HEPTAHYDRATE 40 MG/ML
2 INJECTION, SOLUTION INTRAVENOUS ONCE
Status: COMPLETED | OUTPATIENT
Start: 2019-04-25 | End: 2019-04-25

## 2019-04-25 RX ORDER — HEPARIN SODIUM 1000 [USP'U]/ML
INJECTION, SOLUTION INTRAVENOUS; SUBCUTANEOUS
Status: COMPLETED
Start: 2019-04-25 | End: 2019-04-25

## 2019-04-25 RX ORDER — POTASSIUM CHLORIDE 29.8 MG/ML
20 INJECTION INTRAVENOUS
Status: COMPLETED | OUTPATIENT
Start: 2019-04-25 | End: 2019-04-25

## 2019-04-25 RX ORDER — HEPARIN SODIUM,PORCINE 10 UNIT/ML
VIAL (ML) INTRAVENOUS
Status: DISCONTINUED
Start: 2019-04-25 | End: 2019-04-25 | Stop reason: WASHOUT

## 2019-04-25 RX ORDER — HEPARIN SODIUM 1000 [USP'U]/ML
INJECTION, SOLUTION INTRAVENOUS; SUBCUTANEOUS
Status: DISPENSED
Start: 2019-04-25 | End: 2019-04-26

## 2019-04-25 RX ADMIN — POTASSIUM CHLORIDE 20 MEQ: 29.8 INJECTION, SOLUTION INTRAVENOUS at 12:19

## 2019-04-25 RX ADMIN — SODIUM CHLORIDE, PRESERVATIVE FREE 3 ML: 5 INJECTION INTRAVENOUS at 20:07

## 2019-04-25 RX ADMIN — SODIUM CHLORIDE, POTASSIUM CHLORIDE, SODIUM LACTATE AND CALCIUM CHLORIDE 1000 ML: 600; 310; 30; 20 INJECTION, SOLUTION INTRAVENOUS at 09:30

## 2019-04-25 RX ADMIN — SODIUM CHLORIDE, PRESERVATIVE FREE 3 ML: 5 INJECTION INTRAVENOUS at 09:31

## 2019-04-25 RX ADMIN — SUCRALFATE 1 G: 1 SUSPENSION ORAL at 05:34

## 2019-04-25 RX ADMIN — MAGNESIUM SULFATE HEPTAHYDRATE 2 G: 40 INJECTION, SOLUTION INTRAVENOUS at 09:31

## 2019-04-25 RX ADMIN — FAMOTIDINE 20 MG: 10 INJECTION, SOLUTION INTRAVENOUS at 09:29

## 2019-04-25 RX ADMIN — SODIUM BICARBONATE 75 ML/HR: 84 INJECTION, SOLUTION INTRAVENOUS at 06:41

## 2019-04-25 RX ADMIN — POTASSIUM CHLORIDE 20 MEQ: 29.8 INJECTION, SOLUTION INTRAVENOUS at 11:13

## 2019-04-25 RX ADMIN — VANCOMYCIN HYDROCHLORIDE 125 MG: KIT at 11:14

## 2019-04-25 RX ADMIN — SODIUM CHLORIDE, POTASSIUM CHLORIDE, SODIUM LACTATE AND CALCIUM CHLORIDE 100 ML/HR: 600; 310; 30; 20 INJECTION, SOLUTION INTRAVENOUS at 18:13

## 2019-04-25 RX ADMIN — VANCOMYCIN HYDROCHLORIDE 125 MG: KIT at 05:34

## 2019-04-25 RX ADMIN — VANCOMYCIN HYDROCHLORIDE 125 MG: KIT at 18:13

## 2019-04-25 RX ADMIN — TAZOBACTAM SODIUM AND PIPERACILLIN SODIUM 4.5 G: 500; 4 INJECTION, SOLUTION INTRAVENOUS at 21:47

## 2019-04-25 RX ADMIN — SUCRALFATE 1 G: 1 SUSPENSION ORAL at 11:14

## 2019-04-25 RX ADMIN — SUCRALFATE 1 G: 1 SUSPENSION ORAL at 18:07

## 2019-04-25 RX ADMIN — ENOXAPARIN SODIUM 30 MG: 30 INJECTION SUBCUTANEOUS at 18:07

## 2019-04-25 RX ADMIN — HEPARIN SODIUM 1000 UNITS: 1000 INJECTION INTRAVENOUS; SUBCUTANEOUS at 17:12

## 2019-04-25 RX ADMIN — TAZOBACTAM SODIUM AND PIPERACILLIN SODIUM 4.5 G: 500; 4 INJECTION, SOLUTION INTRAVENOUS at 09:30

## 2019-04-26 LAB
ALBUMIN SERPL-MCNC: 2.9 G/DL (ref 3.5–5)
ALBUMIN/GLOB SERPL: 1.1 G/DL (ref 1.1–2.5)
ALP SERPL-CCNC: 40 U/L (ref 24–120)
ALT SERPL W P-5'-P-CCNC: <15 U/L (ref 0–54)
ANION GAP SERPL CALCULATED.3IONS-SCNC: 12 MMOL/L (ref 4–13)
AST SERPL-CCNC: 34 U/L (ref 7–45)
BILIRUB SERPL-MCNC: 0.4 MG/DL (ref 0.1–1)
BUN BLD-MCNC: 21 MG/DL (ref 5–21)
BUN/CREAT SERPL: 3.6 (ref 7–25)
CALCIUM SPEC-SCNC: 7.3 MG/DL (ref 8.4–10.4)
CHLORIDE SERPL-SCNC: 107 MMOL/L (ref 98–110)
CO2 SERPL-SCNC: 21 MMOL/L (ref 24–31)
CREAT BLD-MCNC: 5.84 MG/DL (ref 0.5–1.4)
DEPRECATED RDW RBC AUTO: 45.7 FL (ref 40–54)
ERYTHROCYTE [DISTWIDTH] IN BLOOD BY AUTOMATED COUNT: 14.2 % (ref 12–15)
ERYTHROCYTE [SEDIMENTATION RATE] IN BLOOD: 95 MM/HR (ref 0–15)
GFR SERPL CREATININE-BSD FRML MDRD: 10 ML/MIN/1.73
GFR SERPL CREATININE-BSD FRML MDRD: ABNORMAL ML/MIN/1.73
GLOBULIN UR ELPH-MCNC: 2.6 GM/DL
GLUCOSE BLD-MCNC: 90 MG/DL (ref 70–100)
HCT VFR BLD AUTO: 31.1 % (ref 40–52)
HGB BLD-MCNC: 10.5 G/DL (ref 14–18)
MCH RBC QN AUTO: 30 PG (ref 28–32)
MCHC RBC AUTO-ENTMCNC: 33.8 G/DL (ref 33–36)
MCV RBC AUTO: 88.9 FL (ref 82–95)
PLATELET # BLD AUTO: 247 10*3/MM3 (ref 130–400)
PMV BLD AUTO: 11.2 FL (ref 6–12)
POTASSIUM BLD-SCNC: 3.5 MMOL/L (ref 3.5–5.3)
PROT SERPL-MCNC: 5.5 G/DL (ref 6.3–8.7)
RBC # BLD AUTO: 3.5 10*6/MM3 (ref 4.8–5.9)
SODIUM BLD-SCNC: 140 MMOL/L (ref 135–145)
WBC NRBC COR # BLD: 8.72 10*3/MM3 (ref 4.8–10.8)

## 2019-04-26 PROCEDURE — 94799 UNLISTED PULMONARY SVC/PX: CPT

## 2019-04-26 PROCEDURE — 85651 RBC SED RATE NONAUTOMATED: CPT | Performed by: SPECIALIST

## 2019-04-26 PROCEDURE — 85027 COMPLETE CBC AUTOMATED: CPT | Performed by: SPECIALIST

## 2019-04-26 PROCEDURE — 25010000002 ENOXAPARIN PER 10 MG: Performed by: SPECIALIST

## 2019-04-26 PROCEDURE — 25010000002 PIPERACILLIN SOD-TAZOBACTAM PER 1 G: Performed by: INTERNAL MEDICINE

## 2019-04-26 PROCEDURE — 80053 COMPREHEN METABOLIC PANEL: CPT | Performed by: SPECIALIST

## 2019-04-26 PROCEDURE — 25010000002 MAGNESIUM SULFATE 2 GM/50ML SOLUTION: Performed by: INTERNAL MEDICINE

## 2019-04-26 RX ORDER — HEPARIN SODIUM 1000 [USP'U]/ML
3200 INJECTION, SOLUTION INTRAVENOUS; SUBCUTANEOUS AS NEEDED
Status: DISCONTINUED | OUTPATIENT
Start: 2019-04-26 | End: 2019-04-30 | Stop reason: HOSPADM

## 2019-04-26 RX ORDER — MAGNESIUM SULFATE HEPTAHYDRATE 40 MG/ML
2 INJECTION, SOLUTION INTRAVENOUS ONCE
Status: COMPLETED | OUTPATIENT
Start: 2019-04-26 | End: 2019-04-26

## 2019-04-26 RX ADMIN — SODIUM CHLORIDE, POTASSIUM CHLORIDE, SODIUM LACTATE AND CALCIUM CHLORIDE 100 ML/HR: 600; 310; 30; 20 INJECTION, SOLUTION INTRAVENOUS at 19:08

## 2019-04-26 RX ADMIN — SUCRALFATE 1 G: 1 SUSPENSION ORAL at 11:37

## 2019-04-26 RX ADMIN — VANCOMYCIN HYDROCHLORIDE 125 MG: KIT at 11:37

## 2019-04-26 RX ADMIN — SODIUM CHLORIDE, PRESERVATIVE FREE 3 ML: 5 INJECTION INTRAVENOUS at 10:07

## 2019-04-26 RX ADMIN — SODIUM CHLORIDE, POTASSIUM CHLORIDE, SODIUM LACTATE AND CALCIUM CHLORIDE 100 ML/HR: 600; 310; 30; 20 INJECTION, SOLUTION INTRAVENOUS at 07:24

## 2019-04-26 RX ADMIN — TAZOBACTAM SODIUM AND PIPERACILLIN SODIUM 4.5 G: 500; 4 INJECTION, SOLUTION INTRAVENOUS at 10:06

## 2019-04-26 RX ADMIN — SUCRALFATE 1 G: 1 SUSPENSION ORAL at 05:55

## 2019-04-26 RX ADMIN — SUCRALFATE 1 G: 1 SUSPENSION ORAL at 17:57

## 2019-04-26 RX ADMIN — TAZOBACTAM SODIUM AND PIPERACILLIN SODIUM 4.5 G: 500; 4 INJECTION, SOLUTION INTRAVENOUS at 21:00

## 2019-04-26 RX ADMIN — VANCOMYCIN HYDROCHLORIDE 125 MG: KIT at 17:57

## 2019-04-26 RX ADMIN — VANCOMYCIN HYDROCHLORIDE 125 MG: KIT at 05:55

## 2019-04-26 RX ADMIN — SUCRALFATE 1 G: 1 SUSPENSION ORAL at 00:07

## 2019-04-26 RX ADMIN — VANCOMYCIN HYDROCHLORIDE 125 MG: KIT at 00:07

## 2019-04-26 RX ADMIN — ENOXAPARIN SODIUM 30 MG: 30 INJECTION SUBCUTANEOUS at 17:57

## 2019-04-26 RX ADMIN — MAGNESIUM SULFATE HEPTAHYDRATE 2 G: 40 INJECTION, SOLUTION INTRAVENOUS at 10:32

## 2019-04-26 RX ADMIN — FAMOTIDINE 20 MG: 20 TABLET, FILM COATED ORAL at 10:07

## 2019-04-27 PROBLEM — E83.42 HYPOMAGNESEMIA: Status: ACTIVE | Noted: 2019-04-27

## 2019-04-27 LAB
ALBUMIN SERPL-MCNC: 3 G/DL (ref 3.5–5)
ANION GAP SERPL CALCULATED.3IONS-SCNC: 9 MMOL/L (ref 4–13)
BUN BLD-MCNC: 8 MG/DL (ref 5–21)
BUN/CREAT SERPL: 3.3 (ref 7–25)
CALCIUM SPEC-SCNC: 7.5 MG/DL (ref 8.4–10.4)
CHLORIDE SERPL-SCNC: 102 MMOL/L (ref 98–110)
CO2 SERPL-SCNC: 28 MMOL/L (ref 24–31)
CREAT BLD-MCNC: 2.45 MG/DL (ref 0.5–1.4)
DEPRECATED RDW RBC AUTO: 46.5 FL (ref 40–54)
ERYTHROCYTE [DISTWIDTH] IN BLOOD BY AUTOMATED COUNT: 14 % (ref 12–15)
GFR SERPL CREATININE-BSD FRML MDRD: 26 ML/MIN/1.73
GLUCOSE BLD-MCNC: 88 MG/DL (ref 70–100)
HCT VFR BLD AUTO: 31.9 % (ref 40–52)
HGB BLD-MCNC: 10.5 G/DL (ref 14–18)
MAGNESIUM SERPL-MCNC: 1.3 MG/DL (ref 1.4–2.2)
MCH RBC QN AUTO: 29.8 PG (ref 28–32)
MCHC RBC AUTO-ENTMCNC: 32.9 G/DL (ref 33–36)
MCV RBC AUTO: 90.6 FL (ref 82–95)
PHOSPHATE SERPL-MCNC: 3 MG/DL (ref 2.5–4.5)
PLATELET # BLD AUTO: 279 10*3/MM3 (ref 130–400)
PMV BLD AUTO: 11.7 FL (ref 6–12)
POTASSIUM BLD-SCNC: 3.2 MMOL/L (ref 3.5–5.3)
RBC # BLD AUTO: 3.52 10*6/MM3 (ref 4.8–5.9)
SODIUM BLD-SCNC: 139 MMOL/L (ref 135–145)
WBC NRBC COR # BLD: 8.51 10*3/MM3 (ref 4.8–10.8)

## 2019-04-27 PROCEDURE — 83735 ASSAY OF MAGNESIUM: CPT | Performed by: INTERNAL MEDICINE

## 2019-04-27 PROCEDURE — 80069 RENAL FUNCTION PANEL: CPT | Performed by: INTERNAL MEDICINE

## 2019-04-27 PROCEDURE — 25010000002 MAGNESIUM SULFATE 2 GM/50ML SOLUTION: Performed by: INTERNAL MEDICINE

## 2019-04-27 PROCEDURE — 25010000002 ENOXAPARIN PER 10 MG: Performed by: SPECIALIST

## 2019-04-27 PROCEDURE — 25010000003 AMPICILLIN-SULBACTAM PER 1.5 G: Performed by: INTERNAL MEDICINE

## 2019-04-27 PROCEDURE — 85027 COMPLETE CBC AUTOMATED: CPT | Performed by: INTERNAL MEDICINE

## 2019-04-27 RX ORDER — POTASSIUM CHLORIDE 750 MG/1
20 CAPSULE, EXTENDED RELEASE ORAL ONCE
Status: COMPLETED | OUTPATIENT
Start: 2019-04-27 | End: 2019-04-27

## 2019-04-27 RX ORDER — MAGNESIUM SULFATE HEPTAHYDRATE 40 MG/ML
2 INJECTION, SOLUTION INTRAVENOUS ONCE
Status: COMPLETED | OUTPATIENT
Start: 2019-04-27 | End: 2019-04-27

## 2019-04-27 RX ADMIN — VANCOMYCIN HYDROCHLORIDE 125 MG: KIT at 00:58

## 2019-04-27 RX ADMIN — MAGNESIUM SULFATE HEPTAHYDRATE 2 G: 40 INJECTION, SOLUTION INTRAVENOUS at 13:01

## 2019-04-27 RX ADMIN — VANCOMYCIN HYDROCHLORIDE 125 MG: KIT at 13:01

## 2019-04-27 RX ADMIN — SUCRALFATE 1 G: 1 SUSPENSION ORAL at 00:58

## 2019-04-27 RX ADMIN — VANCOMYCIN HYDROCHLORIDE 125 MG: KIT at 23:24

## 2019-04-27 RX ADMIN — AMPICILLIN SODIUM AND SULBACTAM SODIUM 3 G: 2; 1 INJECTION, POWDER, FOR SOLUTION INTRAMUSCULAR; INTRAVENOUS at 23:24

## 2019-04-27 RX ADMIN — VANCOMYCIN HYDROCHLORIDE 125 MG: KIT at 18:07

## 2019-04-27 RX ADMIN — SUCRALFATE 1 G: 1 SUSPENSION ORAL at 13:01

## 2019-04-27 RX ADMIN — VANCOMYCIN HYDROCHLORIDE 125 MG: KIT at 05:39

## 2019-04-27 RX ADMIN — SUCRALFATE 1 G: 1 SUSPENSION ORAL at 05:39

## 2019-04-27 RX ADMIN — SODIUM CHLORIDE, POTASSIUM CHLORIDE, SODIUM LACTATE AND CALCIUM CHLORIDE 100 ML/HR: 600; 310; 30; 20 INJECTION, SOLUTION INTRAVENOUS at 05:40

## 2019-04-27 RX ADMIN — FAMOTIDINE 20 MG: 20 TABLET, FILM COATED ORAL at 11:28

## 2019-04-27 RX ADMIN — AMPICILLIN SODIUM AND SULBACTAM SODIUM 3 G: 2; 1 INJECTION, POWDER, FOR SOLUTION INTRAMUSCULAR; INTRAVENOUS at 00:58

## 2019-04-27 RX ADMIN — ENOXAPARIN SODIUM 30 MG: 30 INJECTION SUBCUTANEOUS at 18:07

## 2019-04-27 RX ADMIN — POTASSIUM CHLORIDE 20 MEQ: 750 CAPSULE, EXTENDED RELEASE ORAL at 13:01

## 2019-04-28 LAB
ALBUMIN SERPL-MCNC: 3 G/DL (ref 3.5–5)
ANION GAP SERPL CALCULATED.3IONS-SCNC: 7 MMOL/L (ref 4–13)
BUN BLD-MCNC: 6 MG/DL (ref 5–21)
BUN/CREAT SERPL: 3.7 (ref 7–25)
CALCIUM SPEC-SCNC: 7.6 MG/DL (ref 8.4–10.4)
CHLORIDE SERPL-SCNC: 103 MMOL/L (ref 98–110)
CO2 SERPL-SCNC: 30 MMOL/L (ref 24–31)
CREAT BLD-MCNC: 1.61 MG/DL (ref 0.5–1.4)
GFR SERPL CREATININE-BSD FRML MDRD: 42 ML/MIN/1.73
GLUCOSE BLD-MCNC: 111 MG/DL (ref 70–100)
MAGNESIUM SERPL-MCNC: 1.5 MG/DL (ref 1.4–2.2)
PHOSPHATE SERPL-MCNC: 2.3 MG/DL (ref 2.5–4.5)
POTASSIUM BLD-SCNC: 3.1 MMOL/L (ref 3.5–5.3)
SODIUM BLD-SCNC: 140 MMOL/L (ref 135–145)

## 2019-04-28 PROCEDURE — 25010000003 AMPICILLIN-SULBACTAM PER 1.5 G: Performed by: INTERNAL MEDICINE

## 2019-04-28 PROCEDURE — 80069 RENAL FUNCTION PANEL: CPT | Performed by: INTERNAL MEDICINE

## 2019-04-28 PROCEDURE — 97161 PT EVAL LOW COMPLEX 20 MIN: CPT | Performed by: PHYSICAL THERAPIST

## 2019-04-28 PROCEDURE — 25010000002 ENOXAPARIN PER 10 MG: Performed by: SPECIALIST

## 2019-04-28 PROCEDURE — 83735 ASSAY OF MAGNESIUM: CPT | Performed by: INTERNAL MEDICINE

## 2019-04-28 PROCEDURE — 25010000002 MAGNESIUM SULFATE 2 GM/50ML SOLUTION: Performed by: INTERNAL MEDICINE

## 2019-04-28 RX ORDER — POTASSIUM CHLORIDE 750 MG/1
40 CAPSULE, EXTENDED RELEASE ORAL ONCE
Status: COMPLETED | OUTPATIENT
Start: 2019-04-28 | End: 2019-04-28

## 2019-04-28 RX ORDER — POTASSIUM CHLORIDE 750 MG/1
20 CAPSULE, EXTENDED RELEASE ORAL ONCE
Status: DISCONTINUED | OUTPATIENT
Start: 2019-04-28 | End: 2019-04-28 | Stop reason: SDUPTHER

## 2019-04-28 RX ORDER — MAGNESIUM SULFATE HEPTAHYDRATE 40 MG/ML
2 INJECTION, SOLUTION INTRAVENOUS ONCE
Status: COMPLETED | OUTPATIENT
Start: 2019-04-28 | End: 2019-04-28

## 2019-04-28 RX ADMIN — SODIUM CHLORIDE, PRESERVATIVE FREE 3 ML: 5 INJECTION INTRAVENOUS at 08:30

## 2019-04-28 RX ADMIN — MAGNESIUM SULFATE HEPTAHYDRATE 2 G: 40 INJECTION, SOLUTION INTRAVENOUS at 15:24

## 2019-04-28 RX ADMIN — VANCOMYCIN HYDROCHLORIDE 125 MG: KIT at 05:55

## 2019-04-28 RX ADMIN — VANCOMYCIN HYDROCHLORIDE 125 MG: KIT at 13:02

## 2019-04-28 RX ADMIN — AMPICILLIN SODIUM AND SULBACTAM SODIUM 3 G: 2; 1 INJECTION, POWDER, FOR SOLUTION INTRAMUSCULAR; INTRAVENOUS at 21:56

## 2019-04-28 RX ADMIN — SODIUM CHLORIDE, PRESERVATIVE FREE 3 ML: 5 INJECTION INTRAVENOUS at 21:57

## 2019-04-28 RX ADMIN — VANCOMYCIN HYDROCHLORIDE 125 MG: KIT at 23:03

## 2019-04-28 RX ADMIN — FAMOTIDINE 20 MG: 20 TABLET, FILM COATED ORAL at 08:30

## 2019-04-28 RX ADMIN — SODIUM CHLORIDE, POTASSIUM CHLORIDE, SODIUM LACTATE AND CALCIUM CHLORIDE 50 ML/HR: 600; 310; 30; 20 INJECTION, SOLUTION INTRAVENOUS at 03:30

## 2019-04-28 RX ADMIN — AMPICILLIN SODIUM AND SULBACTAM SODIUM 3 G: 2; 1 INJECTION, POWDER, FOR SOLUTION INTRAMUSCULAR; INTRAVENOUS at 13:02

## 2019-04-28 RX ADMIN — ENOXAPARIN SODIUM 30 MG: 30 INJECTION SUBCUTANEOUS at 18:29

## 2019-04-28 RX ADMIN — SODIUM CHLORIDE, POTASSIUM CHLORIDE, SODIUM LACTATE AND CALCIUM CHLORIDE 50 ML/HR: 600; 310; 30; 20 INJECTION, SOLUTION INTRAVENOUS at 23:03

## 2019-04-28 RX ADMIN — POTASSIUM CHLORIDE 40 MEQ: 750 CAPSULE, EXTENDED RELEASE ORAL at 15:24

## 2019-04-28 RX ADMIN — VANCOMYCIN HYDROCHLORIDE 125 MG: KIT at 18:29

## 2019-04-29 LAB
ALBUMIN SERPL-MCNC: 3.2 G/DL (ref 3.5–5)
ANION GAP SERPL CALCULATED.3IONS-SCNC: 11 MMOL/L (ref 4–13)
BACTERIA SPEC AEROBE CULT: NORMAL
BACTERIA SPEC AEROBE CULT: NORMAL
BUN BLD-MCNC: 8 MG/DL (ref 5–21)
BUN/CREAT SERPL: 6 (ref 7–25)
CALCIUM SPEC-SCNC: 8 MG/DL (ref 8.4–10.4)
CHLORIDE SERPL-SCNC: 102 MMOL/L (ref 98–110)
CO2 SERPL-SCNC: 30 MMOL/L (ref 24–31)
CREAT BLD-MCNC: 1.34 MG/DL (ref 0.5–1.4)
DEPRECATED RDW RBC AUTO: 48 FL (ref 40–54)
ERYTHROCYTE [DISTWIDTH] IN BLOOD BY AUTOMATED COUNT: 14.2 % (ref 12–15)
GFR SERPL CREATININE-BSD FRML MDRD: 52 ML/MIN/1.73
GLUCOSE BLD-MCNC: 108 MG/DL (ref 70–100)
HCT VFR BLD AUTO: 34.5 % (ref 40–52)
HGB BLD-MCNC: 10.8 G/DL (ref 14–18)
MAGNESIUM SERPL-MCNC: 1.4 MG/DL (ref 1.4–2.2)
MCH RBC QN AUTO: 29.2 PG (ref 28–32)
MCHC RBC AUTO-ENTMCNC: 31.3 G/DL (ref 33–36)
MCV RBC AUTO: 93.2 FL (ref 82–95)
PHOSPHATE SERPL-MCNC: 2.5 MG/DL (ref 2.5–4.5)
PLATELET # BLD AUTO: 317 10*3/MM3 (ref 130–400)
PMV BLD AUTO: 11.3 FL (ref 6–12)
POTASSIUM BLD-SCNC: 3.9 MMOL/L (ref 3.5–5.3)
RBC # BLD AUTO: 3.7 10*6/MM3 (ref 4.8–5.9)
SODIUM BLD-SCNC: 143 MMOL/L (ref 135–145)
WBC NRBC COR # BLD: 9.05 10*3/MM3 (ref 4.8–10.8)

## 2019-04-29 PROCEDURE — 94760 N-INVAS EAR/PLS OXIMETRY 1: CPT

## 2019-04-29 PROCEDURE — 25010000002 ENOXAPARIN PER 10 MG: Performed by: SPECIALIST

## 2019-04-29 PROCEDURE — 25010000003 AMPICILLIN-SULBACTAM PER 1.5 G: Performed by: INTERNAL MEDICINE

## 2019-04-29 PROCEDURE — 97530 THERAPEUTIC ACTIVITIES: CPT

## 2019-04-29 PROCEDURE — 94799 UNLISTED PULMONARY SVC/PX: CPT

## 2019-04-29 PROCEDURE — 85027 COMPLETE CBC AUTOMATED: CPT | Performed by: INTERNAL MEDICINE

## 2019-04-29 PROCEDURE — 97116 GAIT TRAINING THERAPY: CPT

## 2019-04-29 PROCEDURE — 83735 ASSAY OF MAGNESIUM: CPT | Performed by: INTERNAL MEDICINE

## 2019-04-29 PROCEDURE — 97110 THERAPEUTIC EXERCISES: CPT

## 2019-04-29 PROCEDURE — 80069 RENAL FUNCTION PANEL: CPT | Performed by: INTERNAL MEDICINE

## 2019-04-29 RX ADMIN — VANCOMYCIN HYDROCHLORIDE 125 MG: KIT at 18:21

## 2019-04-29 RX ADMIN — AMPICILLIN SODIUM AND SULBACTAM SODIUM 3 G: 2; 1 INJECTION, POWDER, FOR SOLUTION INTRAMUSCULAR; INTRAVENOUS at 12:50

## 2019-04-29 RX ADMIN — ENOXAPARIN SODIUM 30 MG: 30 INJECTION SUBCUTANEOUS at 18:21

## 2019-04-29 RX ADMIN — VANCOMYCIN HYDROCHLORIDE 125 MG: KIT at 12:50

## 2019-04-29 RX ADMIN — AMPICILLIN SODIUM AND SULBACTAM SODIUM 3 G: 2; 1 INJECTION, POWDER, FOR SOLUTION INTRAMUSCULAR; INTRAVENOUS at 21:07

## 2019-04-29 RX ADMIN — AMPICILLIN SODIUM AND SULBACTAM SODIUM 3 G: 2; 1 INJECTION, POWDER, FOR SOLUTION INTRAMUSCULAR; INTRAVENOUS at 05:40

## 2019-04-29 RX ADMIN — SODIUM CHLORIDE, POTASSIUM CHLORIDE, SODIUM LACTATE AND CALCIUM CHLORIDE 50 ML/HR: 600; 310; 30; 20 INJECTION, SOLUTION INTRAVENOUS at 19:29

## 2019-04-29 RX ADMIN — FAMOTIDINE 20 MG: 20 TABLET, FILM COATED ORAL at 08:22

## 2019-04-29 RX ADMIN — VANCOMYCIN HYDROCHLORIDE 125 MG: KIT at 23:59

## 2019-04-29 RX ADMIN — SODIUM CHLORIDE, PRESERVATIVE FREE 3 ML: 5 INJECTION INTRAVENOUS at 21:06

## 2019-04-29 RX ADMIN — VANCOMYCIN HYDROCHLORIDE 125 MG: KIT at 05:40

## 2019-04-30 VITALS
TEMPERATURE: 98.3 F | DIASTOLIC BLOOD PRESSURE: 74 MMHG | OXYGEN SATURATION: 92 % | HEIGHT: 65 IN | RESPIRATION RATE: 18 BRPM | BODY MASS INDEX: 25.49 KG/M2 | SYSTOLIC BLOOD PRESSURE: 118 MMHG | HEART RATE: 87 BPM | WEIGHT: 153 LBS

## 2019-04-30 LAB
ANION GAP SERPL CALCULATED.3IONS-SCNC: 8 MMOL/L (ref 4–13)
BUN BLD-MCNC: 8 MG/DL (ref 5–21)
BUN/CREAT SERPL: 7.4 (ref 7–25)
CALCIUM SPEC-SCNC: 7.3 MG/DL (ref 8.4–10.4)
CHLORIDE SERPL-SCNC: 98 MMOL/L (ref 98–110)
CO2 SERPL-SCNC: 35 MMOL/L (ref 24–31)
CREAT BLD-MCNC: 1.08 MG/DL (ref 0.5–1.4)
GFR SERPL CREATININE-BSD FRML MDRD: 67 ML/MIN/1.73
GLUCOSE BLD-MCNC: 116 MG/DL (ref 70–100)
POTASSIUM BLD-SCNC: 3 MMOL/L (ref 3.5–5.3)
SODIUM BLD-SCNC: 141 MMOL/L (ref 135–145)

## 2019-04-30 PROCEDURE — 25010000003 AMPICILLIN-SULBACTAM PER 1.5 G: Performed by: INTERNAL MEDICINE

## 2019-04-30 PROCEDURE — 80048 BASIC METABOLIC PNL TOTAL CA: CPT | Performed by: INTERNAL MEDICINE

## 2019-04-30 RX ORDER — FAMOTIDINE 20 MG/1
20 TABLET, FILM COATED ORAL DAILY
Qty: 30 TABLET | Refills: 1 | Status: ON HOLD | OUTPATIENT
Start: 2019-05-01 | End: 2019-05-14 | Stop reason: SDUPTHER

## 2019-04-30 RX ORDER — POTASSIUM CHLORIDE 750 MG/1
40 CAPSULE, EXTENDED RELEASE ORAL ONCE
Status: COMPLETED | OUTPATIENT
Start: 2019-04-30 | End: 2019-04-30

## 2019-04-30 RX ORDER — POTASSIUM CHLORIDE 14.9 MG/ML
10 INJECTION INTRAVENOUS
Status: DISPENSED | OUTPATIENT
Start: 2019-04-30 | End: 2019-04-30

## 2019-04-30 RX ADMIN — POTASSIUM CHLORIDE 40 MEQ: 750 CAPSULE, EXTENDED RELEASE ORAL at 10:18

## 2019-04-30 RX ADMIN — FAMOTIDINE 20 MG: 20 TABLET, FILM COATED ORAL at 08:08

## 2019-04-30 RX ADMIN — AMPICILLIN SODIUM AND SULBACTAM SODIUM 3 G: 2; 1 INJECTION, POWDER, FOR SOLUTION INTRAMUSCULAR; INTRAVENOUS at 03:56

## 2019-04-30 RX ADMIN — VANCOMYCIN HYDROCHLORIDE 125 MG: KIT at 05:39

## 2019-04-30 RX ADMIN — SODIUM CHLORIDE, PRESERVATIVE FREE 3 ML: 5 INJECTION INTRAVENOUS at 08:08

## 2019-05-01 ENCOUNTER — READMISSION MANAGEMENT (OUTPATIENT)
Dept: CALL CENTER | Facility: HOSPITAL | Age: 74
End: 2019-05-01

## 2019-05-01 NOTE — OUTREACH NOTE
Prep Survey      Responses   Facility patient discharged from?  [place]   Is patient eligible?  Yes   Discharge diagnosis  Hypomagnesemia,Septic shock    Does the patient have one of the following disease processes/diagnoses(primary or secondary)?  Sepsis   Does the patient have Home health ordered?  Yes   What is the Home health agency?   [facility]    Is there a DME ordered?  Yes   What DME was ordered?  Hospital bed per [place]   Prep survey completed?  Yes          Rossy Hayden RN

## 2019-05-03 ENCOUNTER — READMISSION MANAGEMENT (OUTPATIENT)
Dept: CALL CENTER | Facility: HOSPITAL | Age: 74
End: 2019-05-03

## 2019-05-03 NOTE — OUTREACH NOTE
Sepsis Week 1 Survey      Responses   Facility patient discharged from?  [place]   Does the patient have one of the following disease processes/diagnoses(primary or secondary)?  Sepsis   Is there a successful TCM telephone encounter documented?  No   Week 1 attempt successful?  Yes   Call start time  1128   Call end time  1140   Discharge diagnosis  Hypomagnesemia,Septic shock    Meds reviewed with patient/caregiver?  Yes   Is the patient having any side effects they believe may be caused by any medication additions or changes?  No   Does the patient have all medications related to this admission filled (includes all antibiotics, inhalers, nebulizers,steroids,etc.)  Yes   Is the patient taking all medications as directed (includes completed medication regime)?  Yes   Does the patient have a primary care provider?   Yes   Comments regarding PCP  Has a followup on Monday May 6th with PCP.    Does the patient have an appointment with their PCP within 7 days of discharge?  Yes   What is the Home health agency?   [facility]    Has home health visited the patient within 72 hours of discharge?  N/A   What DME was ordered?  Hospital bed per [place]   Has all DME been delivered?  Yes   Psychosocial issues?  No   Comments  Patient reports he is doing well. He is afebrile and has no chills.    Did the patient receive a copy of their discharge instructions?  Yes   Nursing interventions  Reviewed instructions with patient   What is the patient's perception of their health status since discharge?  Improving   Nursing interventions  Nurse provided patient education   Is the patient/caregiver able to teach back Sepsis?  S - Shivering,fever or very cold, E - Extreme pain or generalized discomfort (worst ever,especially abdomen), P - Pale or discolored skin, S - Sleepy, difficult to arouse,confused, I -   I feel like I might die-a feeling of hopelessness, S - Short of breath   Nursing interventions  Nurse provided reassurance to patient,  Nurse provided patient education   Is patient/caregiver able to teach back steps to recovery at home?  Set small, achievable goals for return to baseline health, Rest and regain strength, Make a list of questions for PCP appoinment, Eat a balanced diet   Is the patient/caregiver able to teach back signs and symptoms of worsening condition:  Fever, Hyperthermia, Rapid heart rate (>90), Shortness of breath/rapid respiratory rate   Is the patient/caregiver able to teach back the hierarchy of who to call/visit for symptoms/problems? PCP, Specialist, Home health nurse, Urgent Care, ED, 911  Yes   Additional teach back comments  Explained to patient if he develops chills, fever, increased heart rate, SOB, pale skin, confusion or chest pain. Patient verbalized understanding.    Week 1 call completed?  Yes          Florentino Chong RN

## 2019-05-10 ENCOUNTER — READMISSION MANAGEMENT (OUTPATIENT)
Dept: CALL CENTER | Facility: HOSPITAL | Age: 74
End: 2019-05-10

## 2019-05-10 ENCOUNTER — HOSPITAL ENCOUNTER (OUTPATIENT)
Facility: HOSPITAL | Age: 74
Setting detail: OBSERVATION
Discharge: HOME | End: 2019-05-14
Attending: EMERGENCY MEDICINE | Admitting: FAMILY MEDICINE

## 2019-05-10 ENCOUNTER — APPOINTMENT (OUTPATIENT)
Dept: GENERAL RADIOLOGY | Facility: HOSPITAL | Age: 74
End: 2019-05-10

## 2019-05-10 ENCOUNTER — APPOINTMENT (OUTPATIENT)
Dept: CT IMAGING | Facility: HOSPITAL | Age: 74
End: 2019-05-10

## 2019-05-10 DIAGNOSIS — K92.1 BLACK STOOL: ICD-10-CM

## 2019-05-10 DIAGNOSIS — R65.21 SEPTIC SHOCK (HCC): ICD-10-CM

## 2019-05-10 DIAGNOSIS — E87.6 HYPOKALEMIA: ICD-10-CM

## 2019-05-10 DIAGNOSIS — K57.92 DIVERTICULITIS: ICD-10-CM

## 2019-05-10 DIAGNOSIS — Z74.09 IMPAIRED MOBILITY: ICD-10-CM

## 2019-05-10 DIAGNOSIS — R19.7 DIARRHEA, UNSPECIFIED TYPE: ICD-10-CM

## 2019-05-10 DIAGNOSIS — A41.9 SEPTIC SHOCK (HCC): ICD-10-CM

## 2019-05-10 DIAGNOSIS — R11.2 NAUSEA VOMITING AND DIARRHEA: ICD-10-CM

## 2019-05-10 DIAGNOSIS — R13.10 DYSPHAGIA, UNSPECIFIED TYPE: ICD-10-CM

## 2019-05-10 DIAGNOSIS — C18.9 MALIGNANT NEOPLASM OF COLON, UNSPECIFIED PART OF COLON (HCC): ICD-10-CM

## 2019-05-10 DIAGNOSIS — R11.10 VOMITING AND DIARRHEA: ICD-10-CM

## 2019-05-10 DIAGNOSIS — D50.9 IRON DEFICIENCY ANEMIA, UNSPECIFIED IRON DEFICIENCY ANEMIA TYPE: ICD-10-CM

## 2019-05-10 DIAGNOSIS — R19.7 NAUSEA VOMITING AND DIARRHEA: ICD-10-CM

## 2019-05-10 DIAGNOSIS — K57.32 DIVERTICULITIS LARGE INTESTINE W/O PERFORATION OR ABSCESS W/O BLEEDING: ICD-10-CM

## 2019-05-10 DIAGNOSIS — N17.9 ACUTE RENAL FAILURE, UNSPECIFIED ACUTE RENAL FAILURE TYPE (HCC): ICD-10-CM

## 2019-05-10 DIAGNOSIS — R19.7 VOMITING AND DIARRHEA: ICD-10-CM

## 2019-05-10 DIAGNOSIS — E83.42 HYPOMAGNESEMIA: ICD-10-CM

## 2019-05-10 DIAGNOSIS — K57.30 DIVERTICULAR DISEASE OF LARGE INTESTINE WITH COMPLICATION: ICD-10-CM

## 2019-05-10 DIAGNOSIS — E87.20 LACTIC ACIDOSIS: ICD-10-CM

## 2019-05-10 DIAGNOSIS — Z85.038 PERSONAL HISTORY OF COLON CANCER: ICD-10-CM

## 2019-05-10 DIAGNOSIS — R63.0 ANOREXIA: ICD-10-CM

## 2019-05-10 DIAGNOSIS — D72.829 LEUKOCYTOSIS, UNSPECIFIED TYPE: ICD-10-CM

## 2019-05-10 DIAGNOSIS — N17.9 ACUTE KIDNEY INJURY (HCC): ICD-10-CM

## 2019-05-10 DIAGNOSIS — A04.72 C. DIFFICILE COLITIS: ICD-10-CM

## 2019-05-10 DIAGNOSIS — R06.00 DYSPNEA, UNSPECIFIED TYPE: ICD-10-CM

## 2019-05-10 DIAGNOSIS — J44.1 COPD WITH ACUTE EXACERBATION (HCC): ICD-10-CM

## 2019-05-10 DIAGNOSIS — R09.02 HYPOXIA: Primary | ICD-10-CM

## 2019-05-10 DIAGNOSIS — E87.29 HIGH ANION GAP METABOLIC ACIDOSIS: ICD-10-CM

## 2019-05-10 LAB
ALBUMIN SERPL-MCNC: 4.3 G/DL (ref 3.5–5)
ALBUMIN/GLOB SERPL: 1.1 G/DL (ref 1.1–2.5)
ALP SERPL-CCNC: 66 U/L (ref 24–120)
ALT SERPL W P-5'-P-CCNC: <15 U/L (ref 0–54)
ANION GAP SERPL CALCULATED.3IONS-SCNC: 12 MMOL/L (ref 4–13)
ARTERIAL PATENCY WRIST A: POSITIVE
AST SERPL-CCNC: 55 U/L (ref 7–45)
ATMOSPHERIC PRESS: 753 MMHG
ATMOSPHERIC PRESS: 754 MMHG
BACTERIA UR QL AUTO: ABNORMAL /HPF
BASE EXCESS BLDA CALC-SCNC: 12.8 MMOL/L (ref 0–2)
BASE EXCESS BLDV CALC-SCNC: 12.4 MMOL/L (ref 0–2)
BASOPHILS # BLD AUTO: 0.06 10*3/MM3 (ref 0–0.2)
BASOPHILS NFR BLD AUTO: 0.4 % (ref 0–2)
BDY SITE: ABNORMAL
BDY SITE: ABNORMAL
BILIRUB SERPL-MCNC: 0.5 MG/DL (ref 0.1–1)
BILIRUB UR QL STRIP: NEGATIVE
BODY TEMPERATURE: 37 C
BODY TEMPERATURE: 37 C
BUN BLD-MCNC: 11 MG/DL (ref 5–21)
BUN/CREAT SERPL: 10.9 (ref 7–25)
CALCIUM SPEC-SCNC: 9.3 MG/DL (ref 8.4–10.4)
CHLORIDE SERPL-SCNC: 94 MMOL/L (ref 98–110)
CLARITY UR: CLEAR
CO2 SERPL-SCNC: 39 MMOL/L (ref 24–31)
COLOR UR: YELLOW
CREAT BLD-MCNC: 1.01 MG/DL (ref 0.5–1.4)
D-LACTATE SERPL-SCNC: 1.2 MMOL/L (ref 0.5–2)
D-LACTATE SERPL-SCNC: 2.3 MMOL/L (ref 0.5–2)
DEPRECATED RDW RBC AUTO: 47.7 FL (ref 40–54)
EOSINOPHIL # BLD AUTO: 0.01 10*3/MM3 (ref 0–0.7)
EOSINOPHIL NFR BLD AUTO: 0.1 % (ref 0–4)
ERYTHROCYTE [DISTWIDTH] IN BLOOD BY AUTOMATED COUNT: 14 % (ref 12–15)
GAS FLOW AIRWAY: 2 LPM
GFR SERPL CREATININE-BSD FRML MDRD: 72 ML/MIN/1.73
GLOBULIN UR ELPH-MCNC: 3.8 GM/DL
GLUCOSE BLD-MCNC: 149 MG/DL (ref 70–100)
GLUCOSE UR STRIP-MCNC: NEGATIVE MG/DL
HCO3 BLDA-SCNC: 37.3 MMOL/L (ref 20–26)
HCO3 BLDV-SCNC: 38.5 MMOL/L (ref 22–28)
HCT VFR BLD AUTO: 40.4 % (ref 40–52)
HGB BLD-MCNC: 13 G/DL (ref 14–18)
HGB UR QL STRIP.AUTO: NEGATIVE
HOLD SPECIMEN: NORMAL
HYALINE CASTS UR QL AUTO: ABNORMAL /LPF
IMM GRANULOCYTES # BLD AUTO: 0.11 10*3/MM3 (ref 0–0.05)
IMM GRANULOCYTES NFR BLD AUTO: 0.7 % (ref 0–5)
KETONES UR QL STRIP: NEGATIVE
LEUKOCYTE ESTERASE UR QL STRIP.AUTO: NEGATIVE
LIPASE SERPL-CCNC: 267 U/L (ref 23–203)
LYMPHOCYTES # BLD AUTO: 1.09 10*3/MM3 (ref 0.72–4.86)
LYMPHOCYTES NFR BLD AUTO: 7 % (ref 15–45)
Lab: ABNORMAL
Lab: ABNORMAL
MCH RBC QN AUTO: 29.6 PG (ref 28–32)
MCHC RBC AUTO-ENTMCNC: 32.2 G/DL (ref 33–36)
MCV RBC AUTO: 92 FL (ref 82–95)
MODALITY: ABNORMAL
MODALITY: ABNORMAL
MONOCYTES # BLD AUTO: 1.14 10*3/MM3 (ref 0.19–1.3)
MONOCYTES NFR BLD AUTO: 7.3 % (ref 4–12)
NEUTROPHILS # BLD AUTO: 13.22 10*3/MM3 (ref 1.87–8.4)
NEUTROPHILS NFR BLD AUTO: 84.5 % (ref 39–78)
NITRITE UR QL STRIP: NEGATIVE
NRBC BLD AUTO-RTO: 0 /100 WBC (ref 0–0.2)
NT-PROBNP SERPL-MCNC: 141 PG/ML (ref 0–900)
PCO2 BLDA: 46.4 MM HG (ref 35–45)
PCO2 BLDV: 55.9 MM HG (ref 41–51)
PH BLDA: 7.51 PH UNITS (ref 7.35–7.45)
PH BLDV: 7.45 PH UNITS (ref 7.32–7.42)
PH UR STRIP.AUTO: >=9 [PH] (ref 5–8)
PLATELET # BLD AUTO: 368 10*3/MM3 (ref 130–400)
PMV BLD AUTO: 12.2 FL (ref 6–12)
PO2 BLDA: 74.3 MM HG (ref 83–108)
PO2 BLDV: 16.5 MM HG (ref 27–53)
POTASSIUM BLD-SCNC: 3.6 MMOL/L (ref 3.5–5.3)
PROT SERPL-MCNC: 8.1 G/DL (ref 6.3–8.7)
PROT UR QL STRIP: ABNORMAL
RBC # BLD AUTO: 4.39 10*6/MM3 (ref 4.8–5.9)
RBC # UR: ABNORMAL /HPF
REF LAB TEST METHOD: ABNORMAL
SAO2 % BLDCOA: 96 % (ref 94–99)
SAO2 % BLDCOV: 20.7 % (ref 45–75)
SODIUM BLD-SCNC: 145 MMOL/L (ref 135–145)
SP GR UR STRIP: 1.01 (ref 1–1.03)
SQUAMOUS #/AREA URNS HPF: ABNORMAL /HPF
TROPONIN I SERPL-MCNC: <0.012 NG/ML (ref 0–0.03)
UROBILINOGEN UR QL STRIP: ABNORMAL
VENTILATOR MODE: ABNORMAL
VENTILATOR MODE: ABNORMAL
WBC NRBC COR # BLD: 15.63 10*3/MM3 (ref 4.8–10.8)
WBC UR QL AUTO: ABNORMAL /HPF

## 2019-05-10 PROCEDURE — 94799 UNLISTED PULMONARY SVC/PX: CPT

## 2019-05-10 PROCEDURE — 99284 EMERGENCY DEPT VISIT MOD MDM: CPT

## 2019-05-10 PROCEDURE — 83605 ASSAY OF LACTIC ACID: CPT | Performed by: EMERGENCY MEDICINE

## 2019-05-10 PROCEDURE — 94640 AIRWAY INHALATION TREATMENT: CPT

## 2019-05-10 PROCEDURE — 25010000002 METHYLPREDNISOLONE PER 40 MG: Performed by: FAMILY MEDICINE

## 2019-05-10 PROCEDURE — 25010000002 ONDANSETRON PER 1 MG: Performed by: EMERGENCY MEDICINE

## 2019-05-10 PROCEDURE — 71045 X-RAY EXAM CHEST 1 VIEW: CPT

## 2019-05-10 PROCEDURE — 96375 TX/PRO/DX INJ NEW DRUG ADDON: CPT

## 2019-05-10 PROCEDURE — 93010 ELECTROCARDIOGRAM REPORT: CPT | Performed by: INTERNAL MEDICINE

## 2019-05-10 PROCEDURE — 96376 TX/PRO/DX INJ SAME DRUG ADON: CPT

## 2019-05-10 PROCEDURE — 96361 HYDRATE IV INFUSION ADD-ON: CPT

## 2019-05-10 PROCEDURE — 82803 BLOOD GASES ANY COMBINATION: CPT

## 2019-05-10 PROCEDURE — G0378 HOSPITAL OBSERVATION PER HR: HCPCS

## 2019-05-10 PROCEDURE — 83880 ASSAY OF NATRIURETIC PEPTIDE: CPT | Performed by: EMERGENCY MEDICINE

## 2019-05-10 PROCEDURE — 81001 URINALYSIS AUTO W/SCOPE: CPT | Performed by: EMERGENCY MEDICINE

## 2019-05-10 PROCEDURE — 74176 CT ABD & PELVIS W/O CONTRAST: CPT

## 2019-05-10 PROCEDURE — 84484 ASSAY OF TROPONIN QUANT: CPT | Performed by: EMERGENCY MEDICINE

## 2019-05-10 PROCEDURE — 25010000002 CEFTRIAXONE PER 250 MG: Performed by: FAMILY MEDICINE

## 2019-05-10 PROCEDURE — 25010000002 MORPHINE PER 10 MG: Performed by: EMERGENCY MEDICINE

## 2019-05-10 PROCEDURE — 93005 ELECTROCARDIOGRAM TRACING: CPT | Performed by: EMERGENCY MEDICINE

## 2019-05-10 PROCEDURE — 96374 THER/PROPH/DIAG INJ IV PUSH: CPT

## 2019-05-10 PROCEDURE — 25010000002 ONDANSETRON PER 1 MG: Performed by: FAMILY MEDICINE

## 2019-05-10 PROCEDURE — 36600 WITHDRAWAL OF ARTERIAL BLOOD: CPT

## 2019-05-10 PROCEDURE — 87040 BLOOD CULTURE FOR BACTERIA: CPT | Performed by: EMERGENCY MEDICINE

## 2019-05-10 PROCEDURE — 85025 COMPLETE CBC W/AUTO DIFF WBC: CPT | Performed by: EMERGENCY MEDICINE

## 2019-05-10 PROCEDURE — 83690 ASSAY OF LIPASE: CPT | Performed by: EMERGENCY MEDICINE

## 2019-05-10 PROCEDURE — 80053 COMPREHEN METABOLIC PANEL: CPT | Performed by: EMERGENCY MEDICINE

## 2019-05-10 RX ORDER — IPRATROPIUM BROMIDE AND ALBUTEROL SULFATE 2.5; .5 MG/3ML; MG/3ML
3 SOLUTION RESPIRATORY (INHALATION)
Status: DISCONTINUED | OUTPATIENT
Start: 2019-05-10 | End: 2019-05-14 | Stop reason: HOSPADM

## 2019-05-10 RX ORDER — ONDANSETRON 2 MG/ML
4 INJECTION INTRAMUSCULAR; INTRAVENOUS EVERY 6 HOURS PRN
Status: DISCONTINUED | OUTPATIENT
Start: 2019-05-10 | End: 2019-05-14 | Stop reason: HOSPADM

## 2019-05-10 RX ORDER — SODIUM CHLORIDE 9 MG/ML
100 INJECTION, SOLUTION INTRAVENOUS CONTINUOUS
Status: DISCONTINUED | OUTPATIENT
Start: 2019-05-10 | End: 2019-05-11

## 2019-05-10 RX ORDER — ACETAMINOPHEN 650 MG/1
650 SUPPOSITORY RECTAL EVERY 4 HOURS PRN
Status: DISCONTINUED | OUTPATIENT
Start: 2019-05-10 | End: 2019-05-14 | Stop reason: HOSPADM

## 2019-05-10 RX ORDER — IPRATROPIUM BROMIDE AND ALBUTEROL SULFATE 2.5; .5 MG/3ML; MG/3ML
3 SOLUTION RESPIRATORY (INHALATION) ONCE
Status: COMPLETED | OUTPATIENT
Start: 2019-05-10 | End: 2019-05-10

## 2019-05-10 RX ORDER — GUAIFENESIN 600 MG/1
1200 TABLET, EXTENDED RELEASE ORAL EVERY 12 HOURS SCHEDULED
Status: DISCONTINUED | OUTPATIENT
Start: 2019-05-10 | End: 2019-05-14 | Stop reason: HOSPADM

## 2019-05-10 RX ORDER — SODIUM CHLORIDE 0.9 % (FLUSH) 0.9 %
10 SYRINGE (ML) INJECTION AS NEEDED
Status: DISCONTINUED | OUTPATIENT
Start: 2019-05-10 | End: 2019-05-14 | Stop reason: HOSPADM

## 2019-05-10 RX ORDER — ONDANSETRON 2 MG/ML
4 INJECTION INTRAMUSCULAR; INTRAVENOUS ONCE
Status: COMPLETED | OUTPATIENT
Start: 2019-05-10 | End: 2019-05-10

## 2019-05-10 RX ORDER — ALUMINA, MAGNESIA, AND SIMETHICONE 2400; 2400; 240 MG/30ML; MG/30ML; MG/30ML
15 SUSPENSION ORAL ONCE
Status: COMPLETED | OUTPATIENT
Start: 2019-05-10 | End: 2019-05-10

## 2019-05-10 RX ORDER — ACETAMINOPHEN 325 MG/1
650 TABLET ORAL EVERY 6 HOURS PRN
Status: DISCONTINUED | OUTPATIENT
Start: 2019-05-10 | End: 2019-05-14 | Stop reason: HOSPADM

## 2019-05-10 RX ORDER — METHYLPREDNISOLONE SODIUM SUCCINATE 40 MG/ML
20 INJECTION, POWDER, LYOPHILIZED, FOR SOLUTION INTRAMUSCULAR; INTRAVENOUS EVERY 12 HOURS
Status: DISCONTINUED | OUTPATIENT
Start: 2019-05-10 | End: 2019-05-14 | Stop reason: HOSPADM

## 2019-05-10 RX ADMIN — METHYLPREDNISOLONE SODIUM SUCCINATE 20 MG: 40 INJECTION, POWDER, FOR SOLUTION INTRAMUSCULAR; INTRAVENOUS at 22:42

## 2019-05-10 RX ADMIN — ONDANSETRON HYDROCHLORIDE 4 MG: 2 INJECTION, SOLUTION INTRAMUSCULAR; INTRAVENOUS at 18:44

## 2019-05-10 RX ADMIN — ONDANSETRON HYDROCHLORIDE 4 MG: 2 INJECTION, SOLUTION INTRAMUSCULAR; INTRAVENOUS at 09:34

## 2019-05-10 RX ADMIN — SODIUM CHLORIDE 1000 ML: 9 INJECTION, SOLUTION INTRAVENOUS at 09:34

## 2019-05-10 RX ADMIN — IPRATROPIUM BROMIDE AND ALBUTEROL SULFATE 3 ML: 2.5; .5 SOLUTION RESPIRATORY (INHALATION) at 14:39

## 2019-05-10 RX ADMIN — LIDOCAINE HYDROCHLORIDE 15 ML: 20 SOLUTION ORAL; TOPICAL at 17:10

## 2019-05-10 RX ADMIN — ALUMINUM HYDROXIDE, MAGNESIUM HYDROXIDE, AND DIMETHICONE 15 ML: 400; 400; 40 SUSPENSION ORAL at 17:10

## 2019-05-10 RX ADMIN — SODIUM CHLORIDE 100 ML/HR: 9 INJECTION, SOLUTION INTRAVENOUS at 22:41

## 2019-05-10 RX ADMIN — METRONIDAZOLE 500 MG: 500 INJECTION, SOLUTION INTRAVENOUS at 21:23

## 2019-05-10 RX ADMIN — IPRATROPIUM BROMIDE AND ALBUTEROL SULFATE 3 ML: 2.5; .5 SOLUTION RESPIRATORY (INHALATION) at 20:52

## 2019-05-10 RX ADMIN — MORPHINE SULFATE 4 MG: 4 INJECTION INTRAVENOUS at 09:37

## 2019-05-10 RX ADMIN — VANCOMYCIN HYDROCHLORIDE 250 MG: KIT at 21:28

## 2019-05-10 RX ADMIN — CEFTRIAXONE SODIUM 1 G: 1 INJECTION, POWDER, FOR SOLUTION INTRAMUSCULAR; INTRAVENOUS at 22:41

## 2019-05-10 RX ADMIN — ACETAMINOPHEN 650 MG: 650 SUPPOSITORY RECTAL at 22:42

## 2019-05-10 NOTE — OUTREACH NOTE
Sepsis Week 2 Survey      Responses   Facility patient discharged from?  [place]   Does the patient have one of the following disease processes/diagnoses(primary or secondary)?  Sepsis   Unsuccessful attempts  Attempt 1 [Pt in ED]          Alma Nixon RN

## 2019-05-10 NOTE — ED PROVIDER NOTES
[facility]  emergency department encounter      Pt Name: Jonnie Sheets Jr.  MRN: [id number]  Birthdate 1945  Date of evaluation: 5/10/2019      CHIEF COMPLAINT       Chief Complaint   Patient presents with   • Nausea   • Vomiting   • Diarrhea       Nurses Notes reviewed and I agree except as noted in the HPI.      HISTORY OF PRESENT ILLNESS    Jonnie Sheets Jr. is a 73 y.o. male who presents to the emergency department with his wife.  Patient was discharged on April 30 after being diagnosed with diverticulitis, hypokalemia, acute renal insufficiency and was placed on dialysis for a brief period of time, and C. difficile.  He is currently taking vancomycin orally for his C. difficile.  He notes nausea and vomiting since last night.  He also notes very mild abdominal pain.  He denies chest pain, fever, chills, shortness of breath.    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    CURRENT MEDICATIONS        Medication List      ASK your doctor about these medications    famotidine 20 MG tablet  Commonly known as:  PEPCID  Take 1 tablet by mouth Daily.     ferrous sulfate 325 (65 FE) MG  "tablet  Commonly known as:  IRON SUPPLEMENT  Take 1 tablet by mouth Daily With Breakfast.     lovastatin 40 MG tablet  Commonly known as:  MEVACOR     vancomycin 50 MG/ML reconstituted solution oral solution reconstituted  Take 5 mL by mouth Every 8 (Eight) Hours for 10 days.          ALLERGIES     is allergic to acetylcholinesterase.    FAMILY HISTORY     indicated that his father is . He indicated that the status of his neg hx is unknown.   family history includes Heart disease in his father.    SOCIAL HISTORY      reports that he has been smoking cigarettes.  He has a 55.00 pack-year smoking history. He has never used smokeless tobacco. He reports that he drinks alcohol. He reports that he does not use drugs.    PHYSICAL EXAM     INITIAL VITALS:  height is 160 cm (63\") and weight is 62.4 kg (137 lb 8 oz). His oral temperature is 99.6 °F (37.6 °C). His blood pressure is 149/72 and his pulse is 94. His respiration is 18 and oxygen saturation is 96%.    Physical Exam    CONSTITUTIONAL: Well developed, well nourished, somewhat ill-appearing, not diaphoretic  HENT: Normocephalic, atraumatic, oropharynx clear and moist  EYES: PERRL, EOM normal, no discharge, no scleral icterus  NECK: ROM normal, supple, no tracheal deviation nor JVD, no stridor  CARDIOVASCULAR: Normal rate and rhythm, heart sounds normal, no rub no gallop, intact distal pulses, normal cap refill  PULMONARY: Normal effort, slightly diminished breath sounds, no distress, no wheezes, rhonchi or rales, no chest tenderness  ABDOMINAL: Soft, very mild diffuse tenderness, no guarding, no mass, no rebound, no hernia  GENITOURINARY/ANORECTAL: deferred  MUSCULOSKELETAL: ROM normal, no tenderness nor deformity, no edema  NEUROLOGICAL: Alert, oriented x 3,  normal tone, sensation normal  SKIN: Warm, dry, no erythema, no rash, normal color  PSYCH: Mood and affect normal, behavior normal, thought content and judgement normal.      DIAGNOSTIC RESULTS     EKG: " All EKG's are interpreted by the Emergency Department Physician who either signs or Co-signs this chart in the absence of a cardiologist.  EKG performed at 1634 shows sinus tachycardia with a rate of 101 bpm, normal axis, normal intervals, nonspecific ST segments, normal T waves    RADIOLOGY: non-plain film images(s) such as CT, Ultrasound and MRI are read by the radiologist.  Plain radiographic images are visualized and preliminarily interpreted by the emergency physician unless otherwise stated below.  Ct Abdomen Pelvis Without Contrast    Result Date: 5/10/2019  1. Changes of diverticulitis. Compared to April 24 this is improved however residual does persist. 2. Old compression fracture of L1 with some retropulsion into the lumbar canal 3. Vascular calcifications present abdominal aorta. Infrarenal aorta is ectatic measuring 3.1 x 3.4 cm..   This report was finalized on 05/10/2019 12:21 by Dr. Olegario Graham MD.    Xr Chest 1 View    Result Date: 5/10/2019  1. No acute cardiopulmonary process.  This report was finalized on 05/10/2019 15:33 by Dr. Max Ring MD.             LABS:   Lab Results (last 24 hours)     Procedure Component Value Units Date/Time    CBC & Differential [[id number]] Collected:  05/10/19 0933    Specimen:  Blood Updated:  05/10/19 0958    Narrative:       The following orders were created for panel order CBC & Differential.  Procedure                               Abnormality         Status                     ---------                               -----------         ------                     CBC Auto Differential[[id number]]        Abnormal            Final result                 Please view results for these tests on the individual orders.    Comprehensive Metabolic Panel [[id number]]  (Abnormal) Collected:  05/10/19 0933    Specimen:  Blood Updated:  05/10/19 1010     Glucose 149 mg/dL      BUN 11 mg/dL      Creatinine 1.01 mg/dL      Sodium 145 mmol/L      Potassium 3.6 mmol/L       Chloride 94 mmol/L      CO2 39.0 mmol/L      Calcium 9.3 mg/dL      Total Protein 8.1 g/dL      Albumin 4.30 g/dL      ALT (SGPT) <15 U/L      AST (SGOT) 55 U/L      Alkaline Phosphatase 66 U/L      Total Bilirubin 0.5 mg/dL      eGFR Non African Amer 72 mL/min/1.73      Globulin 3.8 gm/dL      A/G Ratio 1.1 g/dL      BUN/Creatinine Ratio 10.9     Anion Gap 12.0 mmol/L     Narrative:       GFR Normal >60  Chronic Kidney Disease <60  Kidney Failure <15    Lipase [[id number]]  (Abnormal) Collected:  05/10/19 0933    Specimen:  Blood Updated:  05/10/19 1007     Lipase 267 U/L     Lactic Acid, Plasma [[id number]]  (Abnormal) Collected:  05/10/19 0933    Specimen:  Blood Updated:  05/10/19 1003     Lactate 2.3 mmol/L     CBC Auto Differential [[id number]]  (Abnormal) Collected:  05/10/19 0933    Specimen:  Blood Updated:  05/10/19 0958     WBC 15.63 10*3/mm3      RBC 4.39 10*6/mm3      Hemoglobin 13.0 g/dL      Hematocrit 40.4 %      MCV 92.0 fL      MCH 29.6 pg      MCHC 32.2 g/dL      RDW 14.0 %      RDW-SD 47.7 fl      MPV 12.2 fL      Platelets 368 10*3/mm3      Neutrophil % 84.5 %      Lymphocyte % 7.0 %      Monocyte % 7.3 %      Eosinophil % 0.1 %      Basophil % 0.4 %      Immature Grans % 0.7 %      Neutrophils, Absolute 13.22 10*3/mm3      Lymphocytes, Absolute 1.09 10*3/mm3      Monocytes, Absolute 1.14 10*3/mm3      Eosinophils, Absolute 0.01 10*3/mm3      Basophils, Absolute 0.06 10*3/mm3      Immature Grans, Absolute 0.11 10*3/mm3      nRBC 0.0 /100 WBC     Lactic Acid, Reflex Timer (This will reflex a repeat order 3-3:15 hours after ordered.) [[id number]] Collected:  05/10/19 0933    Specimen:  Blood Updated:  05/10/19 1315     Extra Tube Hold for add-ons.     Comment: Auto resulted.       BNP [[id number]]  (Normal) Collected:  05/10/19 0933    Specimen:  Blood Updated:  05/10/19 1651     proBNP 141.0 pg/mL     Troponin [[id number]]  (Normal) Collected:  05/10/19 0933    Specimen:  Blood Updated:  05/10/19 1651      Troponin I <0.012 ng/mL     Blood Gas, Venous [[id number]]  (Abnormal) Collected:  05/10/19 1030    Specimen:  Venous Blood Updated:  05/10/19 1040     Site OTHER     pH, Venous 7.446 pH Units      pCO2, Venous 55.9 mm Hg      Comment: 83 Value above reference range        pO2, Venous 16.5 mm Hg      Comment: 84 Value below reference range        HCO3, Venous 38.5 mmol/L      Comment: 83 Value above reference range        Base Excess, Venous 12.4 mmol/L      Comment: 83 Value above reference range        O2 Saturation, Venous 20.7 %      Comment: 84 Value below reference range        Temperature 37.0 C      Barometric Pressure for Blood Gas 754 mmHg      Modality Room Air     Ventilator Mode NA     Collected by [id number]     Comment: Meter: [id number]     :  [id number]       Urinalysis With Culture If Indicated - Urine, Clean Catch [[id number]]  (Abnormal) Collected:  05/10/19 1045    Specimen:  Urine, Clean Catch Updated:  05/10/19 1103     Color, UA Yellow     Appearance, UA Clear     pH, UA >=9.0     Specific Gravity, UA 1.015     Glucose, UA Negative     Ketones, UA Negative     Bilirubin, UA Negative     Blood, UA Negative     Protein, UA 30 mg/dL (1+)     Leuk Esterase, UA Negative     Nitrite, UA Negative     Urobilinogen, UA 0.2 E.U./dL    Urinalysis, Microscopic Only - Urine, Clean Catch [[id number]]  (Abnormal) Collected:  05/10/19 1045    Specimen:  Urine, Clean Catch Updated:  05/10/19 1103     RBC, UA 3-5 /HPF      WBC, UA 3-5 /HPF      Bacteria, UA None Seen /HPF      Squamous Epithelial Cells, UA 0-2 /HPF      Hyaline Casts, UA 0-2 /LPF      Methodology Automated Microscopy    Lactic Acid, Reflex [[id number]]  (Normal) Collected:  05/10/19 1328    Specimen:  Blood from Arm, Right Updated:  05/10/19 1401     Lactate 1.2 mmol/L     Blood Culture - Blood, Arm, Right [[id number]] Collected:  05/10/19 1437    Specimen:  Blood from Arm, Right Updated:  05/10/19 1454    Blood Culture With ANGELIA -  "Blood, Arm, Left [[id number]] Collected:  05/10/19 1450    Specimen:  Blood from Arm, Left Updated:  05/10/19 1500          EMERGENCY DEPARTMENT COURSE:   Vitals:    Vitals:    05/10/19 1716 05/10/19 1717 05/10/19 1717 05/10/19 1824   BP: 145/82   149/72   BP Location:    Right arm   Patient Position:    Lying   Pulse:  93  94   Resp:   20 18   Temp:   100.5 °F (38.1 °C) 99.6 °F (37.6 °C)   TempSrc:   Oral Oral   SpO2: 96%   96%   Weight:    62.4 kg (137 lb 8 oz)   Height:    160 cm (63\")       The patient was given the following medications:  Medications   sodium chloride 0.9 % flush 10 mL (not administered)   sodium chloride 0.9 % bolus 1,000 mL (0 mL Intravenous Stopped 5/10/19 1010)   morphine injection 4 mg (4 mg Intravenous Given 5/10/19 0937)   ondansetron (ZOFRAN) injection 4 mg (4 mg Intravenous Given 5/10/19 0934)   ipratropium-albuterol (DUO-NEB) nebulizer solution 3 mL (3 mL Nebulization Given 5/10/19 1439)   aluminum-magnesium hydroxide-simethicone (MAALOX MAX) 400-400-40 MG/5ML suspension 15 mL (15 mL Oral Given 5/10/19 1710)   lidocaine viscous (XYLOCAINE) 2 % mouth solution 15 mL (15 mL Mouth/Throat Given 5/10/19 1710)   ondansetron (ZOFRAN) injection 4 mg (4 mg Intravenous Given 5/10/19 1844)            CRITICAL CARE:  none    CONSULTS:  none    PROCEDURES:  Procedures        Patient presents the emergency department with nausea, vomiting, and very mild abdominal pain.  He was recently discharged after being on dialysis for short period of time as well as being diagnosed with diverticulitis and C. difficile.  He is currently taking oral vancomycin.  He has diminished lung sounds but initially did not complain of shortness of breath.  He has a CT scan which shows improving diverticulitis.  Labs show elevated lactic acid.  Patient received Zofran, morphine, IV fluids.  Upon reevaluation patient notes that he has been short of breath and coughing for a few days.  He initially denied shortness of breath. "  At this time a chest x-ray was performed and was unremarkable.  He received a DuoNeb and felt somewhat better.  However, when he ambulated around the emergency department his oxygen saturation was 81%.  The patient has never been diagnosed with COPD but he likely has COPD given a 57 pack year smoking history.  I spoke to Dr. Sauceda who agrees to admit the patient to the service of Dr. Blanco.  Patient stable at time of admission and agreeable plan of care.    FINAL IMPRESSION      1. Hypoxia    2. Dyspnea, unspecified type    3. Diverticulitis    4. Vomiting and diarrhea          DISPOSITION/PLAN   Admit      PATIENT REFERRED TO:  No follow-up provider specified.    DISCHARGE MEDICATIONS:     Medication List      ASK your doctor about these medications    famotidine 20 MG tablet  Commonly known as:  PEPCID  Take 1 tablet by mouth Daily.     ferrous sulfate 325 (65 FE) MG tablet  Commonly known as:  IRON SUPPLEMENT  Take 1 tablet by mouth Daily With Breakfast.     lovastatin 40 MG tablet  Commonly known as:  MEVACOR     vancomycin 50 MG/ML reconstituted solution oral solution reconstituted  Take 5 mL by mouth Every 8 (Eight) Hours for 10 days.          (Please note that portions of this note were completed with a voice recognition program.)    DO Jessica Lee Jason Paul, DO  05/10/19 3274

## 2019-05-11 LAB
ALBUMIN SERPL-MCNC: 3.9 G/DL (ref 3.5–5)
ALBUMIN/GLOB SERPL: 1.3 G/DL (ref 1.1–2.5)
ALP SERPL-CCNC: 52 U/L (ref 24–120)
ALT SERPL W P-5'-P-CCNC: <15 U/L (ref 0–54)
ANION GAP SERPL CALCULATED.3IONS-SCNC: 12 MMOL/L (ref 4–13)
AST SERPL-CCNC: 38 U/L (ref 7–45)
BASOPHILS # BLD AUTO: 0.03 10*3/MM3 (ref 0–0.2)
BASOPHILS NFR BLD AUTO: 0.1 % (ref 0–2)
BILIRUB SERPL-MCNC: 0.4 MG/DL (ref 0.1–1)
BUN BLD-MCNC: 14 MG/DL (ref 5–21)
BUN/CREAT SERPL: 16.3 (ref 7–25)
CALCIUM SPEC-SCNC: 8.5 MG/DL (ref 8.4–10.4)
CHLORIDE SERPL-SCNC: 101 MMOL/L (ref 98–110)
CO2 SERPL-SCNC: 31 MMOL/L (ref 24–31)
CREAT BLD-MCNC: 0.86 MG/DL (ref 0.5–1.4)
DEPRECATED RDW RBC AUTO: 48.7 FL (ref 40–54)
EOSINOPHIL # BLD AUTO: 0 10*3/MM3 (ref 0–0.7)
EOSINOPHIL NFR BLD AUTO: 0 % (ref 0–4)
ERYTHROCYTE [DISTWIDTH] IN BLOOD BY AUTOMATED COUNT: 14.4 % (ref 12–15)
GFR SERPL CREATININE-BSD FRML MDRD: 87 ML/MIN/1.73
GLOBULIN UR ELPH-MCNC: 3 GM/DL
GLUCOSE BLD-MCNC: 143 MG/DL (ref 70–100)
HCT VFR BLD AUTO: 35.6 % (ref 40–52)
HGB BLD-MCNC: 11.3 G/DL (ref 14–18)
IMM GRANULOCYTES # BLD AUTO: 0.1 10*3/MM3 (ref 0–0.05)
IMM GRANULOCYTES NFR BLD AUTO: 0.5 % (ref 0–5)
LYMPHOCYTES # BLD AUTO: 0.87 10*3/MM3 (ref 0.72–4.86)
LYMPHOCYTES NFR BLD AUTO: 4.2 % (ref 15–45)
MCH RBC QN AUTO: 29.4 PG (ref 28–32)
MCHC RBC AUTO-ENTMCNC: 31.7 G/DL (ref 33–36)
MCV RBC AUTO: 92.7 FL (ref 82–95)
MONOCYTES # BLD AUTO: 0.47 10*3/MM3 (ref 0.19–1.3)
MONOCYTES NFR BLD AUTO: 2.3 % (ref 4–12)
NEUTROPHILS # BLD AUTO: 19.2 10*3/MM3 (ref 1.87–8.4)
NEUTROPHILS NFR BLD AUTO: 92.9 % (ref 39–78)
NRBC BLD AUTO-RTO: 0 /100 WBC (ref 0–0.2)
PLATELET # BLD AUTO: 282 10*3/MM3 (ref 130–400)
PMV BLD AUTO: 12.3 FL (ref 6–12)
POTASSIUM BLD-SCNC: 3.7 MMOL/L (ref 3.5–5.3)
PROT SERPL-MCNC: 6.9 G/DL (ref 6.3–8.7)
RBC # BLD AUTO: 3.84 10*6/MM3 (ref 4.8–5.9)
SODIUM BLD-SCNC: 144 MMOL/L (ref 135–145)
WBC NRBC COR # BLD: 20.67 10*3/MM3 (ref 4.8–10.8)

## 2019-05-11 PROCEDURE — 25010000002 ENOXAPARIN PER 10 MG: Performed by: FAMILY MEDICINE

## 2019-05-11 PROCEDURE — G0378 HOSPITAL OBSERVATION PER HR: HCPCS

## 2019-05-11 PROCEDURE — 96361 HYDRATE IV INFUSION ADD-ON: CPT

## 2019-05-11 PROCEDURE — 85025 COMPLETE CBC W/AUTO DIFF WBC: CPT | Performed by: FAMILY MEDICINE

## 2019-05-11 PROCEDURE — 96372 THER/PROPH/DIAG INJ SC/IM: CPT

## 2019-05-11 PROCEDURE — 92610 EVALUATE SWALLOWING FUNCTION: CPT | Performed by: SPEECH-LANGUAGE PATHOLOGIST

## 2019-05-11 PROCEDURE — 94799 UNLISTED PULMONARY SVC/PX: CPT

## 2019-05-11 PROCEDURE — 25010000002 METHYLPREDNISOLONE PER 40 MG: Performed by: FAMILY MEDICINE

## 2019-05-11 PROCEDURE — 25010000002 CEFTRIAXONE PER 250 MG: Performed by: FAMILY MEDICINE

## 2019-05-11 PROCEDURE — 80053 COMPREHEN METABOLIC PANEL: CPT | Performed by: FAMILY MEDICINE

## 2019-05-11 PROCEDURE — 96376 TX/PRO/DX INJ SAME DRUG ADON: CPT

## 2019-05-11 RX ADMIN — CEFTRIAXONE SODIUM 1 G: 1 INJECTION, POWDER, FOR SOLUTION INTRAMUSCULAR; INTRAVENOUS at 20:30

## 2019-05-11 RX ADMIN — METHYLPREDNISOLONE SODIUM SUCCINATE 20 MG: 40 INJECTION, POWDER, FOR SOLUTION INTRAMUSCULAR; INTRAVENOUS at 08:22

## 2019-05-11 RX ADMIN — IPRATROPIUM BROMIDE AND ALBUTEROL SULFATE 3 ML: 2.5; .5 SOLUTION RESPIRATORY (INHALATION) at 14:46

## 2019-05-11 RX ADMIN — METHYLPREDNISOLONE SODIUM SUCCINATE 20 MG: 40 INJECTION, POWDER, FOR SOLUTION INTRAMUSCULAR; INTRAVENOUS at 20:30

## 2019-05-11 RX ADMIN — METRONIDAZOLE 500 MG: 500 INJECTION, SOLUTION INTRAVENOUS at 12:37

## 2019-05-11 RX ADMIN — METRONIDAZOLE 500 MG: 500 INJECTION, SOLUTION INTRAVENOUS at 03:54

## 2019-05-11 RX ADMIN — ENOXAPARIN SODIUM 40 MG: 40 INJECTION SUBCUTANEOUS at 20:29

## 2019-05-11 RX ADMIN — VANCOMYCIN HYDROCHLORIDE 250 MG: KIT at 12:36

## 2019-05-11 RX ADMIN — GUAIFENESIN 1200 MG: 600 TABLET, EXTENDED RELEASE ORAL at 20:29

## 2019-05-11 RX ADMIN — SODIUM CHLORIDE 100 ML/HR: 9 INJECTION, SOLUTION INTRAVENOUS at 15:36

## 2019-05-11 RX ADMIN — IPRATROPIUM BROMIDE AND ALBUTEROL SULFATE 3 ML: 2.5; .5 SOLUTION RESPIRATORY (INHALATION) at 06:54

## 2019-05-11 RX ADMIN — IPRATROPIUM BROMIDE AND ALBUTEROL SULFATE 3 ML: 2.5; .5 SOLUTION RESPIRATORY (INHALATION) at 19:53

## 2019-05-11 RX ADMIN — PHENOL 2 SPRAY: 1.5 LIQUID ORAL at 15:36

## 2019-05-11 NOTE — PLAN OF CARE
Problem: Patient Care Overview  Goal: Plan of Care Review  Outcome: Ongoing (interventions implemented as appropriate)   05/11/19 0822   Coping/Psychosocial   Plan of Care Reviewed With patient;other (see comments)  (RN Yeimi and Nara )   Plan of Care Review   Progress no change  (Initial Evaluation )   OTHER   Outcome Summary Clinical bedside swallow evaluation completed. Patient was alert and cooperative. He complains of odynophagia and relates this to frequent vomitting since Thursday. Dr. Blanco ordered clear liquid diet for patient; therefore, ST only assessed liquid toleration as no solid trials were given. History is remarkable for COPD with chronic cough. Patient is a smoker. CXR is clear with no acute infiltrates. According to chart and patient report, he has had a biposy of oral mucosa with concern for squamous cell carcinoma. The patient tells me that following the biopsy no further follow ups have occured. Oral motor assessment was unremarkable. It should be noted that prior to any PO intake the patient was clearing his throat persistively. Patient completed trials of puree, honey thick, nectar thick, and thin liquids. Throat clearing noted following all trials other than nectar thick trial. Throat clearing continues following PO once ST returned to patient's room to discuss case further. At this time he was also coughing followinf the throat clearing and coughed up clear mucus. He tells me he has been doing so recently. ST is not able to fully rule out aspiration at bedside. The patient's voice did remain clear throughout the study. Since patient's lungs are clear with no acute infiltrates ST discussed option of NPO with only allowing for water as safest option or beginning clear liquid diet and monitoring lungs for increased congestion and incresed s/s at bedside. Patient wishes to begin clear liquid diet with close monitoring of toleration. RN to d/c diet if patient has increased congestion or increased  s/s of aspiration at bedside throughout the day. ST will follow up for diet toleration. Patient may warrant a more objective study to determine continued safety with PO intake. Oral care should be completed to decrease risk of complications of aspiration if this is occuring.

## 2019-05-11 NOTE — THERAPY EVALUATION
Acute Care - Speech Language Pathology   Swallow Initial Evaluation [facility]     Patient Name: Jonnie Sheets Jr.  : 1945  MRN: [id number]  Today's Date: 2019               Admit Date: 5/10/2019  Clinical bedside swallow evaluation completed. Patient was alert and cooperative. He complains of odynophagia and relates this to frequent vomitting since Thursday. Dr. Blanco ordered clear liquid diet for patient; therefore, ST only assessed liquid toleration as no solid trials were given. History is remarkable for COPD with chronic cough. Patient is a smoker. CXR is clear with no acute infiltrates. According to chart and patient report, he has had a biposy of oral mucosa with concern for squamous cell carcinoma. The patient tells me that following the biopsy no further follow ups have occured. Oral motor assessment was unremarkable. It should be noted that prior to any PO intake the patient was clearing his throat persistively. Patient completed trials of puree, honey thick, nectar thick, and thin liquids. Throat clearing noted following all trials other than nectar thick trial. Throat clearing continues following PO once ST returned to patient's room to discuss case further. At this time he was also coughing followinf the throat clearing and coughed up clear mucus. He tells me he has been doing so recently. ST is not able to fully rule out aspiration at bedside. The patient's voice did remain clear throughout the study. Since patient's lungs are clear with no acute infiltrates ST discussed option of NPO with only allowing for water as safest option or beginning clear liquid diet and monitoring lungs for increased congestion and incresed s/s at bedside. Patient wishes to begin clear liquid diet with close monitoring of toleration. RN to d/c diet if patient has increased congestion or increased s/s of aspiration at bedside throughout the day. ST will follow up for diet toleration. Patient may warrant a more  objective study to determine continued safety with PO intake. Oral care should be completed to decrease risk of complications of aspiration if this is occuring. Meds whole with apple sauce. ST will let MD or RN order diet due to modifications of clear liquids per MD on previous day to ensure that diet that MD wishes to be started today is started accurately.     Judith MEMO Breaux, MS, CFY-SLP 5/11/2019 8:25 AM      Visit Dx:     ICD-10-CM ICD-9-CM   1. Hypoxia R09.02 799.02   2. Dyspnea, unspecified type R06.00 786.09   3. Diverticulitis K57.92 562.11   4. Vomiting and diarrhea R11.10 787.03    R19.7 787.91   5. Dysphagia, unspecified type R13.10 787.20     Patient Active Problem List   Diagnosis   • Colon cancer (CMS/HCC)   • Personal history of colon cancer   • Anorexia   • Black stool   • Acute renal failure (CMS/HCC)   • Diverticular disease of large intestine with complication   • Septic shock (CMS/HCC), resolved   • Diverticulitis large intestine w/o perforation or abscess w/o bleeding   • Diarrhea, improved   • Acute kidney injury (CMS/HCC), secondary to sepsis, dialysis initiated    • High anion gap metabolic acidosis, improved   • Lactic acidosis, resolved   • Leukocytosis, resolved   • C. 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)    • Umbilical hernia      Past Surgical History:   Procedure Laterality Date   • COLON RESECTION Right 1/19/2017    Procedure: LAPAROSCOPIC ASSISTED RIGHT COLON RESECTION RIGHT, UMBILICAL HERNIA REPAIR;  Surgeon: Mario Ellison MD;   Location: [facility] OR;  Service:    • COLONOSCOPY     • COLONOSCOPY N/A 1/4/2017    Procedure: COLONOSCOPY WITH ANESTHESIA;  Surgeon: Tee Kong MD;  Location: [facility] ENDOSCOPY;  Service:    • COLONOSCOPY N/A 2/5/2018    Procedure: COLONOSCOPY WITH ANESTHESIA;  Surgeon: Tee Kong MD;  Location: [facility] ENDOSCOPY;  Service:    • ENDOSCOPY N/A 1/4/2017    Procedure: ESOPHAGOGASTRODUODENOSCOPY WITH ANESTHESIA;  Surgeon: Tee Kong MD;  Location: [facility] ENDOSCOPY;  Service:    • ENDOSCOPY N/A 3/9/2018    Procedure: ESOPHAGOGASTRODUODENOSCOPY WITH ANESTHESIA;  Surgeon: Francisco Harmon DO;  Location: [facility] ENDOSCOPY;  Service:    • KNEE SURGERY      PINS PLACED AND LATER REMOVED S/P FRACTURE   • MOUTH BIOPSY     • TONSILLECTOMY          SWALLOW EVALUATION (last 72 hours)      SLP Adult Swallow Evaluation     Row Name 05/11/19 0700                   Rehab Evaluation    Document Type  evaluation  -MM        Subjective Information  complains of throat pain   -MM        Patient Observations  alert;cooperative;agree to therapy  -MM        Patient/Family Observations  No family present.  -MM        Patient Effort  good  -MM        Symptoms Noted During/After Treatment  none  -MM           General Information    Patient Profile Reviewed  yes  -MM        Pertinent History Of Current Problem  CXR: no acute infiltrates, COPD, smoker, chronic cough, C-diff, squamous cell carcinoma of oral mucosa (biopsed at [facility]), current nausea and vomitting   -MM        Current Method of Nutrition  NPO  -MM        Precautions/Limitations, Vision  WFL;for purposes of eval  -MM        Precautions/Limitations, Hearing  WFL;for purposes of eval  -MM        Prior Level of Function-Communication  WFL  -MM        Prior Level of Function-Swallowing  no diet consistency restrictions  -MM        Plans/Goals Discussed with  patient;other (see comments);agreed upon RN Al   -MM        Barriers to Rehab  none identified  -MM         Patient's Goals for Discharge  return to PO diet  -MM           Pain Assessment    Additional Documentation  Pain Scale: Numbers Pre/Post-Treatment (Group)  -MM           Pain Scale: Numbers Pre/Post-Treatment    Pain Scale: Numbers, Pretreatment  0/10 - no pain  -MM        Pain Scale: Numbers, Post-Treatment  0/10 - no pain  -MM           Oral Motor and Function    Dentition Assessment  natural, present and adequate  -MM        Secretion Management  WNL/WFL  -MM        Mucosal Quality  moist, healthy  -MM        Volitional Swallow  WFL  -MM        Volitional Cough  WFL  -MM           Oral Musculature and Cranial Nerve Assessment    Oral Motor General Assessment  WFL  -MM           General Eating/Swallowing Observations    Respiratory Support Currently in Use  nasal cannula  -MM        Eating/Swallowing Skills  self-fed  -MM        Positioning During Eating  upright in bed  -MM        Utensils Used  spoon;cup;straw  -MM        Consistencies Trialed  honey-thick liquids;nectar/syrup-thick liquids;thin liquids;pureed  -MM           Clinical Swallow Eval    Oral Prep Phase  -- Did not fully assess  -MM        Oral Transit  WFL  -MM        Oral Residue  WFL  -MM        Pharyngeal Phase  suspected pharyngeal impairment Difficult to determine at bedside   -MM        Esophageal Phase  suspected esophageal impairment  -MM        Clinical Swallow Evaluation Summary  Clinical bedside swallow evaluation completed. Patient was alert and cooperative. He complains of odynophagia and relates this to frequent vomitting since Thursday. Dr. Blanco ordered clear liquid diet for patient; therefore, ST only assessed liquid toleration as no solid trials were given. History is remarkable for COPD with chronic cough. Patient is a smoker. CXR is clear with no acute infiltrates. According to chart and patient report, he has had a biposy of oral mucosa with concern for squamous cell carcinoma. The patient tells me that following the biopsy no  further follow ups have occured. Oral motor assessment was unremarkable. It should be noted that prior to any PO intake the patient was clearing his throat persistively. Patient completed trials of puree, honey thick, nectar thick, and thin liquids. Throat clearing noted following all trials other than nectar thick trial. Throat clearing continues following PO once ST returned to patient's room to discuss case further. At this time he was also coughing followinf the throat clearing and coughed up clear mucus. He tells me he has been doing so recently. ST is not able to fully rule out aspiration at bedside. The patient's voice did remain clear throughout the study. Since patient's lungs are clear with no acute infiltrates ST discussed option of NPO with only allowing for water as safest option or beginning clear liquid diet and monitoring lungs for increased congestion and incresed s/s at bedside. Patient wishes to begin clear liquid diet with close monitoring of toleration. RN to d/c diet if patient has increased congestion or increased s/s of aspiration at bedside throughout the day. ST will follow up for diet toleration. Patient may warrant a more objective study to determine continued safety with PO intake. Oral care should be completed to decrease risk of complications of aspiration if this is occuring.   -MM           Pharyngeal Phase Concerns    Pharyngeal Phase Concerns  throat clear  -MM        Throat Clear  thin;honey;pudding;other (see comments) Prior to PO intake as well  -MM           Esophageal Phase Concerns    Esophageal Phase Concerns  other (see comments) Potential reflux   -MM           Clinical Impression    SLP Swallowing Diagnosis  suspected pharyngeal dysfunction  -MM        Functional Impact  risk of aspiration/pneumonia;risk of malnutrition;risk of dehydration  -MM        Rehab Potential/Prognosis, Swallowing  good, to achieve stated therapy goals  -MM        Swallow Criteria for Skilled  Therapeutic Interventions Met  demonstrates skilled criteria  -MM           Recommendations    Therapy Frequency (Swallow)  at least;3 days per week  -MM        Predicted Duration Therapy Intervention (Days)  until discharge  -MM        SLP Diet Recommendation  clear liquid diet  -MM        Recommended Diagnostics  reassess via clinical swallow evaluation;VFSS ([facility])  -MM        Recommended Precautions and Strategies  upright posture during/after eating;small bites of food and sips of liquid  -MM        SLP Rec. for Method of Medication Administration  meds whole;with pudding or applesauce;as tolerated  -MM        Monitor for Signs of Aspiration  yes;notify SLP if any concerns;gurgly voice;cough;throat clearing;pneumonia;right lower lobe infiltrates  -MM        Anticipated Dischage Disposition  unknown  -MM           Swallow Goals (SLP)    Oral Nutrition/Hydration Goal Selection (SLP)  oral nutrition/hydration, SLP goal 1  -MM           Oral Nutrition/Hydration Goal 1 (SLP)    Oral Nutrition/Hydration Goal 1, SLP  LTG: Patient will tolerate LRD without s/s of aspiration.   -MM        Time Frame (Oral Nutrition/Hydration Goal 1, SLP)  by discharge  -MM        Barriers (Oral Nutrition/Hydration Goal 1, SLP)  n/a  -MM        Progress/Outcomes (Oral Nutrition/Hydration Goal 1, SLP)  goal ongoing  -MM          User Key  (r) = Recorded By, (t) = Taken By, (c) = Cosigned By    Initials Name Effective Dates    MM Judith Breaux, MS, CFY-SLP 07/03/18 -           EDUCATION  The patient has been educated in the following areas:   Dysphagia (Swallowing Impairment) Oral Care/Hydration.    SLP Recommendation and Plan  SLP Swallowing Diagnosis: suspected pharyngeal dysfunction  SLP Diet Recommendation: clear liquid diet  Recommended Precautions and Strategies: upright posture during/after eating, small bites of food and sips of liquid  SLP Rec. for Method of Medication Administration: meds whole, with pudding or applesauce, as  tolerated     Monitor for Signs of Aspiration: yes, notify SLP if any concerns, gurgly voice, cough, throat clearing, pneumonia, right lower lobe infiltrates  Recommended Diagnostics: reassess via clinical swallow evaluation, [place]  Swallow Criteria for Skilled Therapeutic Interventions Met: demonstrates skilled criteria  Anticipated Dischage Disposition: unknown  Rehab Potential/Prognosis, Swallowing: good, to achieve stated therapy goals  Therapy Frequency (Swallow): at least, 3 days per week  Predicted Duration Therapy Intervention (Days): until discharge       Plan of Care Reviewed With: patient, other (see comments)(KVNG Jalloh and Nara )  Plan of Care Review  Plan of Care Reviewed With: patient, other (see comments)(KVNG Jalloh and Nara )  Progress: no change(Initial Evaluation )  Outcome Summary: Clinical bedside swallow evaluation completed. Patient was alert and cooperative. He complains of odynophagia and relates this to frequent vomitting since Thursday. Dr. Blanco ordered clear liquid diet for patient; therefore, ST only assessed liquid toleration as no solid trials were given. History is remarkable for COPD with chronic cough. Patient is a smoker. CXR is clear with no acute infiltrates. According to chart and patient report, he has had a biposy of oral mucosa with concern for squamous cell carcinoma. The patient tells me that following the biopsy no further follow ups have occured. Oral motor assessment was unremarkable. It should be noted that prior to any PO intake the patient was clearing his throat persistively. Patient completed trials of puree, honey thick, nectar thick, and thin liquids. Throat clearing noted following all trials other than nectar thick trial. Throat clearing continues following PO once ST returned to patient's room to discuss case further. At this time he was also coughing followinf the throat clearing and coughed up clear mucus. He tells me he has been doing so recently. ST is not  able to fully rule out aspiration at bedside. The patient's voice did remain clear throughout the study. Since patient's lungs are clear with no acute infiltrates ST discussed option of NPO with only allowing for water as safest option or beginning clear liquid diet and monitoring lungs for increased congestion and incresed s/s at bedside. Patient wishes to begin clear liquid diet with close monitoring of toleration. RN to d/c diet if patient has increased congestion or increased s/s of aspiration at bedside throughout the day. ST will follow up for diet toleration. Patient may warrant a more objective study to determine continued safety with PO intake. Oral care should be completed to decrease risk of complications of aspiration if this is occuring.     SLP GOALS     Row Name 05/11/19 0700             Oral Nutrition/Hydration Goal 1 (SLP)    Oral Nutrition/Hydration Goal 1, SLP  LTG: Patient will tolerate LRD without s/s of aspiration.   -MM      Time Frame (Oral Nutrition/Hydration Goal 1, SLP)  by discharge  -MM      Barriers (Oral Nutrition/Hydration Goal 1, SLP)  n/a  -MM      Progress/Outcomes (Oral Nutrition/Hydration Goal 1, SLP)  goal ongoing  -MM        User Key  (r) = Recorded By, (t) = Taken By, (c) = Cosigned By    Initials Name Provider Type    Judith Ramirez, MS, CFY-SLP Speech and Language Pathologist             Time Calculation:   Time Calculation- SLP     Row Name 05/11/19 0823             Time Calculation- SLP    SLP Start Time  0700  -MM      SLP Stop Time  0823  -MM      SLP Time Calculation (min)  83 min  -MM      SLP Received On  05/11/19  -MM      SLP Goal Re-Cert Due Date  05/21/19  -MM        User Key  (r) = Recorded By, (t) = Taken By, (c) = Cosigned By    Initials Name Provider Type    Judith Ramirez, MS, CFY-SLP Speech and Language Pathologist          Therapy Charges for Today     Code Description Service Date Service Provider Modifiers Qty    [id number]  ST CADE  ORAL PHARYNG SWALLOW 6 5/11/2019 Judith Breaux, MS, CFY-SLP GN 1               Judith Breaux MS, CFY-SLP  5/11/2019

## 2019-05-11 NOTE — PLAN OF CARE
Problem: ARDS (Acute Resp Distress Syndrome) (Adult)  Goal: Signs and Symptoms of Listed Potential Problems Will be Absent, Minimized or Managed (ARDS)  Outcome: Ongoing (interventions implemented as appropriate)      Problem: Patient Care Overview  Goal: Plan of Care Review  Outcome: Ongoing (interventions implemented as appropriate)   05/10/19 6995   Coping/Psychosocial   Plan of Care Reviewed With patient   Plan of Care Review   Progress no change   OTHER   Outcome Summary Pt new admit to 4c from ER. VSS. 02 stable on 2L. 4mg zoprhan given. Awaiting further orders per MD. Placed in C Diff precautions. Safety maintained. Will continue to monitor.      Goal: Individualization and Mutuality  Outcome: Ongoing (interventions implemented as appropriate)    Goal: Discharge Needs Assessment  Outcome: Ongoing (interventions implemented as appropriate)    Goal: Interprofessional Rounds/Family Conf  Outcome: Ongoing (interventions implemented as appropriate)

## 2019-05-11 NOTE — PLAN OF CARE
Problem: ARDS (Acute Resp Distress Syndrome) (Adult)  Goal: Signs and Symptoms of Listed Potential Problems Will be Absent, Minimized or Managed (ARDS)  Outcome: Ongoing (interventions implemented as appropriate)      Problem: Patient Care Overview  Goal: Plan of Care Review  Outcome: Ongoing (interventions implemented as appropriate)   05/11/19 0834   Coping/Psychosocial   Plan of Care Reviewed With patient   Plan of Care Review   Progress no change   OTHER   Outcome Summary Pt has c/o throat pain when swallowing. No c/o abd pain. 300 ml of brown liquid stool earlier this am. Frequent productive cough. Fine crackles and exp wheezes noted. O2 at 2L NC.      Goal: Individualization and Mutuality  Outcome: Ongoing (interventions implemented as appropriate)    Goal: Discharge Needs Assessment  Outcome: Ongoing (interventions implemented as appropriate)    Goal: Interprofessional Rounds/Family Conf  Outcome: Ongoing (interventions implemented as appropriate)      Problem: Fall Risk (Adult)  Goal: Identify Related Risk Factors and Signs and Symptoms  Outcome: Ongoing (interventions implemented as appropriate)    Goal: Absence of Fall  Outcome: Ongoing (interventions implemented as appropriate)      Problem: Infection, Risk/Actual (Adult)  Goal: Identify Related Risk Factors and Signs and Symptoms  Outcome: Ongoing (interventions implemented as appropriate)    Goal: Infection Prevention/Resolution  Outcome: Ongoing (interventions implemented as appropriate)

## 2019-05-11 NOTE — PROGRESS NOTES
[facility] Medicine Services  INPATIENT PROGRESS NOTE    Patient Name: Jonnie Sheets Jr.  Date of Admission: 5/10/2019  Today's Date: 05/11/19  Length of Stay: 1  Primary Care Physician: Dandy Burton MD    Subjective   Chief Complaint: Coughing up mucus  HPI   Doing ok.  No n/v/d  No abdominal pain  Afebrile  Says he is just coughing up a lot of mucus today  Doesn't feel SOA, no wheezing  No diarrhea          Review of Systems   Constitutional: Positive for fatigue. Negative for fever.   HENT: Negative for congestion and ear pain.    Eyes: Negative for redness and visual disturbance.   Respiratory: Positive for cough. Negative for shortness of breath.    Cardiovascular: Negative for chest pain and palpitations.   Gastrointestinal: Negative for abdominal pain, diarrhea, nausea and vomiting.   Endocrine: Negative for cold intolerance and heat intolerance.   Genitourinary: Negative for dysuria and frequency.   Musculoskeletal: Negative for arthralgias and back pain.   Skin: Negative for rash and wound.   Neurological: Negative for dizziness and headaches.   Psychiatric/Behavioral: Negative for confusion. The patient is not nervous/anxious.           All pertinent negatives and positives are as above. All other systems have been reviewed and are negative unless otherwise stated.     Objective    Temp:  [98 °F (36.7 °C)-100.5 °F (38.1 °C)] 98.6 °F (37 °C)  Heart Rate:  [] 94  Resp:  [18-20] 20  BP: (114-149)/(69-90) 136/76  Physical Exam   Constitutional: He is oriented to person, place, and time. He appears well-developed and well-nourished.   HENT:   Head: Normocephalic and atraumatic.   Right Ear: External ear normal.   Left Ear: External ear normal.   Nose: Nose normal.   Mouth/Throat: Oropharynx is clear and moist.   Eyes: Conjunctivae and EOM are normal. Pupils are equal, round, and reactive to light. Right eye exhibits no discharge. Left eye exhibits no discharge. No  scleral icterus.   Neck: Normal range of motion. Neck supple. No tracheal deviation present. No thyromegaly present.   Cardiovascular: Normal rate, regular rhythm, normal heart sounds and intact distal pulses. Exam reveals no gallop and no friction rub.   No murmur heard.  Pulmonary/Chest: Effort normal. No stridor. No respiratory distress. He has decreased breath sounds. He has no wheezes. He has no rales. He exhibits no tenderness.   Coarse breath sounds   Abdominal: Soft. Bowel sounds are normal. He exhibits no distension and no mass. There is no tenderness. There is no rebound and no guarding. No hernia.   Musculoskeletal: Normal range of motion. He exhibits no edema or deformity.   Lymphadenopathy:     He has no cervical adenopathy.   Neurological: He is alert and oriented to person, place, and time. He has normal reflexes. He displays normal reflexes. No cranial nerve deficit. He exhibits normal muscle tone. Coordination normal.   Skin: Skin is warm and dry. No rash noted. No erythema. No pallor.   Psychiatric: He has a normal mood and affect. His behavior is normal. Judgment and thought content normal.   Vitals reviewed.        Results Review:  I have reviewed the labs, radiology results, and diagnostic studies.    Laboratory Data:   Results from last 7 days   Lab Units 05/11/19  0756 05/10/19  0933   WBC 10*3/mm3 20.67* 15.63*   HEMOGLOBIN g/dL 11.3* 13.0*   HEMATOCRIT % 35.6* 40.4   PLATELETS 10*3/mm3 282 368        Results from last 7 days   Lab Units 05/11/19  0756 05/10/19  0933   SODIUM mmol/L 144 145   POTASSIUM mmol/L 3.7 3.6   CHLORIDE mmol/L 101 94*   CO2 mmol/L 31.0 39.0*   BUN mg/dL 14 11   CREATININE mg/dL 0.86 1.01   CALCIUM mg/dL 8.5 9.3   BILIRUBIN mg/dL 0.4 0.5   ALK PHOS U/L 52 66   ALT (SGPT) U/L <15 <15   AST (SGOT) U/L 38 55*   GLUCOSE mg/dL 143* 149*       Culture Data:   Blood Culture   Date Value Ref Range Status   05/10/2019 No growth at 24 hours  Preliminary   05/10/2019 No growth at  24 hours  Preliminary       Radiology Data:   Imaging Results (last 24 hours)     ** No results found for the last 24 hours. **          I have reviewed the patient's current medications.     Assessment/Plan     Active Hospital Problems    Diagnosis   • Hypoxia   1.  COPD AE  -IV abx--Rocephin  -IV steroids  -Nebs  -Mucinex  -Flutter  -Supplemental oxygen, wean as tolerated     2.  Sepsis  -IV abx     3.  C. Diff Colitis  -Completed therapy     4.  N/V  -Zofran  -Fluids     5.  Acute Hypoxic Respiratory Failure  -IV abx--Rocephin  -IV steroids  -Nebs  -Mucinex  -Flutter  -Supplemental oxygen, wean as tolerated            No abdominal pain, do not feel this represents abdominal infectiion                  Discharge Planning: I expect the patient to be discharged to home in 1-2 days  Bandar Blanco MD   05/11/19   4:20 PM

## 2019-05-11 NOTE — PLAN OF CARE
Problem: ARDS (Acute Resp Distress Syndrome) (Adult)  Goal: Signs and Symptoms of Listed Potential Problems Will be Absent, Minimized or Managed (ARDS)  Outcome: Ongoing (interventions implemented as appropriate)   05/11/19 1823   Goal/Outcome Evaluation   Problems Assessed (Acute Respiratory Distress Syndrome) all   Problems Present (ARDS) hypoxia/hypoxemia;situational response       Problem: Patient Care Overview  Goal: Plan of Care Review  Outcome: Ongoing (interventions implemented as appropriate)      Problem: Fall Risk (Adult)  Goal: Identify Related Risk Factors and Signs and Symptoms  Outcome: Ongoing (interventions implemented as appropriate)    Goal: Absence of Fall  Outcome: Ongoing (interventions implemented as appropriate)      Problem: Infection, Risk/Actual (Adult)  Goal: Identify Related Risk Factors and Signs and Symptoms  Outcome: Ongoing (interventions implemented as appropriate)    Goal: Infection Prevention/Resolution  Outcome: Ongoing (interventions implemented as appropriate)

## 2019-05-11 NOTE — H&P
[facility] Medicine Services  HISTORY AND PHYSICAL    Date of Admission: 5/10/2019  Primary Care Physician: Dandy Burton MD    Subjective     Chief Complaint: Vomiting    History of Present Illness  Mr. Sheets is a 73 year old gentleman with a history of tobacco abuse and COPD.  He presents complaining of 1 day of persistent nausea and vomiting.  He has had fever.  He denies abdominal pain or diarrhea.  He denies black or bloody stools.     He has a chronic productive smokers cough.  This has been worse the last few days  He has been wheezing.        Review of Systems   Constitutional: Negative for fatigue and fever.   HENT: Negative for congestion and ear pain.    Eyes: Negative for redness and visual disturbance.   Respiratory: Positive for cough, shortness of breath and wheezing.    Cardiovascular: Negative for chest pain and palpitations.   Gastrointestinal: Positive for nausea and vomiting. Negative for abdominal pain and diarrhea.   Endocrine: Negative for cold intolerance and heat intolerance.   Genitourinary: Negative for dysuria and frequency.   Musculoskeletal: Negative for arthralgias and back pain.   Skin: Negative for rash and wound.   Neurological: Negative for dizziness and headaches.   Psychiatric/Behavioral: Negative for confusion. The patient is not nervous/anxious.           Otherwise complete ROS reviewed and negative except as mentioned in the HPI.    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)    • Umbilical hernia      Past Surgical History:  Past Surgical History:    Procedure Laterality Date   • COLON RESECTION Right 1/19/2017    Procedure: LAPAROSCOPIC ASSISTED RIGHT COLON RESECTION RIGHT, UMBILICAL HERNIA REPAIR;  Surgeon: Mario Ellison MD;  Location: [facility] OR;  Service:    • COLONOSCOPY     • COLONOSCOPY N/A 1/4/2017    Procedure: COLONOSCOPY WITH ANESTHESIA;  Surgeon: Tee Kong MD;  Location: [facility] ENDOSCOPY;  Service:    • COLONOSCOPY N/A 2/5/2018    Procedure: COLONOSCOPY WITH ANESTHESIA;  Surgeon: Tee Kong MD;  Location: [facility] ENDOSCOPY;  Service:    • ENDOSCOPY N/A 1/4/2017    Procedure: ESOPHAGOGASTRODUODENOSCOPY WITH ANESTHESIA;  Surgeon: Tee Kong MD;  Location: [facility] ENDOSCOPY;  Service:    • ENDOSCOPY N/A 3/9/2018    Procedure: ESOPHAGOGASTRODUODENOSCOPY WITH ANESTHESIA;  Surgeon: Francisco Harmon DO;  Location: [facility] ENDOSCOPY;  Service:    • KNEE SURGERY      PINS PLACED AND LATER REMOVED S/P FRACTURE   • MOUTH BIOPSY     • TONSILLECTOMY       Social History:  reports that he has been smoking cigarettes.  He has a 55.00 pack-year smoking history. He has never used smokeless tobacco. He reports that he drinks alcohol. He reports that he does not use drugs.    Family History: family history includes Heart disease in his father.       Allergies:  Allergies   Allergen Reactions   • Acetylcholinesterase Other (See Comments)     Had trouble coming out of anesthesia, Dr Ellison inst pt family to make sure pt never receive this again, if he does he will wind up on respirator     Medications:  Prior to Admission medications    Medication Sig Start Date End Date Taking? Authorizing Provider   famotidine (PEPCID) 20 MG tablet Take 1 tablet by mouth Daily. 5/1/19  Yes Hansa Lr MD   ferrous sulfate (IRON SUPPLEMENT) 325 (65 FE) MG tablet Take 1 tablet by mouth Daily With Breakfast. 3/9/18  Yes Casimiro Hadley MD   lovastatin (MEVACOR) 40 MG tablet Take 40 mg by mouth Every Night. 12/4/16  Yes Provider, MD Chaya   vancomycin  "50 MG/ML reconstituted solution oral solution reconstituted Take 5 mL by mouth Every 8 (Eight) Hours for 10 days. 4/30/19 5/10/19 Yes Hansa Lr MD     Objective     Vital Signs: /72 (BP Location: Right arm, Patient Position: Lying)   Pulse 94   Temp 99.6 °F (37.6 °C) (Oral)   Resp 18   Ht 160 cm (63\")   Wt 62.4 kg (137 lb 8 oz)   SpO2 96%   BMI 24.36 kg/m²   Physical Exam   Constitutional: He is oriented to person, place, and time. He appears well-developed and well-nourished.   HENT:   Head: Normocephalic and atraumatic.   Right Ear: External ear normal.   Left Ear: External ear normal.   Nose: Nose normal.   Mouth/Throat: Oropharynx is clear and moist.   Eyes: Conjunctivae and EOM are normal. Pupils are equal, round, and reactive to light. Right eye exhibits no discharge. Left eye exhibits no discharge. No scleral icterus.   Neck: Normal range of motion. Neck supple. No tracheal deviation present. No thyromegaly present.   Cardiovascular: Normal rate, regular rhythm, normal heart sounds and intact distal pulses. Exam reveals no gallop and no friction rub.   No murmur heard.  Pulmonary/Chest: Effort normal. No stridor. No respiratory distress. He has decreased breath sounds. He has wheezes. He has no rales. He exhibits no tenderness.   Abdominal: Soft. Bowel sounds are normal. He exhibits no distension and no mass. There is no tenderness. There is no rebound and no guarding. No hernia.   Musculoskeletal: Normal range of motion. He exhibits no edema or deformity.   Lymphadenopathy:     He has no cervical adenopathy.   Neurological: He is alert and oriented to person, place, and time. He has normal reflexes. He displays normal reflexes. No cranial nerve deficit. He exhibits normal muscle tone. Coordination normal.   Skin: Skin is warm and dry. No rash noted. No erythema. No pallor.   Psychiatric: He has a normal mood and affect. His behavior is normal. Judgment and thought content normal. "   Vitals reviewed.        Results Reviewed:  Lab Results (last 24 hours)     Procedure Component Value Units Date/Time    BNP [[id number]]  (Normal) Collected:  05/10/19 0933    Specimen:  Blood Updated:  05/10/19 1651     proBNP 141.0 pg/mL     Troponin [[id number]]  (Normal) Collected:  05/10/19 0933    Specimen:  Blood Updated:  05/10/19 1651     Troponin I <0.012 ng/mL     Blood Culture With ANGELIA - Blood, Arm, Left [[id number]] Collected:  05/10/19 1450    Specimen:  Blood from Arm, Left Updated:  05/10/19 1507    Blood Culture - Blood, Arm, Right [[id number]] Collected:  05/10/19 1437    Specimen:  Blood from Arm, Right Updated:  05/10/19 1454    Lactic Acid, Reflex [[id number]]  (Normal) Collected:  05/10/19 1328    Specimen:  Blood from Arm, Right Updated:  05/10/19 1401     Lactate 1.2 mmol/L     Lactic Acid, Reflex Timer (This will reflex a repeat order 3-3:15 hours after ordered.) [[id number]] Collected:  05/10/19 0933    Specimen:  Blood Updated:  05/10/19 1315     Extra Tube Hold for add-ons.     Comment: Auto resulted.       Urinalysis With Culture If Indicated - Urine, Clean Catch [[id number]]  (Abnormal) Collected:  05/10/19 1045    Specimen:  Urine, Clean Catch Updated:  05/10/19 1103     Color, UA Yellow     Appearance, UA Clear     pH, UA >=9.0     Specific Gravity, UA 1.015     Glucose, UA Negative     Ketones, UA Negative     Bilirubin, UA Negative     Blood, UA Negative     Protein, UA 30 mg/dL (1+)     Leuk Esterase, UA Negative     Nitrite, UA Negative     Urobilinogen, UA 0.2 E.U./dL    Urinalysis, Microscopic Only - Urine, Clean Catch [[id number]]  (Abnormal) Collected:  05/10/19 1045    Specimen:  Urine, Clean Catch Updated:  05/10/19 1103     RBC, UA 3-5 /HPF      WBC, UA 3-5 /HPF      Bacteria, UA None Seen /HPF      Squamous Epithelial Cells, UA 0-2 /HPF      Hyaline Casts, UA 0-2 /LPF      Methodology Automated Microscopy    Blood Gas, Venous [[id number]]  (Abnormal) Collected:  05/10/19 1035     Specimen:  Venous Blood Updated:  05/10/19 1040     Site OTHER     pH, Venous 7.446 pH Units      pCO2, Venous 55.9 mm Hg      Comment: 83 Value above reference range        pO2, Venous 16.5 mm Hg      Comment: 84 Value below reference range        HCO3, Venous 38.5 mmol/L      Comment: 83 Value above reference range        Base Excess, Venous 12.4 mmol/L      Comment: 83 Value above reference range        O2 Saturation, Venous 20.7 %      Comment: 84 Value below reference range        Temperature 37.0 C      Barometric Pressure for Blood Gas 754 mmHg      Modality Room Air     Ventilator Mode NA     Collected by [id number]     Comment: Meter: [id number]     :  [id number]       Comprehensive Metabolic Panel [[id number]]  (Abnormal) Collected:  05/10/19 0933    Specimen:  Blood Updated:  05/10/19 1010     Glucose 149 mg/dL      BUN 11 mg/dL      Creatinine 1.01 mg/dL      Sodium 145 mmol/L      Potassium 3.6 mmol/L      Chloride 94 mmol/L      CO2 39.0 mmol/L      Calcium 9.3 mg/dL      Total Protein 8.1 g/dL      Albumin 4.30 g/dL      ALT (SGPT) <15 U/L      AST (SGOT) 55 U/L      Alkaline Phosphatase 66 U/L      Total Bilirubin 0.5 mg/dL      eGFR Non African Amer 72 mL/min/1.73      Globulin 3.8 gm/dL      A/G Ratio 1.1 g/dL      BUN/Creatinine Ratio 10.9     Anion Gap 12.0 mmol/L     Narrative:       GFR Normal >60  Chronic Kidney Disease <60  Kidney Failure <15    Lipase [[id number]]  (Abnormal) Collected:  05/10/19 0933    Specimen:  Blood Updated:  05/10/19 1007     Lipase 267 U/L     Lactic Acid, Plasma [[id number]]  (Abnormal) Collected:  05/10/19 0933    Specimen:  Blood Updated:  05/10/19 1003     Lactate 2.3 mmol/L     CBC & Differential [[id number]] Collected:  05/10/19 0933    Specimen:  Blood Updated:  05/10/19 0958    Narrative:       The following orders were created for panel order CBC & Differential.  Procedure                               Abnormality         Status                      ---------                               -----------         ------                     CBC Auto Differential[[id number]]        Abnormal            Final result                 Please view results for these tests on the individual orders.    CBC Auto Differential [[id number]]  (Abnormal) Collected:  05/10/19 0933    Specimen:  Blood Updated:  05/10/19 0958     WBC 15.63 10*3/mm3      RBC 4.39 10*6/mm3      Hemoglobin 13.0 g/dL      Hematocrit 40.4 %      MCV 92.0 fL      MCH 29.6 pg      MCHC 32.2 g/dL      RDW 14.0 %      RDW-SD 47.7 fl      MPV 12.2 fL      Platelets 368 10*3/mm3      Neutrophil % 84.5 %      Lymphocyte % 7.0 %      Monocyte % 7.3 %      Eosinophil % 0.1 %      Basophil % 0.4 %      Immature Grans % 0.7 %      Neutrophils, Absolute 13.22 10*3/mm3      Lymphocytes, Absolute 1.09 10*3/mm3      Monocytes, Absolute 1.14 10*3/mm3      Eosinophils, Absolute 0.01 10*3/mm3      Basophils, Absolute 0.06 10*3/mm3      Immature Grans, Absolute 0.11 10*3/mm3      nRBC 0.0 /100 WBC         Imaging Results (last 24 hours)     Procedure Component Value Units Date/Time    XR Chest 1 View [[id number]] Collected:  05/10/19 1533     Updated:  05/10/19 1537    Narrative:       EXAMINATION: XR CHEST 1 VW-. 5/10/2019 3:33 PM CDT     CHEST, ONE VIEW:     HISTORY: Cough and hypoxia     COMPARISON: 04/24/2019 and 04/24/2019     A single frontal chest radiograph was obtained.     FINDINGS:     Calcified granuloma appreciated in the right upper lobe.     The lungs are clear without acute infiltrates.     The heart is normal in size.     There is ectasia of the descending thoracic aorta observed, without  heart failure.     No acute osseous abnormalities identified.                                     Impression:       1. No acute cardiopulmonary process.     This report was finalized on 05/10/2019 15:33 by Dr. Max Ring MD.    CT Abdomen Pelvis Without Contrast [[id number]] Collected:  05/10/19 1216     Updated:  05/10/19  1224    Narrative:       EXAMINATION:   CT ABDOMEN PELVIS WO CONTRAST-  5/10/2019 12:16 PM CDT     HISTORY: CT ABDOMEN PELVIS WO CONTRAST- 5/10/2019 11:32 AM CDT     HISTORY: pain, vomiting, diarrhea      COMPARISON: 04/24/2019      DOSE LENGTH PRODUCT: 2 90 mGy cm. Automated exposure control was also  utilized to decrease patient radiation dose.     TECHNIQUE: Noncontrast enhanced images of the abdomen and pelvis  obtained without oral contrast. Multiplanar reformatted images were  provided for review.     FINDINGS:   Lung bases: Chronic changes noted in the lung bases..      LIVER: No focal liver lesion.      BILIARY SYSTEM: The gallbladder is unremarkable. No intrahepatic or  extrahepatic ductal dilatation.      PANCREAS: No focal pancreatic lesion.      SPLEEN: Unremarkable.      KIDNEYS AND ADRENALS: Bilateral kidneys and adrenal glands are  unremarkable.  Vascular calcifications noted in the renal arteries..     RETROPERITONEUM: No mass, lymphadenopathy or hemorrhage.      GI TRACT: No evidence of obstruction or bowel wall thickening.  Diverticulitis is noted in the sigmoid colon. Compared to prior exam of  04/24/2019 this is improved. However residual does persist..     OTHER: There is no mesenteric mass, lymphadenopathy or fluid collection.  The osseous structures and soft tissues demonstrate no worrisome  lesions. Old compression fracture of L1 is noted. There is retropulsion  of L1 into the lumbar canal this is unchanged     Vascular calcifications noted in the abdominal aorta      PELVIS: No mass lesion, fluid collection or significant lymphadenopathy  is seen in the pelvis. The urinary bladder is normal in appearance.       Impression:       1. Changes of diverticulitis. Compared to April 24 this is improved  however residual does persist.  2. Old compression fracture of L1 with some retropulsion into the lumbar  canal  3. Vascular calcifications present abdominal aorta. Infrarenal aorta is  ectatic  measuring 3.1 x 3.4 cm..         This report was finalized on 05/10/2019 12:21 by Dr. Olegario Graham MD.        I have personally reviewed and interpreted the radiology studies and ECG obtained at time of admission.     Assessment / Plan     Assessment:   Active Hospital Problems    Diagnosis   • Hypoxia       1.  COPD AE  -IV abx--Rocephin  -IV steroids  -Nebs  -Mucinex  -Flutter  -Supplemental oxygen, wean as tolerated    2.  Sepsis  -IV abx  -blood/sputum cultures    3.  ?Intra-abdominal infection  -Overall, I doubt abdominal infectiion.  However given his CT findings, Persistent vomiting, fever and leukocytosis--will add Flagyl for the time being.  Will try to cut back quickly    4.  N/V  -Zofran  -Fluids    5.  Acute Hypoxic Respiratory Failure  -IV abx--Rocephin  -IV steroids  -Nebs  -Mucinex  -Flutter  -Supplemental oxygen, wean as tolerated    6.  C. Diff Colitis  -Continue last 2 doses of PO Vanc    Code Status: Full     I discussed the patient's findings and my recommendations with the patient, patient's family    Estimated length of stay 2-3 days    Bandar Blanco MD   05/10/19   7:32 PM

## 2019-05-12 LAB
ALBUMIN SERPL-MCNC: 3.4 G/DL (ref 3.5–5)
ALBUMIN/GLOB SERPL: 1.1 G/DL (ref 1.1–2.5)
ALP SERPL-CCNC: 44 U/L (ref 24–120)
ALT SERPL W P-5'-P-CCNC: <15 U/L (ref 0–54)
ANION GAP SERPL CALCULATED.3IONS-SCNC: 10 MMOL/L (ref 4–13)
AST SERPL-CCNC: 38 U/L (ref 7–45)
BASOPHILS # BLD AUTO: 0.01 10*3/MM3 (ref 0–0.2)
BASOPHILS NFR BLD AUTO: 0.1 % (ref 0–2)
BILIRUB SERPL-MCNC: 0.2 MG/DL (ref 0.1–1)
BUN BLD-MCNC: 13 MG/DL (ref 5–21)
BUN/CREAT SERPL: 18.3 (ref 7–25)
CALCIUM SPEC-SCNC: 7.9 MG/DL (ref 8.4–10.4)
CHLORIDE SERPL-SCNC: 99 MMOL/L (ref 98–110)
CO2 SERPL-SCNC: 29 MMOL/L (ref 24–31)
CREAT BLD-MCNC: 0.71 MG/DL (ref 0.5–1.4)
DEPRECATED RDW RBC AUTO: 47.9 FL (ref 40–54)
EOSINOPHIL # BLD AUTO: 0 10*3/MM3 (ref 0–0.7)
EOSINOPHIL NFR BLD AUTO: 0 % (ref 0–4)
ERYTHROCYTE [DISTWIDTH] IN BLOOD BY AUTOMATED COUNT: 14.1 % (ref 12–15)
GFR SERPL CREATININE-BSD FRML MDRD: 108 ML/MIN/1.73
GLOBULIN UR ELPH-MCNC: 3.1 GM/DL
GLUCOSE BLD-MCNC: 133 MG/DL (ref 70–100)
HCT VFR BLD AUTO: 30.1 % (ref 40–52)
HGB BLD-MCNC: 9.7 G/DL (ref 14–18)
IMM GRANULOCYTES # BLD AUTO: 0.11 10*3/MM3 (ref 0–0.05)
IMM GRANULOCYTES NFR BLD AUTO: 0.7 % (ref 0–5)
LYMPHOCYTES # BLD AUTO: 0.83 10*3/MM3 (ref 0.72–4.86)
LYMPHOCYTES NFR BLD AUTO: 5.5 % (ref 15–45)
MCH RBC QN AUTO: 30 PG (ref 28–32)
MCHC RBC AUTO-ENTMCNC: 32.2 G/DL (ref 33–36)
MCV RBC AUTO: 93.2 FL (ref 82–95)
MONOCYTES # BLD AUTO: 0.62 10*3/MM3 (ref 0.19–1.3)
MONOCYTES NFR BLD AUTO: 4.1 % (ref 4–12)
NEUTROPHILS # BLD AUTO: 13.41 10*3/MM3 (ref 1.87–8.4)
NEUTROPHILS NFR BLD AUTO: 89.6 % (ref 39–78)
NRBC BLD AUTO-RTO: 0 /100 WBC (ref 0–0.2)
PLATELET # BLD AUTO: 233 10*3/MM3 (ref 130–400)
PMV BLD AUTO: 12.1 FL (ref 6–12)
POTASSIUM BLD-SCNC: 3.7 MMOL/L (ref 3.5–5.3)
PROT SERPL-MCNC: 6.5 G/DL (ref 6.3–8.7)
RBC # BLD AUTO: 3.23 10*6/MM3 (ref 4.8–5.9)
SODIUM BLD-SCNC: 138 MMOL/L (ref 135–145)
WBC NRBC COR # BLD: 14.98 10*3/MM3 (ref 4.8–10.8)

## 2019-05-12 PROCEDURE — 25010000002 CEFTRIAXONE PER 250 MG: Performed by: FAMILY MEDICINE

## 2019-05-12 PROCEDURE — 85025 COMPLETE CBC W/AUTO DIFF WBC: CPT | Performed by: FAMILY MEDICINE

## 2019-05-12 PROCEDURE — 25010000002 METHYLPREDNISOLONE PER 40 MG: Performed by: FAMILY MEDICINE

## 2019-05-12 PROCEDURE — 94799 UNLISTED PULMONARY SVC/PX: CPT

## 2019-05-12 PROCEDURE — 25010000002 ENOXAPARIN PER 10 MG: Performed by: FAMILY MEDICINE

## 2019-05-12 PROCEDURE — 96372 THER/PROPH/DIAG INJ SC/IM: CPT

## 2019-05-12 PROCEDURE — 96376 TX/PRO/DX INJ SAME DRUG ADON: CPT

## 2019-05-12 PROCEDURE — 96375 TX/PRO/DX INJ NEW DRUG ADDON: CPT

## 2019-05-12 PROCEDURE — 92526 ORAL FUNCTION THERAPY: CPT | Performed by: SPEECH-LANGUAGE PATHOLOGIST

## 2019-05-12 PROCEDURE — 80053 COMPREHEN METABOLIC PANEL: CPT | Performed by: FAMILY MEDICINE

## 2019-05-12 PROCEDURE — G0378 HOSPITAL OBSERVATION PER HR: HCPCS

## 2019-05-12 PROCEDURE — 25010000002 FUROSEMIDE PER 20 MG: Performed by: FAMILY MEDICINE

## 2019-05-12 RX ORDER — FUROSEMIDE 10 MG/ML
20 INJECTION INTRAMUSCULAR; INTRAVENOUS ONCE
Status: COMPLETED | OUTPATIENT
Start: 2019-05-12 | End: 2019-05-12

## 2019-05-12 RX ADMIN — METHYLPREDNISOLONE SODIUM SUCCINATE 20 MG: 40 INJECTION, POWDER, FOR SOLUTION INTRAMUSCULAR; INTRAVENOUS at 08:02

## 2019-05-12 RX ADMIN — METHYLPREDNISOLONE SODIUM SUCCINATE 20 MG: 40 INJECTION, POWDER, FOR SOLUTION INTRAMUSCULAR; INTRAVENOUS at 20:42

## 2019-05-12 RX ADMIN — IPRATROPIUM BROMIDE AND ALBUTEROL SULFATE 3 ML: 2.5; .5 SOLUTION RESPIRATORY (INHALATION) at 06:38

## 2019-05-12 RX ADMIN — FUROSEMIDE 20 MG: 10 INJECTION, SOLUTION INTRAMUSCULAR; INTRAVENOUS at 20:41

## 2019-05-12 RX ADMIN — IPRATROPIUM BROMIDE AND ALBUTEROL SULFATE 3 ML: 2.5; .5 SOLUTION RESPIRATORY (INHALATION) at 11:15

## 2019-05-12 RX ADMIN — IPRATROPIUM BROMIDE AND ALBUTEROL SULFATE 3 ML: 2.5; .5 SOLUTION RESPIRATORY (INHALATION) at 21:23

## 2019-05-12 RX ADMIN — ENOXAPARIN SODIUM 40 MG: 40 INJECTION SUBCUTANEOUS at 20:44

## 2019-05-12 RX ADMIN — GUAIFENESIN 1200 MG: 600 TABLET, EXTENDED RELEASE ORAL at 20:41

## 2019-05-12 RX ADMIN — CEFTRIAXONE SODIUM 1 G: 1 INJECTION, POWDER, FOR SOLUTION INTRAMUSCULAR; INTRAVENOUS at 21:13

## 2019-05-12 RX ADMIN — IPRATROPIUM BROMIDE AND ALBUTEROL SULFATE 3 ML: 2.5; .5 SOLUTION RESPIRATORY (INHALATION) at 14:48

## 2019-05-12 NOTE — PLAN OF CARE
Problem: Patient Care Overview  Goal: Plan of Care Review  Outcome: Ongoing (interventions implemented as appropriate)   05/12/19 1052   Coping/Psychosocial   Plan of Care Reviewed With patient;other (see comments)  (RN Nara and S/O)   Plan of Care Review   Progress improving   OTHER   Outcome Summary Diet toleration follow up completed. Patient was alert and cooperative. S/O at bedside. RN states that patient did have some coughing with jello but lungs are unchanged since yesterday. Patient completed trials of thin liquids with 1x delayed coughing noted. ST felt that this was secondary to reflux as patient is also observed to belch several times prior to and after PO. Patient is OK to continue clear liquids. Solids still not trialed as ST is unsure if MD wants to advance diet. Fom a speech standpoint ST feels that patient's oral motor skills are WFL and he would be able to begin a solid diet whe OK with MD.

## 2019-05-12 NOTE — THERAPY TREATMENT NOTE
Acute Care - Speech Language Pathology   Swallow Treatment Note [facility]     Patient Name: Jonnie Sheets Jr.  : 1945  MRN: [id number]  Today's Date: 2019               Admit Date: 5/10/2019  Diet toleration follow up completed. Patient was alert and cooperative. S/O at bedside. RN states that patient did have some coughing with jello but lungs are unchanged since yesterday. Patient completed trials of thin liquids with 1x delayed coughing noted. ST felt that this was secondary to reflux as patient is also observed to belch several times prior to and after PO. Patient is OK to continue clear liquids. Solids still not trialed as ST is unsure if MD wants to advance diet. Fom a speech standpoint ST feels that patient's oral motor skills are WFL and he would be able to begin a solid diet whe OK with MD.   Judith Breaux, MS, CFY-SLP 2019 10:59 AM    Visit Dx:      ICD-10-CM ICD-9-CM   1. Hypoxia R09.02 799.02   2. Dyspnea, unspecified type R06.00 786.09   3. Diverticulitis K57.92 562.11   4. Vomiting and diarrhea R11.10 787.03    R19.7 787.91   5. Dysphagia, unspecified type R13.10 787.20     Patient Active Problem List   Diagnosis   • Colon cancer (CMS/HCC)   • Personal history of colon cancer   • Anorexia   • Black stool   • Acute renal failure (CMS/HCC)   • Diverticular disease of large intestine with complication   • Septic shock (CMS/HCC), resolved   • Diverticulitis large intestine w/o perforation or abscess w/o bleeding   • Diarrhea, improved   • Acute kidney injury (CMS/HCC), secondary to sepsis, dialysis initiated    • High anion gap metabolic acidosis, improved   • Lactic acidosis, resolved   • Leukocytosis, resolved   • C. difficile colitis   • Hypokalemia   • Anemia   • Hypomagnesemia   • Hypoxia       Therapy Treatment  Rehabilitation Treatment Summary     Row Name 19 1024             Treatment Time/Intention    Discipline  speech language pathologist  -MM      Document Type   therapy note (daily note)  -MM      Subjective Information  no complaints  -MM      Mode of Treatment  individual therapy;speech-language pathology  -MM      Patient/Family Observations  Girl friend present   -MM      Care Plan Review  care plan/treatment goals reviewed  -MM      Care Plan Review, Other Participant(s)  other (see comments) [facility] Tala[facility]  -MM      Therapy Frequency (Swallow)  3 days per week  -MM      Patient Effort  good  -MM      Recorded by [MM] Judith Breaux MS, CFDARREN-SLP 05/12/19 1051      Row Name 05/12/19 1024             Pain Assessment    Additional Documentation  Pain Scale: FACES Pre/Post-Treatment (Group)  -MM      Recorded by [MM] Judith Breaux MS, CFDARREN-SLP 05/12/19 1051      Row Name 05/12/19 1024             Pain Scale: FACES Pre/Post-Treatment    Pain: FACES Scale, Pretreatment  0-->no hurt  -MM      Pain: FACES Scale, Post-Treatment  0-->no hurt  -MM      Recorded by [MM] Judith Breaux MS, CFDARREN-SLP 05/12/19 1051      Row Name 05/12/19 1024             Outcome Summary/Treatment Plan (SLP)    Daily Summary of Progress (SLP)  progress toward functional goals as expected  -MM      Barriers to Overall Progress (SLP)  n/a  -MM      Plan for Continued Treatment (SLP)  Continue clear liquids.   -MM      Anticipated Dischage Disposition  unknown  -MM      Recorded by [MM] Judith Breaux MS, ISABELLE-SLP 05/12/19 1051        User Key  (r) = Recorded By, (t) = Taken By, (c) = Cosigned By    Initials Name Effective Dates Discipline    MM Judith Breaux MS, CFY-SLP 07/03/18 -  SLP          Outcome Summary  Outcome Summary/Treatment Plan (SLP)  Daily Summary of Progress (SLP): progress toward functional goals as expected (05/12/19 1024 : Judith Breaux MS, CFY-SLP)  Barriers to Overall Progress (SLP): n/a (05/12/19 1024 : Judith Breaux MS, CFDARREN-SLP)  Plan for Continued Treatment (SLP): Continue clear liquids.  (05/12/19 1024 : Judith Breaux MS,  CFY-SLP)  Anticipated Dischage Disposition: unknown (05/12/19 1024 : Judith Breaux MS, CFY-SLP)      SLP GOALS     Row Name 05/12/19 1024 05/11/19 0700          Oral Nutrition/Hydration Goal 1 (SLP)    Oral Nutrition/Hydration Goal 1, SLP  LTG: Patient will tolerate LRD without s/s of aspiration.   -MM  LTG: Patient will tolerate LRD without s/s of aspiration.   -MM     Time Frame (Oral Nutrition/Hydration Goal 1, SLP)  by discharge  -MM  by discharge  -MM     Barriers (Oral Nutrition/Hydration Goal 1, SLP)  n/a  -MM  n/a  -MM     Progress/Outcomes (Oral Nutrition/Hydration Goal 1, SLP)  continuing progress toward goal  -MM  goal ongoing  -MM       User Key  (r) = Recorded By, (t) = Taken By, (c) = Cosigned By    Initials Name Provider Type    Judith Ramirez MS, CFY-SLP Speech and Language Pathologist          EDUCATION  The patient has been educated in the following areas:   Dysphagia (Swallowing Impairment).    SLP Recommendation and Plan  Daily Summary of Progress (SLP): progress toward functional goals as expected  Barriers to Overall Progress (SLP): n/a  Plan for Continued Treatment (SLP): Continue clear liquids.   Anticipated Dischage Disposition: unknown                    Time Calculation:   Time Calculation- SLP     Row Name 05/12/19 1058             Time Calculation- SLP    SLP Start Time  1024  -MM      SLP Stop Time  1048  -MM      SLP Time Calculation (min)  24 min  -MM      SLP Received On  05/12/19  -MM        User Key  (r) = Recorded By, (t) = Taken By, (c) = Cosigned By    Initials Name Provider Type    Judith Ramirez MS, CFY-SLP Speech and Language Pathologist          Therapy Charges for Today     Code Description Service Date Service Provider Modifiers Qty    78391547852 HC ST EVAL ORAL PHARYNG SWALLOW 6 5/11/2019 Judith Breaux MS, CFY-SLP GN 1    23002641832 HC ST TREATMENT SWALLOW 2 5/12/2019 Judith Breaux MS, CFY-SLP GN 1                 Judith HAMPTON  MS Saeid, CFY-SLP  5/12/2019

## 2019-05-12 NOTE — PLAN OF CARE
Problem: Patient Care Overview  Goal: Plan of Care Review  Outcome: Ongoing (interventions implemented as appropriate)  Still with some small diarrhea - noted to have finished oral vanc - still ever-so-slightly nauseated but has not required medication - on 02 @2L  IVF infusing - no acute distress will cont to monitor

## 2019-05-13 ENCOUNTER — READMISSION MANAGEMENT (OUTPATIENT)
Dept: CALL CENTER | Facility: HOSPITAL | Age: 74
End: 2019-05-13

## 2019-05-13 PROBLEM — R11.2 NAUSEA VOMITING AND DIARRHEA: Status: ACTIVE | Noted: 2019-05-13

## 2019-05-13 PROBLEM — J44.1 COPD WITH ACUTE EXACERBATION (HCC): Status: ACTIVE | Noted: 2019-05-13

## 2019-05-13 PROBLEM — R19.7 NAUSEA VOMITING AND DIARRHEA: Status: ACTIVE | Noted: 2019-05-13

## 2019-05-13 LAB
ALBUMIN SERPL-MCNC: 3.5 G/DL (ref 3.5–5)
ALBUMIN/GLOB SERPL: 1.3 G/DL (ref 1.1–2.5)
ALP SERPL-CCNC: 47 U/L (ref 24–120)
ALT SERPL W P-5'-P-CCNC: <15 U/L (ref 0–54)
ANION GAP SERPL CALCULATED.3IONS-SCNC: 10 MMOL/L (ref 4–13)
AST SERPL-CCNC: 33 U/L (ref 7–45)
BASOPHILS # BLD AUTO: 0.01 10*3/MM3 (ref 0–0.2)
BASOPHILS NFR BLD AUTO: 0.1 % (ref 0–2)
BILIRUB SERPL-MCNC: 0.3 MG/DL (ref 0.1–1)
BUN BLD-MCNC: 12 MG/DL (ref 5–21)
BUN/CREAT SERPL: 16.2 (ref 7–25)
CALCIUM SPEC-SCNC: 8.6 MG/DL (ref 8.4–10.4)
CHLORIDE SERPL-SCNC: 99 MMOL/L (ref 98–110)
CO2 SERPL-SCNC: 28 MMOL/L (ref 24–31)
CREAT BLD-MCNC: 0.74 MG/DL (ref 0.5–1.4)
DEPRECATED RDW RBC AUTO: 47.6 FL (ref 40–54)
EOSINOPHIL # BLD AUTO: 0 10*3/MM3 (ref 0–0.7)
EOSINOPHIL NFR BLD AUTO: 0 % (ref 0–4)
ERYTHROCYTE [DISTWIDTH] IN BLOOD BY AUTOMATED COUNT: 14.2 % (ref 12–15)
GFR SERPL CREATININE-BSD FRML MDRD: 103 ML/MIN/1.73
GLOBULIN UR ELPH-MCNC: 2.8 GM/DL
GLUCOSE BLD-MCNC: 113 MG/DL (ref 70–100)
HCT VFR BLD AUTO: 32.2 % (ref 40–52)
HGB BLD-MCNC: 10.5 G/DL (ref 14–18)
IMM GRANULOCYTES # BLD AUTO: 0.1 10*3/MM3 (ref 0–0.05)
IMM GRANULOCYTES NFR BLD AUTO: 0.7 % (ref 0–5)
LYMPHOCYTES # BLD AUTO: 1.14 10*3/MM3 (ref 0.72–4.86)
LYMPHOCYTES NFR BLD AUTO: 7.8 % (ref 15–45)
MCH RBC QN AUTO: 29.7 PG (ref 28–32)
MCHC RBC AUTO-ENTMCNC: 32.6 G/DL (ref 33–36)
MCV RBC AUTO: 91.2 FL (ref 82–95)
MONOCYTES # BLD AUTO: 0.99 10*3/MM3 (ref 0.19–1.3)
MONOCYTES NFR BLD AUTO: 6.7 % (ref 4–12)
NEUTROPHILS # BLD AUTO: 12.45 10*3/MM3 (ref 1.87–8.4)
NEUTROPHILS NFR BLD AUTO: 84.7 % (ref 39–78)
NRBC BLD AUTO-RTO: 0 /100 WBC (ref 0–0.2)
PLATELET # BLD AUTO: 252 10*3/MM3 (ref 130–400)
PMV BLD AUTO: 12.5 FL (ref 6–12)
POTASSIUM BLD-SCNC: 3.9 MMOL/L (ref 3.5–5.3)
PROT SERPL-MCNC: 6.3 G/DL (ref 6.3–8.7)
RBC # BLD AUTO: 3.53 10*6/MM3 (ref 4.8–5.9)
SODIUM BLD-SCNC: 137 MMOL/L (ref 135–145)
WBC NRBC COR # BLD: 14.69 10*3/MM3 (ref 4.8–10.8)

## 2019-05-13 PROCEDURE — G0378 HOSPITAL OBSERVATION PER HR: HCPCS

## 2019-05-13 PROCEDURE — 85025 COMPLETE CBC W/AUTO DIFF WBC: CPT | Performed by: FAMILY MEDICINE

## 2019-05-13 PROCEDURE — 96375 TX/PRO/DX INJ NEW DRUG ADDON: CPT

## 2019-05-13 PROCEDURE — 94799 UNLISTED PULMONARY SVC/PX: CPT

## 2019-05-13 PROCEDURE — 92526 ORAL FUNCTION THERAPY: CPT

## 2019-05-13 PROCEDURE — 97165 OT EVAL LOW COMPLEX 30 MIN: CPT | Performed by: OCCUPATIONAL THERAPIST

## 2019-05-13 PROCEDURE — 97161 PT EVAL LOW COMPLEX 20 MIN: CPT | Performed by: PHYSICAL THERAPIST

## 2019-05-13 PROCEDURE — 25010000002 METHYLPREDNISOLONE PER 40 MG: Performed by: FAMILY MEDICINE

## 2019-05-13 PROCEDURE — 96372 THER/PROPH/DIAG INJ SC/IM: CPT

## 2019-05-13 PROCEDURE — 80053 COMPREHEN METABOLIC PANEL: CPT | Performed by: FAMILY MEDICINE

## 2019-05-13 PROCEDURE — 97116 GAIT TRAINING THERAPY: CPT

## 2019-05-13 PROCEDURE — 25010000002 ENOXAPARIN PER 10 MG: Performed by: FAMILY MEDICINE

## 2019-05-13 PROCEDURE — 94760 N-INVAS EAR/PLS OXIMETRY 1: CPT

## 2019-05-13 PROCEDURE — 96376 TX/PRO/DX INJ SAME DRUG ADON: CPT

## 2019-05-13 RX ORDER — FAMOTIDINE 20 MG/1
20 TABLET, FILM COATED ORAL 2 TIMES DAILY
Status: DISCONTINUED | OUTPATIENT
Start: 2019-05-13 | End: 2019-05-13

## 2019-05-13 RX ORDER — FAMOTIDINE 10 MG/ML
20 INJECTION, SOLUTION INTRAVENOUS 2 TIMES DAILY
Status: DISCONTINUED | OUTPATIENT
Start: 2019-05-13 | End: 2019-05-14 | Stop reason: HOSPADM

## 2019-05-13 RX ORDER — SACCHAROMYCES BOULARDII 250 MG
250 CAPSULE ORAL 2 TIMES DAILY
Status: DISCONTINUED | OUTPATIENT
Start: 2019-05-13 | End: 2019-05-14 | Stop reason: HOSPADM

## 2019-05-13 RX ADMIN — Medication 250 MG: at 20:25

## 2019-05-13 RX ADMIN — IPRATROPIUM BROMIDE AND ALBUTEROL SULFATE 3 ML: 2.5; .5 SOLUTION RESPIRATORY (INHALATION) at 14:24

## 2019-05-13 RX ADMIN — GUAIFENESIN 1200 MG: 600 TABLET, EXTENDED RELEASE ORAL at 09:09

## 2019-05-13 RX ADMIN — IPRATROPIUM BROMIDE AND ALBUTEROL SULFATE 3 ML: 2.5; .5 SOLUTION RESPIRATORY (INHALATION) at 10:47

## 2019-05-13 RX ADMIN — ENOXAPARIN SODIUM 40 MG: 40 INJECTION SUBCUTANEOUS at 20:25

## 2019-05-13 RX ADMIN — METHYLPREDNISOLONE SODIUM SUCCINATE 20 MG: 40 INJECTION, POWDER, FOR SOLUTION INTRAMUSCULAR; INTRAVENOUS at 09:09

## 2019-05-13 RX ADMIN — IPRATROPIUM BROMIDE AND ALBUTEROL SULFATE 3 ML: 2.5; .5 SOLUTION RESPIRATORY (INHALATION) at 07:28

## 2019-05-13 RX ADMIN — METHYLPREDNISOLONE SODIUM SUCCINATE 20 MG: 40 INJECTION, POWDER, FOR SOLUTION INTRAMUSCULAR; INTRAVENOUS at 20:25

## 2019-05-13 RX ADMIN — GUAIFENESIN 1200 MG: 600 TABLET, EXTENDED RELEASE ORAL at 20:25

## 2019-05-13 RX ADMIN — FAMOTIDINE 20 MG: 10 INJECTION INTRAVENOUS at 20:26

## 2019-05-13 RX ADMIN — IPRATROPIUM BROMIDE AND ALBUTEROL SULFATE 3 ML: 2.5; .5 SOLUTION RESPIRATORY (INHALATION) at 18:43

## 2019-05-13 NOTE — PLAN OF CARE
Problem: Patient Care Overview  Goal: Plan of Care Review  Outcome: Ongoing (interventions implemented as appropriate)  Still with desaturations off 02 - to 86-88% - ate like 1 bite of fruit - anticipates solid food per order beginning with breakfast today - still with diarrhea but completed treatment has had not much of any intake except lots of apple juice to explain diarrhea - did diurese some from dose of lasix - ambulates to BR - no acute distress - will cont to monitor

## 2019-05-13 NOTE — THERAPY DISCHARGE NOTE
Acute Care - Occupational Therapy Initial Eval/Discharge  [facility]     Patient Name: Jonnie Sheets Jr.  : 1945  MRN: [id number]  Today's Date: 2019  Onset of Illness/Injury or Date of Surgery: 05/10/19  Date of Referral to OT: 19  Referring Physician: Dr. Blanco      Admit Date: 5/10/2019       ICD-10-CM ICD-9-CM   1. Hypoxia R09.02 799.02   2. Dyspnea, unspecified type R06.00 786.09   3. Diverticulitis K57.92 562.11   4. Vomiting and diarrhea R11.10 787.03    R19.7 787.91   5. Dysphagia, unspecified type R13.10 787.20   6. Impaired mobility Z74.09 799.89     Patient Active Problem List   Diagnosis   • Colon cancer (CMS/HCC)   • Personal history of colon cancer   • Anorexia   • Black stool   • Acute renal failure (CMS/HCC)   • Diverticular disease of large intestine with complication   • Septic shock (CMS/HCC), resolved   • Diverticulitis large intestine w/o perforation or abscess w/o bleeding   • Diarrhea, improved   • Acute kidney injury (CMS/HCC), secondary to sepsis, dialysis initiated    • High anion gap metabolic acidosis, improved   • Lactic acidosis, resolved   • Leukocytosis, resolved   • C. 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)    • Umbilical hernia      Past Surgical History:   Procedure Laterality Date   • COLON RESECTION Right 2017    Procedure: LAPAROSCOPIC ASSISTED RIGHT COLON RESECTION RIGHT, UMBILICAL HERNIA REPAIR;  Surgeon: Mario Ellison MD;  Location: [facility];  Service:    • COLONOSCOPY     • COLONOSCOPY N/A 2017     Procedure: COLONOSCOPY WITH ANESTHESIA;  Surgeon: Tee Kong MD;  Location: [facility] ENDOSCOPY;  Service:    • COLONOSCOPY N/A 2/5/2018    Procedure: COLONOSCOPY WITH ANESTHESIA;  Surgeon: Tee Kong MD;  Location: [facility] ENDOSCOPY;  Service:    • ENDOSCOPY N/A 1/4/2017    Procedure: ESOPHAGOGASTRODUODENOSCOPY WITH ANESTHESIA;  Surgeon: Tee Kong MD;  Location: [facility] ENDOSCOPY;  Service:    • ENDOSCOPY N/A 3/9/2018    Procedure: ESOPHAGOGASTRODUODENOSCOPY WITH ANESTHESIA;  Surgeon: Francisco Harmon DO;  Location: [facility] ENDOSCOPY;  Service:    • KNEE SURGERY      PINS PLACED AND LATER REMOVED S/P FRACTURE   • MOUTH BIOPSY     • TONSILLECTOMY            OT ASSESSMENT FLOWSHEET (last 12 hours)      Occupational Therapy Evaluation     Row Name 05/13/19 0952                   OT Evaluation Time/Intention    Subjective Information  complains of;nausea/vomiting  -MM        Document Type  evaluation  -MM        Mode of Treatment  occupational therapy  -MM        Patient Effort  good  -MM        Symptoms Noted During/After Treatment  shortness of breath  -MM           General Information    Patient Profile Reviewed?  yes  -MM        Onset of Illness/Injury or Date of Surgery  05/10/19  -MM        Referring Physician  Dr. Blanco  -MM        Patient Observations  alert;cooperative;agree to therapy  -MM        Patient/Family Observations  wife present  -MM        General Observations of Patient  alert, no apparent distress, cont pulse ox, O2/NC sitting beside pt  -MM        Prior Level of Function  independent:;all household mobility;grooming;dressing;bathing  -MM        Equipment Currently Used at Home  commode, bedside;hospital bed;shower chair;walker, rolling  -MM        Pertinent History of Current Functional Problem  pt presents 5/10 with nausea and vomiting; dx C dif and hypoxia  -MM        Existing Precautions/Restrictions  fall;oxygen therapy device and L/min  -MM        Equipment Issued to Patient   walker, front wheeled;gait belt  -MM        Risks Reviewed  patient:;LOB;nausea/vomiting;dizziness;increased discomfort;change in vital signs;increased drainage;lines disloged;spouse/S.O.:  -MM        Benefits Reviewed  improve function;patient:;spouse/S.O.:;increase independence;increase strength;increase balance;decrease pain;decrease risk of DVT;improve skin integrity;increase knowledge  -MM        Barriers to Rehab  none identified  -MM           Relationship/Environment    Primary Source of Support/Comfort  spouse  -MM        Lives With  spouse  -MM           Resource/Environmental Concerns    Current Living Arrangements  home/apartment/condo  -MM        Resource/Environmental Concerns  none  -MM           Home Main Entrance    Number of Stairs, Main Entrance  two  -MM        Stair Railings, Main Entrance  none  -MM           Cognitive Assessment/Interventions    Additional Documentation  Cognitive Assessment/Intervention (Group)  -MM           Cognitive Assessment/Intervention- PT/OT    Affect/Mental Status (Cognitive)  WFL  -MM        Orientation Status (Cognition)  oriented x 4  -MM        Follows Commands (Cognition)  WFL  -MM        Cognitive Function (Cognitive)  WFL  -MM        Safety Deficit (Cognitive)  impulsivity  -MM        Personal Safety Interventions  fall prevention program maintained;muscle strengthening facilitated;gait belt;supervised activity;nonskid shoes/slippers when out of bed  -MM           Safety Issues, Functional Mobility    Safety Issues Affecting Function (Mobility)  impulsivity  -MM        Impairments Affecting Function (Mobility)  endurance/activity tolerance;shortness of breath;strength  -MM           Bed Mobility Assessment/Treatment    Bed Mobility Assessment/Treatment  supine-sit;sit-supine  -MM        Supine-Sit [place] (Bed Mobility)  supervision  -MM        Sit-Supine [place] (Bed Mobility)  supervision  -MM        Assistive Device (Bed Mobility)  head of bed  elevated  -MM           Functional Mobility    Functional Mobility- Ind. Level  supervision required;contact guard assist  -MM        Functional Mobility- Device  rolling walker  -MM        Functional Mobility- Comment  down weiss  -MM           Transfer Assessment/Treatment    Transfer Assessment/Treatment  sit-stand transfer;stand-sit transfer  -MM           Sit-Stand Transfer    Sit-Stand [place] (Transfers)  contact guard  -MM           Stand-Sit Transfer    Stand-Sit [place] (Transfers)  contact guard  -MM           ADL Assessment/Intervention    BADL Assessment/Intervention  lower body dressing  -MM           Lower Body Dressing Assessment/Training    Lower Body Dressing [place] Level  independent  -MM        Lower Body Dressing Position  edge of bed sitting  -MM           General ROM    GENERAL ROM COMMENTS  BUE WFL  -MM           MMT (Manual Muscle Testing)    General MMT Comments  BUE 4/5  -MM           Sensory Assessment/Intervention    Sensory General Assessment  no sensation deficits identified  -MM           Positioning and Restraints    Pre-Treatment Position  in bed  -MM        Post Treatment Position  bed  -MM        In Bed  call light within reach;encouraged to call for assist;with family/caregiver;side rails up x2  -MM           Pain Scale: Numbers Pre/Post-Treatment    Pain Scale: Numbers, Pretreatment  0/10 - no pain  -MM        Pain Scale: Numbers, Post-Treatment  0/10 - no pain  -MM        Pain Intervention(s)  Repositioned;Ambulation/increased activity  -MM           Plan of Care Review    Plan of Care Reviewed With  patient;spouse  -MM           Clinical Impression (OT)    Date of Referral to OT  05/12/19  -MM        Functional Level at Time of Evaluation (OT Eval)  good  -MM        Patient/Family Goals Statement (OT Eval)  return home  -MM        Criteria for Skilled Therapeutic Interventions Met (OT Eval)  no;no problems identified which require skilled intervention  -MM         Therapy Frequency (OT Eval)  evaluation only  -MM        Care Plan Review (OT)  evaluation/treatment results reviewed;care plan/treatment goals reviewed;risks/benefits reviewed;current/potential barriers reviewed;patient/other agree to care plan  -MM        Anticipated Discharge Disposition (OT)  home with assist  -MM           Vital Signs    Pre SpO2 (%)  95  -MM        O2 Delivery Pre Treatment  room air  -MM        O2 Delivery Intra Treatment  room air  -MM        Post SpO2 (%)  90  -MM        O2 Delivery Post Treatment  supplemental O2 1L  -MM        Pre Patient Position  Supine  -MM        Intra Patient Position  Standing  -MM        Post Patient Position  Sitting  -MM           Living Environment    Home Accessibility  tub/shower is not walk in;stairs to enter home  -MM          User Key  (r) = Recorded By, (t) = Taken By, (c) = Cosigned By    Initials Name Effective Dates    Jatinder Boogie, OTR/L 04/03/18 -           Occupational Therapy Education     Title: PT OT SLP Therapies (Done)     Topic: Occupational Therapy (Resolved)     Point: ADL training (Resolved)     Description: Instruct learner(s) on proper safety adaptation and remediation techniques during self care or transfers.   Instruct in proper use of assistive devices.    Learning Progress Summary           Patient Acceptance, E, VU by SONIA at 5/13/2019  1:21 PM    Comment:  OT role, benefits, POC, home safety, activity tolerance, d/c planning   Family Acceptance, E, VU by MM at 5/13/2019  1:21 PM    Comment:  OT role, benefits, POC, home safety, activity tolerance, d/c planning                   Point: Precautions (Resolved)     Description: Instruct learner(s) on prescribed precautions during self-care and functional transfers.    Learning Progress Summary           Patient Acceptance, E, VU by SONIA at 5/13/2019  1:21 PM    Comment:  OT role, benefits, POC, home safety, activity tolerance, d/c planning   Family Acceptance, E, VU by MM at  5/13/2019  1:21 PM    Comment:  OT role, benefits, POC, home safety, activity tolerance, d/c planning                               User Key     Initials Effective Dates Name Provider Type Discipline     04/03/18 -  Jatinder Serrano, OTR/L Occupational Therapist OT                OT Recommendation and Plan  Outcome Summary/Treatment Plan (OT)  Anticipated Discharge Disposition (OT): home with assist  Therapy Frequency (OT Eval): evaluation only  Plan of Care Review  Plan of Care Reviewed With: patient, spouse  Plan of Care Reviewed With: patient, spouse  Outcome Summary: OT eval completed. Pt was supervision for bed mobiliyt. Pt was CGA for sit to stand transfer. Pt was CGA to Supervision for functional mobility. pt was independent in adjusting socks. Mild SOA noted with increased mobility. Pt and spouse confirm pt has been up ad patti to BR and have no concerns from an ADL standpoint. Skilled OT not warranted at this time d/t independence level. Will defer endurance/activity tolerance training to PT. Recommended d/c home with spouse.     Rehab Goal Summary     Row Name 05/13/19 0951             Physical Therapy Goals    Bed Mobility Goal Selection (PT)  bed mobility, PT goal 1  -SB      Transfer Goal Selection (PT)  transfer, PT goal 1  -SB      Gait Training Goal Selection (PT)  gait training, PT goal 1  -SB         Bed Mobility Goal 1 (PT)    Activity/Assistive Device (Bed Mobility Goal 1, PT)  bed mobility activities, all  -SB      [place] Level/Cues Needed (Bed Mobility Goal 1, PT)  independent  -SB      Time Frame (Bed Mobility Goal 1, PT)  by discharge  -SB      Progress/Outcomes (Bed Mobility Goal 1, PT)  goal ongoing  -SB         Transfer Goal 1 (PT)    Activity/Assistive Device (Transfer Goal 1, PT)  sit-to-stand/stand-to-sit  -SB      [place] Level/Cues Needed (Transfer Goal 1, PT)  independent  -SB      Time Frame (Transfer Goal 1, PT)  by discharge  -SB      Progress/Outcome (Transfer Goal 1,  PT)  goal ongoing  -SB         Gait Training Goal 1 (PT)    Activity/Assistive Device (Gait Training Goal 1, PT)  gait (walking locomotion);increase energy conservation;increase endurance/gait distance  -SB      [place] Level (Gait Training Goal 1, PT)  supervision required  -SB      Distance (Gait Goal 1, PT)  250  -SB      Time Frame (Gait Training Goal 1, PT)  by discharge  -SB      Progress/Outcome (Gait Training Goal 1, PT)  goal ongoing  -SB        User Key  (r) = Recorded By, (t) = Taken By, (c) = Cosigned By    Initials Name Provider Type Discipline    SB Angela Blanco, PT Physical Therapist PT          Outcome Measures     Row Name 05/13/19 1321 05/13/19 1300          How much help from another person do you currently need...    Turning from your back to your side while in flat bed without using bedrails?  --  4  -SB     Moving from lying on back to sitting on the side of a flat bed without bedrails?  --  4  -SB     Moving to and from a bed to a chair (including a wheelchair)?  --  4  -SB     Standing up from a chair using your arms (e.g., wheelchair, bedside chair)?  --  4  -SB     Climbing 3-5 steps with a railing?  --  3  -SB     To walk in hospital room?  --  3  -SB     AM-PAC 6 Clicks Score  --  22  -SB        How much help from another is currently needed...    Putting on and taking off regular lower body clothing?  4  -MM  --     Bathing (including washing, rinsing, and drying)  3  -MM  --     Toileting (which includes using toilet bed pan or urinal)  4  -MM  --     Putting on and taking off regular upper body clothing  4  -MM  --     Taking care of personal grooming (such as brushing teeth)  4  -MM  --     Eating meals  4  -MM  --     Score  23  -MM  --        Functional Assessment    Outcome Measure Options  AM-PAC 6 Clicks Daily Activity (OT)  -MM  AM-PAC 6 Clicks Basic Mobility (PT)  -SB       User Key  (r) = Recorded By, (t) = Taken By, (c) = Cosigned By    Initials Name Provider Type     Jatinder Boogie, OTR/L Occupational Therapist    SB Angela Blanco, PT Physical Therapist          Time Calculation:   Time Calculation- OT     Row Name 05/13/19 0951             Time Calculation- OT    OT Start Time  0951 +5 min chart review  -MM      OT Stop Time  1041  -MM      OT Time Calculation (min)  50 min  -MM      OT Received On  05/13/19  -MM        User Key  (r) = Recorded By, (t) = Taken By, (c) = Cosigned By    Initials Name Provider Type    Jatinder Boogie, OTR/L Occupational Therapist        Therapy Suggested Charges     Code   Minutes Charges    None           Therapy Charges for Today     Code Description Service Date Service Provider Modifiers Qty    [id number] HC OT EVAL LOW COMPLEXITY 4 5/13/2019 Jatinder Serrano OTR/L GO 1               OT Discharge Summary  Anticipated Discharge Disposition (OT): home with assist  Reason for Discharge: Independent, At baseline function  Outcomes Achieved: Refer to plan of care for updates on goals achieved(one time evaluation)  Discharge Destination: Home with assist    BERRY Dacosta/MEMO  5/13/2019

## 2019-05-13 NOTE — PROGRESS NOTES
Case Management Readmission Assessment Note       Case Management Readmission Assessment (all recorded)      Readmission Interview     [facility] Name 05/13/19 1042             Readmission Indications    Is this hospitalization related to the prior hospital diagnosis?  No      What was the reason you were admitted?  COPD      [facility] Name 05/13/19 1042             Recommendation for rehospitalization    Did you speak with your physician prior to coming to the hospital  No      Did you seek care elsewhere prior to coming to the hospital?  No      Row Name 05/13/19 1042             Follow up appointment    Do you have a PCP?  Yes      Did you have an appointment with PCP/specialist after hospitalization within 7 days?  Yes      Did you keep your appointment?  Yes      Row Name 05/13/19 1042             Medications    Did you have newly prescribed medications at discharge?  Yes      Did you understand the reasons for your medications at discharge and how to take them?  Yes      Did you understand the side effects of your medications?  Yes      Are you taking all of you prescribed medications?  Yes      Row Name 05/13/19 1042             Discharge Instructions    Did you understand your discharge instructions?  Yes      Did your family/caregiver hear your instructions?  Yes      Were you told to eat a special diet?  No      Did you adhere to the diet?  No      Were you given a number of someone to call if you had questions or concerns?  Yes      Row Name 05/13/19 1042             Index discharge location/services    Where did you go upon discharge?  Home      Do you have supportive family or friends in the home?  No      [facility] 05/13/19 1042             Discharge Readiness    On a scale of 1-5 (5 being well prepared), how ready were you for discharge  5

## 2019-05-13 NOTE — PLAN OF CARE
Problem: Patient Care Overview  Goal: Plan of Care Review  Outcome: Ongoing (interventions implemented as appropriate)   05/13/19 0907   Coping/Psychosocial   Plan of Care Reviewed With patient;spouse   Plan of Care Review   Progress improving   OTHER   Outcome Summary OT eval completed. Pt was supervision for bed mobiliyt. Pt was CGA for sit to stand transfer. Pt was CGA to Supervision for functional mobility. pt was independent in adjusting socks. Mild SOA noted with increased mobility. Pt and spouse confirm pt has been up ad patti to BR and have no concerns from an ADL standpoint. Skilled OT not warranted at this time d/t independence level. Will defer endurance/activity tolerance training to PT. Recommended d/c home with spouse.

## 2019-05-13 NOTE — PLAN OF CARE
Problem: Patient Care Overview  Goal: Plan of Care Review  Outcome: Ongoing (interventions implemented as appropriate)   05/13/19 1630   Coping/Psychosocial   Plan of Care Reviewed With patient;spouse   Plan of Care Review   Progress improving   OTHER   Outcome Summary Clinical SLP dysphagia tx completed on 5/13/19. Pt was given regular food trial and thin liquid trial and had delayed cough x1 after thin. No other overt s/s of aspiration noted during tx. SLP educated pt and wife on s/s of aspiration, silent aspiration, and pneumonia. Pt continue on current diet of regular food and thin liquid. Pt to be discharged from ST services. MD to consult ST if change. Thanks!

## 2019-05-13 NOTE — PROGRESS NOTES
Discharge Planning Assessment  [facility]     Patient Name: Jonnie Sheets Jr.  MRN: [id number]  Today's Date: 5/13/2019    Admit Date: 5/10/2019    Discharge Needs Assessment     Row Name 05/13/19 1043       Living Environment    Lives With  spouse    Current Living Arrangements  home/apartment/condo    Primary Care Provided by  spouse/significant other    Provides Primary Care For  no one    Family Caregiver if Needed  spouse    Quality of Family Relationships  helpful;involved;supportive    Able to Return to Prior Arrangements  yes       Resource/Environmental Concerns    Resource/Environmental Concerns  none    Transportation Concerns  car, none       Transition Planning    Patient/Family Anticipates Transition to  home with family    Patient/Family Anticipated Services at Transition  none    Transportation Anticipated  family or friend will provide       Discharge Needs Assessment    Readmission Within the Last 30 Days  previous discharge plan unsuccessful    Concerns to be Addressed  discharge planning    Equipment Currently Used at Home  walker, standard;hospital bed;shower chair;commode    Discharge Coordination/Progress  LIVES WITH SPOUSE; HAS RX COVERAGE; INDEPENDENT AT HOME; DENIES NEED FOR PURCHASE [facility] DEPARTMENT AND HOME HE[facility]        Discharge Plan    No documentation.       Destination      No service coordination in this encounter.      Durable Medical Equipment      No service coordination in this encounter.      Dialysis/Infusion      No service coordination in this encounter.      Home Medical Care      No service coordination in this encounter.      Therapy      No service coordination in this encounter.      Community Resources      No service coordination in this encounter.          Demographic Summary    No documentation.       Functional Status    No documentation.       Psychosocial    No documentation.       Abuse/Neglect    No documentation.       Legal    No documentation.        Substance Abuse    No documentation.       Patient Forms    No documentation.           Maria C Quinteros RN

## 2019-05-13 NOTE — PLAN OF CARE
Problem: Patient Care Overview  Goal: Plan of Care Review  Outcome: Ongoing (interventions implemented as appropriate)   05/13/19 9263   Coping/Psychosocial   Plan of Care Reviewed With patient   Plan of Care Review   Progress no change   OTHER   Outcome Summary PT eval completed. Pt alert and oriented x4. Pt performed bed mob with sup and sit to stand with CGA. Pt on 1 L of O2 and had 95% O2 sats. Pt ambulated 100 feet on room air with CGA and became SOB at end of gait training. Pt will benefit from skilled PT to improve endurance and gait. Recommend d/c home with assist.

## 2019-05-13 NOTE — OUTREACH NOTE
Sepsis Week 2 Survey      Responses   Facility patient discharged from?  [facility]   Does the patient have one of the following disease processes/diagnoses(primary or secondary)?  Sepsis   Week 2 attempt successful?  No   Revoke  Readmitted          Milvia Colón RN

## 2019-05-13 NOTE — PLAN OF CARE
Problem: Patient Care Overview  Goal: Plan of Care Review  Outcome: Ongoing (interventions implemented as appropriate)   05/13/19 7427   Coping/Psychosocial   Plan of Care Reviewed With patient;spouse   Plan of Care Review   Progress improving   OTHER   Outcome Summary Pt with fair appetite today, however over recent weeks appetite has been poor. Upgraded to regular solids diet, Cardiac restriction last night and has been eating some today. Does meet criteria for malnutrition (see progress notes for details). Encouraged intake and educated on MNT for disease states and basic nutrition advice. Will continue to follow.

## 2019-05-13 NOTE — THERAPY EVALUATION
Acute Care - Physical Therapy Initial Evaluation  [facility]     Patient Name: Jonnie Sheets Jr.  : 1945  MRN: [id number]  Today's Date: 2019   Onset of Illness/Injury or Date of Surgery: 05/10/19  Date of Referral to PT: 05/10/19  Referring Physician: Dr. Blanco      Admit Date: 5/10/2019    Visit Dx:     ICD-10-CM ICD-9-CM   1. Hypoxia R09.02 799.02   2. Dyspnea, unspecified type R06.00 786.09   3. Diverticulitis K57.92 562.11   4. Vomiting and diarrhea R11.10 787.03    R19.7 787.91   5. Dysphagia, unspecified type R13.10 787.20   6. Impaired mobility Z74.09 799.89     Patient Active Problem List   Diagnosis   • Colon cancer (CMS/HCC)   • Personal history of colon cancer   • Anorexia   • Black stool   • Acute renal failure (CMS/HCC)   • Diverticular disease of large intestine with complication   • Septic shock (CMS/HCC), resolved   • Diverticulitis large intestine w/o perforation or abscess w/o bleeding   • Diarrhea, improved   • Acute kidney injury (CMS/HCC), secondary to sepsis, dialysis initiated    • High anion gap metabolic acidosis, improved   • Lactic acidosis, resolved   • Leukocytosis, resolved   • C. 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)    • Umbilical hernia      Past Surgical History:   Procedure Laterality Date   • COLON RESECTION Right 2017    Procedure: LAPAROSCOPIC ASSISTED RIGHT COLON RESECTION RIGHT, UMBILICAL HERNIA REPAIR;  Surgeon: Mario Ellison MD;  Location: [facility];  Service:    • COLONOSCOPY     • COLONOSCOPY N/A 2017     Procedure: COLONOSCOPY WITH ANESTHESIA;  Surgeon: Tee Kong MD;  Location: [facility] ENDOSCOPY;  Service:    • COLONOSCOPY N/A 2/5/2018    Procedure: COLONOSCOPY WITH ANESTHESIA;  Surgeon: Tee Kong MD;  Location: [facility] ENDOSCOPY;  Service:    • ENDOSCOPY N/A 1/4/2017    Procedure: ESOPHAGOGASTRODUODENOSCOPY WITH ANESTHESIA;  Surgeon: Tee Kong MD;  Location: [facility] ENDOSCOPY;  Service:    • ENDOSCOPY N/A 3/9/2018    Procedure: ESOPHAGOGASTRODUODENOSCOPY WITH ANESTHESIA;  Surgeon: Francisco Harmon DO;  Location: [facility] ENDOSCOPY;  Service:    • KNEE SURGERY      PINS PLACED AND LATER REMOVED S/P FRACTURE   • MOUTH BIOPSY     • TONSILLECTOMY          PT ASSESSMENT (last 12 hours)      Physical Therapy Evaluation     Row Name 05/13/19 0951          PT Evaluation Time/Intention    Subjective Information  complains of;nausea/vomiting  -SB     Document Type  evaluation  -SB     Mode of Treatment  physical therapy  -SB     Patient Effort  good  -SB     Symptoms Noted During/After Treatment  shortness of breath  -SB     Row Name 05/13/19 0951          General Information    Patient Profile Reviewed?  yes  -SB     Onset of Illness/Injury or Date of Surgery  05/10/19  -SB     Referring Physician  Dr. Blanco  -SB     Patient Observations  alert;cooperative;agree to therapy  -SB     Patient/Family Observations  wife present  -SB     General Observations of Patient  alert, no apparent distress  -SB     Prior Level of Function  independent:;all household mobility;grooming;dressing;bathing  -SB     Equipment Currently Used at Home  commode, bedside;hospital bed;shower chair;walker, rolling  -SB     Pertinent History of Current Functional Problem  pt presents 5/10 with nausea and vomiting; dx C dif and hypoxia  -SB     Existing Precautions/Restrictions  fall;oxygen therapy device and L/min  -SB     Equipment Issued to Patient  walker, front wheeled;gait belt  -SB     Risks Reviewed   patient:;LOB;nausea/vomiting;dizziness;increased discomfort;change in vital signs;increased drainage;lines disloged;spouse/S.O.:  -SB     Benefits Reviewed  improve function;patient:;spouse/S.O.:;increase independence;increase strength;increase balance;decrease pain;decrease risk of DVT;improve skin integrity;increase knowledge  -SB     Row Name 05/13/19 0964          Relationship/Environment    Primary Source of Support/Comfort  spouse  -SB     Lives With  spouse  -SB     Row Name 05/13/19 0951          Resource/Environmental Concerns    Current Living Arrangements  home/apartment/condo  -SB     Resource/Environmental Concerns  none  -SB     Row Name 05/13/19 0951          Home Main Entrance    Number of Stairs, Main Entrance  two  -SB     Stair Railings, Main Entrance  none  -SB     Row Name 05/13/19 0951          Cognitive Assessment/Interventions    Additional Documentation  Cognitive Assessment/Intervention (Group)  -SB     Row Name 05/13/19 0958          Cognitive Assessment/Intervention- PT/OT    Affect/Mental Status (Cognitive)  WFL  -SB     Orientation Status (Cognition)  oriented x 4  -SB     Follows Commands (Cognition)  WFL  -SB     Cognitive Function (Cognitive)  WFL  -SB     Personal Safety Interventions  fall prevention program maintained;muscle strengthening facilitated;gait belt;supervised activity;nonskid shoes/slippers when out of bed  -SB     Row Name 05/13/19 0912          Safety Issues, Functional Mobility    Safety Issues Affecting Function (Mobility)  impulsivity  -SB     Impairments Affecting Function (Mobility)  endurance/activity tolerance;shortness of breath;strength  -SB     Row Name 05/13/19 0981          Bed Mobility Assessment/Treatment    Bed Mobility Assessment/Treatment  supine-sit;sit-supine  -SB     Supine-Sit Sac (Bed Mobility)  supervision  -SB     Sit-Supine Sac (Bed Mobility)  supervision  -SB     Assistive Device (Bed Mobility)  head of bed elevated  -SB      Row Name 05/13/19 0951          Transfer Assessment/Treatment    Transfer Assessment/Treatment  sit-stand transfer;stand-sit transfer  -SB     Sit-Stand [place] (Transfers)  contact guard  -SB     Stand-Sit [place] (Transfers)  contact guard  -SB     Row Name 05/13/19 0951          Gait/Stairs Assessment/Training    Gait/Stairs Assessment/Training  gait/ambulation independence;gait/ambulation assistive device;distance ambulated  -SB     [place] Level (Gait)  contact guard  -SB     Distance in Feet (Gait)  100  -SB     Pattern (Gait)  step-through  -SB     Row Name 05/13/19 0951          General ROM    GENERAL ROM COMMENTS  BLE WFL  -SB     Row Name 05/13/19 0951          MMT (Manual Muscle Testing)    General MMT Comments  BLE 4/5  -SB     Row Name 05/13/19 0951          Sensory Assessment/Intervention    Sensory General Assessment  no sensation deficits identified  -SB     Row Name 05/13/19 0951          Pain Assessment    Additional Documentation  Pain Scale: Numbers Pre/Post-Treatment (Group)  -SB     Row Name 05/13/19 0951          Pain Scale: Numbers Pre/Post-Treatment    Pain Scale: Numbers, Pretreatment  0/10 - no pain  -SB     Pain Scale: Numbers, Post-Treatment  0/10 - no pain  -SB     Pain Intervention(s)  Repositioned;Ambulation/increased activity  -SB     Row Name 05/13/19 0951          Plan of Care Review    Plan of Care Reviewed With  patient;spouse  -SB     Row Name 05/13/19 0951          Physical Therapy Clinical Impression    Date of Referral to PT  05/10/19  -SB     Patient/Family Goals Statement (PT Clinical Impression)  return home  -SB     Criteria for Skilled Interventions Met (PT Clinical Impression)  yes  -SB     Rehab Potential (PT Clinical Summary)  good, to achieve stated therapy goals  -SB     Predicted Duration of Therapy (PT)  until d/c  -SB     Care Plan Review (PT)  evaluation/treatment results reviewed;care plan/treatment goals reviewed;risks/benefits  reviewed;current/potential barriers reviewed;patient/other agree to care plan  -SB     Care Plan Review, Other Participant (PT Clinical Impression)  spouse  -SB     Row Name 05/13/19 0951          Vital Signs    Pre SpO2 (%)  95  -SB     O2 Delivery Pre Treatment  supplemental O2 1L  -SB     O2 Delivery Intra Treatment  room air  -SB     Post SpO2 (%)  90  -SB     O2 Delivery Post Treatment  supplemental O2  -SB     Row Name 05/13/19 0951          Physical Therapy Goals    Bed Mobility Goal Selection (PT)  bed mobility, PT goal 1  -SB     Transfer Goal Selection (PT)  transfer, PT goal 1  -SB     Gait Training Goal Selection (PT)  gait training, PT goal 1  -SB     Row Name 05/13/19 0951          Bed Mobility Goal 1 (PT)    Activity/Assistive Device (Bed Mobility Goal 1, PT)  bed mobility activities, all  -SB     [place] Level/Cues Needed (Bed Mobility Goal 1, PT)  independent  -SB     Time Frame (Bed Mobility Goal 1, PT)  by discharge  -SB     Progress/Outcomes (Bed Mobility Goal 1, PT)  goal ongoing  -SB     Row Name 05/13/19 0951          Transfer Goal 1 (PT)    Activity/Assistive Device (Transfer Goal 1, PT)  sit-to-stand/stand-to-sit  -SB     [place] Level/Cues Needed (Transfer Goal 1, PT)  independent  -SB     Time Frame (Transfer Goal 1, PT)  by discharge  -SB     Progress/Outcome (Transfer Goal 1, PT)  goal ongoing  -SB     Row Name 05/13/19 0951          Gait Training Goal 1 (PT)    Activity/Assistive Device (Gait Training Goal 1, PT)  gait (walking locomotion);increase energy conservation;increase endurance/gait distance  -SB     [place] Level (Gait Training Goal 1, PT)  supervision required  -SB     Distance (Gait Goal 1, PT)  250  -SB     Time Frame (Gait Training Goal 1, PT)  by discharge  -SB     Progress/Outcome (Gait Training Goal 1, PT)  goal ongoing  -SB     Row Name 05/13/19 0951          Positioning and Restraints    Pre-Treatment Position  in bed  -SB     Post Treatment Position   bed  -SB     In Bed  call light within reach;encouraged to call for assist;with family/caregiver;side rails up x2  -SB     Row Name 05/13/19 0951          Living Environment    Home Accessibility  tub/shower is not walk in;stairs to enter home  -SB       User Key  (r) = Recorded By, (t) = Taken By, (c) = Cosigned By    Initials Name Provider Type    SB Angela Blanco, AMELIA Physical Therapist        Physical Therapy Education     Title: PT OT SLP Therapies (Done)     Topic: Physical Therapy (Done)     Point: Mobility training (Done)     Learning Progress Summary           Patient Acceptance, E, VU by SB at 5/13/2019  1:04 PM    Comment:  pt edu on POC, benefits of activity and d/c plans                   Point: Home exercise program (Done)     Learning Progress Summary           Patient Acceptance, E, VU by SB at 5/13/2019  1:04 PM    Comment:  pt edu on POC, benefits of activity and d/c plans                   Point: Body mechanics (Done)     Learning Progress Summary           Patient Acceptance, E, VU by SB at 5/13/2019  1:04 PM    Comment:  pt edu on POC, benefits of activity and d/c plans                   Point: Precautions (Done)     Learning Progress Summary           Patient Acceptance, E, VU by SB at 5/13/2019  1:04 PM    Comment:  pt edu on POC, benefits of activity and d/c plans                               User Key     Initials Effective Dates Name Provider Type Discipline     08/31/18 -  Angela Blanco PT Physical Therapist PT              PT Recommendation and Plan  Anticipated Discharge Disposition (PT): home with assist  Planned Therapy Interventions (PT Eval): balance training, bed mobility training, gait training, home exercise program, patient/family education, stair training, strengthening, transfer training  Therapy Frequency (PT Clinical Impression): 2 times/day  Outcome Summary/Treatment Plan (PT)  Anticipated Discharge Disposition (PT): home with assist  Plan of Care Reviewed With:  patient  Progress: no change  Outcome Summary: PT eval completed. Pt alert and oriented x4. Pt performed bed mob with sup and sit to stand with CGA. Pt on 1 L of O2 and had 95% O2 sats. Pt ambulated 100 feet on room air with CGA and became SOB at end of gait training. Pt will benefit from skilled PT to improve endurance and gait. Recommend d/c home with assist.   Outcome Measures     Row Name 05/13/19 1300             How much help from another person do you currently need...    Turning from your back to your side while in flat bed without using bedrails?  4  -SB      Moving from lying on back to sitting on the side of a flat bed without bedrails?  4  -SB      Moving to and from a bed to a chair (including a wheelchair)?  4  -SB      Standing up from a chair using your arms (e.g., wheelchair, bedside chair)?  4  -SB      Climbing 3-5 steps with a railing?  3  -SB      To walk in hospital room?  3  -SB      AM-PAC 6 Clicks Score  22  -SB         Functional Assessment    Outcome Measure Options  AM-PAC 6 Clicks Basic Mobility (PT)  -SB        User Key  (r) = Recorded By, (t) = Taken By, (c) = Cosigned By    Initials Name Provider Type    Angela Orozco PT Physical Therapist         Time Calculation:   PT Charges     Row Name 05/13/19 1308             Time Calculation    Start Time  1005  -SB      Stop Time  1046  -SB      Time Calculation (min)  41 min  -SB      PT Received On  05/13/19  -SB      PT Goal Re-Cert Due Date  05/23/19  -SB        User Key  (r) = Recorded By, (t) = Taken By, (c) = Cosigned By    Initials Name Provider Type    Angela Oorzco PT Physical Therapist        Therapy Charges for Today     Code Description Service Date Service Provider Modifiers Qty    42150151066 HC PT EVAL LOW COMPLEXITY 3 5/13/2019 Angela Blanco PT  1          PT G-Codes  Outcome Measure Options: AM-PAC 6 Clicks Basic Mobility (PT)  AM-PAC 6 Clicks Score: 22      Angela Blanco PT  5/13/2019

## 2019-05-13 NOTE — THERAPY DISCHARGE NOTE
Acute Care - Speech Language Pathology   Swallow Treatment Note/Discharge   [facility]     Patient Name: Jonnie Sheets Jr.  : 1945  MRN: [id number]  Today's Date: 2019  Onset of Illness/Injury or Date of Surgery: 05/10/19     Referring Physician: Dr. Blanco      Admit Date: 5/10/2019    Clinical SLP dysphagia tx completed on 19. Pt was given regular food trial and thin liquid trial and had delayed cough x1 after thin. No other overt s/s of aspiration noted during tx. SLP educated pt and wife on s/s of aspiration, silent aspiration, and pneumonia. Pt continue on current diet of regular food and thin liquid. Pt to be discharged from  services. MD to consult  if change. Thanks!  Myrtle Sandoval  Clinician  Visit Dx:      ICD-10-CM ICD-9-CM   1. Hypoxia R09.02 799.02   2. Dyspnea, unspecified type R06.00 786.09   3. Diverticulitis K57.92 562.11   4. Vomiting and diarrhea R11.10 787.03    R19.7 787.91   5. Dysphagia, unspecified type R13.10 787.20   6. Impaired mobility Z74.09 799.89     Patient Active Problem List   Diagnosis   • Colon cancer (CMS/HCC)   • Personal history of colon cancer   • Anorexia   • Black stool   • Acute renal failure (CMS/HCC)   • Diverticular disease of large intestine with complication   • Septic shock (CMS/HCC), resolved   • Diverticulitis large intestine w/o perforation or abscess w/o bleeding   • Diarrhea, improved   • Acute kidney injury (CMS/HCC), secondary to sepsis, dialysis initiated    • High anion gap metabolic acidosis, improved   • Lactic acidosis, resolved   • Leukocytosis, resolved   • C. difficile colitis   • Hypokalemia   • Anemia   • Hypomagnesemia   • Hypoxia   • COPD with acute exacerbation (CMS/HCC)   • Nausea vomiting and diarrhea       Therapy Treatment  Rehabilitation Treatment Summary     Row Name 19 1521 19 1448          Treatment Time/Intention    Discipline  physical therapy assistant  -GINETTE  speech language pathologist   (Pended)   -LT     Document Type  therapy note (daily note)  -GINETTE  discharge treatment  (Pended)   -LT     Subjective Information  no complaints  -JP2  complains of  (Pended)  esophageal symptoms  -LT     Mode of Treatment  --  individual therapy  (Pended)   -LT     Patient/Family Observations  wife  -JP2  wife present  (Pended)   -LT     Care Plan Review  --  care plan/treatment goals reviewed;risks/benefits reviewed;current/potential barriers reviewed;patient/other agree to care plan  (Pended)   -LT     Patient Effort  excellent  -JP2  excellent  (Pended)   -LT     Existing Precautions/Restrictions  fall  -JP2  --     Recorded by [GINETTE] Vira Shane, [facility] 05/13/19 1537  [JP2] Vira Shane, [facility] 05/13/19 1546 [LT] Myrtle Sandoval, Speech Therapy Student 05/13/19 1522     Row Name 05/13/19 1521             Bed Mobility Assessment/Treatment    Supine-Sit [place] (Bed Mobility)  independent  -GINETTE      Sit-Supine [place] (Bed Mobility)  independent  -GINETTE      Recorded by [GINETTE] Vira Shane, [facility] 05/13/19 1546      Row Name 05/13/19 1521             Sit-Stand Transfer    Sit-Stand [place] (Transfers)  supervision  -GINETTE      Recorded by [GINETTE] Vira Shane, [facility] 05/13/19 1546      Row Name 05/13/19 1521             Stand-Sit Transfer    Stand-Sit [place] (Transfers)  supervision  -GINETTE      Recorded by [GINETTE] Vira Shane, [facility] 05/13/19 1546      Row Name 05/13/19 1521             Gait/Stairs Assessment/Training    [place] Level (Gait)  contact guard  -GINETTE      Distance in Feet (Gait)  200  -GINETTE      Recorded by [GINETTE] Vira Shane, [facility] 05/13/19 1546      Row Name 05/13/19 1521             Positioning and Restraints    Pre-Treatment Position  in bed  -GINETTE      Post Treatment Position  bed  -GINETTE      In Bed  supine;call light within reach  -GINETTE      Recorded by [GINETTE] Vira Shane, [facility] 05/13/19 1546      Row Name 05/13/19 1448             Pain Assessment    Additional Documentation  Pain  Scale: FACES Pre/Post-Treatment (Group)  (Pended)   -LT      Recorded by [LT] Myrtle Sandoval Speech Therapy Student 05/13/19 1522      Row Name 05/13/19 1521 05/13/19 1448          Pain Scale: FACES Pre/Post-Treatment    Pain: FACES Scale, Pretreatment  0-->no hurt  -GINETTE  0-->no hurt  (Pended)   -LT     Pain: FACES Scale, Post-Treatment  --  0-->no hurt  (Pended)   -LT     Recorded by [GINETTE] Vira Shane, PTA 05/13/19 1546 [LT] Myrtle Sandoval Speech Therapy Student 05/13/19 1522     Row Name 05/13/19 1448             Outcome Summary/Treatment Plan (SLP)    Daily Summary of Progress (SLP)  progress toward functional goals as expected  (Pended)   -LT      Barriers to Overall Progress (SLP)  n/a  (Pended)   -LT      Plan for Continued Treatment (SLP)  d/c speech services  (Pended)   -LT      Anticipated Dischage Disposition  home  (Pended)   -LT      Reason for Discharge  no further expectation of functional progress  (Pended)   -LT2      Recorded by [LT] Myrtle Sandoval Speech Therapy Student 05/13/19 1522  [LT2] Myrtle Sandoval Speech Therapy Student 05/13/19 1531        User Key  (r) = Recorded By, (t) = Taken By, (c) = Cosigned By    Initials Name Effective Dates Discipline    GINETTE Vira Shane, PTA 08/02/16 -  PT    LT Myrtle Sandoval Speech Therapy Student 04/24/19 -  SLP        Outcome Summary  Outcome Summary/Treatment Plan (SLP)  Daily Summary of Progress (SLP): (P) progress toward functional goals as expected (05/13/19 1448 : Myrtle Sandoval, Speech Therapy Student)  Barriers to Overall Progress (SLP): (P) n/a (05/13/19 1448 : Myrtle Sandoval, Speech Therapy Student)  Plan for Continued Treatment (SLP): (P) d/c speech services (05/13/19 1448 : Myrtle Sandoavl, Speech Therapy Student)  Anticipated Dischage Disposition: (P) home (05/13/19 1448 : Myrtle Sandoval, Speech Therapy Student)  Reason for Discharge: (P) no further expectation of functional progress (05/13/19 1448 : Myrtle Sandoval, Speech Therapy Student)  SLP  GOALS     Row Name 05/13/19 1448 05/12/19 1024 05/11/19 0700       Oral Nutrition/Hydration Goal 1 (SLP)    Oral Nutrition/Hydration Goal 1, SLP  LTG: Patient will tolerate LRD without s/s of aspiration.   (Pended)   -LT  LTG: Patient will tolerate LRD without s/s of aspiration.   -MM  LTG: Patient will tolerate LRD without s/s of aspiration.   -MM    Time Frame (Oral Nutrition/Hydration Goal 1, SLP)  by discharge  (Pended)   -LT  by discharge  -MM  by discharge  -MM    Barriers (Oral Nutrition/Hydration Goal 1, SLP)  n/a  (Pended)   -LT  n/a  -MM  n/a  -MM    Progress/Outcomes (Oral Nutrition/Hydration Goal 1, SLP)  goal met  (Pended)   -LT  continuing progress toward goal  -MM  goal ongoing  -MM      User Key  (r) = Recorded By, (t) = Taken By, (c) = Cosigned By    Initials Name Provider Type    MM Judith Breaux, MS, CFY-SLP Speech and Language Pathologist    LT Myrtle Sandoval, Speech Therapy Student Speech Therapy Student          EDUCATION  The patient has been educated in the following areas:   Dysphagia (Swallowing Impairment).    SLP Recommendation and Plan  Daily Summary of Progress (SLP): (P) progress toward functional goals as expected  Barriers to Overall Progress (SLP): (P) n/a  Plan for Continued Treatment (SLP): (P) d/c speech services  Anticipated Dischage Disposition: (P) home        Reason for Discharge: (P) no further expectation of functional progress           Time Calculation:   Time Calculation- SLP     Row Name 05/13/19 1635             Time Calculation- SLP    SLP Start Time  1448  (Pended)   -LT      SLP Stop Time  1502  (Pended)   -LT      SLP Time Calculation (min)  14 min  (Pended)   -LT      SLP Received On  05/13/19  (Pended)   -LT        User Key  (r) = Recorded By, (t) = Taken By, (c) = Cosigned By    Initials Name Provider Type    Myrtle Diaz Speech Therapy Student Speech Therapy Student          Therapy Charges for Today     Code Description Service Date Service Provider  Modifiers Qty    62858913045 HC ST TREATMENT SWALLOW 1 5/13/2019 Myrtle Sandoval, Speech Therapy Student GN 1               SLP Discharge Summary  Anticipated Dischage Disposition: (P) home  Reason for Discharge: (P) no further expectation of functional progress    Myrtle Sandoval Speech Therapy Student  5/13/2019

## 2019-05-13 NOTE — PROGRESS NOTES
Malnutrition Severity Assessment    Patient Name:  Jonnie Sheets Jr.  YOB: 1945  MRN: [id number]  Admit Date:  5/10/2019    Patient meets criteria for : Moderate malnutrition    Comments:  If in agreement with malnutrition assessment, please attest documentation. Thanks.     Malnutrition Type: Chronic Illness Malnutrition     Malnutrition Type (last 8 hours)      Malnutrition Severity Assessment     Row Name 05/13/19 1453       Malnutrition Severity Assessment    Malnutrition Type  Chronic Illness Malnutrition    Row Name 05/13/19 1453       Physical Signs of Malnutrition (Chronic)    Muscle Wasting  Mild slight depression of temporalis; clavicle bone visible with some loss of pectoris, deltoild, and trapezius muscles; depression of interosseous muscle    Fat Loss  Mild some depression/darkening of orbital fat pad; limited fat tissue covering ribs with easily palpable bones and depressions between    Row Name 05/13/19 1453       Energy Intake Status (Chronic)    Energy Intake  Mod (<75% / > or equal to 1 mo)    Row Name 05/13/19 1453       Criteria Met (Must meet criteria for severity in at least 2 of these categories: M Wasting, Fat Loss, Fluid, Secondary Signs, Wt. Status, Intake)    Patient meets criteria for   Moderate malnutrition          Electronically signed by:  Paula Rock RDN, LD  05/13/19 2:56 PM

## 2019-05-13 NOTE — PLAN OF CARE
Problem: ARDS (Acute Resp Distress Syndrome) (Adult)  Goal: Signs and Symptoms of Listed Potential Problems Will be Absent, Minimized or Managed (ARDS)  Outcome: Ongoing (interventions implemented as appropriate)      Problem: Patient Care Overview  Goal: Plan of Care Review  Outcome: Ongoing (interventions implemented as appropriate)   05/13/19 4203   Coping/Psychosocial   Plan of Care Reviewed With patient   Plan of Care Review   Progress improving   OTHER   Outcome Summary Patient has no complaints. Spouse at bedside. Dietician consulted for appetite. Safety maintained, vss, will follow.        Problem: Fall Risk (Adult)  Goal: Identify Related Risk Factors and Signs and Symptoms  Outcome: Outcome(s) achieved Date Met: 05/13/19    Goal: Absence of Fall  Outcome: Ongoing (interventions implemented as appropriate)      Problem: Infection, Risk/Actual (Adult)  Goal: Identify Related Risk Factors and Signs and Symptoms  Outcome: Ongoing (interventions implemented as appropriate)    Goal: Infection Prevention/Resolution  Outcome: Ongoing (interventions implemented as appropriate)

## 2019-05-13 NOTE — CONSULTS
"INFECTIOUS DISEASES CONSULT NOTE    Patient:  Jonnie Sheets Jr. 73 y.o. male  ROOM # 473/1  YOB: 1945  MRN: [id number]  [facility]:  [id number]  Admit date: 5/10/2019   Admitting Physician: Casimiro Hadley MD  Primary Care Physician: Dandy Burton MD  REFERRING PROVIDER: Bandar Blanco, *      REASON FOR CONSULTATION : Diarrhea/nausea/vomiting.  Just finished a course of vancomycin p.o.  Saw Dr. Bella last visit.      HISTORY OF PRESENT ILLNESS: Patient is a pleasant 73-year-old gentleman that actually saw in consultation April 25 and in follow-up on April 26 and 27.  Dr. Eli saw him the evening of the 29th and he was subsequently discharged.    Patient reports that he saw his primary provider, Dr. Burton in follow-up and had been doing well.  The night prior to admission he noted he was not feeling well but it was somewhat nonspecific.  He was not hungry.  He then reports he developed emesis with gagging and phlegm along with stomach contents.  He did not note any significant diarrhea at that time.  His wife stated she had \"never seen somewhat liquid come out of someone\"    Patient said he has been on a clear liquid diet and wanted to eat.  He tried solid food today but it feels like it is getting stuck.  He reports in the past he was taken daily omeprazole because of a hiatal hernia and has had this problem with food getting stuck in the past if he does not take it.  Additionally he reports having his esophagus stretched before.    Reports last admission he was discharged on Pepcid for some reason and nothing has been resumed this admission however Dr. Hadley has been contacted about this.    Patient states he did have some loose stools today but no abdominal pain or cramping.  He is lying no concerns that he has had recurrent C. 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)    • Umbilical hernia    C diff April 2019      Past Surgical History:   Procedure Laterality Date   • COLON RESECTION Right 1/19/2017    Procedure: LAPAROSCOPIC ASSISTED RIGHT COLON RESECTION RIGHT, UMBILICAL HERNIA REPAIR;  Surgeon: Mario Ellison MD;  Location: [facility] OR;  Service:    • COLONOSCOPY     • COLONOSCOPY N/A 1/4/2017    Procedure: COLONOSCOPY WITH ANESTHESIA;  Surgeon: Tee Kong MD;  Location: [facility] ENDOSCOPY;  Service:    • COLONOSCOPY N/A 2/5/2018    Procedure: COLONOSCOPY WITH ANESTHESIA;  Surgeon: Tee Kong MD;  Location: [facility] ENDOSCOPY;  Service:    • ENDOSCOPY N/A 1/4/2017    Procedure: ESOPHAGOGASTRODUODENOSCOPY WITH ANESTHESIA;  Surgeon: Tee Kong MD;  Location: [facility] ENDOSCOPY;  Service:    • ENDOSCOPY N/A 3/9/2018    Procedure: ESOPHAGOGASTRODUODENOSCOPY WITH ANESTHESIA;  Surgeon: Francisco Harmon DO;  Location: [facility] ENDOSCOPY;  Service:    • KNEE SURGERY      PINS PLACED AND LATER REMOVED S/P FRACTURE   • MOUTH BIOPSY     • TONSILLECTOMY       Family History   Problem Relation Age of Onset   • Heart disease Father    • Colon cancer Neg Hx    • Colon polyps Neg Hx      Social History     Socioeconomic History   • Marital status:      Spouse name: Not on file   • Number of children: Not on file   • Years of education: Not on file   • Highest education level: Not on file   Tobacco Use   • Smoking status: Current Every Day Smoker     Packs/day: 1.00     Years: 55.00     Pack years: 55.00     Types: Cigarettes   • Smokeless tobacco: Never Used   Substance and Sexual Activity   • Alcohol use: Yes     Comment: 3-4 times a week, liquor   • Drug use: No   • Sexual activity: Defer           Current  Meds:     Current Facility-Administered Medications   Medication Dose Route Frequency Provider Last Rate Last Dose   • acetaminophen (TYLENOL) suppository 650 mg  650 mg Rectal Q4H PRN Porfirio Fisher MD   650 mg at 05/10/19 2242   • acetaminophen (TYLENOL) tablet 650 mg  650 mg Oral Q6H PRN Bandar Blanco MD       • enoxaparin (LOVENOX) syringe 40 mg  40 mg Subcutaneous Nightly Bandar Blanco MD   40 mg at 05/12/19 2044   • famotidine (PEPCID) injection 20 mg  20 mg Intravenous BID Casimiro Hadley MD       • guaiFENesin (MUCINEX) 12 hr tablet 1,200 mg  1,200 mg Oral Q12H Bandar Blanco MD   1,200 mg at 05/13/19 0909   • ipratropium-albuterol (DUO-NEB) nebulizer solution 3 mL  3 mL Nebulization 4x Daily - RT Bandar Blanco MD   3 mL at 05/13/19 1424   • methylPREDNISolone sodium succinate (SOLU-Medrol) injection 20 mg  20 mg Intravenous Q12H Bandar Blanco MD   20 mg at 05/13/19 0909   • ondansetron (ZOFRAN) injection 4 mg  4 mg Intravenous Q6H PRN Bandar Blanco MD       • phenol (CHLORASEPTIC) 1.4 % liquid 2 spray  2 spray Mouth/Throat Q2H PRN Bandar Blanco MD   2 spray at 05/11/19 1536   • saccharomyces boulardii (FLORASTOR) capsule 250 mg  250 mg Oral BID Casimiro Hadley MD       • sodium chloride 0.9 % flush 10 mL  10 mL Intravenous PRN Zeferino Lopez DO           Home Meds:  Prior to Admission medications    Medication Sig Start Date End Date Taking? Authorizing Provider   famotidine (PEPCID) 20 MG tablet Take 1 tablet by mouth Daily. 5/1/19  Yes Hansa Lr MD   ferrous sulfate (IRON SUPPLEMENT) 325 (65 FE) MG tablet Take 1 tablet by mouth Daily With Breakfast. 3/9/18  Yes Casimiro Hadley MD   lovastatin (MEVACOR) 40 MG tablet Take 40 mg by mouth Every Night. 12/4/16  Yes Provider, Historical, MD            Allergies   Allergen Reactions   • Acetylcholinesterase Other (See Comments)     Had trouble coming out of anesthesia, Dr Ellison  "inst pt family to make sure pt never receive this again, if he does he will wind up on respirator       Review of Systems   Constitutional: Positive for fever (Low-grade).   HENT: Negative for mouth sores.    Eyes: Negative for photophobia.   Respiratory: Positive for cough.    Cardiovascular: Negative for chest pain.   Gastrointestinal: Positive for nausea and vomiting (On admission that has resolved).   Endocrine: Positive for cold intolerance.   Genitourinary: Negative for dysuria.   Skin: Negative for rash.   Neurological: Negative for speech difficulty.         Vital Signs:  /73 (BP Location: Right arm, Patient Position: Lying)   Pulse 87   Temp 98.9 °F (37.2 °C) (Oral)   Resp 16   Ht 160 cm (63\")   Wt 64.5 kg (142 lb 3.2 oz)   SpO2 92%   BMI 25.19 kg/m²    Temp (24hrs), Av.9 °F (37.2 °C), Min:98.6 °F (37 °C), Max:99.3 °F (37.4 °C)      Physical Exam   General: Patient is chronically ill-appearing but does look better than last admission.  He is lying in bed in no acute distress.  His wife is at bedside  HEENT: Sclera anicteric and noninjected  Neck: Supple  Heart: S1-S2 rate is regular  Lungs: Diminished breath sounds throughout.  No crackles appreciated  Abdomen: Soft, nontender, nondistended, no focal tenderness rebound or guarding appreciated  Extremities: No edema  Psych: He is pleasant cooperative  Neuro: He is somewhat hard of hearing, Speech is clear,      Line/IV site: Peripheral IV hand left, condition patent and no redness    Results Review:    I reviewed the patient's new clinical results.    Lab Results:  CBC:   Lab Results   Lab 19  0756 19  1646 19  0658   WBC 20.67* 14.98* 14.69*   HEMOGLOBIN 11.3* 9.7* 10.5*   HEMATOCRIT 35.6* 30.1* 32.2*   PLATELETS 282 233 252       CMP:   Lab Results   Lab 19  0756 19  1646 19  0657   SODIUM 144 138 137   POTASSIUM 3.7 3.7 3.9   CHLORIDE 101 99 99   CO2 31.0 29.0 28.0   BUN 14 13 12   CREATININE 0.86 0.71 " 0.74   CALCIUM 8.5 7.9* 8.6   BILIRUBIN 0.4 0.2 0.3   ALK PHOS 52 44 47   ALT (SGPT) <15 <15 <15   AST (SGOT) 38 38 33   GLUCOSE 143* 133* 113*       Lab Results (last 72 hours)     Procedure Component Value Units Date/Time    Blood Culture With ANGELIA - Blood, Arm, Left [[id number]] Collected:  05/10/19 1450    Specimen:  Blood from Arm, Left Updated:  05/13/19 1515     Blood Culture No growth at 3 days    Blood Culture - Blood, Arm, Right [[id number]] Collected:  05/10/19 1437    Specimen:  Blood from Arm, Right Updated:  05/13/19 1500     Blood Culture No growth at 3 days    Comprehensive Metabolic Panel [[id number]]  (Abnormal) Collected:  05/13/19 0657    Specimen:  Blood Updated:  05/13/19 0738     Glucose 113 mg/dL      BUN 12 mg/dL      Creatinine 0.74 mg/dL      Sodium 137 mmol/L      Potassium 3.9 mmol/L      Chloride 99 mmol/L      CO2 28.0 mmol/L      Calcium 8.6 mg/dL      Total Protein 6.3 g/dL      Albumin 3.50 g/dL      ALT (SGPT) <15 U/L      AST (SGOT) 33 U/L      Alkaline Phosphatase 47 U/L      Total Bilirubin 0.3 mg/dL      eGFR Non African Amer 103 mL/min/1.73      Globulin 2.8 gm/dL      A/G Ratio 1.3 g/dL      BUN/Creatinine Ratio 16.2     Anion Gap 10.0 mmol/L     Narrative:       GFR Normal >60  Chronic Kidney Disease <60  Kidney Failure <15    CBC & Differential [[id number]] Collected:  05/13/19 0658    Specimen:  Blood Updated:  05/13/19 0726    Narrative:       The following orders were created for panel order CBC & Differential.  Procedure                               Abnormality         Status                     ---------                               -----------         ------                     CBC Auto Differential[[id number]]        Abnormal            Final result                 Please view results for these tests on the individual orders.    CBC Auto Differential [[id number]]  (Abnormal) Collected:  05/13/19 0658    Specimen:  Blood Updated:  05/13/19 0726     WBC 14.69 10*3/mm3       RBC 3.53 10*6/mm3      Hemoglobin 10.5 g/dL      Hematocrit 32.2 %      MCV 91.2 fL      MCH 29.7 pg      MCHC 32.6 g/dL      RDW 14.2 %      RDW-SD 47.6 fl      MPV 12.5 fL      Platelets 252 10*3/mm3      Neutrophil % 84.7 %      Lymphocyte % 7.8 %      Monocyte % 6.7 %      Eosinophil % 0.0 %      Basophil % 0.1 %      Immature Grans % 0.7 %      Neutrophils, Absolute 12.45 10*3/mm3      Lymphocytes, Absolute 1.14 10*3/mm3      Monocytes, Absolute 0.99 10*3/mm3      Eosinophils, Absolute 0.00 10*3/mm3      Basophils, Absolute 0.01 10*3/mm3      Immature Grans, Absolute 0.10 10*3/mm3      nRBC 0.0 /100 WBC     Comprehensive Metabolic Panel [[id number]]  (Abnormal) Collected:  05/12/19 1646    Specimen:  Blood Updated:  05/12/19 1708     Glucose 133 mg/dL      BUN 13 mg/dL      Creatinine 0.71 mg/dL      Sodium 138 mmol/L      Potassium 3.7 mmol/L      Chloride 99 mmol/L      CO2 29.0 mmol/L      Calcium 7.9 mg/dL      Total Protein 6.5 g/dL      Albumin 3.40 g/dL      ALT (SGPT) <15 U/L      AST (SGOT) 38 U/L      Alkaline Phosphatase 44 U/L      Total Bilirubin 0.2 mg/dL      eGFR Non African Amer 108 mL/min/1.73      Globulin 3.1 gm/dL      A/G Ratio 1.1 g/dL      BUN/Creatinine Ratio 18.3     Anion Gap 10.0 mmol/L     Narrative:       GFR Normal >60  Chronic Kidney Disease <60  Kidney Failure <15    CBC & Differential [[id number]] Collected:  05/12/19 1646    Specimen:  Blood Updated:  05/12/19 1655    Narrative:       The following orders were created for panel order CBC & Differential.  Procedure                               Abnormality         Status                     ---------                               -----------         ------                     CBC Auto Differential[[id number]]        Abnormal            Final result                 Please view results for these tests on the individual orders.    CBC Auto Differential [[id number]]  (Abnormal) Collected:  05/12/19 1646    Specimen:  Blood Updated:   05/12/19 1655     WBC 14.98 10*3/mm3      RBC 3.23 10*6/mm3      Hemoglobin 9.7 g/dL      Hematocrit 30.1 %      MCV 93.2 fL      MCH 30.0 pg      MCHC 32.2 g/dL      RDW 14.1 %      RDW-SD 47.9 fl      MPV 12.1 fL      Platelets 233 10*3/mm3      Neutrophil % 89.6 %      Lymphocyte % 5.5 %      Monocyte % 4.1 %      Eosinophil % 0.0 %      Basophil % 0.1 %      Immature Grans % 0.7 %      Neutrophils, Absolute 13.41 10*3/mm3      Lymphocytes, Absolute 0.83 10*3/mm3      Monocytes, Absolute 0.62 10*3/mm3      Eosinophils, Absolute 0.00 10*3/mm3      Basophils, Absolute 0.01 10*3/mm3      Immature Grans, Absolute 0.11 10*3/mm3      nRBC 0.0 /100 WBC     Comprehensive Metabolic Panel [[id number]]  (Abnormal) Collected:  05/11/19 0756    Specimen:  Blood Updated:  05/11/19 0825     Glucose 143 mg/dL      BUN 14 mg/dL      Creatinine 0.86 mg/dL      Sodium 144 mmol/L      Potassium 3.7 mmol/L      Chloride 101 mmol/L      CO2 31.0 mmol/L      Calcium 8.5 mg/dL      Total Protein 6.9 g/dL      Albumin 3.90 g/dL      ALT (SGPT) <15 U/L      AST (SGOT) 38 U/L      Alkaline Phosphatase 52 U/L      Total Bilirubin 0.4 mg/dL      eGFR Non African Amer 87 mL/min/1.73      Globulin 3.0 gm/dL      A/G Ratio 1.3 g/dL      BUN/Creatinine Ratio 16.3     Anion Gap 12.0 mmol/L     Narrative:       GFR Normal >60  Chronic Kidney Disease <60  Kidney Failure <15    CBC & Differential [[id number]] Collected:  05/11/19 0756    Specimen:  Blood Updated:  05/11/19 0804    Narrative:       The following orders were created for panel order CBC & Differential.  Procedure                               Abnormality         Status                     ---------                               -----------         ------                     CBC Auto Differential[[id number]]        Abnormal            Final result                 Please view results for these tests on the individual orders.    CBC Auto Differential [[id number]]  (Abnormal) Collected:   05/11/19 0756    Specimen:  Blood Updated:  05/11/19 0804     WBC 20.67 10*3/mm3      RBC 3.84 10*6/mm3      Hemoglobin 11.3 g/dL      Hematocrit 35.6 %      MCV 92.7 fL      MCH 29.4 pg      MCHC 31.7 g/dL      RDW 14.4 %      RDW-SD 48.7 fl      MPV 12.3 fL      Platelets 282 10*3/mm3      Neutrophil % 92.9 %      Lymphocyte % 4.2 %      Monocyte % 2.3 %      Eosinophil % 0.0 %      Basophil % 0.1 %      Immature Grans % 0.5 %      Neutrophils, Absolute 19.20 10*3/mm3      Lymphocytes, Absolute 0.87 10*3/mm3      Monocytes, Absolute 0.47 10*3/mm3      Eosinophils, Absolute 0.00 10*3/mm3      Basophils, Absolute 0.03 10*3/mm3      Immature Grans, Absolute 0.10 10*3/mm3      nRBC 0.0 /100 WBC     Blood Gas, Arterial [[id number]]  (Abnormal) Collected:  05/10/19 2025    Specimen:  Arterial Blood Updated:  05/10/19 2030     Site Right Radial     Danny's Test Positive     pH, Arterial 7.514 pH units      Comment: 83 Value above reference range        pCO2, Arterial 46.4 mm Hg      Comment: 83 Value above reference range        pO2, Arterial 74.3 mm Hg      Comment: 84 Value below reference range        HCO3, Arterial 37.3 mmol/L      Comment: 83 Value above reference range        Base Excess, Arterial 12.8 mmol/L      Comment: 83 Value above reference range        O2 Saturation, Arterial 96.0 %      Temperature 37.0 C      Barometric Pressure for Blood Gas 753 mmHg      Modality Nasal Cannula     Flow Rate 2.0 lpm      Ventilator Mode NA     Collected by [id number]     Comment: Meter: [id number]     :  [id number]       BNP [[id number]]  (Normal) Collected:  05/10/19 0933    Specimen:  Blood Updated:  05/10/19 1651     proBNP 141.0 pg/mL     Troponin [[id number]]  (Normal) Collected:  05/10/19 0933    Specimen:  Blood Updated:  05/10/19 1651     Troponin I <0.012 ng/mL           Culture Results:    Blood Culture   Date Value Ref Range Status   05/10/2019 No growth at 3 days  Preliminary   05/10/2019 No growth at 3  days  Preliminary         Radiology:   Imaging Results (last 72 hours)     ** No results found for the last 72 hours. **        Procedure Component Value Units Date/Time   XR Chest 1 View [[id number]] Vasile as Reviewed   Order Status: Completed Collected: 05/10/19 1533    Updated: 05/10/19 1537   Narrative:     EXAMINATION: XR CHEST 1 VW-. 5/10/2019 3:33 PM CDT     CHEST, ONE VIEW:     HISTORY: Cough and hypoxia     COMPARISON: 04/24/2019 and 04/24/2019     A single frontal chest radiograph was obtained.     FINDINGS:     Calcified granuloma appreciated in the right upper lobe.     The lungs are clear without acute infiltrates.     The heart is normal in size.     There is ectasia of the descending thoracic aorta observed, without  heart failure.     No acute osseous abnormalities identified.                                    Impression:     1. No acute cardiopulmonary process.     This report was finalized on 05/10/2019 15:33 by Dr. Max Ring MD.   CT Abdomen Pelvis Without Contrast [[id number]] Vasile as Reviewed   Order Status: Completed Collected: 05/10/19 1216    Updated: 05/10/19 1224   Narrative:     EXAMINATION:   CT ABDOMEN PELVIS WO CONTRAST-  5/10/2019 12:16 PM CDT     HISTORY: CT ABDOMEN PELVIS WO CONTRAST- 5/10/2019 11:32 AM CDT     HISTORY: pain, vomiting, diarrhea      COMPARISON: 04/24/2019      DOSE LENGTH PRODUCT: 2 90 mGy cm. Automated exposure control was also  utilized to decrease patient radiation dose.     TECHNIQUE: Noncontrast enhanced images of the abdomen and pelvis  obtained without oral contrast. Multiplanar reformatted images were  provided for review.     FINDINGS:   Lung bases: Chronic changes noted in the lung bases..      LIVER: No focal liver lesion.      BILIARY SYSTEM: The gallbladder is unremarkable. No intrahepatic or  extrahepatic ductal dilatation.      PANCREAS: No focal pancreatic lesion.      SPLEEN: Unremarkable.      KIDNEYS AND ADRENALS: Bilateral kidneys and adrenal  glands are  unremarkable.  Vascular calcifications noted in the renal arteries..     RETROPERITONEUM: No mass, lymphadenopathy or hemorrhage.      GI TRACT: No evidence of obstruction or bowel wall thickening.  Diverticulitis is noted in the sigmoid colon. Compared to prior exam of  04/24/2019 this is improved. However residual does persist..     OTHER: There is no mesenteric mass, lymphadenopathy or fluid collection.  The osseous structures and soft tissues demonstrate no worrisome  lesions. Old compression fracture of L1 is noted. There is retropulsion  of L1 into the lumbar canal this is unchanged     Vascular calcifications noted in the abdominal aorta      PELVIS: No mass lesion, fluid collection or significant lymphadenopathy  is seen in the pelvis. The urinary bladder is normal in appearance.      Impression:     1. Changes of diverticulitis. Compared to April 24 this is improved  however residual does persist.  2. Old compression fracture of L1 with some retropulsion into the lumbar  canal  3. Vascular calcifications present abdominal aorta. 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         This report was finalized on 05/10/2019 12:21 by Dr. Olegario Graham MD.         Assessment/Plan       Hypoxia    COPD with acute exacerbation (CMS/HCC)    Nausea vomiting and diarrhea      IMPRESSION:  1. Nausea and vomiting on admission-resolved  2. History of diverticulitis status post treatment previous admission with improvement but residual changes on CT scan.  Patient is asymptomatic from the standpoint of any abdominal pain.  3. Loose stool.  Patient treated for C. difficile during last admission based on GI panel positive for C. difficile.  The patient only reports 2 episodes of loose stool today and is not having any abdominal cramping or persistent loose stool.  4. Leukocytosis-improved and the patient is on IV steroids  5. COPD exacerbation.  Patient has been weaned off of oxygen.  Patient has been  on ceftriaxone and Flagyl      RECOMMENDATION:   · Hold any targeted treatment for C. difficile  · Would monitor stools and if any significant increase in stools, increase in white count, abdominal pain, etc. would consider empiric treatment for recurrent C. difficile.  At this point, the patient reports he has some loose stools at baseline and reported the same thing during the last admission.  Think watchful waiting is the best approach    Reviewed with patient and wife        Cheyenne Robb MD  05/13/19  4:46 PM

## 2019-05-13 NOTE — PROGRESS NOTES
[facility] Medicine Services  INPATIENT PROGRESS NOTE    Length of Stay: 3  Date of Admission: 5/10/2019  Primary Care Physician: Dandy Burton MD    Subjective   Chief Complaint: Nausea/diarrhea    HPI   Patient was recently treated for C. difficile colitis.  Plan to consult infectious disease.  Start patient on Pepcid and probiotic.  Coughing is improved.    Review of Systems   Constitutional: Positive for activity change, appetite change and fatigue. Negative for chills and fever.   HENT: Negative for hearing loss, nosebleeds, tinnitus and trouble swallowing.    Eyes: Negative for visual disturbance.   Respiratory: Positive for cough and shortness of breath. Negative for chest tightness and wheezing.    Cardiovascular: Negative for chest pain, palpitations and leg swelling.   Gastrointestinal: Positive for diarrhea, nausea and vomiting. Negative for abdominal distention, abdominal pain, blood in stool and constipation.   Endocrine: Negative for cold intolerance, heat intolerance, polydipsia, polyphagia and polyuria.   Genitourinary: Negative for decreased urine volume, difficulty urinating, dysuria, flank pain, frequency and hematuria.   Musculoskeletal: Negative for arthralgias, joint swelling and myalgias.   Skin: Negative for rash.   Allergic/Immunologic: Negative for immunocompromised state.   Neurological: Positive for weakness. Negative for dizziness, syncope, light-headedness and headaches.   Hematological: Negative for adenopathy. Does not bruise/bleed easily.   Psychiatric/Behavioral: Negative for confusion and sleep disturbance. The patient is not nervous/anxious.           All pertinent negatives and positives are as above. All other systems have been reviewed and are negative unless otherwise stated.     Objective    Temp:  [98.6 °F (37 °C)-99.3 °F (37.4 °C)] 98.6 °F (37 °C)  Heart Rate:  [80-93] 85  Resp:  [16-20] 16  BP: (127-146)/(66-75)  127/66    Intake/Output Summary (Last 24 hours) at 5/13/2019 1400  Last data filed at 5/13/2019 0045  Gross per 24 hour   Intake --   Output 800 ml   Net -800 ml     Physical Exam   Constitutional: He is oriented to person, place, and time. He appears well-developed.   HENT:   Head: Normocephalic and atraumatic.   Eyes: Conjunctivae are normal. Pupils are equal, round, and reactive to light.   Neck: Neck supple. No JVD present. No thyromegaly present.   Cardiovascular: Normal rate, regular rhythm, normal heart sounds and intact distal pulses. Exam reveals no gallop and no friction rub.   No murmur heard.  Pulmonary/Chest: No respiratory distress. He has no wheezes. He has no rales. He exhibits no tenderness.   Diminished breath sound bilateral, clear.   Abdominal: Soft. Bowel sounds are normal. He exhibits no distension. There is no tenderness. There is no rebound and no guarding.   Musculoskeletal: Normal range of motion. He exhibits no edema, tenderness or deformity.   Lymphadenopathy:     He has no cervical adenopathy.   Neurological: He is alert and oriented to person, place, and time. He displays normal reflexes. No cranial nerve deficit. He exhibits normal muscle tone.   Skin: Skin is warm and dry. Capillary refill takes 2 to 3 seconds. No rash noted.   Psychiatric: He has a normal mood and affect. His behavior is normal. Thought content normal.   Nursing note and vitals reviewed.      Results Review:  Lab Results (last 24 hours)     Procedure Component Value Units Date/Time    Comprehensive Metabolic Panel [[id number]]  (Abnormal) Collected:  05/13/19 0657    Specimen:  Blood Updated:  05/13/19 0738     Glucose 113 mg/dL      BUN 12 mg/dL      Creatinine 0.74 mg/dL      Sodium 137 mmol/L      Potassium 3.9 mmol/L      Chloride 99 mmol/L      CO2 28.0 mmol/L      Calcium 8.6 mg/dL      Total Protein 6.3 g/dL      Albumin 3.50 g/dL      ALT (SGPT) <15 U/L      AST (SGOT) 33 U/L      Alkaline Phosphatase 47 U/L       Total Bilirubin 0.3 mg/dL      eGFR Non African Amer 103 mL/min/1.73      Globulin 2.8 gm/dL      A/G Ratio 1.3 g/dL      BUN/Creatinine Ratio 16.2     Anion Gap 10.0 mmol/L     Narrative:       GFR Normal >60  Chronic Kidney Disease <60  Kidney Failure <15    CBC & Differential [[id number]] Collected:  05/13/19 0658    Specimen:  Blood Updated:  05/13/19 0726    Narrative:       The following orders were created for panel order CBC & Differential.  Procedure                               Abnormality         Status                     ---------                               -----------         ------                     CBC Auto Differential[[id number]]        Abnormal            Final result                 Please view results for these tests on the individual orders.    CBC Auto Differential [[id number]]  (Abnormal) Collected:  05/13/19 0658    Specimen:  Blood Updated:  05/13/19 0726     WBC 14.69 10*3/mm3      RBC 3.53 10*6/mm3      Hemoglobin 10.5 g/dL      Hematocrit 32.2 %      MCV 91.2 fL      MCH 29.7 pg      MCHC 32.6 g/dL      RDW 14.2 %      RDW-SD 47.6 fl      MPV 12.5 fL      Platelets 252 10*3/mm3      Neutrophil % 84.7 %      Lymphocyte % 7.8 %      Monocyte % 6.7 %      Eosinophil % 0.0 %      Basophil % 0.1 %      Immature Grans % 0.7 %      Neutrophils, Absolute 12.45 10*3/mm3      Lymphocytes, Absolute 1.14 10*3/mm3      Monocytes, Absolute 0.99 10*3/mm3      Eosinophils, Absolute 0.00 10*3/mm3      Basophils, Absolute 0.01 10*3/mm3      Immature Grans, Absolute 0.10 10*3/mm3      nRBC 0.0 /100 WBC     Comprehensive Metabolic Panel [[id number]]  (Abnormal) Collected:  05/12/19 1646    Specimen:  Blood Updated:  05/12/19 1708     Glucose 133 mg/dL      BUN 13 mg/dL      Creatinine 0.71 mg/dL      Sodium 138 mmol/L      Potassium 3.7 mmol/L      Chloride 99 mmol/L      CO2 29.0 mmol/L      Calcium 7.9 mg/dL      Total Protein 6.5 g/dL      Albumin 3.40 g/dL      ALT (SGPT) <15 U/L      AST (SGOT)  38 U/L      Alkaline Phosphatase 44 U/L      Total Bilirubin 0.2 mg/dL      eGFR Non African Amer 108 mL/min/1.73      Globulin 3.1 gm/dL      A/G Ratio 1.1 g/dL      BUN/Creatinine Ratio 18.3     Anion Gap 10.0 mmol/L     Narrative:       GFR Normal >60  Chronic Kidney Disease <60  Kidney Failure <15    CBC & Differential [[id number]] Collected:  05/12/19 1646    Specimen:  Blood Updated:  05/12/19 1655    Narrative:       The following orders were created for panel order CBC & Differential.  Procedure                               Abnormality         Status                     ---------                               -----------         ------                     CBC Auto Differential[[id number]]        Abnormal            Final result                 Please view results for these tests on the individual orders.    CBC Auto Differential [[id number]]  (Abnormal) Collected:  05/12/19 1646    Specimen:  Blood Updated:  05/12/19 1655     WBC 14.98 10*3/mm3      RBC 3.23 10*6/mm3      Hemoglobin 9.7 g/dL      Hematocrit 30.1 %      MCV 93.2 fL      MCH 30.0 pg      MCHC 32.2 g/dL      RDW 14.1 %      RDW-SD 47.9 fl      MPV 12.1 fL      Platelets 233 10*3/mm3      Neutrophil % 89.6 %      Lymphocyte % 5.5 %      Monocyte % 4.1 %      Eosinophil % 0.0 %      Basophil % 0.1 %      Immature Grans % 0.7 %      Neutrophils, Absolute 13.41 10*3/mm3      Lymphocytes, Absolute 0.83 10*3/mm3      Monocytes, Absolute 0.62 10*3/mm3      Eosinophils, Absolute 0.00 10*3/mm3      Basophils, Absolute 0.01 10*3/mm3      Immature Grans, Absolute 0.11 10*3/mm3      nRBC 0.0 /100 WBC     Blood Culture With ANGELIA - Blood, Arm, Left [[id number]] Collected:  05/10/19 1450    Specimen:  Blood from Arm, Left Updated:  05/12/19 1515     Blood Culture No growth at 2 days    Blood Culture - Blood, Arm, Right [[id number]] Collected:  05/10/19 1437    Specimen:  Blood from Arm, Right Updated:  05/12/19 1500     Blood Culture No growth at 2 days            Cultures:  Blood Culture   Date Value Ref Range Status   05/10/2019 No growth at 2 days  Preliminary   05/10/2019 No growth at 2 days  Preliminary       Radiology Data:    Imaging Results (last 24 hours)     ** No results found for the last 24 hours. **          Allergies   Allergen Reactions   • Acetylcholinesterase Other (See Comments)     Had trouble coming out of anesthesia, Dr Ellison inst pt family to make sure pt never receive this again, if he does he will wind up on respirator       Scheduled meds:     enoxaparin 40 mg Subcutaneous Nightly   famotidine 20 mg Intravenous BID   guaiFENesin 1,200 mg Oral Q12H   ipratropium-albuterol 3 mL Nebulization 4x Daily - RT   methylPREDNISolone sodium succinate 20 mg Intravenous Q12H   saccharomyces boulardii 250 mg Oral BID       PRN meds:  •  acetaminophen  •  acetaminophen  •  ondansetron  •  phenol  •  [COMPLETED] Insert peripheral IV **AND** sodium chloride    Assessment/Plan       Hypoxia    COPD with acute exacerbation (CMS/HCC)    Nausea vomiting and diarrhea      Plan:  Hypoxia respiratory failure/COPD exacerbation/coughing.   Solu-Medrol.  Duo nebs.  Mucinex.  Flutter valves.  Oxygen.  Chest ray shows no acute pulmonary cardio process.    Sepsis.  Resolved.    Nausea vomiting diarrhea.  Zofran.  Pepcid IV.  History of C. difficile colitis.  Patient just finished the last 2 dose of p.o. Vancomycin.  Consult infectious disease.  Start probiotic.  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  Patient will need outpatient vascular follow-up, discussed with patient.    Blood culture no growth in 2 days.  Blood culture ANGELIA no growth in 2 days.    Hypocalcemia.  Corrected calcium 8.4 5/13/2019.    Nutrition.  Regular thin cardiac diet.  Nutrition consult.    Deconditioning.  PT and OT  consult.    Discharge Plannin to 3 days.  Casimiro Hadley MD   19   2:00 PM

## 2019-05-14 ENCOUNTER — NURSE TRIAGE (OUTPATIENT)
Dept: CALL CENTER | Facility: HOSPITAL | Age: 74
End: 2019-05-14

## 2019-05-14 VITALS
RESPIRATION RATE: 16 BRPM | WEIGHT: 143.5 LBS | BODY MASS INDEX: 25.43 KG/M2 | OXYGEN SATURATION: 93 % | DIASTOLIC BLOOD PRESSURE: 66 MMHG | TEMPERATURE: 97.8 F | SYSTOLIC BLOOD PRESSURE: 130 MMHG | HEIGHT: 63 IN | HEART RATE: 95 BPM

## 2019-05-14 LAB
ALBUMIN SERPL-MCNC: 3.3 G/DL (ref 3.5–5)
ALBUMIN/GLOB SERPL: 1.1 G/DL (ref 1.1–2.5)
ALP SERPL-CCNC: 42 U/L (ref 24–120)
ALT SERPL W P-5'-P-CCNC: <15 U/L (ref 0–54)
ANION GAP SERPL CALCULATED.3IONS-SCNC: 9 MMOL/L (ref 4–13)
AST SERPL-CCNC: 30 U/L (ref 7–45)
BASOPHILS # BLD AUTO: 0 10*3/MM3 (ref 0–0.2)
BASOPHILS NFR BLD AUTO: 0 % (ref 0–2)
BILIRUB SERPL-MCNC: 0.2 MG/DL (ref 0.1–1)
BUN BLD-MCNC: 17 MG/DL (ref 5–21)
BUN/CREAT SERPL: 25.4 (ref 7–25)
CALCIUM SPEC-SCNC: 8.7 MG/DL (ref 8.4–10.4)
CHLORIDE SERPL-SCNC: 101 MMOL/L (ref 98–110)
CO2 SERPL-SCNC: 27 MMOL/L (ref 24–31)
CREAT BLD-MCNC: 0.67 MG/DL (ref 0.5–1.4)
DEPRECATED RDW RBC AUTO: 45.3 FL (ref 40–54)
EOSINOPHIL # BLD AUTO: 0 10*3/MM3 (ref 0–0.7)
EOSINOPHIL NFR BLD AUTO: 0 % (ref 0–4)
ERYTHROCYTE [DISTWIDTH] IN BLOOD BY AUTOMATED COUNT: 14 % (ref 12–15)
GFR SERPL CREATININE-BSD FRML MDRD: 116 ML/MIN/1.73
GLOBULIN UR ELPH-MCNC: 2.9 GM/DL
GLUCOSE BLD-MCNC: 116 MG/DL (ref 70–100)
HCT VFR BLD AUTO: 31.2 % (ref 40–52)
HGB BLD-MCNC: 10.2 G/DL (ref 14–18)
IMM GRANULOCYTES # BLD AUTO: 0.04 10*3/MM3 (ref 0–0.05)
IMM GRANULOCYTES NFR BLD AUTO: 0.5 % (ref 0–5)
LYMPHOCYTES # BLD AUTO: 0.89 10*3/MM3 (ref 0.72–4.86)
LYMPHOCYTES NFR BLD AUTO: 10.6 % (ref 15–45)
MCH RBC QN AUTO: 29.1 PG (ref 28–32)
MCHC RBC AUTO-ENTMCNC: 32.7 G/DL (ref 33–36)
MCV RBC AUTO: 89.1 FL (ref 82–95)
MONOCYTES # BLD AUTO: 0.48 10*3/MM3 (ref 0.19–1.3)
MONOCYTES NFR BLD AUTO: 5.7 % (ref 4–12)
NEUTROPHILS # BLD AUTO: 6.95 10*3/MM3 (ref 1.87–8.4)
NEUTROPHILS NFR BLD AUTO: 83.2 % (ref 39–78)
NRBC BLD AUTO-RTO: 0 /100 WBC (ref 0–0.2)
PLATELET # BLD AUTO: 239 10*3/MM3 (ref 130–400)
PMV BLD AUTO: 12.7 FL (ref 6–12)
POTASSIUM BLD-SCNC: 4.3 MMOL/L (ref 3.5–5.3)
PROT SERPL-MCNC: 6.2 G/DL (ref 6.3–8.7)
RBC # BLD AUTO: 3.5 10*6/MM3 (ref 4.8–5.9)
SODIUM BLD-SCNC: 137 MMOL/L (ref 135–145)
WBC NRBC COR # BLD: 8.36 10*3/MM3 (ref 4.8–10.8)

## 2019-05-14 PROCEDURE — 80053 COMPREHEN METABOLIC PANEL: CPT | Performed by: FAMILY MEDICINE

## 2019-05-14 PROCEDURE — 96376 TX/PRO/DX INJ SAME DRUG ADON: CPT

## 2019-05-14 PROCEDURE — G0378 HOSPITAL OBSERVATION PER HR: HCPCS

## 2019-05-14 PROCEDURE — 94799 UNLISTED PULMONARY SVC/PX: CPT

## 2019-05-14 PROCEDURE — 85025 COMPLETE CBC W/AUTO DIFF WBC: CPT | Performed by: FAMILY MEDICINE

## 2019-05-14 PROCEDURE — 25010000002 METHYLPREDNISOLONE PER 40 MG: Performed by: FAMILY MEDICINE

## 2019-05-14 PROCEDURE — 97116 GAIT TRAINING THERAPY: CPT

## 2019-05-14 RX ORDER — FAMOTIDINE 20 MG/1
20 TABLET, FILM COATED ORAL 2 TIMES DAILY PRN
Qty: 60 TABLET | Refills: 2 | Status: ON HOLD | OUTPATIENT
Start: 2019-05-14 | End: 2019-06-24

## 2019-05-14 RX ORDER — IPRATROPIUM BROMIDE AND ALBUTEROL SULFATE 2.5; .5 MG/3ML; MG/3ML
3 SOLUTION RESPIRATORY (INHALATION)
Qty: 360 ML | Refills: 2 | Status: SHIPPED | OUTPATIENT
Start: 2019-05-14 | End: 2019-08-14 | Stop reason: SDUPTHER

## 2019-05-14 RX ORDER — SACCHAROMYCES BOULARDII 250 MG
250 CAPSULE ORAL DAILY
Qty: 30 CAPSULE | Refills: 2 | Status: SHIPPED | OUTPATIENT
Start: 2019-05-14 | End: 2019-06-13

## 2019-05-14 RX ADMIN — METHYLPREDNISOLONE SODIUM SUCCINATE 20 MG: 40 INJECTION, POWDER, FOR SOLUTION INTRAMUSCULAR; INTRAVENOUS at 08:38

## 2019-05-14 RX ADMIN — Medication 250 MG: at 08:38

## 2019-05-14 RX ADMIN — IPRATROPIUM BROMIDE AND ALBUTEROL SULFATE 3 ML: 2.5; .5 SOLUTION RESPIRATORY (INHALATION) at 10:36

## 2019-05-14 RX ADMIN — FAMOTIDINE 20 MG: 10 INJECTION INTRAVENOUS at 08:38

## 2019-05-14 RX ADMIN — GUAIFENESIN 1200 MG: 600 TABLET, EXTENDED RELEASE ORAL at 08:38

## 2019-05-14 RX ADMIN — IPRATROPIUM BROMIDE AND ALBUTEROL SULFATE 3 ML: 2.5; .5 SOLUTION RESPIRATORY (INHALATION) at 06:43

## 2019-05-14 NOTE — DISCHARGE PLACEMENT REQUEST
"[place] [phone]  Pauline Sheets Jr. (73 y.o. Male)     Date of Birth Social Security Number Address Home Phone MRN    1945  PO   [facility] [id number] [phone] [id number]    Sabianist Marital Status          Non-Yazidism        Admission Date Admission Type Admitting Provider Attending Provider Department, Room/Bed    5/10/19 Emergency Casimiro Hadley MD Truong, Khai C, MD [address], [address]/    Discharge Date Discharge Disposition Discharge Destination         Home or Self Care              Attending Provider:  Casimiro Hadley MD    Allergies:  Acetylcholinesterase    Isolation:  Spore   Infection:  C.difficile (19)   Code Status:  CPR    Ht:  160 cm (63\")   Wt:  65.1 kg (143 lb 8 oz)    Admission Cmt:  None   Principal Problem:  None                Active Insurance as of 5/10/2019     Primary Coverage     Payor Plan Insurance Group Employer/Plan Group    [facility] MEDICARE REPLACEMENT [facility] [id number]     Payor Plan Address Payor Plan Phone Number Payor Plan Fax Number Effective Dates    [address] [address]   2016 - None Entered    [facility] [id number]       Subscriber Name Subscriber Birth Date Member ID       PAULINE SHEETS JR. 1945 [id number]                 Emergency Contacts      (Rel.) Home Phone Work Phone Mobile Phone    Anastasia Sheets (Spouse) [phone]    Zeina Greenfield (Sister) [phone] -- [phone]        [address] [address]  Dept. Phone:  [phone]  Dept. Fax:   Date Ordered: May 14, 2019         Patient:  Pauline Sheets Jr. MRN:  [id number]   PO   [facility] [address] :  1945  SSN:    Phone: [phone] Sex:  M     Weight: 65.1 kg (143 lb 8 oz)         Ht Readings from Last 1 Encounters:   05/10/19 160 cm (63\")         Home Nebulizer          (Order ID: [id number])    Diagnosis:  Hypoxia (R09.02 " [ICD-10-CM] 799.02 [ICD-9-CM])  Dyspnea, unspecified type (R06.00 [ICD-10-CM] 786.09 [ICD-9-CM])  Diverticulitis (K57.92 [ICD-10-CM] 562.11 [ICD-9-CM])  Vomiting and diarrhea (R11.10,R19.7 [ICD-10-CM] 787.03,787.91 [ICD-9-CM])  Dysphagia, unspecified type (R13.10 [ICD-10-CM] 787.20 [ICD-9-CM])  Impaired mobility (Z74.09 [ICD-10-CM] 799.89 [ICD-9-CM])  COPD with acute exacerbation (CMS/[facility]) (J44.1 [ICD-10-CM] 491.21 [ICD-9-CM])  Nausea vomiting and diarrhea (R11.2,R19.7 [ICD-10-CM] 787.91,787.01 [ICD-9-CM])  Malignant neoplasm of colon, unspecified part of colon (CMS/HCC) (C18.9 [ICD-10-CM] 153.9 [ICD-9-CM])  Personal history of colon cancer (Z85.038 [ICD-10-CM] V10.05 [ICD-9-CM])  Anorexia (R63.0 [ICD-10-CM] 783.0 [ICD-9-CM])  Black stool (K92.1 [ICD-10-CM] 792.1 [ICD-9-CM])  Acute renal failure, unspecified acute renal failure type (CMS/HCC) (N17.9 [ICD-10-CM] 584.9 [ICD-9-CM])  Diverticular disease of large intestine with complication (K57.30 [ICD-10-CM] 562.10 [ICD-9-CM])  Septic shock (CMS/HCC) (A41.9,R65.21 [ICD-10-CM] 038.9,785.52,995.92 [ICD-9-CM])  Diverticulitis large intestine w/o perforation or abscess w/o bleeding (K57.32 [ICD-10-CM] 562.11 [ICD-9-CM])  Diarrhea, unspecified type (R19.7 [ICD-10-CM] 787.91 [ICD-9-CM])  Acute kidney injury (CMS/HCC) (N17.9 [ICD-10-CM] 584.9 [ICD-9-CM])  High anion gap metabolic acidosis (E87.2 [ICD-10-CM] 276.2 [ICD-9-CM])  Lactic acidosis (E87.2 [ICD-10-CM] 276.2 [ICD-9-CM])  Leukocytosis, unspecified type (D72.829 [ICD-10-CM] 288.60 [ICD-9-CM])  C. difficile colitis (A04.72 [ICD-10-CM] 008.45 [ICD-9-CM])  Hypokalemia (E87.6 [ICD-10-CM] 276.8 [ICD-9-CM])  Iron deficiency anemia, unspecified iron deficiency anemia type (D50.9 [ICD-10-CM] 280.9 [ICD-9-CM])  Hypomagnesemia (E83.42 [ICD-10-CM] 275.2 [ICD-9-CM])   Quantity:  1     Nebulizer Equipment:  Nebulizer w/ Compressor  Nebulizer Accessories:  Nebulizer Kit - Administration Set, Non-Disposable  Length of Need  (99 Months = Lifetime): 99 Months = Lifetime        Authorizing Provider's Phone: [phone]   Authorizing Provider:Casimiro Hadley MD  Authorizing Provider's NPI: [id number]  Order Entered By: Casimiro Hadley MD 5/14/2019 12:15 PM     Electronically signed by: Casimiro Hadley MD 5/14/2019 12:15 PM               History & Physical      BlancoBandar MD at 5/10/2019  7:32 PM              [facility]  HISTORY AND PHYSICAL    Date of Admission: 5/10/2019  Primary Care Physician: Dandy Burton MD    Subjective     Chief Complaint: Vomiting    History of Present Illness  Mr. Sheets is a 73 year old gentleman with a history of tobacco abuse and COPD.  He presents complaining of 1 day of persistent nausea and vomiting.  He has had fever.  He denies abdominal pain or diarrhea.  He denies black or bloody stools.     He has a chronic productive smokers cough.  This has been worse the last few days  He has been wheezing.        Review of Systems   Constitutional: Negative for fatigue and fever.   HENT: Negative for congestion and ear pain.    Eyes: Negative for redness and visual disturbance.   Respiratory: Positive for cough, shortness of breath and wheezing.    Cardiovascular: Negative for chest pain and palpitations.   Gastrointestinal: Positive for nausea and vomiting. Negative for abdominal pain and diarrhea.   Endocrine: Negative for cold intolerance and heat intolerance.   Genitourinary: Negative for dysuria and frequency.   Musculoskeletal: Negative for arthralgias and back pain.   Skin: Negative for rash and wound.   Neurological: Negative for dizziness and headaches.   Psychiatric/Behavioral: Negative for confusion. The patient is not nervous/anxious.           Otherwise complete ROS reviewed and negative except as mentioned in the HPI.    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)    • Umbilical hernia      Past Surgical History:  Past Surgical History:   Procedure Laterality Date   • COLON RESECTION Right 1/19/2017    Procedure: LAPAROSCOPIC ASSISTED RIGHT COLON RESECTION RIGHT, UMBILICAL HERNIA REPAIR;  Surgeon: Mario Ellison MD;  Location: [facility] OR;  Service:    • COLONOSCOPY     • COLONOSCOPY N/A 1/4/2017    Procedure: COLONOSCOPY WITH ANESTHESIA;  Surgeon: Tee Kong MD;  Location: [facility] ENDOSCOPY;  Service:    • COLONOSCOPY N/A 2/5/2018    Procedure: COLONOSCOPY WITH ANESTHESIA;  Surgeon: Tee Kong MD;  Location: [facility] ENDOSCOPY;  Service:    • ENDOSCOPY N/A 1/4/2017    Procedure: ESOPHAGOGASTRODUODENOSCOPY WITH ANESTHESIA;  Surgeon: Tee Kong MD;  Location: [facility] ENDOSCOPY;  Service:    • ENDOSCOPY N/A 3/9/2018    Procedure: ESOPHAGOGASTRODUODENOSCOPY WITH ANESTHESIA;  Surgeon: Francisco Harmon DO;  Location: [facility] ENDOSCOPY;  Service:    • KNEE SURGERY      PINS PLACED AND LATER REMOVED S/P FRACTURE   • MOUTH BIOPSY     • TONSILLECTOMY       Social History:  reports that he has been smoking cigarettes.  He has a 55.00 pack-year smoking history. He has never used smokeless tobacco. He reports that he drinks alcohol. He reports that he does not use drugs.    Family History: family history includes Heart disease in his father.       Allergies:  Allergies   Allergen Reactions   • Acetylcholinesterase Other (See Comments)     Had trouble coming out of anesthesia, Dr Ellison inst pt family to make sure pt never receive this again, if he does he will wind up on respirator     Medications:  Prior to Admission medications    Medication Sig Start  "Date End Date Taking? Authorizing Provider   famotidine (PEPCID) 20 MG tablet Take 1 tablet by mouth Daily. 5/1/19  Yes Hansa Lr MD   ferrous sulfate (IRON SUPPLEMENT) 325 (65 FE) MG tablet Take 1 tablet by mouth Daily With Breakfast. 3/9/18  Yes Casimiro Hadley MD   lovastatin (MEVACOR) 40 MG tablet Take 40 mg by mouth Every Night. 12/4/16  Yes ProviderChaya MD   vancomycin 50 MG/ML reconstituted solution oral solution reconstituted Take 5 mL by mouth Every 8 (Eight) Hours for 10 days. 4/30/19 5/10/19 Yes Hansa Lr MD     Objective     Vital Signs: /72 (BP Location: Right arm, Patient Position: Lying)   Pulse 94   Temp 99.6 °F (37.6 °C) (Oral)   Resp 18   Ht 160 cm (63\")   Wt 62.4 kg (137 lb 8 oz)   SpO2 96%   BMI 24.36 kg/m²    Physical Exam   Constitutional: He is oriented to person, place, and time. He appears well-developed and well-nourished.   HENT:   Head: Normocephalic and atraumatic.   Right Ear: External ear normal.   Left Ear: External ear normal.   Nose: Nose normal.   Mouth/Throat: Oropharynx is clear and moist.   Eyes: Conjunctivae and EOM are normal. Pupils are equal, round, and reactive to light. Right eye exhibits no discharge. Left eye exhibits no discharge. No scleral icterus.   Neck: Normal range of motion. Neck supple. No tracheal deviation present. No thyromegaly present.   Cardiovascular: Normal rate, regular rhythm, normal heart sounds and intact distal pulses. Exam reveals no gallop and no friction rub.   No murmur heard.  Pulmonary/Chest: Effort normal. No stridor. No respiratory distress. He has decreased breath sounds. He has wheezes. He has no rales. He exhibits no tenderness.   Abdominal: Soft. Bowel sounds are normal. He exhibits no distension and no mass. There is no tenderness. There is no rebound and no guarding. No hernia.   Musculoskeletal: Normal range of motion. He exhibits no edema or deformity.   Lymphadenopathy:     He has no " cervical adenopathy.   Neurological: He is alert and oriented to person, place, and time. He has normal reflexes. He displays normal reflexes. No cranial nerve deficit. He exhibits normal muscle tone. Coordination normal.   Skin: Skin is warm and dry. No rash noted. No erythema. No pallor.   Psychiatric: He has a normal mood and affect. His behavior is normal. Judgment and thought content normal.   Vitals reviewed.        Results Reviewed:  Lab Results (last 24 hours)     Procedure Component Value Units Date/Time    BNP [[id number]]  (Normal) Collected:  05/10/19 0933    Specimen:  Blood Updated:  05/10/19 1651     proBNP 141.0 pg/mL     Troponin [[id number]]  (Normal) Collected:  05/10/19 0933    Specimen:  Blood Updated:  05/10/19 1651     Troponin I <0.012 ng/mL     Blood Culture With ANGELIA - Blood, Arm, Left [[id number]] Collected:  05/10/19 1450    Specimen:  Blood from Arm, Left Updated:  05/10/19 1507    Blood Culture - Blood, Arm, Right [[id number]] Collected:  05/10/19 1437    Specimen:  Blood from Arm, Right Updated:  05/10/19 1454    Lactic Acid, Reflex [[id number]]  (Normal) Collected:  05/10/19 1328    Specimen:  Blood from Arm, Right Updated:  05/10/19 1401     Lactate 1.2 mmol/L     Lactic Acid, Reflex Timer (This will reflex a repeat order 3-3:15 hours after ordered.) [[id number]] Collected:  05/10/19 0933    Specimen:  Blood Updated:  05/10/19 1315     Extra Tube Hold for add-ons.     Comment: Auto resulted.       Urinalysis With Culture If Indicated - Urine, Clean Catch [[id number]]  (Abnormal) Collected:  05/10/19 1045    Specimen:  Urine, Clean Catch Updated:  05/10/19 1103     Color, UA Yellow     Appearance, UA Clear     pH, UA >=9.0     Specific Gravity, UA 1.015     Glucose, UA Negative     Ketones, UA Negative     Bilirubin, UA Negative     Blood, UA Negative     Protein, UA 30 mg/dL (1+)     Leuk Esterase, UA Negative     Nitrite, UA Negative     Urobilinogen, UA 0.2 E.U./dL    Urinalysis,  Microscopic Only - Urine, Clean Catch [[id number]]  (Abnormal) Collected:  05/10/19 1045    Specimen:  Urine, Clean Catch Updated:  05/10/19 1103     RBC, UA 3-5 /HPF      WBC, UA 3-5 /HPF      Bacteria, UA None Seen /HPF      Squamous Epithelial Cells, UA 0-2 /HPF      Hyaline Casts, UA 0-2 /LPF      Methodology Automated Microscopy    Blood Gas, Venous [[id number]]  (Abnormal) Collected:  05/10/19 1030    Specimen:  Venous Blood Updated:  05/10/19 1040     Site OTHER     pH, Venous 7.446 pH Units      pCO2, Venous 55.9 mm Hg      Comment: 83 Value above reference range        pO2, Venous 16.5 mm Hg      Comment: 84 Value below reference range        HCO3, Venous 38.5 mmol/L      Comment: 83 Value above reference range        Base Excess, Venous 12.4 mmol/L      Comment: 83 Value above reference range        O2 Saturation, Venous 20.7 %      Comment: 84 Value below reference range        Temperature 37.0 C      Barometric Pressure for Blood Gas 754 mmHg      Modality Room Air     Ventilator Mode NA     Collected by [id number]     Comment: Meter: [id number]     :  [id number]       Comprehensive Metabolic Panel [[id number]]  (Abnormal) Collected:  05/10/19 0933    Specimen:  Blood Updated:  05/10/19 1010     Glucose 149 mg/dL      BUN 11 mg/dL      Creatinine 1.01 mg/dL      Sodium 145 mmol/L      Potassium 3.6 mmol/L      Chloride 94 mmol/L      CO2 39.0 mmol/L      Calcium 9.3 mg/dL      Total Protein 8.1 g/dL      Albumin 4.30 g/dL      ALT (SGPT) <15 U/L      AST (SGOT) 55 U/L      Alkaline Phosphatase 66 U/L      Total Bilirubin 0.5 mg/dL      eGFR Non African Amer 72 mL/min/1.73      Globulin 3.8 gm/dL      A/G Ratio 1.1 g/dL      BUN/Creatinine Ratio 10.9     Anion Gap 12.0 mmol/L     Narrative:       GFR Normal >60  Chronic Kidney Disease <60  Kidney Failure <15    Lipase [[id number]]  (Abnormal) Collected:  05/10/19 0933    Specimen:  Blood Updated:  05/10/19 1007     Lipase 267 U/L     Lactic Acid,  Plasma [[id number]]  (Abnormal) Collected:  05/10/19 0933    Specimen:  Blood Updated:  05/10/19 1003     Lactate 2.3 mmol/L     CBC & Differential [[id number]] Collected:  05/10/19 0933    Specimen:  Blood Updated:  05/10/19 0958    Narrative:       The following orders were created for panel order CBC & Differential.  Procedure                               Abnormality         Status                     ---------                               -----------         ------                     CBC Auto Differential[[id number]]        Abnormal            Final result                 Please view results for these tests on the individual orders.    CBC Auto Differential [[id number]]  (Abnormal) Collected:  05/10/19 0933    Specimen:  Blood Updated:  05/10/19 0958     WBC 15.63 10*3/mm3      RBC 4.39 10*6/mm3      Hemoglobin 13.0 g/dL      Hematocrit 40.4 %      MCV 92.0 fL      MCH 29.6 pg      MCHC 32.2 g/dL      RDW 14.0 %      RDW-SD 47.7 fl      MPV 12.2 fL      Platelets 368 10*3/mm3      Neutrophil % 84.5 %      Lymphocyte % 7.0 %      Monocyte % 7.3 %      Eosinophil % 0.1 %      Basophil % 0.4 %      Immature Grans % 0.7 %      Neutrophils, Absolute 13.22 10*3/mm3      Lymphocytes, Absolute 1.09 10*3/mm3      Monocytes, Absolute 1.14 10*3/mm3      Eosinophils, Absolute 0.01 10*3/mm3      Basophils, Absolute 0.06 10*3/mm3      Immature Grans, Absolute 0.11 10*3/mm3      nRBC 0.0 /100 WBC         Imaging Results (last 24 hours)     Procedure Component Value Units Date/Time    XR Chest 1 View [[id number]] Collected:  05/10/19 1533     Updated:  05/10/19 1537    Narrative:       EXAMINATION: XR CHEST 1 VW-. 5/10/2019 3:33 PM CDT     CHEST, ONE VIEW:     HISTORY: Cough and hypoxia     COMPARISON: 04/24/2019 and 04/24/2019     A single frontal chest radiograph was obtained.     FINDINGS:     Calcified granuloma appreciated in the right upper lobe.     The lungs are clear without acute infiltrates.     The heart is normal in  size.     There is ectasia of the descending thoracic aorta observed, without  heart failure.     No acute osseous abnormalities identified.                                     Impression:       1. No acute cardiopulmonary process.     This report was finalized on 05/10/2019 15:33 by Dr. Max Ring MD.    CT Abdomen Pelvis Without Contrast [[id number]] Collected:  05/10/19 1216     Updated:  05/10/19 1224    Narrative:       EXAMINATION:   CT ABDOMEN PELVIS WO CONTRAST-  5/10/2019 12:16 PM CDT     HISTORY: CT ABDOMEN PELVIS WO CONTRAST- 5/10/2019 11:32 AM CDT     HISTORY: pain, vomiting, diarrhea      COMPARISON: 04/24/2019      DOSE LENGTH PRODUCT: 2 90 mGy cm. Automated exposure control was also  utilized to decrease patient radiation dose.     TECHNIQUE: Noncontrast enhanced images of the abdomen and pelvis  obtained without oral contrast. Multiplanar reformatted images were  provided for review.     FINDINGS:   Lung bases: Chronic changes noted in the lung bases..      LIVER: No focal liver lesion.      BILIARY SYSTEM: The gallbladder is unremarkable. No intrahepatic or  extrahepatic ductal dilatation.      PANCREAS: No focal pancreatic lesion.      SPLEEN: Unremarkable.      KIDNEYS AND ADRENALS: Bilateral kidneys and adrenal glands are  unremarkable.  Vascular calcifications noted in the renal arteries..     RETROPERITONEUM: No mass, lymphadenopathy or hemorrhage.      GI TRACT: No evidence of obstruction or bowel wall thickening.  Diverticulitis is noted in the sigmoid colon. Compared to prior exam of  04/24/2019 this is improved. However residual does persist..     OTHER: There is no mesenteric mass, lymphadenopathy or fluid collection.  The osseous structures and soft tissues demonstrate no worrisome  lesions. Old compression fracture of L1 is noted. There is retropulsion  of L1 into the lumbar canal this is unchanged     Vascular calcifications noted in the abdominal aorta      PELVIS: No mass lesion,  fluid collection or significant lymphadenopathy  is seen in the pelvis. The urinary bladder is normal in appearance.       Impression:       1. Changes of diverticulitis. Compared to April 24 this is improved  however residual does persist.  2. Old compression fracture of L1 with some retropulsion into the lumbar  canal  3. Vascular calcifications present abdominal aorta. 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         This report was finalized on 05/10/2019 12:21 by Dr. Olegario Graham MD.        I have personally reviewed and interpreted the radiology studies and ECG obtained at time of admission.     Assessment / Plan     Assessment:   Active Hospital Problems    Diagnosis   • Hypoxia       1.  COPD AE  -IV abx--Rocephin  -IV steroids  -Nebs  -Mucinex  -Flutter  -Supplemental oxygen, wean as tolerated    2.  Sepsis  -IV abx  -blood/sputum cultures    3.  ?Intra-abdominal infection  -Overall, I doubt abdominal infectiion.  However given his CT findings, Persistent vomiting, fever and leukocytosis--will add Flagyl for the time being.  Will try to cut back quickly    4.  N/V  -Zofran  -Fluids    5.  Acute Hypoxic Respiratory Failure  -IV abx--Rocephin  -IV steroids  -Nebs  -Mucinex  -Flutter  -Supplemental oxygen, wean as tolerated    6.  C. Diff Colitis  -Continue last 2 doses of PO Vanc    Code Status: Full     I discussed the patient's findings and my recommendations with the patient, patient's family    Estimated length of stay 2-3 days    Bandar Blanco MD   05/10/19   7:32 PM              Electronically signed by Bandar Blanco MD at 5/10/2019  7:40 PM       Operative/Procedure Notes (last 24 hours) (Notes from 5/13/2019 12:23 PM through 5/14/2019 12:23 PM)     No notes of this type exist for this encounter.           Physician Progress Notes (last 24 hours) (Notes from 5/13/2019 12:23 PM through 5/14/2019 12:23 PM)      Cheyenne Robb MD at 5/14/2019 11:50 AM          INFECTIOUS  "DISEASES PROGRESS NOTE    Patient:  Jonnie Sheets Jr.  YOB: 1945  MRN: [id number]   Admit date: 5/10/2019   Admitting Physician: Casimiro Hadley MD  Primary Care Physician: Dandy Burton MD    Chief Complaint: \"I feel good today\"        Interval History: Patient reports he feels really well today.  He was finally able to eat without food getting stuck.  He reports his Zantac was started back yesterday.    Patient states he has had one small loose stool at 730 this morning.  He is eaten breakfast without difficulty and has not had any more stools.  He has no abdominal pain.    He said they \"finally took me off that oxygen\".    Allergies:   Allergies   Allergen Reactions   • Acetylcholinesterase Other (See Comments)     Had trouble coming out of anesthesia, Dr Ellison inst pt family to make sure pt never receive this again, if he does he will wind up on respirator       Current Meds:     Current Facility-Administered Medications:   •  acetaminophen (TYLENOL) suppository 650 mg, 650 mg, Rectal, Q4H PRN, Porfirio Fisher MD, 650 mg at 05/10/19 2242  •  acetaminophen (TYLENOL) tablet 650 mg, 650 mg, Oral, Q6H PRN, Bandar Blanco MD  •  enoxaparin (LOVENOX) syringe 40 mg, 40 mg, Subcutaneous, Nightly, Bandar Blanco MD, 40 mg at 05/13/19 2025  •  famotidine (PEPCID) injection 20 mg, 20 mg, Intravenous, BID, Casimiro Hadley MD, 20 mg at 05/14/19 0838  •  guaiFENesin (MUCINEX) 12 hr tablet 1,200 mg, 1,200 mg, Oral, Q12H, Bandar Blanco MD, 1,200 mg at 05/14/19 0838  •  ipratropium-albuterol (DUO-NEB) nebulizer solution 3 mL, 3 mL, Nebulization, 4x Daily - RT, Bandar Blanco MD, 3 mL at 05/14/19 1036  •  methylPREDNISolone sodium succinate (SOLU-Medrol) injection 20 mg, 20 mg, Intravenous, Q12H, Bandar Blanco MD, 20 mg at 05/14/19 0838  •  ondansetron (ZOFRAN) injection 4 mg, 4 mg, Intravenous, Q6H PRN, Bandar Blanco MD  •  phenol (CHLORASEPTIC) 1.4 % " "liquid 2 spray, 2 spray, Mouth/Throat, Q2H PRN, Bandar Blanco MD, 2 spray at 19 1536  •  saccharomyces boulardii (FLORASTOR) capsule 250 mg, 250 mg, Oral, BID, Casimiro Hadley MD, 250 mg at 19 0838  •  [COMPLETED] Insert peripheral IV, , , Once **AND** sodium chloride 0.9 % flush 10 mL, 10 mL, Intravenous, PRN, Zeferino Lopez DO      Review of Systems   cardiac: No chest pain          respiratory: Breathing is at baseline    Objective     Vital Signs:  Temp (24hrs), Av.4 °F (36.9 °C), Min:97.8 °F (36.6 °C), Max:99 °F (37.2 °C)      /66 (BP Location: Right arm, Patient Position: Lying)   Pulse 95   Temp 97.8 °F (36.6 °C) (Oral)   Resp 16   Ht 160 cm (63\")   Wt 65.1 kg (143 lb 8 oz)   SpO2 93%   BMI 25.42 kg/m²          Physical Exam   General: Patient's nontoxic-appearing lying in bed he is in no acute distress  HEENT: Sclera anicteric and noninjected  Neck: Supple  Respiratory: Effort even and unlabored  Abdomen: Soft nontender nondistended bowel sounds are positive  Psych: He is pleasant and cooperative  Neuro: He is alert and oriented, speech is clear      Results Review:    I reviewed the patient's new clinical results.    Lab Results:  CBC:   Lab Results   Lab 05/10/19  0933 19  0756 19  1646 19  0658 19  0554   WBC 15.63* 20.67* 14.98* 14.69* 8.36   HEMOGLOBIN 13.0* 11.3* 9.7* 10.5* 10.2*   HEMATOCRIT 40.4 35.6* 30.1* 32.2* 31.2*   PLATELETS 368 282 233 252 239         CMP:   Lab Results   Lab 19  1646 19  0657 19  0554   SODIUM 138 137 137   POTASSIUM 3.7 3.9 4.3   CHLORIDE 99 99 101   CO2 29.0 28.0 27.0   BUN 13 12 17   CREATININE 0.71 0.74 0.67   CALCIUM 7.9* 8.6 8.7   BILIRUBIN 0.2 0.3 0.2   ALK PHOS 44 47 42   ALT (SGPT) <15 <15 <15   AST (SGOT) 38 33 30   GLUCOSE 133* 113* 116*         Culture Results:    Blood Culture   Date Value Ref Range Status   05/10/2019 No growth at 3 days  Preliminary   05/10/2019 No growth at 3 " days  Preliminary       Microbiology Results Abnormal     Procedure Component Value - Date/Time    Blood Culture With ANGELIA - Blood, Arm, Left [[id number]] Collected:  05/10/19 1450    Lab Status:  Preliminary result Specimen:  Blood from Arm, Left Updated:  05/13/19 1515     Blood Culture No growth at 3 days    Blood Culture - Blood, Arm, Right [[id number]] Collected:  05/10/19 1437    Lab Status:  Preliminary result Specimen:  Blood from Arm, Right Updated:  05/13/19 1500     Blood Culture No growth at 3 days                Radiology:   Imaging Results (last 72 hours)     ** No results found for the last 72 hours. **          Assessment/Plan     Active Hospital Problems    Diagnosis   • COPD with acute exacerbation (CMS/HCC)   • Nausea vomiting and diarrhea   • Hypoxia       IMPRESSION:  1. Nausea and vomiting on admission-resolved  2. History of diverticulitis status post treatment previous admission with improvement but residual changes on CT scan.  Patient is asymptomatic from the standpoint of any abdominal pain.  He has been on ceftriaxone and Flagyl this admission.  3. Loose stool.  Patient treated for C. difficile during last admission based on GI panel positive for C. difficile.     Currently patient is not having any symptoms consistent with C. difficile colitis  4. Leukocytosis-resolved and the patient is on IV steroids  5. COPD exacerbation.  Patient has been weaned off of oxygen.  Patient has been on ceftriaxone this admission     RECOMMENDATION:   · Patient is aware of signs and symptoms of C. difficile colitis.    · Encouraged patient to monitor stool and contact either me or his primary care doctor, Dr. Burton, if he notices any abdominal pain, fever, significant increase in number of stools or amount of stool.  He voices understanding.    · Infectious disease will sign off.  Please reconsult consult if needed        Cheyenne Robb MD  05/14/19  11:50 AM        Electronically signed by  Cheyenne Robb MD at 5/14/2019 11:55 AM     Casimiro Hadley MD at 5/13/2019  1:31 PM              [facility]  INPATIENT PROGRESS NOTE    Length of Stay: 3  Date of Admission: 5/10/2019  Primary Care Physician: Dandy Burotn MD    Subjective   Chief Complaint: Nausea/diarrhea    HPI   Patient was recently treated for C. difficile colitis.  Plan to consult infectious disease.  Start patient on Pepcid and probiotic.  Coughing is improved.    Review of Systems   Constitutional: Positive for activity change, appetite change and fatigue. Negative for chills and fever.   HENT: Negative for hearing loss, nosebleeds, tinnitus and trouble swallowing.    Eyes: Negative for visual disturbance.   Respiratory: Positive for cough and shortness of breath. Negative for chest tightness and wheezing.    Cardiovascular: Negative for chest pain, palpitations and leg swelling.   Gastrointestinal: Positive for diarrhea, nausea and vomiting. Negative for abdominal distention, abdominal pain, blood in stool and constipation.   Endocrine: Negative for cold intolerance, heat intolerance, polydipsia, polyphagia and polyuria.   Genitourinary: Negative for decreased urine volume, difficulty urinating, dysuria, flank pain, frequency and hematuria.   Musculoskeletal: Negative for arthralgias, joint swelling and myalgias.   Skin: Negative for rash.   Allergic/Immunologic: Negative for immunocompromised state.   Neurological: Positive for weakness. Negative for dizziness, syncope, light-headedness and headaches.   Hematological: Negative for adenopathy. Does not bruise/bleed easily.   Psychiatric/Behavioral: Negative for confusion and sleep disturbance. The patient is not nervous/anxious.           All pertinent negatives and positives are as above. All other systems have been reviewed and are negative unless otherwise stated.     Objective    Temp:  [98.6 °F (37 °C)-99.3 °F (37.4 °C)]  98.6 °F (37 °C)  Heart Rate:  [80-93] 85  Resp:  [16-20] 16  BP: (127-146)/(66-75) 127/66    Intake/Output Summary (Last 24 hours) at 5/13/2019 1400  Last data filed at 5/13/2019 0045  Gross per 24 hour   Intake --   Output 800 ml   Net -800 ml     Physical Exam   Constitutional: He is oriented to person, place, and time. He appears well-developed.   HENT:   Head: Normocephalic and atraumatic.   Eyes: Conjunctivae are normal. Pupils are equal, round, and reactive to light.   Neck: Neck supple. No JVD present. No thyromegaly present.   Cardiovascular: Normal rate, regular rhythm, normal heart sounds and intact distal pulses. Exam reveals no gallop and no friction rub.   No murmur heard.  Pulmonary/Chest: No respiratory distress. He has no wheezes. He has no rales. He exhibits no tenderness.   Diminished breath sound bilateral, clear.   Abdominal: Soft. Bowel sounds are normal. He exhibits no distension. There is no tenderness. There is no rebound and no guarding.   Musculoskeletal: Normal range of motion. He exhibits no edema, tenderness or deformity.   Lymphadenopathy:     He has no cervical adenopathy.   Neurological: He is alert and oriented to person, place, and time. He displays normal reflexes. No cranial nerve deficit. He exhibits normal muscle tone.   Skin: Skin is warm and dry. Capillary refill takes 2 to 3 seconds. No rash noted.   Psychiatric: He has a normal mood and affect. His behavior is normal. Thought content normal.   Nursing note and vitals reviewed.      Results Review:  Lab Results (last 24 hours)     Procedure Component Value Units Date/Time    Comprehensive Metabolic Panel [[id number]]  (Abnormal) Collected:  05/13/19 0657    Specimen:  Blood Updated:  05/13/19 0738     Glucose 113 mg/dL      BUN 12 mg/dL      Creatinine 0.74 mg/dL      Sodium 137 mmol/L      Potassium 3.9 mmol/L      Chloride 99 mmol/L      CO2 28.0 mmol/L      Calcium 8.6 mg/dL      Total Protein 6.3 g/dL      Albumin 3.50  g/dL      ALT (SGPT) <15 U/L      AST (SGOT) 33 U/L      Alkaline Phosphatase 47 U/L      Total Bilirubin 0.3 mg/dL      eGFR Non African Amer 103 mL/min/1.73      Globulin 2.8 gm/dL      A/G Ratio 1.3 g/dL      BUN/Creatinine Ratio 16.2     Anion Gap 10.0 mmol/L     Narrative:       GFR Normal >60  Chronic Kidney Disease <60  Kidney Failure <15    CBC & Differential [[id number]] Collected:  05/13/19 0658    Specimen:  Blood Updated:  05/13/19 0726    Narrative:       The following orders were created for panel order CBC & Differential.  Procedure                               Abnormality         Status                     ---------                               -----------         ------                     CBC Auto Differential[[id number]]        Abnormal            Final result                 Please view results for these tests on the individual orders.    CBC Auto Differential [[id number]]  (Abnormal) Collected:  05/13/19 0658    Specimen:  Blood Updated:  05/13/19 0726     WBC 14.69 10*3/mm3      RBC 3.53 10*6/mm3      Hemoglobin 10.5 g/dL      Hematocrit 32.2 %      MCV 91.2 fL      MCH 29.7 pg      MCHC 32.6 g/dL      RDW 14.2 %      RDW-SD 47.6 fl      MPV 12.5 fL      Platelets 252 10*3/mm3      Neutrophil % 84.7 %      Lymphocyte % 7.8 %      Monocyte % 6.7 %      Eosinophil % 0.0 %      Basophil % 0.1 %      Immature Grans % 0.7 %      Neutrophils, Absolute 12.45 10*3/mm3      Lymphocytes, Absolute 1.14 10*3/mm3      Monocytes, Absolute 0.99 10*3/mm3      Eosinophils, Absolute 0.00 10*3/mm3      Basophils, Absolute 0.01 10*3/mm3      Immature Grans, Absolute 0.10 10*3/mm3      nRBC 0.0 /100 WBC     Comprehensive Metabolic Panel [[id number]]  (Abnormal) Collected:  05/12/19 1646    Specimen:  Blood Updated:  05/12/19 1708     Glucose 133 mg/dL      BUN 13 mg/dL      Creatinine 0.71 mg/dL      Sodium 138 mmol/L      Potassium 3.7 mmol/L      Chloride 99 mmol/L      CO2 29.0 mmol/L      Calcium 7.9 mg/dL       Total Protein 6.5 g/dL      Albumin 3.40 g/dL      ALT (SGPT) <15 U/L      AST (SGOT) 38 U/L      Alkaline Phosphatase 44 U/L      Total Bilirubin 0.2 mg/dL      eGFR Non African Amer 108 mL/min/1.73      Globulin 3.1 gm/dL      A/G Ratio 1.1 g/dL      BUN/Creatinine Ratio 18.3     Anion Gap 10.0 mmol/L     Narrative:       GFR Normal >60  Chronic Kidney Disease <60  Kidney Failure <15    CBC & Differential [[id number]] Collected:  05/12/19 1646    Specimen:  Blood Updated:  05/12/19 1655    Narrative:       The following orders were created for panel order CBC & Differential.  Procedure                               Abnormality         Status                     ---------                               -----------         ------                     CBC Auto Differential[[id number]]        Abnormal            Final result                 Please view results for these tests on the individual orders.    CBC Auto Differential [[id number]]  (Abnormal) Collected:  05/12/19 1646    Specimen:  Blood Updated:  05/12/19 1655     WBC 14.98 10*3/mm3      RBC 3.23 10*6/mm3      Hemoglobin 9.7 g/dL      Hematocrit 30.1 %      MCV 93.2 fL      MCH 30.0 pg      MCHC 32.2 g/dL      RDW 14.1 %      RDW-SD 47.9 fl      MPV 12.1 fL      Platelets 233 10*3/mm3      Neutrophil % 89.6 %      Lymphocyte % 5.5 %      Monocyte % 4.1 %      Eosinophil % 0.0 %      Basophil % 0.1 %      Immature Grans % 0.7 %      Neutrophils, Absolute 13.41 10*3/mm3      Lymphocytes, Absolute 0.83 10*3/mm3      Monocytes, Absolute 0.62 10*3/mm3      Eosinophils, Absolute 0.00 10*3/mm3      Basophils, Absolute 0.01 10*3/mm3      Immature Grans, Absolute 0.11 10*3/mm3      nRBC 0.0 /100 WBC     Blood Culture With ANGELIA - Blood, Arm, Left [[id number]] Collected:  05/10/19 1450    Specimen:  Blood from Arm, Left Updated:  05/12/19 1516     Blood Culture No growth at 2 days    Blood Culture - Blood, Arm, Right [[id number]] Collected:  05/10/19 1437    Specimen:  Blood  from Arm, Right Updated:  05/12/19 1500     Blood Culture No growth at 2 days           Cultures:  Blood Culture   Date Value Ref Range Status   05/10/2019 No growth at 2 days  Preliminary   05/10/2019 No growth at 2 days  Preliminary       Radiology Data:    Imaging Results (last 24 hours)     ** No results found for the last 24 hours. **          Allergies   Allergen Reactions   • Acetylcholinesterase Other (See Comments)     Had trouble coming out of anesthesia, Dr Ellison inst pt family to make sure pt never receive this again, if he does he will wind up on respirator       Scheduled meds:     enoxaparin 40 mg Subcutaneous Nightly   famotidine 20 mg Intravenous BID   guaiFENesin 1,200 mg Oral Q12H   ipratropium-albuterol 3 mL Nebulization 4x Daily - RT   methylPREDNISolone sodium succinate 20 mg Intravenous Q12H   saccharomyces boulardii 250 mg Oral BID       PRN meds:  •  acetaminophen  •  acetaminophen  •  ondansetron  •  phenol  •  [COMPLETED] Insert peripheral IV **AND** sodium chloride    Assessment/Plan       Hypoxia    COPD with acute exacerbation (CMS/HCC)    Nausea vomiting and diarrhea      Plan:  Hypoxia respiratory failure/COPD exacerbation/coughing.   Solu-Medrol.  Duo nebs.  Mucinex.  Flutter valves.  Oxygen.  Chest ray shows no acute pulmonary cardio process.    Sepsis.  Resolved.    Nausea vomiting diarrhea.  Zofran.  Pepcid IV.  History of C. difficile colitis.  Patient just finished the last 2 dose of p.o. Vancomycin.  Consult infectious disease.  Start probiotic.  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  Patient will need outpatient vascular follow-up, discussed with patient.    Blood culture no growth in 2 days.  Blood culture ANGELIA no growth in 2 days.    Hypocalcemia.  Corrected calcium 8.4 5/13/2019.    Nutrition.   "Regular thin cardiac diet.  Nutrition consult.    Deconditioning.  PT and OT consult.    Discharge Plannin to 3 days.  Casimiro Hadley MD   19   2:00 PM                    Electronically signed by Casimiro Hadley MD at 2019  2:02 PM          Consult Notes (last 24 hours) (Notes from 2019 12:23 PM through 2019 12:23 PM)      Cheyenne Robb MD at 2019  4:46 PM      Consult Orders    1. Inpatient Infectious Diseases Consult [[id number]] ordered by Casimiro Hadley MD at 19 1353                INFECTIOUS DISEASES CONSULT NOTE    Patient:  Jonnie Sheets Jr. 73 y.o. male  ROOM # 473/1  YOB: 1945  MRN: [id number]  CSN:  [id number]  Admit date: 5/10/2019   Admitting Physician: Casimiro Hadley MD  Primary Care Physician: Dandy Burton MD  REFERRING PROVIDER: Bandar Blanco, *      REASON FOR CONSULTATION : Diarrhea/nausea/vomiting.  Just finished a course of vancomycin p.o.  Saw Dr. Bella last visit.      HISTORY OF PRESENT ILLNESS: Patient is a pleasant 73-year-old gentleman that actually saw in consultation  and in follow-up on  and .  Dr. Eli saw him the evening of the  and he was subsequently discharged.    Patient reports that he saw his primary provider, Dr. Burton in follow-up and had been doing well.  The night prior to admission he noted he was not feeling well but it was somewhat nonspecific.  He was not hungry.  He then reports he developed emesis with gagging and phlegm along with stomach contents.  He did not note any significant diarrhea at that time.  His wife stated she had \"never seen somewhat liquid come out of someone\"    Patient said he has been on a clear liquid diet and wanted to eat.  He tried solid food today but it feels like it is getting stuck.  He reports in the past he was taken daily omeprazole because of a hiatal hernia and has had this problem with food getting stuck in the past if he does not take it. "  Additionally he reports having his esophagus stretched before.    Reports last admission he was discharged on Pepcid for some reason and nothing has been resumed this admission however Dr. Hadley has been contacted about this.    Patient states he did have some loose stools today but no abdominal pain or cramping.  He is lying no concerns that he has had recurrent C. 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)    • Umbilical hernia    C diff April 2019      Past Surgical History:   Procedure Laterality Date   • COLON RESECTION Right 1/19/2017    Procedure: LAPAROSCOPIC ASSISTED RIGHT COLON RESECTION RIGHT, UMBILICAL HERNIA REPAIR;  Surgeon: Mario Ellison MD;  Location: [facility] OR;  Service:    • COLONOSCOPY     • COLONOSCOPY N/A 1/4/2017    Procedure: COLONOSCOPY WITH ANESTHESIA;  Surgeon: Tee Kong MD;  Location: [facility] ENDOSCOPY;  Service:    • COLONOSCOPY N/A 2/5/2018    Procedure: COLONOSCOPY WITH ANESTHESIA;  Surgeon: Tee Kong MD;  Location: [facility] ENDOSCOPY;  Service:    • ENDOSCOPY N/A 1/4/2017    Procedure: ESOPHAGOGASTRODUODENOSCOPY WITH ANESTHESIA;  Surgeon: Tee Kong MD;  Location: [facility] ENDOSCOPY;  Service:    • ENDOSCOPY N/A 3/9/2018    Procedure: ESOPHAGOGASTRODUODENOSCOPY WITH ANESTHESIA;  Surgeon: Francisco Harmon DO;  Location: [facility] ENDOSCOPY;  Service:    • KNEE SURGERY      PINS PLACED AND LATER REMOVED S/P FRACTURE   • MOUTH BIOPSY     • TONSILLECTOMY       Family History   Problem Relation Age of Onset   • Heart disease Father    • Colon cancer Neg Hx    • Colon polyps Neg Hx       Social History     Socioeconomic History   • Marital status:      Spouse name: Not on file   • Number of children: Not on file   • Years of education: Not on file   • Highest education level: Not on file   Tobacco Use   • Smoking status: Current Every Day Smoker     Packs/day: 1.00     Years: 55.00     Pack years: 55.00     Types: Cigarettes   • Smokeless tobacco: Never Used   Substance and Sexual Activity   • Alcohol use: Yes     Comment: 3-4 times a week, liquor   • Drug use: No   • Sexual activity: Defer           Current Meds:     Current Facility-Administered Medications   Medication Dose Route Frequency Provider Last Rate Last Dose   • acetaminophen (TYLENOL) suppository 650 mg  650 mg Rectal Q4H PRN Porfirio Fisher MD   650 mg at 05/10/19 2242   • acetaminophen (TYLENOL) tablet 650 mg  650 mg Oral Q6H PRN Bandar Blanco MD       • enoxaparin (LOVENOX) syringe 40 mg  40 mg Subcutaneous Nightly Bandar Blanco MD   40 mg at 05/12/19 2044   • famotidine (PEPCID) injection 20 mg  20 mg Intravenous BID Casimiro Hadley MD       • guaiFENesin (MUCINEX) 12 hr tablet 1,200 mg  1,200 mg Oral Q12H Bandar Blanco MD   1,200 mg at 05/13/19 0909   • ipratropium-albuterol (DUO-NEB) nebulizer solution 3 mL  3 mL Nebulization 4x Daily - RT Bandar Blanco MD   3 mL at 05/13/19 1424   • methylPREDNISolone sodium succinate (SOLU-Medrol) injection 20 mg  20 mg Intravenous Q12H Bandar Blanco MD   20 mg at 05/13/19 0909   • ondansetron (ZOFRAN) injection 4 mg  4 mg Intravenous Q6H PRN Bandar Blanco MD       • phenol (CHLORASEPTIC) 1.4 % liquid 2 spray  2 spray Mouth/Throat Q2H PRN Bandar Blanco MD   2 spray at 05/11/19 1536   • saccharomyces boulardii (FLORASTOR) capsule 250 mg  250 mg Oral BID Casimiro Hadley MD       • sodium chloride 0.9 % flush 10 mL  10 mL Intravenous PRN Zeferino Lopez,            Home Meds:  Prior to Admission medications   "  Medication Sig Start Date End Date Taking? Authorizing Provider   famotidine (PEPCID) 20 MG tablet Take 1 tablet by mouth Daily. 19  Yes Hansa Lr MD   ferrous sulfate (IRON SUPPLEMENT) 325 (65 FE) MG tablet Take 1 tablet by mouth Daily With Breakfast. 3/9/18  Yes Casimiro Hadley MD   lovastatin (MEVACOR) 40 MG tablet Take 40 mg by mouth Every Night. 16  Yes Provider, MD Chaya            Allergies   Allergen Reactions   • Acetylcholinesterase Other (See Comments)     Had trouble coming out of anesthesia, Dr Ellison inst pt family to make sure pt never receive this again, if he does he will wind up on respirator       Review of Systems   Constitutional: Positive for fever (Low-grade).   HENT: Negative for mouth sores.    Eyes: Negative for photophobia.   Respiratory: Positive for cough.    Cardiovascular: Negative for chest pain.   Gastrointestinal: Positive for nausea and vomiting (On admission that has resolved).   Endocrine: Positive for cold intolerance.   Genitourinary: Negative for dysuria.   Skin: Negative for rash.   Neurological: Negative for speech difficulty.         Vital Signs:  /73 (BP Location: Right arm, Patient Position: Lying)   Pulse 87   Temp 98.9 °F (37.2 °C) (Oral)   Resp 16   Ht 160 cm (63\")   Wt 64.5 kg (142 lb 3.2 oz)   SpO2 92%   BMI 25.19 kg/m²    Temp (24hrs), Av.9 °F (37.2 °C), Min:98.6 °F (37 °C), Max:99.3 °F (37.4 °C)      Physical Exam   General: Patient is chronically ill-appearing but does look better than last admission.  He is lying in bed in no acute distress.  His wife is at bedside  HEENT: Sclera anicteric and noninjected  Neck: Supple  Heart: S1-S2 rate is regular  Lungs: Diminished breath sounds throughout.  No crackles appreciated  Abdomen: Soft, nontender, nondistended, no focal tenderness rebound or guarding appreciated  Extremities: No edema  Psych: He is pleasant cooperative  Neuro: He is somewhat hard of hearing, Speech is " clear,      Line/IV site: Peripheral IV hand left, condition patent and no redness    Results Review:    I reviewed the patient's new clinical results.    Lab Results:  CBC:   Lab Results   Lab 05/11/19 0756 05/12/19 1646 05/13/19 0658   WBC 20.67* 14.98* 14.69*   HEMOGLOBIN 11.3* 9.7* 10.5*   HEMATOCRIT 35.6* 30.1* 32.2*   PLATELETS 282 233 252       CMP:   Lab Results   Lab 05/11/19 0756 05/12/19  1646 05/13/19 0657   SODIUM 144 138 137   POTASSIUM 3.7 3.7 3.9   CHLORIDE 101 99 99   CO2 31.0 29.0 28.0   BUN 14 13 12   CREATININE 0.86 0.71 0.74   CALCIUM 8.5 7.9* 8.6   BILIRUBIN 0.4 0.2 0.3   ALK PHOS 52 44 47   ALT (SGPT) <15 <15 <15   AST (SGOT) 38 38 33   GLUCOSE 143* 133* 113*       Lab Results (last 72 hours)     Procedure Component Value Units Date/Time    Blood Culture With ANGELIA - Blood, Arm, Left [[id number]] Collected:  05/10/19 1450    Specimen:  Blood from Arm, Left Updated:  05/13/19 1515     Blood Culture No growth at 3 days    Blood Culture - Blood, Arm, Right [[id number]] Collected:  05/10/19 1437    Specimen:  Blood from Arm, Right Updated:  05/13/19 1500     Blood Culture No growth at 3 days    Comprehensive Metabolic Panel [[id number]]  (Abnormal) Collected:  05/13/19 0657    Specimen:  Blood Updated:  05/13/19 0738     Glucose 113 mg/dL      BUN 12 mg/dL      Creatinine 0.74 mg/dL      Sodium 137 mmol/L      Potassium 3.9 mmol/L      Chloride 99 mmol/L      CO2 28.0 mmol/L      Calcium 8.6 mg/dL      Total Protein 6.3 g/dL      Albumin 3.50 g/dL      ALT (SGPT) <15 U/L      AST (SGOT) 33 U/L      Alkaline Phosphatase 47 U/L      Total Bilirubin 0.3 mg/dL      eGFR Non African Amer 103 mL/min/1.73      Globulin 2.8 gm/dL      A/G Ratio 1.3 g/dL      BUN/Creatinine Ratio 16.2     Anion Gap 10.0 mmol/L     Narrative:       GFR Normal >60  Chronic Kidney Disease <60  Kidney Failure <15    CBC & Differential [[id number]] Collected:  05/13/19 0658    Specimen:  Blood Updated:  05/13/19 0726     Narrative:       The following orders were created for panel order CBC & Differential.  Procedure                               Abnormality         Status                     ---------                               -----------         ------                     CBC Auto Differential[[id number]]        Abnormal            Final result                 Please view results for these tests on the individual orders.    CBC Auto Differential [[id number]]  (Abnormal) Collected:  05/13/19 0658    Specimen:  Blood Updated:  05/13/19 0726     WBC 14.69 10*3/mm3      RBC 3.53 10*6/mm3      Hemoglobin 10.5 g/dL      Hematocrit 32.2 %      MCV 91.2 fL      MCH 29.7 pg      MCHC 32.6 g/dL      RDW 14.2 %      RDW-SD 47.6 fl      MPV 12.5 fL      Platelets 252 10*3/mm3      Neutrophil % 84.7 %      Lymphocyte % 7.8 %      Monocyte % 6.7 %      Eosinophil % 0.0 %      Basophil % 0.1 %      Immature Grans % 0.7 %      Neutrophils, Absolute 12.45 10*3/mm3      Lymphocytes, Absolute 1.14 10*3/mm3      Monocytes, Absolute 0.99 10*3/mm3      Eosinophils, Absolute 0.00 10*3/mm3      Basophils, Absolute 0.01 10*3/mm3      Immature Grans, Absolute 0.10 10*3/mm3      nRBC 0.0 /100 WBC     Comprehensive Metabolic Panel [[id number]]  (Abnormal) Collected:  05/12/19 1646    Specimen:  Blood Updated:  05/12/19 1708     Glucose 133 mg/dL      BUN 13 mg/dL      Creatinine 0.71 mg/dL      Sodium 138 mmol/L      Potassium 3.7 mmol/L      Chloride 99 mmol/L      CO2 29.0 mmol/L      Calcium 7.9 mg/dL      Total Protein 6.5 g/dL      Albumin 3.40 g/dL      ALT (SGPT) <15 U/L      AST (SGOT) 38 U/L      Alkaline Phosphatase 44 U/L      Total Bilirubin 0.2 mg/dL      eGFR Non African Amer 108 mL/min/1.73      Globulin 3.1 gm/dL      A/G Ratio 1.1 g/dL      BUN/Creatinine Ratio 18.3     Anion Gap 10.0 mmol/L     Narrative:       GFR Normal >60  Chronic Kidney Disease <60  Kidney Failure <15    CBC & Differential [[id number]] Collected:  05/12/19 1646     Specimen:  Blood Updated:  05/12/19 1655    Narrative:       The following orders were created for panel order CBC & Differential.  Procedure                               Abnormality         Status                     ---------                               -----------         ------                     CBC Auto Differential[[id number]]        Abnormal            Final result                 Please view results for these tests on the individual orders.    CBC Auto Differential [[id number]]  (Abnormal) Collected:  05/12/19 1646    Specimen:  Blood Updated:  05/12/19 1655     WBC 14.98 10*3/mm3      RBC 3.23 10*6/mm3      Hemoglobin 9.7 g/dL      Hematocrit 30.1 %      MCV 93.2 fL      MCH 30.0 pg      MCHC 32.2 g/dL      RDW 14.1 %      RDW-SD 47.9 fl      MPV 12.1 fL      Platelets 233 10*3/mm3      Neutrophil % 89.6 %      Lymphocyte % 5.5 %      Monocyte % 4.1 %      Eosinophil % 0.0 %      Basophil % 0.1 %      Immature Grans % 0.7 %      Neutrophils, Absolute 13.41 10*3/mm3      Lymphocytes, Absolute 0.83 10*3/mm3      Monocytes, Absolute 0.62 10*3/mm3      Eosinophils, Absolute 0.00 10*3/mm3      Basophils, Absolute 0.01 10*3/mm3      Immature Grans, Absolute 0.11 10*3/mm3      nRBC 0.0 /100 WBC     Comprehensive Metabolic Panel [[id number]]  (Abnormal) Collected:  05/11/19 0756    Specimen:  Blood Updated:  05/11/19 0825     Glucose 143 mg/dL      BUN 14 mg/dL      Creatinine 0.86 mg/dL      Sodium 144 mmol/L      Potassium 3.7 mmol/L      Chloride 101 mmol/L      CO2 31.0 mmol/L      Calcium 8.5 mg/dL      Total Protein 6.9 g/dL      Albumin 3.90 g/dL      ALT (SGPT) <15 U/L      AST (SGOT) 38 U/L      Alkaline Phosphatase 52 U/L      Total Bilirubin 0.4 mg/dL      eGFR Non African Amer 87 mL/min/1.73      Globulin 3.0 gm/dL      A/G Ratio 1.3 g/dL      BUN/Creatinine Ratio 16.3     Anion Gap 12.0 mmol/L     Narrative:       GFR Normal >60  Chronic Kidney Disease <60  Kidney Failure <15    CBC & Differential  [[id number]] Collected:  05/11/19 0756    Specimen:  Blood Updated:  05/11/19 0804    Narrative:       The following orders were created for panel order CBC & Differential.  Procedure                               Abnormality         Status                     ---------                               -----------         ------                     CBC Auto Differential[[id number]]        Abnormal            Final result                 Please view results for these tests on the individual orders.    CBC Auto Differential [[id number]]  (Abnormal) Collected:  05/11/19 0756    Specimen:  Blood Updated:  05/11/19 0804     WBC 20.67 10*3/mm3      RBC 3.84 10*6/mm3      Hemoglobin 11.3 g/dL      Hematocrit 35.6 %      MCV 92.7 fL      MCH 29.4 pg      MCHC 31.7 g/dL      RDW 14.4 %      RDW-SD 48.7 fl      MPV 12.3 fL      Platelets 282 10*3/mm3      Neutrophil % 92.9 %      Lymphocyte % 4.2 %      Monocyte % 2.3 %      Eosinophil % 0.0 %      Basophil % 0.1 %      Immature Grans % 0.5 %      Neutrophils, Absolute 19.20 10*3/mm3      Lymphocytes, Absolute 0.87 10*3/mm3      Monocytes, Absolute 0.47 10*3/mm3      Eosinophils, Absolute 0.00 10*3/mm3      Basophils, Absolute 0.03 10*3/mm3      Immature Grans, Absolute 0.10 10*3/mm3      nRBC 0.0 /100 WBC     Blood Gas, Arterial [[id number]]  (Abnormal) Collected:  05/10/19 2025    Specimen:  Arterial Blood Updated:  05/10/19 2030     Site Right Radial     Danny's Test Positive     pH, Arterial 7.514 pH units      Comment: 83 Value above reference range        pCO2, Arterial 46.4 mm Hg      Comment: 83 Value above reference range        pO2, Arterial 74.3 mm Hg      Comment: 84 Value below reference range        HCO3, Arterial 37.3 mmol/L      Comment: 83 Value above reference range        Base Excess, Arterial 12.8 mmol/L      Comment: 83 Value above reference range        O2 Saturation, Arterial 96.0 %      Temperature 37.0 C      Barometric Pressure for Blood Gas 753 mmHg       Modality Nasal Cannula     Flow Rate 2.0 lpm      Ventilator Mode NA     Collected by [id number]     Comment: Meter: [id number]     :  [id number]       BNP [[id number]]  (Normal) Collected:  05/10/19 0933    Specimen:  Blood Updated:  05/10/19 1651     proBNP 141.0 pg/mL     Troponin [[id number]]  (Normal) Collected:  05/10/19 0933    Specimen:  Blood Updated:  05/10/19 1651     Troponin I <0.012 ng/mL           Culture Results:    Blood Culture   Date Value Ref Range Status   05/10/2019 No growth at 3 days  Preliminary   05/10/2019 No growth at 3 days  Preliminary         Radiology:   Imaging Results (last 72 hours)     ** No results found for the last 72 hours. **        Procedure Component Value Units Date/Time   XR Chest 1 View [[id number]] Vasile as Reviewed   Order Status: Completed Collected: 05/10/19 1533    Updated: 05/10/19 1537   Narrative:     EXAMINATION: XR CHEST 1 VW-. 5/10/2019 3:33 PM CDT     CHEST, ONE VIEW:     HISTORY: Cough and hypoxia     COMPARISON: 04/24/2019 and 04/24/2019     A single frontal chest radiograph was obtained.     FINDINGS:     Calcified granuloma appreciated in the right upper lobe.     The lungs are clear without acute infiltrates.     The heart is normal in size.     There is ectasia of the descending thoracic aorta observed, without  heart failure.     No acute osseous abnormalities identified.                                    Impression:     1. No acute cardiopulmonary process.     This report was finalized on 05/10/2019 15:33 by Dr. Max Ring MD.   CT Abdomen Pelvis Without Contrast [[id number]] Vasile as Reviewed   Order Status: Completed Collected: 05/10/19 1216    Updated: 05/10/19 1224   Narrative:     EXAMINATION:   CT ABDOMEN PELVIS WO CONTRAST-  5/10/2019 12:16 PM CDT     HISTORY: CT ABDOMEN PELVIS WO CONTRAST- 5/10/2019 11:32 AM CDT     HISTORY: pain, vomiting, diarrhea      COMPARISON: 04/24/2019      DOSE LENGTH PRODUCT: 2 90 mGy cm. Automated exposure  control was also  utilized to decrease patient radiation dose.     TECHNIQUE: Noncontrast enhanced images of the abdomen and pelvis  obtained without oral contrast. Multiplanar reformatted images were  provided for review.     FINDINGS:   Lung bases: Chronic changes noted in the lung bases..      LIVER: No focal liver lesion.      BILIARY SYSTEM: The gallbladder is unremarkable. No intrahepatic or  extrahepatic ductal dilatation.      PANCREAS: No focal pancreatic lesion.      SPLEEN: Unremarkable.      KIDNEYS AND ADRENALS: Bilateral kidneys and adrenal glands are  unremarkable.  Vascular calcifications noted in the renal arteries..     RETROPERITONEUM: No mass, lymphadenopathy or hemorrhage.      GI TRACT: No evidence of obstruction or bowel wall thickening.  Diverticulitis is noted in the sigmoid colon. Compared to prior exam of  04/24/2019 this is improved. However residual does persist..     OTHER: There is no mesenteric mass, lymphadenopathy or fluid collection.  The osseous structures and soft tissues demonstrate no worrisome  lesions. Old compression fracture of L1 is noted. There is retropulsion  of L1 into the lumbar canal this is unchanged     Vascular calcifications noted in the abdominal aorta      PELVIS: No mass lesion, fluid collection or significant lymphadenopathy  is seen in the pelvis. The urinary bladder is normal in appearance.      Impression:     1. Changes of diverticulitis. Compared to April 24 this is improved  however residual does persist.  2. Old compression fracture of L1 with some retropulsion into the lumbar  canal  3. Vascular calcifications present abdominal aorta. 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         This report was finalized on 05/10/2019 12:21 by Dr. Olegario Graham MD.         Assessment/Plan       Hypoxia    COPD with acute exacerbation (CMS/HCC)    Nausea vomiting and diarrhea      IMPRESSION:  1. Nausea and vomiting on admission-resolved  2. History  of diverticulitis status post treatment previous admission with improvement but residual changes on CT scan.  Patient is asymptomatic from the standpoint of any abdominal pain.  3. Loose stool.  Patient treated for C. difficile during last admission based on GI panel positive for C. difficile.  The patient only reports 2 episodes of loose stool today and is not having any abdominal cramping or persistent loose stool.  4. Leukocytosis-improved and the patient is on IV steroids  5. COPD exacerbation.  Patient has been weaned off of oxygen.  Patient has been on ceftriaxone and Flagyl      RECOMMENDATION:   · Hold any targeted treatment for C. difficile  · Would monitor stools and if any significant increase in stools, increase in white count, abdominal pain, etc. would consider empiric treatment for recurrent C. difficile.  At this point, the patient reports he has some loose stools at baseline and reported the same thing during the last admission.  Think watchful waiting is the best approach    Reviewed with patient and wife        Cheyenne ANTONIO. MD [place]  05/13/19  4:46 PM          Electronically signed by Cheyenne Robb MD at 5/13/2019  5:27 PM          Respiratory Therapy Notes (last 24 hours) (Notes from 5/13/2019 12:23 PM through 5/14/2019 12:23 PM)      Morgan Hooker RRT at 5/14/2019 10:39 AM        Patient giving booklet on lung disease. Pt is not on home 02 or nebulizers.    Electronically signed by Morgan Hooker RRT at 5/14/2019 10:40 AM       Discharge Summary     No notes of this type exist for this encounter.

## 2019-05-14 NOTE — THERAPY DISCHARGE NOTE
Acute Care - Physical Therapy Discharge Summary  [facility]       Patient Name: Jonnie Sheets Jr.  : 1945  MRN: [id number]    Today's Date: 2019  Onset of Illness/Injury or Date of Surgery: 05/10/19    Date of Referral to PT: 05/10/19  Referring Physician: Dr. Blanco      Admit Date: 5/10/2019      PT Recommendation and Plan    Visit Dx:    ICD-10-CM ICD-9-CM   1. Hypoxia R09.02 799.02   2. Dyspnea, unspecified type R06.00 786.09   3. Diverticulitis K57.92 562.11   4. Vomiting and diarrhea R11.10 787.03    R19.7 787.91   5. Dysphagia, unspecified type R13.10 787.20   6. Impaired mobility Z74.09 799.89   7. COPD with acute exacerbation (CMS/[facility]) J44.1 491.21   8. Nausea vomiting and diarrhea R11.2 787.91    R19.7 787.01   9. Malignant neoplasm of colon, unspecified part of colon (CMS/[facility]) C18.9 153.9   10. Personal history of colon cancer Z85.038 V10.05   11. Anorexia R63.0 783.0   12. Black stool K92.1 792.1   13. Acute renal failure, unspecified acute renal failure type (CMS/[facility]) N17.9 584.9   14. Diverticular disease of large intestine with complication K57.30 562.10   15. Septic shock (CMS/[facility]), resolved A41.9 038.9    R65.21 785.52     995.92   16. Diverticulitis large intestine w/o perforation or abscess w/o bleeding K57.32 562.11   17. Diarrhea, unspecified type R19.7 787.91   18. Acute kidney injury (CMS/[facility]), secondary to sepsis, dialysis initiated  N17.9 584.9   19. High anion gap metabolic acidosis, improved E87.2 276.2   20. Lactic acidosis, resolved E87.2 276.2   21. Leukocytosis, unspecified type D72.829 288.60   22. C. difficile colitis A04.72 008.45   23. Hypokalemia E87.6 276.8   24. Iron deficiency anemia, unspecified iron deficiency anemia type D50.9 280.9   25. Hypomagnesemia E83.42 275.2       Outcome Measures     Row Name 19 1321 19 1300          How much help from another person do you currently need...    Turning from your back to your side while in flat bed without  using bedrails?  --  4  -SB     Moving from lying on back to sitting on the side of a flat bed without bedrails?  --  4  -SB     Moving to and from a bed to a chair (including a wheelchair)?  --  4  -SB     Standing up from a chair using your arms (e.g., wheelchair, bedside chair)?  --  4  -SB     Climbing 3-5 steps with a railing?  --  3  -SB     To walk in hospital room?  --  3  -SB     AM-PAC 6 Clicks Score  --  22  -SB        How much help from another is currently needed...    Putting on and taking off regular lower body clothing?  4  -MM  --     Bathing (including washing, rinsing, and drying)  3  -MM  --     Toileting (which includes using toilet bed pan or urinal)  4  -MM  --     Putting on and taking off regular upper body clothing  4  -MM  --     Taking care of personal grooming (such as brushing teeth)  4  -MM  --     Eating meals  4  -MM  --     Score  23  -MM  --        Functional Assessment    Outcome Measure Options  AM-PAC 6 Clicks Daily Activity (OT)  -MM  AM-[facility] 6 Clicks Basic Mobility (PT)  -SB       User Key  (r) = Recorded By, (t) = Taken By, (c) = Cosigned By    Initials Name Provider Type    MM Jatinder Serrano, OTR/L Occupational Therapist    Angela Orozco, PT Physical Therapist          PT Charges     Row Name 05/14/19 1141             Time Calculation    Start Time  1117  -GINETTE      Stop Time  1129  -GINETTE      Time Calculation (min)  12 min  -GINETTE      PT Received On  05/14/19  -GINETTE         Timed Charges    79964 - Gait Training Minutes   12  -GINETTE        User Key  (r) = Recorded By, (t) = Taken By, (c) = Cosigned By    Initials Name Provider Type    Vira Lisa, PTA Physical Therapy Assistant          Rehab Goal Summary     Row Name 05/14/19 0592             Physical Therapy Goals    Bed Mobility Goal Selection (PT)  bed mobility, PT goal 1  -AH      Transfer Goal Selection (PT)  transfer, PT goal 1  -AH      Gait Training Goal Selection (PT)  gait training, PT goal 1  -AH         Bed  Mobility Goal 1 (PT)    Activity/Assistive Device (Bed Mobility Goal 1, PT)  bed mobility activities, all  -AH      [place] Level/Cues Needed (Bed Mobility Goal 1, PT)  independent  -AH      Time Frame (Bed Mobility Goal 1, PT)  by discharge  -AH      Progress/Outcomes (Bed Mobility Goal 1, PT)  goal met  -AH         Transfer Goal 1 (PT)    Activity/Assistive Device (Transfer Goal 1, PT)  sit-to-stand/stand-to-sit  -AH      [place] Level/Cues Needed (Transfer Goal 1, PT)  independent  -AH      Time Frame (Transfer Goal 1, PT)  by discharge  -AH      Progress/Outcome (Transfer Goal 1, PT)  goal met  -AH         Gait Training Goal 1 (PT)    Activity/Assistive Device (Gait Training Goal 1, PT)  gait (walking locomotion);increase energy conservation;increase endurance/gait distance  -AH      [place] Level (Gait Training Goal 1, PT)  supervision required  -AH      Distance (Gait Goal 1, PT)  250  -AH      Time Frame (Gait Training Goal 1, PT)  by discharge  -AH      Progress/Outcome (Gait Training Goal 1, PT)  goal met  -AH        User Key  (r) = Recorded By, (t) = Taken By, (c) = Cosigned By    Initials Name Provider Type Nahed Bishop PTA Physical Therapy Assistant PT              PT Discharge Summary  Reason for Discharge: Discharge from facility  Outcomes Achieved: Refer to plan of care for updates on goals achieved  Discharge Destination: Home      Nahed Gross PTA   5/14/2019

## 2019-05-14 NOTE — PROGRESS NOTES
"INFECTIOUS DISEASES PROGRESS NOTE    Patient:  Jonnie Sheets Jr.  YOB: 1945  MRN: [id number]   Admit date: 5/10/2019   Admitting Physician: Casimiro Hadley MD  Primary Care Physician: Dandy Burton MD    Chief Complaint: \"I feel good today\"        Interval History: Patient reports he feels really well today.  He was finally able to eat without food getting stuck.  He reports his Zantac was started back yesterday.    Patient states he has had one small loose stool at 730 this morning.  He is eaten breakfast without difficulty and has not had any more stools.  He has no abdominal pain.    He said they \"finally took me off that oxygen\".    Allergies:   Allergies   Allergen Reactions   • Acetylcholinesterase Other (See Comments)     Had trouble coming out of anesthesia, Dr Ellison inst pt family to make sure pt never receive this again, if he does he will wind up on respirator       Current Meds:     Current Facility-Administered Medications:   •  acetaminophen (TYLENOL) suppository 650 mg, 650 mg, Rectal, Q4H PRN, Porfirio Fisher MD, 650 mg at 05/10/19 2242  •  acetaminophen (TYLENOL) tablet 650 mg, 650 mg, Oral, Q6H PRN, Bandar Blanco MD  •  enoxaparin (LOVENOX) syringe 40 mg, 40 mg, Subcutaneous, Nightly, Bandar Blanco MD, 40 mg at 05/13/19 2025  •  famotidine (PEPCID) injection 20 mg, 20 mg, Intravenous, BID, Casimiro Hadley MD, 20 mg at 05/14/19 0838  •  guaiFENesin (MUCINEX) 12 hr tablet 1,200 mg, 1,200 mg, Oral, Q12H, Bandar Blanco MD, 1,200 mg at 05/14/19 0838  •  ipratropium-albuterol (DUO-NEB) nebulizer solution 3 mL, 3 mL, Nebulization, 4x Daily - RT, Bandar Blanco MD, 3 mL at 05/14/19 1036  •  methylPREDNISolone sodium succinate (SOLU-Medrol) injection 20 mg, 20 mg, Intravenous, Q12H, Bandar Blanco MD, 20 mg at 05/14/19 0838  •  ondansetron (ZOFRAN) injection 4 mg, 4 mg, Intravenous, Q6H PRN, Bandar Blanco MD  •  phenol " "(CHLORASEPTIC) 1.4 % liquid 2 spray, 2 spray, Mouth/Throat, Q2H PRN, Bandar Blanco MD, 2 spray at 19 1536  •  saccharomyces boulardii (FLORASTOR) capsule 250 mg, 250 mg, Oral, BID, Casimiro Hadley MD, 250 mg at 19 0838  •  [COMPLETED] Insert peripheral IV, , , Once **AND** sodium chloride 0.9 % flush 10 mL, 10 mL, Intravenous, PRN, Zeferino Lopez DO      Review of Systems   cardiac: No chest pain          respiratory: Breathing is at baseline    Objective     Vital Signs:  Temp (24hrs), Av.4 °F (36.9 °C), Min:97.8 °F (36.6 °C), Max:99 °F (37.2 °C)      /66 (BP Location: Right arm, Patient Position: Lying)   Pulse 95   Temp 97.8 °F (36.6 °C) (Oral)   Resp 16   Ht 160 cm (63\")   Wt 65.1 kg (143 lb 8 oz)   SpO2 93%   BMI 25.42 kg/m²         Physical Exam   General: Patient's nontoxic-appearing lying in bed he is in no acute distress  HEENT: Sclera anicteric and noninjected  Neck: Supple  Respiratory: Effort even and unlabored  Abdomen: Soft nontender nondistended bowel sounds are positive  Psych: He is pleasant and cooperative  Neuro: He is alert and oriented, speech is clear      Results Review:    I reviewed the patient's new clinical results.    Lab Results:  CBC:   Lab Results   Lab 05/10/19  0933 19  0756 19  1646 19  0658 19  0554   WBC 15.63* 20.67* 14.98* 14.69* 8.36   HEMOGLOBIN 13.0* 11.3* 9.7* 10.5* 10.2*   HEMATOCRIT 40.4 35.6* 30.1* 32.2* 31.2*   PLATELETS 368 282 233 252 239         CMP:   Lab Results   Lab 19  1646 19  0657 19  0554   SODIUM 138 137 137   POTASSIUM 3.7 3.9 4.3   CHLORIDE 99 99 101   CO2 29.0 28.0 27.0   BUN 13 12 17   CREATININE 0.71 0.74 0.67   CALCIUM 7.9* 8.6 8.7   BILIRUBIN 0.2 0.3 0.2   ALK PHOS 44 47 42   ALT (SGPT) <15 <15 <15   AST (SGOT) 38 33 30   GLUCOSE 133* 113* 116*         Culture Results:    Blood Culture   Date Value Ref Range Status   05/10/2019 No growth at 3 days  Preliminary "   05/10/2019 No growth at 3 days  Preliminary       Microbiology Results Abnormal     Procedure Component Value - Date/Time    Blood Culture With ANGELIA - Blood, Arm, Left [[id number]] Collected:  05/10/19 1450    Lab Status:  Preliminary result Specimen:  Blood from Arm, Left Updated:  05/13/19 1515     Blood Culture No growth at 3 days    Blood Culture - Blood, Arm, Right [[id number]] Collected:  05/10/19 1437    Lab Status:  Preliminary result Specimen:  Blood from Arm, Right Updated:  05/13/19 1500     Blood Culture No growth at 3 days                Radiology:   Imaging Results (last 72 hours)     ** No results found for the last 72 hours. **          Assessment/Plan     Active Hospital Problems    Diagnosis   • COPD with acute exacerbation (CMS/[facility])   • Nausea vomiting and diarrhea   • Hypoxia       IMPRESSION:  1. Nausea and vomiting on admission-resolved  2. History of diverticulitis status post treatment previous admission with improvement but residual changes on CT scan.  Patient is asymptomatic from the standpoint of any abdominal pain.  He has been on ceftriaxone and Flagyl this admission.  3. Loose stool.  Patient treated for C. difficile during last admission based on GI panel positive for C. difficile.    Currently patient is not having any symptoms consistent with C. difficile colitis  4. Leukocytosis-resolved and the patient is on IV steroids  5. COPD exacerbation.  Patient has been weaned off of oxygen.  Patient has been on ceftriaxone this admission     RECOMMENDATION:   · Patient is aware of signs and symptoms of C. difficile colitis.    · Encouraged patient to monitor stool and contact either me or his primary care doctor, Dr. Burton, if he notices any abdominal pain, fever, significant increase in number of stools or amount of stool.  He voices understanding.    · Infectious disease will sign off.  Please reconsult consult if needed        Cheyenne Robb MD  05/14/19  11:50 AM

## 2019-05-14 NOTE — PLAN OF CARE
Problem: ARDS (Acute Resp Distress Syndrome) (Adult)  Goal: Signs and Symptoms of Listed Potential Problems Will be Absent, Minimized or Managed (ARDS)  Outcome: Ongoing (interventions implemented as appropriate)      Problem: Patient Care Overview  Goal: Plan of Care Review  Outcome: Ongoing (interventions implemented as appropriate)   05/14/19 0404   Coping/Psychosocial   Plan of Care Reviewed With patient   Plan of Care Review   Progress no change   OTHER   Outcome Summary Patient has no c/o pain. VSS. Safety maintained. Will continue to monitor.        Problem: Fall Risk (Adult)  Goal: Absence of Fall  Outcome: Ongoing (interventions implemented as appropriate)      Problem: Infection, Risk/Actual (Adult)  Goal: Identify Related Risk Factors and Signs and Symptoms  Outcome: Outcome(s) achieved Date Met: 05/14/19    Goal: Infection Prevention/Resolution  Outcome: Ongoing (interventions implemented as appropriate)

## 2019-05-14 NOTE — PROGRESS NOTES
Continued Stay Note   [place]     Patient Name: Jonnie Sheets Jr.  MRN: [id number]  Today's Date: 5/14/2019    Admit Date: 5/10/2019    Discharge Plan     Row Name 05/14/19 1225       Plan    Plan  Home    Patient/Family in Agreement with Plan  yes    Final Discharge Disposition Code  01 - home or self-care    Final Note  Pt is discharged home today. Pt has order for nebulizer machine.  Spoke with pt/wife and they prefer [facility]. Called Catrina from [facility] and provided referral. They will deliver to room before he leaves today, [phone] fax [phone].        Discharge Codes    No documentation.       Expected Discharge Date and Time     Expected Discharge Date Expected Discharge Time    May 14, 2019             AYDIN Carmona

## 2019-05-14 NOTE — DISCHARGE SUMMARY
[facility] Medicine Services  DISCHARGE SUMMARY       Date of Admission: 5/10/2019  Date of Discharge:  5/14/2019  Primary Care Physician: Dandy Burton MD    Presenting Problem/History of Present Illness:  Hypoxia [R09.02]     Final Discharge Diagnoses:  Active Hospital Problems    Diagnosis   • COPD with acute exacerbation (CMS/[facility])   • Nausea vomiting and diarrhea   • Hypoxia       Consults: Infectious disease.    Pertinent Test Results:                                 IMPRESSION: Chest x-ray  1. No acute cardiopulmonary process.    IMPRESSION: CT scan abdomen pelvic.  1. Changes of diverticulitis. Compared to April 24 this is improved  however residual does persist.  2. Old compression fracture of L1 with some retropulsion into the lumbar  canal  3. Vascular calcifications present abdominal aorta. 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     Chief Complaint on Day of Discharge: none    History of Present Illness on Day of Discharge:   Mr. Sheets is a 73 year old gentleman with a history of tobacco abuse and COPD.  He presents complaining of 1 day of persistent nausea and vomiting.  He has had fever.  He denies abdominal pain or diarrhea.  He denies black or bloody stools.      He has a chronic productive smokers cough.  This has been worse the last few days  He has been wheezing.       Hospital Course:  The patient is a 73 y.o. male who presented to [facility] with COPD exacerbation/nausea vomiting diarrhea.     Hypoxia respiratory failure/COPD exacerbation/coughing.    Patient was started on Solu-Medrol, Duo nebs, Mucinex, flutter valves, and oxygen.  Chest ray shows no acute pulmonary cardio process.  Patient is currently breathing room air.  Patient denies any shortness of breath with oxygen.  Patient go with a DuoNeb machine at home.     Sepsis.  Resolved.     Nausea vomiting diarrhea.  Zofran.  Pepcid IV.  History of C. difficile colitis.   "Patient just finished the last 2 dose of p.o. Vancomycin.  Consult infectious disease.  Start probiotic.  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  Patient will need outpatient vascular follow-up, discussed with patient.  Nausea vomiting diarrhea resolved.  Patient tolerated regular diet.  Diarrhea has stopped.  Blood culture no growth in 3 days.  Blood culture ANGELIA no growth in 3 days.     Hypocalcemia.  Corrected calcium 8.4 5/13/2019.     Vital signs stable, afebrile.  Patient follow with vascular surgery outpatient.  Patient follow primary care doctor in about a week.    Condition on Discharge: Stable.    Physical Exam on Discharge:  /66 (BP Location: Right arm, Patient Position: Lying)   Pulse 95   Temp 97.8 °F (36.6 °C) (Oral)   Resp 16   Ht 160 cm (63\")   Wt 65.1 kg (143 lb 8 oz)   SpO2 93%   BMI 25.42 kg/m²   Physical Exam   Constitutional: He is oriented to person, place, and time. He appears well-developed and well-nourished.   HENT:   Head: Normocephalic and atraumatic.   Eyes: Conjunctivae and EOM are normal. Pupils are equal, round, and reactive to light.   Neck: Neck supple. No JVD present. No thyromegaly present.   Cardiovascular: Normal rate, regular rhythm, normal heart sounds and intact distal pulses. Exam reveals no gallop and no friction rub.   No murmur heard.  Pulmonary/Chest: Effort normal and breath sounds normal. No respiratory distress. He has no wheezes. He has no rales. He exhibits no tenderness.   Abdominal: Soft. Bowel sounds are normal. He exhibits no distension. There is no tenderness. There is no rebound and no guarding.   Musculoskeletal: Normal range of motion. He exhibits no edema, tenderness or deformity.   Lymphadenopathy:     He has no cervical adenopathy.   Neurological: He is alert and " oriented to person, place, and time. He displays normal reflexes. No cranial nerve deficit. He exhibits normal muscle tone.   Skin: Skin is warm and dry. No rash noted.   Psychiatric: He has a normal mood and affect. His behavior is normal. Judgment and thought content normal.   Nursing note and vitals reviewed.        Discharge Disposition:  Home or Self Care    Discharge Medications:     Discharge Medications      New Medications      Instructions Start Date   ipratropium-albuterol 0.5-2.5 mg/3 ml nebulizer  Commonly known as:  DUO-NEB   3 mL, Nebulization, 4 Times Daily - RT      saccharomyces boulardii 250 MG capsule  Commonly known as:  FLORASTOR   250 mg, Oral, Daily         Changes to Medications      Instructions Start Date   famotidine 20 MG tablet  Commonly known as:  PEPCID  What changed:    · when to take this  · reasons to take this   20 mg, Oral, 2 Times Daily PRN         Continue These Medications      Instructions Start Date   ferrous sulfate 325 (65 FE) MG tablet  Commonly known as:  IRON SUPPLEMENT   325 mg, Oral, Daily With Breakfast      lovastatin 40 MG tablet  Commonly known as:  MEVACOR   40 mg, Oral, Nightly         Stop These Medications    vancomycin 50 MG/ML reconstituted solution oral solution reconstituted            Discharge Diet:   Diet Instructions     Advance Diet As Tolerated            Activity at Discharge:   Activity Instructions     Activity as Tolerated            Discharge Care Plan/Instructions:     Follow-up Appointments:   Follow with PCP in about a week.  Follow with vascular surgery outpatient.    Casimiro Hadley MD  05/14/19  12:21 PM    Time: Greater than 30 minutes

## 2019-05-15 ENCOUNTER — READMISSION MANAGEMENT (OUTPATIENT)
Dept: CALL CENTER | Facility: HOSPITAL | Age: 74
End: 2019-05-15

## 2019-05-15 LAB
BACTERIA SPEC AEROBE CULT: NORMAL
BACTERIA SPEC AEROBE CULT: NORMAL

## 2019-05-15 NOTE — TELEPHONE ENCOUNTER
"Was not aware that Duo-Neb treatment comes in two different vials.    Reason for Disposition  • Caller has medication question only, adult not sick, and triager answers question    Additional Information  • Negative: Drug overdose and nurse unable to answer question  • Negative: Caller requesting information not related to medicine  • Negative: Caller requesting a prescription for Strep throat and has a positive culture result  • Negative: Rash while taking a medication or within 3 days of stopping it  • Negative: Immunization reaction suspected  • Negative: [1] Asthma and [2] having symptoms of asthma (cough, wheezing, etc)  • Negative: MORE THAN A DOUBLE DOSE of a prescription or over-the-counter (OTC) drug  • Negative: [1] DOUBLE DOSE (an extra dose or lesser amount) of over-the-counter (OTC) drug AND [2] any symptoms (e.g., dizziness, nausea, pain, sleepiness)  • Negative: [1] DOUBLE DOSE (an extra dose or lesser amount) of prescription drug AND [2] any symptoms (e.g., dizziness, nausea, pain, sleepiness)  • Negative: Took another person's prescription drug  • Negative: [1] DOUBLE DOSE (an extra dose or lesser amount) of prescription drug AND [2] NO symptoms (Exception: a double dose of antibiotics)  • Negative: Diabetes drug error or overdose (e.g., insulin or extra dose)  • Negative: [1] Request for URGENT new prescription or refill of \"essential\" medication (i.e., likelihood of harm to patient if not taken) AND [2] triager unable to fill per unit policy  • Negative: [1] Prescription not at pharmacy AND [2] was prescribed today by PCP  • Negative: Pharmacy calling with prescription questions and triager unable to answer question  • Negative: Caller has URGENT medication question about med that PCP prescribed and triager unable to answer question  • Negative: Caller has NON-URGENT medication question about med that PCP prescribed and triager unable to answer question  • Negative: Caller requesting a NON-URGENT " "new prescription or refill and triager unable to refill per unit policy  • Negative: Caller has medication question about med not prescribed by PCP and triager unable to answer question (e.g., compatibility with other med, storage)  • Negative: [1] DOUBLE DOSE (an extra dose or lesser amount) of over-the-counter (OTC) drug AND [2] NO symptoms  • Negative: [1] DOUBLE DOSE (an extra dose or lesser amount) of antibiotic drug AND [2] NO symptoms  • Negative: Caller has medication question, adult has minor symptoms, caller declines triage, and triager answers question  • Negative: Caller requesting information about medication during pregnancy; adult is not ill and triager answers question  • Negative: Caller requesting information about medication use with breastfeeding; neither adult nor infant is ill, and triager answers question  • Negative: Caller requesting a refill, no triage required, and triager able to refill per unit policy  • Negative: Pharmacy calling with prescription question and triager answers question    Answer Assessment - Initial Assessment Questions  1. SYMPTOMS: \"Do you have any symptoms?\"      Denies symptoms at this time  2. SEVERITY: If symptoms are present, ask \"Are they mild, moderate or severe?\"      denies    Protocols used: MEDICATION QUESTION CALL-ADULT-      "

## 2019-05-16 ENCOUNTER — READMISSION MANAGEMENT (OUTPATIENT)
Dept: CALL CENTER | Facility: HOSPITAL | Age: 74
End: 2019-05-16

## 2019-05-16 NOTE — OUTREACH NOTE
COPD/PN Week 1 Survey      Responses   Facility patient discharged from?  [place]   Does the patient have one of the following disease processes/diagnoses(primary or secondary)?  COPD/Pneumonia   Is there a successful TCM telephone encounter documented?  No   Was the primary reason for admission:  COPD exacerbation   Week 1 attempt successful?  No   Unsuccessful attempts  Attempt 1          Raquel Burch RN

## 2019-05-17 ENCOUNTER — READMISSION MANAGEMENT (OUTPATIENT)
Dept: CALL CENTER | Facility: HOSPITAL | Age: 74
End: 2019-05-17

## 2019-05-17 NOTE — OUTREACH NOTE
COPD/PN Week 1 Survey      Responses   Facility patient discharged from?  [place]   Does the patient have one of the following disease processes/diagnoses(primary or secondary)?  COPD/Pneumonia   Is there a successful TCM telephone encounter documented?  No   Was the primary reason for admission:  Pneumonia   Week 1 attempt successful?  No   Unsuccessful attempts  Attempt 2          Florentino Chong RN

## 2019-05-22 ENCOUNTER — READMISSION MANAGEMENT (OUTPATIENT)
Dept: CALL CENTER | Facility: HOSPITAL | Age: 74
End: 2019-05-22

## 2019-05-22 NOTE — OUTREACH NOTE
COPD/PN Week 2 Survey      Responses   Facility patient discharged from?  [place]   Does the patient have one of the following disease processes/diagnoses(primary or secondary)?  COPD/Pneumonia   Was the primary reason for admission:  Pneumonia   Week 2 attempt successful?  No   Unsuccessful attempts  Attempt 1          Adiran Lenz RN

## 2019-05-23 ENCOUNTER — READMISSION MANAGEMENT (OUTPATIENT)
Dept: CALL CENTER | Facility: HOSPITAL | Age: 74
End: 2019-05-23

## 2019-05-23 NOTE — OUTREACH NOTE
COPD/PN Week 2 Survey      Responses   Facility patient discharged from?  [place]   Does the patient have one of the following disease processes/diagnoses(primary or secondary)?  COPD/Pneumonia   Was the primary reason for admission:  COPD exacerbation   Week 2 attempt successful?  Yes   Call start time  1449   Rescheduled  Rescheduled-pt requested   Call end time  1449          Analia Schulz RN

## 2019-05-24 ENCOUNTER — READMISSION MANAGEMENT (OUTPATIENT)
Dept: CALL CENTER | Facility: HOSPITAL | Age: 74
End: 2019-05-24

## 2019-05-24 NOTE — OUTREACH NOTE
COPD/PN Week 2 Survey      Responses   Facility patient discharged from?  [place]   Does the patient have one of the following disease processes/diagnoses(primary or secondary)?  COPD/Pneumonia   Was the primary reason for admission:  COPD exacerbation   Week 2 attempt successful?  No   Rescheduled  Revoked [Request to reschedule and no answer]          Florentino Chong RN

## 2019-06-10 ENCOUNTER — HOSPITAL ENCOUNTER (OUTPATIENT)
Dept: PULMONOLOGY | Facility: HOSPITAL | Age: 74
Discharge: HOME | End: 2019-06-10
Admitting: FAMILY MEDICINE

## 2019-06-10 ENCOUNTER — HOSPITAL ENCOUNTER (OUTPATIENT)
Dept: GENERAL RADIOLOGY | Age: 74
Discharge: HOME | End: 2019-06-10
Payer: MEDICARE

## 2019-06-10 ENCOUNTER — TRANSCRIBE ORDERS (OUTPATIENT)
Dept: LAB | Facility: HOSPITAL | Age: 74
End: 2019-06-10

## 2019-06-10 DIAGNOSIS — R05.9 COUGH: ICD-10-CM

## 2019-06-10 DIAGNOSIS — J42 CHRONIC BRONCHITIS, UNSPECIFIED CHRONIC BRONCHITIS TYPE (HCC): Primary | ICD-10-CM

## 2019-06-10 LAB
ARTERIAL PATENCY WRIST A: POSITIVE
ATMOSPHERIC PRESS: 754 MMHG
BASE EXCESS BLDA CALC-SCNC: 6.7 MMOL/L (ref 0–2)
BDY SITE: ABNORMAL
BODY TEMPERATURE: 37 C
HCO3 BLDA-SCNC: 32.9 MMOL/L (ref 20–26)
Lab: ABNORMAL
MODALITY: ABNORMAL
PCO2 BLDA: 52.3 MM HG (ref 35–45)
PH BLDA: 7.41 PH UNITS (ref 7.35–7.45)
PO2 BLDA: 56.1 MM HG (ref 83–108)
SAO2 % BLDCOA: 89.1 % (ref 94–99)
VENTILATOR MODE: ABNORMAL

## 2019-06-10 PROCEDURE — 71046 X-RAY EXAM CHEST 2 VIEWS: CPT

## 2019-06-10 PROCEDURE — 36600 WITHDRAWAL OF ARTERIAL BLOOD: CPT

## 2019-06-10 PROCEDURE — 82803 BLOOD GASES ANY COMBINATION: CPT

## 2019-06-24 ENCOUNTER — APPOINTMENT (OUTPATIENT)
Dept: CT IMAGING | Facility: HOSPITAL | Age: 74
End: 2019-06-24

## 2019-06-24 ENCOUNTER — HOSPITAL ENCOUNTER (INPATIENT)
Facility: HOSPITAL | Age: 74
LOS: 4 days | Discharge: HOME HEALTH | End: 2019-06-28
Attending: EMERGENCY MEDICINE | Admitting: INTERNAL MEDICINE

## 2019-06-24 ENCOUNTER — APPOINTMENT (OUTPATIENT)
Dept: ULTRASOUND IMAGING | Facility: HOSPITAL | Age: 74
End: 2019-06-24

## 2019-06-24 ENCOUNTER — APPOINTMENT (OUTPATIENT)
Dept: GENERAL RADIOLOGY | Facility: HOSPITAL | Age: 74
End: 2019-06-24

## 2019-06-24 DIAGNOSIS — J96.22 ACUTE ON CHRONIC RESPIRATORY FAILURE WITH HYPERCAPNIA (HCC): ICD-10-CM

## 2019-06-24 DIAGNOSIS — Z78.9 DECREASED ACTIVITIES OF DAILY LIVING (ADL): ICD-10-CM

## 2019-06-24 DIAGNOSIS — J96.21 ACUTE ON CHRONIC RESPIRATORY FAILURE WITH HYPOXIA AND HYPERCAPNIA (HCC): ICD-10-CM

## 2019-06-24 DIAGNOSIS — J44.1 COPD WITH ACUTE EXACERBATION (HCC): Primary | ICD-10-CM

## 2019-06-24 DIAGNOSIS — J96.22 ACUTE ON CHRONIC RESPIRATORY FAILURE WITH HYPOXIA AND HYPERCAPNIA (HCC): ICD-10-CM

## 2019-06-24 DIAGNOSIS — Z74.09 IMPAIRED FUNCTIONAL MOBILITY, BALANCE, GAIT, AND ENDURANCE: ICD-10-CM

## 2019-06-24 PROBLEM — F17.200 TOBACCO DEPENDENCE: Status: ACTIVE | Noted: 2019-06-24

## 2019-06-24 PROBLEM — R63.4 WEIGHT LOSS: Status: ACTIVE | Noted: 2019-06-24

## 2019-06-24 PROBLEM — I72.4 POPLITEAL ARTERY ANEURYSM (HCC): Status: ACTIVE | Noted: 2019-06-24

## 2019-06-24 PROBLEM — J96.02 ACUTE HYPERCAPNIC RESPIRATORY FAILURE: Status: ACTIVE | Noted: 2019-06-24

## 2019-06-24 LAB
ALBUMIN SERPL-MCNC: 4.4 G/DL (ref 3.5–5)
ALBUMIN/GLOB SERPL: 1.2 G/DL (ref 1.1–2.5)
ALP SERPL-CCNC: 63 U/L (ref 24–120)
ALT SERPL W P-5'-P-CCNC: 17 U/L (ref 0–54)
ANION GAP SERPL CALCULATED.3IONS-SCNC: ABNORMAL MMOL/L (ref 4–13)
ARTERIAL PATENCY WRIST A: ABNORMAL
ARTERIAL PATENCY WRIST A: POSITIVE
AST SERPL-CCNC: 57 U/L (ref 7–45)
ATMOSPHERIC PRESS: 746 MMHG
ATMOSPHERIC PRESS: 746 MMHG
BASE EXCESS BLDA CALC-SCNC: 18.3 MMOL/L (ref 0–2)
BASE EXCESS BLDA CALC-SCNC: 18.3 MMOL/L (ref 0–2)
BASOPHILS # BLD AUTO: 0.08 10*3/MM3 (ref 0–0.2)
BASOPHILS NFR BLD AUTO: 0.6 % (ref 0–2)
BDY SITE: ABNORMAL
BDY SITE: ABNORMAL
BILIRUB SERPL-MCNC: 0.4 MG/DL (ref 0.1–1)
BODY TEMPERATURE: 37 C
BODY TEMPERATURE: 37 C
BUN BLD-MCNC: 20 MG/DL (ref 5–21)
BUN/CREAT SERPL: 27.8 (ref 7–25)
CALCIUM SPEC-SCNC: 9.2 MG/DL (ref 8.4–10.4)
CHLORIDE SERPL-SCNC: 86 MMOL/L (ref 98–110)
CO2 SERPL-SCNC: >40 MMOL/L (ref 24–31)
CREAT BLD-MCNC: 0.72 MG/DL (ref 0.5–1.4)
D-LACTATE SERPL-SCNC: 1.6 MMOL/L (ref 0.5–2)
DEPRECATED RDW RBC AUTO: 49.7 FL (ref 40–54)
EOSINOPHIL # BLD AUTO: 0.78 10*3/MM3 (ref 0–0.7)
EOSINOPHIL NFR BLD AUTO: 6.1 % (ref 0–4)
EPAP: 6
ERYTHROCYTE [DISTWIDTH] IN BLOOD BY AUTOMATED COUNT: 13.7 % (ref 12–15)
GAS FLOW AIRWAY: 2.5 LPM
GFR SERPL CREATININE-BSD FRML MDRD: 106 ML/MIN/1.73
GLOBULIN UR ELPH-MCNC: 3.7 GM/DL
GLUCOSE BLD-MCNC: 147 MG/DL (ref 70–100)
HCO3 BLDA-SCNC: 47.5 MMOL/L (ref 20–26)
HCO3 BLDA-SCNC: 48.8 MMOL/L (ref 20–26)
HCT VFR BLD AUTO: 44.5 % (ref 40–52)
HGB BLD-MCNC: 12.9 G/DL (ref 14–18)
HOLD SPECIMEN: NORMAL
HOROWITZ INDEX BLD+IHG-RTO: 36 %
IPAP: 16
LYMPHOCYTES # BLD AUTO: 1.27 10*3/MM3 (ref 0.72–4.86)
LYMPHOCYTES NFR BLD AUTO: 9.9 % (ref 15–45)
Lab: ABNORMAL
MCH RBC QN AUTO: 28.5 PG (ref 28–32)
MCHC RBC AUTO-ENTMCNC: 29 G/DL (ref 33–36)
MCV RBC AUTO: 98.5 FL (ref 82–95)
MODALITY: ABNORMAL
MODALITY: ABNORMAL
MONOCYTES # BLD AUTO: 1.58 10*3/MM3 (ref 0.19–1.3)
MONOCYTES NFR BLD AUTO: 12.3 % (ref 4–12)
NEUTROPHILS # BLD AUTO: 9.06 10*3/MM3 (ref 1.87–8.4)
NEUTROPHILS NFR BLD AUTO: 70.7 % (ref 39–78)
NOTIFIED BY: ABNORMAL
NOTIFIED BY: ABNORMAL
NOTIFIED WHO: ABNORMAL
NOTIFIED WHO: ABNORMAL
NT-PROBNP SERPL-MCNC: 178 PG/ML (ref 0–900)
PCO2 BLDA: 79 MM HG (ref 35–45)
PCO2 BLDA: 90.7 MM HG (ref 35–45)
PH BLDA: 7.34 PH UNITS (ref 7.35–7.45)
PH BLDA: 7.39 PH UNITS (ref 7.35–7.45)
PLATELET # BLD AUTO: 163 10*3/MM3 (ref 130–400)
PMV BLD AUTO: 14.2 FL (ref 6–12)
PO2 BLDA: 68.9 MM HG (ref 83–108)
PO2 BLDA: 74.5 MM HG (ref 83–108)
POTASSIUM BLD-SCNC: 4.9 MMOL/L (ref 3.5–5.3)
PROT SERPL-MCNC: 8.1 G/DL (ref 6.3–8.7)
RBC # BLD AUTO: 4.52 10*6/MM3 (ref 4.8–5.9)
SAO2 % BLDCOA: 94.5 % (ref 94–99)
SAO2 % BLDCOA: 95 % (ref 94–99)
SET MECH RESP RATE: 18
SODIUM BLD-SCNC: 143 MMOL/L (ref 135–145)
TROPONIN I SERPL-MCNC: <0.012 NG/ML (ref 0–0.03)
TROPONIN I SERPL-MCNC: <0.012 NG/ML (ref 0–0.03)
VENTILATOR MODE: ABNORMAL
VENTILATOR MODE: ABNORMAL
WBC NRBC COR # BLD: 12.82 10*3/MM3 (ref 4.8–10.8)
WHOLE BLOOD HOLD SPECIMEN: NORMAL
WHOLE BLOOD HOLD SPECIMEN: NORMAL

## 2019-06-24 PROCEDURE — 94799 UNLISTED PULMONARY SVC/PX: CPT

## 2019-06-24 PROCEDURE — 25010000002 METHYLPREDNISOLONE PER 125 MG: Performed by: PHYSICIAN ASSISTANT

## 2019-06-24 PROCEDURE — 93970 EXTREMITY STUDY: CPT | Performed by: SURGERY

## 2019-06-24 PROCEDURE — 25010000002 PIPERACILLIN SOD-TAZOBACTAM PER 1 G: Performed by: PHYSICIAN ASSISTANT

## 2019-06-24 PROCEDURE — 87040 BLOOD CULTURE FOR BACTERIA: CPT | Performed by: PHYSICIAN ASSISTANT

## 2019-06-24 PROCEDURE — 82803 BLOOD GASES ANY COMBINATION: CPT

## 2019-06-24 PROCEDURE — 36600 WITHDRAWAL OF ARTERIAL BLOOD: CPT

## 2019-06-24 PROCEDURE — 93005 ELECTROCARDIOGRAM TRACING: CPT | Performed by: PHYSICIAN ASSISTANT

## 2019-06-24 PROCEDURE — 93010 ELECTROCARDIOGRAM REPORT: CPT | Performed by: INTERNAL MEDICINE

## 2019-06-24 PROCEDURE — 25010000002 AZITHROMYCIN PER 500 MG: Performed by: INTERNAL MEDICINE

## 2019-06-24 PROCEDURE — 83605 ASSAY OF LACTIC ACID: CPT | Performed by: PHYSICIAN ASSISTANT

## 2019-06-24 PROCEDURE — 83880 ASSAY OF NATRIURETIC PEPTIDE: CPT | Performed by: PHYSICIAN ASSISTANT

## 2019-06-24 PROCEDURE — 94660 CPAP INITIATION&MGMT: CPT

## 2019-06-24 PROCEDURE — 71275 CT ANGIOGRAPHY CHEST: CPT

## 2019-06-24 PROCEDURE — 71045 X-RAY EXAM CHEST 1 VIEW: CPT

## 2019-06-24 PROCEDURE — 25010000002 CEFTRIAXONE PER 250 MG: Performed by: INTERNAL MEDICINE

## 2019-06-24 PROCEDURE — 0 IOPAMIDOL PER 1 ML: Performed by: PHYSICIAN ASSISTANT

## 2019-06-24 PROCEDURE — 85025 COMPLETE CBC W/AUTO DIFF WBC: CPT | Performed by: PHYSICIAN ASSISTANT

## 2019-06-24 PROCEDURE — 93970 EXTREMITY STUDY: CPT

## 2019-06-24 PROCEDURE — 80053 COMPREHEN METABOLIC PANEL: CPT | Performed by: PHYSICIAN ASSISTANT

## 2019-06-24 PROCEDURE — 25010000002 ENOXAPARIN PER 10 MG: Performed by: INTERNAL MEDICINE

## 2019-06-24 PROCEDURE — 87070 CULTURE OTHR SPECIMN AEROBIC: CPT | Performed by: INTERNAL MEDICINE

## 2019-06-24 PROCEDURE — 84484 ASSAY OF TROPONIN QUANT: CPT | Performed by: PHYSICIAN ASSISTANT

## 2019-06-24 PROCEDURE — 87205 SMEAR GRAM STAIN: CPT | Performed by: INTERNAL MEDICINE

## 2019-06-24 PROCEDURE — 25010000002 METHYLPREDNISOLONE PER 125 MG: Performed by: INTERNAL MEDICINE

## 2019-06-24 PROCEDURE — 94640 AIRWAY INHALATION TREATMENT: CPT

## 2019-06-24 PROCEDURE — 93005 ELECTROCARDIOGRAM TRACING: CPT | Performed by: EMERGENCY MEDICINE

## 2019-06-24 PROCEDURE — 99285 EMERGENCY DEPT VISIT HI MDM: CPT

## 2019-06-24 RX ORDER — SACCHAROMYCES BOULARDII 250 MG
250 CAPSULE ORAL 2 TIMES DAILY
COMMUNITY
End: 2019-11-13 | Stop reason: ALTCHOICE

## 2019-06-24 RX ORDER — IPRATROPIUM BROMIDE AND ALBUTEROL SULFATE 2.5; .5 MG/3ML; MG/3ML
3 SOLUTION RESPIRATORY (INHALATION) ONCE
Status: COMPLETED | OUTPATIENT
Start: 2019-06-24 | End: 2019-06-24

## 2019-06-24 RX ORDER — ONDANSETRON 2 MG/ML
4 INJECTION INTRAMUSCULAR; INTRAVENOUS EVERY 6 HOURS PRN
Status: DISCONTINUED | OUTPATIENT
Start: 2019-06-24 | End: 2019-06-28 | Stop reason: HOSPADM

## 2019-06-24 RX ORDER — GUAIFENESIN 600 MG/1
1200 TABLET, EXTENDED RELEASE ORAL EVERY 12 HOURS SCHEDULED
Status: DISCONTINUED | OUTPATIENT
Start: 2019-06-24 | End: 2019-06-28 | Stop reason: HOSPADM

## 2019-06-24 RX ORDER — SODIUM CHLORIDE, SODIUM LACTATE, POTASSIUM CHLORIDE, CALCIUM CHLORIDE 600; 310; 30; 20 MG/100ML; MG/100ML; MG/100ML; MG/100ML
20 INJECTION, SOLUTION INTRAVENOUS CONTINUOUS
Status: DISCONTINUED | OUTPATIENT
Start: 2019-06-24 | End: 2019-06-26

## 2019-06-24 RX ORDER — PANTOPRAZOLE SODIUM 40 MG/1
40 TABLET, DELAYED RELEASE ORAL EVERY MORNING
Status: DISCONTINUED | OUTPATIENT
Start: 2019-06-25 | End: 2019-06-28 | Stop reason: HOSPADM

## 2019-06-24 RX ORDER — IPRATROPIUM BROMIDE AND ALBUTEROL SULFATE 2.5; .5 MG/3ML; MG/3ML
3 SOLUTION RESPIRATORY (INHALATION)
Status: DISCONTINUED | OUTPATIENT
Start: 2019-06-24 | End: 2019-06-27

## 2019-06-24 RX ORDER — SODIUM CHLORIDE 0.9 % (FLUSH) 0.9 %
10 SYRINGE (ML) INJECTION AS NEEDED
Status: DISCONTINUED | OUTPATIENT
Start: 2019-06-24 | End: 2019-06-28 | Stop reason: HOSPADM

## 2019-06-24 RX ORDER — METHYLPREDNISOLONE SODIUM SUCCINATE 125 MG/2ML
125 INJECTION, POWDER, LYOPHILIZED, FOR SOLUTION INTRAMUSCULAR; INTRAVENOUS ONCE
Status: COMPLETED | OUTPATIENT
Start: 2019-06-24 | End: 2019-06-24

## 2019-06-24 RX ORDER — FERROUS SULFATE 325(65) MG
325 TABLET ORAL
Status: DISCONTINUED | OUTPATIENT
Start: 2019-06-25 | End: 2019-06-28 | Stop reason: HOSPADM

## 2019-06-24 RX ORDER — SODIUM CHLORIDE 0.9 % (FLUSH) 0.9 %
3-10 SYRINGE (ML) INJECTION AS NEEDED
Status: DISCONTINUED | OUTPATIENT
Start: 2019-06-24 | End: 2019-06-28 | Stop reason: HOSPADM

## 2019-06-24 RX ORDER — OMEPRAZOLE 20 MG/1
20 CAPSULE, DELAYED RELEASE ORAL DAILY
COMMUNITY

## 2019-06-24 RX ORDER — METHYLPREDNISOLONE SODIUM SUCCINATE 125 MG/2ML
80 INJECTION, POWDER, LYOPHILIZED, FOR SOLUTION INTRAMUSCULAR; INTRAVENOUS EVERY 8 HOURS
Status: DISCONTINUED | OUTPATIENT
Start: 2019-06-24 | End: 2019-06-26

## 2019-06-24 RX ORDER — ACETAMINOPHEN 325 MG/1
650 TABLET ORAL EVERY 4 HOURS PRN
Status: DISCONTINUED | OUTPATIENT
Start: 2019-06-24 | End: 2019-06-28 | Stop reason: HOSPADM

## 2019-06-24 RX ORDER — SACCHAROMYCES BOULARDII 250 MG
250 CAPSULE ORAL DAILY
Status: DISCONTINUED | OUTPATIENT
Start: 2019-06-24 | End: 2019-06-28 | Stop reason: HOSPADM

## 2019-06-24 RX ORDER — SODIUM CHLORIDE 0.9 % (FLUSH) 0.9 %
3 SYRINGE (ML) INJECTION EVERY 12 HOURS SCHEDULED
Status: DISCONTINUED | OUTPATIENT
Start: 2019-06-24 | End: 2019-06-28 | Stop reason: HOSPADM

## 2019-06-24 RX ADMIN — METHYLPREDNISOLONE SODIUM SUCCINATE 80 MG: 125 INJECTION, POWDER, FOR SOLUTION INTRAMUSCULAR; INTRAVENOUS at 21:12

## 2019-06-24 RX ADMIN — METHYLPREDNISOLONE SODIUM SUCCINATE 125 MG: 125 INJECTION, POWDER, FOR SOLUTION INTRAMUSCULAR; INTRAVENOUS at 12:43

## 2019-06-24 RX ADMIN — GUAIFENESIN 1200 MG: 600 TABLET, EXTENDED RELEASE ORAL at 21:12

## 2019-06-24 RX ADMIN — IPRATROPIUM BROMIDE AND ALBUTEROL SULFATE 3 ML: 2.5; .5 SOLUTION RESPIRATORY (INHALATION) at 19:35

## 2019-06-24 RX ADMIN — SODIUM CHLORIDE, PRESERVATIVE FREE 3 ML: 5 INJECTION INTRAVENOUS at 21:18

## 2019-06-24 RX ADMIN — Medication 250 MG: at 21:14

## 2019-06-24 RX ADMIN — PIPERACILLIN SODIUM AND TAZOBACTAM SODIUM 3.38 G: 3; .375 INJECTION, POWDER, LYOPHILIZED, FOR SOLUTION INTRAVENOUS at 12:45

## 2019-06-24 RX ADMIN — CEFTRIAXONE SODIUM 1 G: 1 INJECTION, POWDER, FOR SOLUTION INTRAMUSCULAR; INTRAVENOUS at 21:14

## 2019-06-24 RX ADMIN — ENOXAPARIN SODIUM 40 MG: 40 INJECTION SUBCUTANEOUS at 21:13

## 2019-06-24 RX ADMIN — SODIUM CHLORIDE, POTASSIUM CHLORIDE, SODIUM LACTATE AND CALCIUM CHLORIDE 75 ML/HR: 600; 310; 30; 20 INJECTION, SOLUTION INTRAVENOUS at 21:09

## 2019-06-24 RX ADMIN — IPRATROPIUM BROMIDE AND ALBUTEROL SULFATE 3 ML: 2.5; .5 SOLUTION RESPIRATORY (INHALATION) at 12:46

## 2019-06-24 RX ADMIN — IOPAMIDOL 100 ML: 755 INJECTION, SOLUTION INTRAVENOUS at 14:57

## 2019-06-24 RX ADMIN — IPRATROPIUM BROMIDE AND ALBUTEROL SULFATE 3 ML: 2.5; .5 SOLUTION RESPIRATORY (INHALATION) at 23:35

## 2019-06-24 RX ADMIN — AZITHROMYCIN MONOHYDRATE 500 MG: 500 INJECTION, POWDER, LYOPHILIZED, FOR SOLUTION INTRAVENOUS at 21:12

## 2019-06-25 PROBLEM — Z86.19 HISTORY OF CLOSTRIDIUM DIFFICILE COLITIS: Status: ACTIVE | Noted: 2019-06-25

## 2019-06-25 LAB
ANION GAP SERPL CALCULATED.3IONS-SCNC: ABNORMAL MMOL/L (ref 4–13)
ARTERIAL PATENCY WRIST A: POSITIVE
ATMOSPHERIC PRESS: 749 MMHG
BASE EXCESS BLDA CALC-SCNC: 17.9 MMOL/L (ref 0–2)
BDY SITE: ABNORMAL
BODY TEMPERATURE: 37 C
BUN BLD-MCNC: 19 MG/DL (ref 5–21)
BUN/CREAT SERPL: 26 (ref 7–25)
CALCIUM SPEC-SCNC: 9 MG/DL (ref 8.4–10.4)
CHLORIDE SERPL-SCNC: 89 MMOL/L (ref 98–110)
CO2 SERPL-SCNC: >40 MMOL/L (ref 24–31)
CREAT BLD-MCNC: 0.73 MG/DL (ref 0.5–1.4)
DEPRECATED RDW RBC AUTO: 47.5 FL (ref 40–54)
ERYTHROCYTE [DISTWIDTH] IN BLOOD BY AUTOMATED COUNT: 14.1 % (ref 12–15)
GAS FLOW AIRWAY: 3 LPM
GFR SERPL CREATININE-BSD FRML MDRD: 105 ML/MIN/1.73
GLUCOSE BLD-MCNC: 129 MG/DL (ref 70–100)
HCO3 BLDA-SCNC: 44.3 MMOL/L (ref 20–26)
HCT VFR BLD AUTO: 36.6 % (ref 40–52)
HGB BLD-MCNC: 11.4 G/DL (ref 14–18)
Lab: ABNORMAL
Lab: ABNORMAL
MCH RBC QN AUTO: 29.1 PG (ref 28–32)
MCHC RBC AUTO-ENTMCNC: 31.1 G/DL (ref 33–36)
MCV RBC AUTO: 93.4 FL (ref 82–95)
MODALITY: ABNORMAL
NOTIFIED BY: ABNORMAL
NOTIFIED WHO: ABNORMAL
PCO2 BLDA: 59.8 MM HG (ref 35–45)
PH BLDA: 7.48 PH UNITS (ref 7.35–7.45)
PLATELET # BLD AUTO: 131 10*3/MM3 (ref 130–400)
PMV BLD AUTO: 14 FL (ref 6–12)
PO2 BLDA: 47.4 MM HG (ref 83–108)
POTASSIUM BLD-SCNC: 4 MMOL/L (ref 3.5–5.3)
RBC # BLD AUTO: 3.92 10*6/MM3 (ref 4.8–5.9)
SAO2 % BLDCOA: 86.4 % (ref 94–99)
SODIUM BLD-SCNC: 140 MMOL/L (ref 135–145)
VENTILATOR MODE: ABNORMAL
WBC NRBC COR # BLD: 7.2 10*3/MM3 (ref 4.8–10.8)

## 2019-06-25 PROCEDURE — 94799 UNLISTED PULMONARY SVC/PX: CPT

## 2019-06-25 PROCEDURE — 85027 COMPLETE CBC AUTOMATED: CPT | Performed by: INTERNAL MEDICINE

## 2019-06-25 PROCEDURE — 25010000002 AZITHROMYCIN PER 500 MG: Performed by: INTERNAL MEDICINE

## 2019-06-25 PROCEDURE — 94640 AIRWAY INHALATION TREATMENT: CPT

## 2019-06-25 PROCEDURE — 97162 PT EVAL MOD COMPLEX 30 MIN: CPT

## 2019-06-25 PROCEDURE — 36600 WITHDRAWAL OF ARTERIAL BLOOD: CPT

## 2019-06-25 PROCEDURE — 94760 N-INVAS EAR/PLS OXIMETRY 1: CPT

## 2019-06-25 PROCEDURE — 25010000002 CEFTRIAXONE PER 250 MG: Performed by: INTERNAL MEDICINE

## 2019-06-25 PROCEDURE — 25010000002 ENOXAPARIN PER 10 MG: Performed by: INTERNAL MEDICINE

## 2019-06-25 PROCEDURE — 25010000002 METHYLPREDNISOLONE PER 125 MG: Performed by: INTERNAL MEDICINE

## 2019-06-25 PROCEDURE — 82803 BLOOD GASES ANY COMBINATION: CPT

## 2019-06-25 PROCEDURE — 99223 1ST HOSP IP/OBS HIGH 75: CPT | Performed by: INTERNAL MEDICINE

## 2019-06-25 PROCEDURE — 97165 OT EVAL LOW COMPLEX 30 MIN: CPT | Performed by: OCCUPATIONAL THERAPIST

## 2019-06-25 PROCEDURE — 80048 BASIC METABOLIC PNL TOTAL CA: CPT | Performed by: INTERNAL MEDICINE

## 2019-06-25 RX ORDER — BUDESONIDE AND FORMOTEROL FUMARATE DIHYDRATE 160; 4.5 UG/1; UG/1
2 AEROSOL RESPIRATORY (INHALATION)
Status: DISCONTINUED | OUTPATIENT
Start: 2019-06-25 | End: 2019-06-28 | Stop reason: HOSPADM

## 2019-06-25 RX ADMIN — IPRATROPIUM BROMIDE AND ALBUTEROL SULFATE 3 ML: 2.5; .5 SOLUTION RESPIRATORY (INHALATION) at 02:40

## 2019-06-25 RX ADMIN — METHYLPREDNISOLONE SODIUM SUCCINATE 80 MG: 125 INJECTION, POWDER, FOR SOLUTION INTRAMUSCULAR; INTRAVENOUS at 21:28

## 2019-06-25 RX ADMIN — BUDESONIDE AND FORMOTEROL FUMARATE DIHYDRATE 2 PUFF: 160; 4.5 AEROSOL RESPIRATORY (INHALATION) at 18:25

## 2019-06-25 RX ADMIN — FERROUS SULFATE TAB 325 MG (65 MG ELEMENTAL FE) 325 MG: 325 (65 FE) TAB at 08:28

## 2019-06-25 RX ADMIN — IPRATROPIUM BROMIDE AND ALBUTEROL SULFATE 3 ML: 2.5; .5 SOLUTION RESPIRATORY (INHALATION) at 18:25

## 2019-06-25 RX ADMIN — IPRATROPIUM BROMIDE AND ALBUTEROL SULFATE 3 ML: 2.5; .5 SOLUTION RESPIRATORY (INHALATION) at 10:36

## 2019-06-25 RX ADMIN — IPRATROPIUM BROMIDE AND ALBUTEROL SULFATE 3 ML: 2.5; .5 SOLUTION RESPIRATORY (INHALATION) at 14:20

## 2019-06-25 RX ADMIN — SODIUM CHLORIDE, POTASSIUM CHLORIDE, SODIUM LACTATE AND CALCIUM CHLORIDE 20 ML/HR: 600; 310; 30; 20 INJECTION, SOLUTION INTRAVENOUS at 18:20

## 2019-06-25 RX ADMIN — PANTOPRAZOLE SODIUM 40 MG: 40 TABLET, DELAYED RELEASE ORAL at 06:11

## 2019-06-25 RX ADMIN — AZITHROMYCIN MONOHYDRATE 500 MG: 500 INJECTION, POWDER, LYOPHILIZED, FOR SOLUTION INTRAVENOUS at 18:20

## 2019-06-25 RX ADMIN — ENOXAPARIN SODIUM 40 MG: 40 INJECTION SUBCUTANEOUS at 18:23

## 2019-06-25 RX ADMIN — METHYLPREDNISOLONE SODIUM SUCCINATE 80 MG: 125 INJECTION, POWDER, FOR SOLUTION INTRAMUSCULAR; INTRAVENOUS at 13:40

## 2019-06-25 RX ADMIN — SODIUM CHLORIDE, PRESERVATIVE FREE 3 ML: 5 INJECTION INTRAVENOUS at 21:29

## 2019-06-25 RX ADMIN — BUDESONIDE AND FORMOTEROL FUMARATE DIHYDRATE 2 PUFF: 160; 4.5 AEROSOL RESPIRATORY (INHALATION) at 09:26

## 2019-06-25 RX ADMIN — METHYLPREDNISOLONE SODIUM SUCCINATE 80 MG: 125 INJECTION, POWDER, FOR SOLUTION INTRAMUSCULAR; INTRAVENOUS at 06:11

## 2019-06-25 RX ADMIN — Medication 250 MG: at 08:28

## 2019-06-25 RX ADMIN — CEFTRIAXONE SODIUM 1 G: 1 INJECTION, POWDER, FOR SOLUTION INTRAMUSCULAR; INTRAVENOUS at 08:26

## 2019-06-25 RX ADMIN — GUAIFENESIN 1200 MG: 600 TABLET, EXTENDED RELEASE ORAL at 21:28

## 2019-06-25 RX ADMIN — GUAIFENESIN 1200 MG: 600 TABLET, EXTENDED RELEASE ORAL at 08:28

## 2019-06-25 RX ADMIN — IPRATROPIUM BROMIDE AND ALBUTEROL SULFATE 3 ML: 2.5; .5 SOLUTION RESPIRATORY (INHALATION) at 07:11

## 2019-06-26 PROBLEM — E44.0 MODERATE MALNUTRITION: Status: ACTIVE | Noted: 2019-06-26

## 2019-06-26 LAB
ANION GAP SERPL CALCULATED.3IONS-SCNC: 8 MMOL/L (ref 4–13)
BACTERIA SPEC RESP CULT: ABNORMAL
BACTERIA SPEC RESP CULT: ABNORMAL
BUN BLD-MCNC: 24 MG/DL (ref 5–21)
BUN/CREAT SERPL: 29.3 (ref 7–25)
CALCIUM SPEC-SCNC: 8.8 MG/DL (ref 8.4–10.4)
CHLORIDE SERPL-SCNC: 93 MMOL/L (ref 98–110)
CO2 SERPL-SCNC: 39 MMOL/L (ref 24–31)
CREAT BLD-MCNC: 0.82 MG/DL (ref 0.5–1.4)
GFR SERPL CREATININE-BSD FRML MDRD: 92 ML/MIN/1.73
GLUCOSE BLD-MCNC: 122 MG/DL (ref 70–100)
GRAM STN SPEC: ABNORMAL
POTASSIUM BLD-SCNC: 3.8 MMOL/L (ref 3.5–5.3)
SODIUM BLD-SCNC: 140 MMOL/L (ref 135–145)

## 2019-06-26 PROCEDURE — 97116 GAIT TRAINING THERAPY: CPT

## 2019-06-26 PROCEDURE — 80048 BASIC METABOLIC PNL TOTAL CA: CPT | Performed by: INTERNAL MEDICINE

## 2019-06-26 PROCEDURE — 94760 N-INVAS EAR/PLS OXIMETRY 1: CPT

## 2019-06-26 PROCEDURE — 97110 THERAPEUTIC EXERCISES: CPT

## 2019-06-26 PROCEDURE — 25010000002 CEFTRIAXONE PER 250 MG: Performed by: INTERNAL MEDICINE

## 2019-06-26 PROCEDURE — 25010000002 AZITHROMYCIN PER 500 MG: Performed by: NURSE PRACTITIONER

## 2019-06-26 PROCEDURE — 25010000002 METHYLPREDNISOLONE PER 125 MG: Performed by: INTERNAL MEDICINE

## 2019-06-26 PROCEDURE — 25010000002 ENOXAPARIN PER 10 MG: Performed by: INTERNAL MEDICINE

## 2019-06-26 PROCEDURE — 94799 UNLISTED PULMONARY SVC/PX: CPT

## 2019-06-26 PROCEDURE — 97535 SELF CARE MNGMENT TRAINING: CPT | Performed by: OCCUPATIONAL THERAPIST

## 2019-06-26 PROCEDURE — 25010000002 METHYLPREDNISOLONE PER 125 MG: Performed by: NURSE PRACTITIONER

## 2019-06-26 PROCEDURE — 99232 SBSQ HOSP IP/OBS MODERATE 35: CPT | Performed by: INTERNAL MEDICINE

## 2019-06-26 RX ORDER — METHYLPREDNISOLONE SODIUM SUCCINATE 40 MG/ML
40 INJECTION, POWDER, LYOPHILIZED, FOR SOLUTION INTRAMUSCULAR; INTRAVENOUS EVERY 8 HOURS
Status: DISCONTINUED | OUTPATIENT
Start: 2019-06-26 | End: 2019-06-27

## 2019-06-26 RX ORDER — CEFDINIR 300 MG/1
300 CAPSULE ORAL EVERY 12 HOURS SCHEDULED
Status: DISCONTINUED | OUTPATIENT
Start: 2019-06-27 | End: 2019-06-28 | Stop reason: HOSPADM

## 2019-06-26 RX ADMIN — PANTOPRAZOLE SODIUM 40 MG: 40 TABLET, DELAYED RELEASE ORAL at 06:06

## 2019-06-26 RX ADMIN — Medication 250 MG: at 09:21

## 2019-06-26 RX ADMIN — METHYLPREDNISOLONE SODIUM SUCCINATE 40 MG: 125 INJECTION, POWDER, FOR SOLUTION INTRAMUSCULAR; INTRAVENOUS at 20:20

## 2019-06-26 RX ADMIN — BUDESONIDE AND FORMOTEROL FUMARATE DIHYDRATE 2 PUFF: 160; 4.5 AEROSOL RESPIRATORY (INHALATION) at 18:49

## 2019-06-26 RX ADMIN — GUAIFENESIN 1200 MG: 600 TABLET, EXTENDED RELEASE ORAL at 09:21

## 2019-06-26 RX ADMIN — CEFTRIAXONE SODIUM 1 G: 1 INJECTION, POWDER, FOR SOLUTION INTRAMUSCULAR; INTRAVENOUS at 09:21

## 2019-06-26 RX ADMIN — IPRATROPIUM BROMIDE AND ALBUTEROL SULFATE 3 ML: 2.5; .5 SOLUTION RESPIRATORY (INHALATION) at 14:16

## 2019-06-26 RX ADMIN — FERROUS SULFATE TAB 325 MG (65 MG ELEMENTAL FE) 325 MG: 325 (65 FE) TAB at 09:21

## 2019-06-26 RX ADMIN — BUDESONIDE AND FORMOTEROL FUMARATE DIHYDRATE 2 PUFF: 160; 4.5 AEROSOL RESPIRATORY (INHALATION) at 06:41

## 2019-06-26 RX ADMIN — IPRATROPIUM BROMIDE AND ALBUTEROL SULFATE 3 ML: 2.5; .5 SOLUTION RESPIRATORY (INHALATION) at 10:43

## 2019-06-26 RX ADMIN — GUAIFENESIN 1200 MG: 600 TABLET, EXTENDED RELEASE ORAL at 20:19

## 2019-06-26 RX ADMIN — ENOXAPARIN SODIUM 40 MG: 40 INJECTION SUBCUTANEOUS at 17:56

## 2019-06-26 RX ADMIN — METHYLPREDNISOLONE SODIUM SUCCINATE 80 MG: 125 INJECTION, POWDER, FOR SOLUTION INTRAMUSCULAR; INTRAVENOUS at 06:05

## 2019-06-26 RX ADMIN — IPRATROPIUM BROMIDE AND ALBUTEROL SULFATE 3 ML: 2.5; .5 SOLUTION RESPIRATORY (INHALATION) at 18:49

## 2019-06-26 RX ADMIN — METHYLPREDNISOLONE SODIUM SUCCINATE 40 MG: 125 INJECTION, POWDER, FOR SOLUTION INTRAMUSCULAR; INTRAVENOUS at 13:35

## 2019-06-26 RX ADMIN — IPRATROPIUM BROMIDE AND ALBUTEROL SULFATE 3 ML: 2.5; .5 SOLUTION RESPIRATORY (INHALATION) at 06:41

## 2019-06-26 RX ADMIN — AZITHROMYCIN MONOHYDRATE 500 MG: 500 INJECTION, POWDER, LYOPHILIZED, FOR SOLUTION INTRAVENOUS at 17:56

## 2019-06-26 RX ADMIN — IPRATROPIUM BROMIDE AND ALBUTEROL SULFATE 3 ML: 2.5; .5 SOLUTION RESPIRATORY (INHALATION) at 23:51

## 2019-06-27 PROCEDURE — 94760 N-INVAS EAR/PLS OXIMETRY 1: CPT

## 2019-06-27 PROCEDURE — 94799 UNLISTED PULMONARY SVC/PX: CPT

## 2019-06-27 PROCEDURE — 99232 SBSQ HOSP IP/OBS MODERATE 35: CPT | Performed by: INTERNAL MEDICINE

## 2019-06-27 PROCEDURE — 63710000001 PREDNISONE PER 1 MG: Performed by: NURSE PRACTITIONER

## 2019-06-27 PROCEDURE — 97116 GAIT TRAINING THERAPY: CPT

## 2019-06-27 PROCEDURE — 97110 THERAPEUTIC EXERCISES: CPT

## 2019-06-27 PROCEDURE — 97535 SELF CARE MNGMENT TRAINING: CPT | Performed by: OCCUPATIONAL THERAPIST

## 2019-06-27 PROCEDURE — 94660 CPAP INITIATION&MGMT: CPT

## 2019-06-27 PROCEDURE — 25010000002 ENOXAPARIN PER 10 MG: Performed by: INTERNAL MEDICINE

## 2019-06-27 PROCEDURE — 94618 PULMONARY STRESS TESTING: CPT

## 2019-06-27 PROCEDURE — 25010000002 METHYLPREDNISOLONE PER 40 MG: Performed by: NURSE PRACTITIONER

## 2019-06-27 RX ORDER — PREDNISONE 10 MG/1
10 TABLET ORAL DAILY
Status: DISCONTINUED | OUTPATIENT
Start: 2019-07-03 | End: 2019-06-28 | Stop reason: HOSPADM

## 2019-06-27 RX ORDER — BENZONATATE 100 MG/1
200 CAPSULE ORAL 3 TIMES DAILY PRN
Status: DISCONTINUED | OUTPATIENT
Start: 2019-06-27 | End: 2019-06-28 | Stop reason: HOSPADM

## 2019-06-27 RX ORDER — IPRATROPIUM BROMIDE AND ALBUTEROL SULFATE 2.5; .5 MG/3ML; MG/3ML
3 SOLUTION RESPIRATORY (INHALATION)
Status: DISCONTINUED | OUTPATIENT
Start: 2019-06-27 | End: 2019-06-28 | Stop reason: HOSPADM

## 2019-06-27 RX ORDER — PREDNISONE 20 MG/1
20 TABLET ORAL DAILY
Status: DISCONTINUED | OUTPATIENT
Start: 2019-06-27 | End: 2019-06-28 | Stop reason: HOSPADM

## 2019-06-27 RX ORDER — PREDNISONE 1 MG/1
5 TABLET ORAL DAILY
Status: DISCONTINUED | OUTPATIENT
Start: 2019-07-06 | End: 2019-06-28 | Stop reason: HOSPADM

## 2019-06-27 RX ADMIN — FERROUS SULFATE TAB 325 MG (65 MG ELEMENTAL FE) 325 MG: 325 (65 FE) TAB at 09:53

## 2019-06-27 RX ADMIN — CEFDINIR 300 MG: 300 CAPSULE ORAL at 09:53

## 2019-06-27 RX ADMIN — METHYLPREDNISOLONE SODIUM SUCCINATE 40 MG: 125 INJECTION, POWDER, FOR SOLUTION INTRAMUSCULAR; INTRAVENOUS at 06:12

## 2019-06-27 RX ADMIN — PREDNISONE 20 MG: 20 TABLET ORAL at 10:01

## 2019-06-27 RX ADMIN — IPRATROPIUM BROMIDE AND ALBUTEROL SULFATE 3 ML: 2.5; .5 SOLUTION RESPIRATORY (INHALATION) at 06:39

## 2019-06-27 RX ADMIN — GUAIFENESIN 1200 MG: 600 TABLET, EXTENDED RELEASE ORAL at 09:53

## 2019-06-27 RX ADMIN — BUDESONIDE AND FORMOTEROL FUMARATE DIHYDRATE 2 PUFF: 160; 4.5 AEROSOL RESPIRATORY (INHALATION) at 20:07

## 2019-06-27 RX ADMIN — Medication 250 MG: at 09:53

## 2019-06-27 RX ADMIN — BUDESONIDE AND FORMOTEROL FUMARATE DIHYDRATE 2 PUFF: 160; 4.5 AEROSOL RESPIRATORY (INHALATION) at 06:39

## 2019-06-27 RX ADMIN — IPRATROPIUM BROMIDE AND ALBUTEROL SULFATE 3 ML: 2.5; .5 SOLUTION RESPIRATORY (INHALATION) at 15:19

## 2019-06-27 RX ADMIN — IPRATROPIUM BROMIDE AND ALBUTEROL SULFATE 3 ML: 2.5; .5 SOLUTION RESPIRATORY (INHALATION) at 20:07

## 2019-06-27 RX ADMIN — IPRATROPIUM BROMIDE AND ALBUTEROL SULFATE 3 ML: 2.5; .5 SOLUTION RESPIRATORY (INHALATION) at 03:39

## 2019-06-27 RX ADMIN — ENOXAPARIN SODIUM 40 MG: 40 INJECTION SUBCUTANEOUS at 17:59

## 2019-06-27 RX ADMIN — GUAIFENESIN 1200 MG: 600 TABLET, EXTENDED RELEASE ORAL at 20:05

## 2019-06-27 RX ADMIN — PANTOPRAZOLE SODIUM 40 MG: 40 TABLET, DELAYED RELEASE ORAL at 06:12

## 2019-06-27 RX ADMIN — CEFDINIR 300 MG: 300 CAPSULE ORAL at 20:05

## 2019-06-27 RX ADMIN — IPRATROPIUM BROMIDE AND ALBUTEROL SULFATE 3 ML: 2.5; .5 SOLUTION RESPIRATORY (INHALATION) at 11:54

## 2019-06-27 RX ADMIN — SODIUM CHLORIDE, PRESERVATIVE FREE 3 ML: 5 INJECTION INTRAVENOUS at 11:22

## 2019-06-28 VITALS
HEIGHT: 65 IN | TEMPERATURE: 98.3 F | HEART RATE: 89 BPM | WEIGHT: 156.5 LBS | RESPIRATION RATE: 18 BRPM | DIASTOLIC BLOOD PRESSURE: 76 MMHG | OXYGEN SATURATION: 98 % | BODY MASS INDEX: 26.08 KG/M2 | SYSTOLIC BLOOD PRESSURE: 138 MMHG

## 2019-06-28 PROCEDURE — 97116 GAIT TRAINING THERAPY: CPT

## 2019-06-28 PROCEDURE — 63710000001 PREDNISONE PER 1 MG: Performed by: NURSE PRACTITIONER

## 2019-06-28 PROCEDURE — 94760 N-INVAS EAR/PLS OXIMETRY 1: CPT

## 2019-06-28 PROCEDURE — 94799 UNLISTED PULMONARY SVC/PX: CPT

## 2019-06-28 PROCEDURE — 97110 THERAPEUTIC EXERCISES: CPT

## 2019-06-28 PROCEDURE — 97530 THERAPEUTIC ACTIVITIES: CPT

## 2019-06-28 RX ORDER — BUDESONIDE AND FORMOTEROL FUMARATE DIHYDRATE 160; 4.5 UG/1; UG/1
2 AEROSOL RESPIRATORY (INHALATION)
Qty: 10.2 G | Refills: 0 | Status: SHIPPED | OUTPATIENT
Start: 2019-06-28 | End: 2019-08-14 | Stop reason: SDUPTHER

## 2019-06-28 RX ORDER — BENZONATATE 200 MG/1
200 CAPSULE ORAL 3 TIMES DAILY PRN
Qty: 20 CAPSULE | Refills: 0 | Status: SHIPPED | OUTPATIENT
Start: 2019-06-28 | End: 2019-08-01

## 2019-06-28 RX ORDER — CETIRIZINE HYDROCHLORIDE 10 MG/1
10 TABLET, CHEWABLE ORAL DAILY
Start: 2019-06-28 | End: 2019-08-01

## 2019-06-28 RX ORDER — FLUTICASONE PROPIONATE 50 MCG
2 SPRAY, SUSPENSION (ML) NASAL DAILY
Start: 2019-06-28 | End: 2019-08-01

## 2019-06-28 RX ORDER — CEFDINIR 300 MG/1
300 CAPSULE ORAL EVERY 12 HOURS SCHEDULED
Qty: 1 CAPSULE | Refills: 0 | Status: SHIPPED | OUTPATIENT
Start: 2019-06-28 | End: 2019-06-29

## 2019-06-28 RX ORDER — PREDNISONE 10 MG/1
TABLET ORAL
Qty: 11 TABLET | Refills: 0 | Status: SHIPPED | OUTPATIENT
Start: 2019-06-29 | End: 2019-08-01

## 2019-06-28 RX ORDER — GUAIFENESIN 600 MG/1
1200 TABLET, EXTENDED RELEASE ORAL EVERY 12 HOURS SCHEDULED
Qty: 20 TABLET | Refills: 0 | Status: SHIPPED | OUTPATIENT
Start: 2019-06-28 | End: 2019-07-03

## 2019-06-28 RX ADMIN — BUDESONIDE AND FORMOTEROL FUMARATE DIHYDRATE 2 PUFF: 160; 4.5 AEROSOL RESPIRATORY (INHALATION) at 07:46

## 2019-06-28 RX ADMIN — Medication 250 MG: at 08:57

## 2019-06-28 RX ADMIN — IPRATROPIUM BROMIDE AND ALBUTEROL SULFATE 3 ML: 2.5; .5 SOLUTION RESPIRATORY (INHALATION) at 10:45

## 2019-06-28 RX ADMIN — IPRATROPIUM BROMIDE AND ALBUTEROL SULFATE 3 ML: 2.5; .5 SOLUTION RESPIRATORY (INHALATION) at 07:49

## 2019-06-28 RX ADMIN — FERROUS SULFATE TAB 325 MG (65 MG ELEMENTAL FE) 325 MG: 325 (65 FE) TAB at 08:57

## 2019-06-28 RX ADMIN — PREDNISONE 20 MG: 20 TABLET ORAL at 08:57

## 2019-06-28 RX ADMIN — CEFDINIR 300 MG: 300 CAPSULE ORAL at 08:57

## 2019-06-28 RX ADMIN — PANTOPRAZOLE SODIUM 40 MG: 40 TABLET, DELAYED RELEASE ORAL at 06:29

## 2019-06-28 RX ADMIN — GUAIFENESIN 1200 MG: 600 TABLET, EXTENDED RELEASE ORAL at 08:57

## 2019-06-29 ENCOUNTER — READMISSION MANAGEMENT (OUTPATIENT)
Dept: CALL CENTER | Facility: HOSPITAL | Age: 74
End: 2019-06-29

## 2019-06-29 LAB
BACTERIA SPEC AEROBE CULT: NORMAL
BACTERIA SPEC AEROBE CULT: NORMAL

## 2019-06-29 NOTE — OUTREACH NOTE
Prep Survey      Responses   Facility patient discharged from?  [place]   Is patient eligible?  Yes   Discharge diagnosis  COPD AE, Moderate malnutrition, Hx C. diff. colitis, acute hypercapnic resp. failure, tobacco dependence, Weight loss, Popliteal artery aneurysm, hx. colon cancer   Does the patient have one of the following disease processes/diagnoses(primary or secondary)?  COPD/Pneumonia   Does the patient have Home health ordered?  Yes   What is the Home health agency?   [facility]   Is there a DME ordered?  No   What DME was ordered?  Pt has home [facility] [place]   Comments regarding appointments  See AVS   Prep survey completed?  Yes          Anastasia Larson RN

## 2019-06-30 ENCOUNTER — NURSE TRIAGE (OUTPATIENT)
Dept: CALL CENTER | Facility: HOSPITAL | Age: 74
End: 2019-06-30

## 2019-06-30 ENCOUNTER — READMISSION MANAGEMENT (OUTPATIENT)
Dept: CALL CENTER | Facility: HOSPITAL | Age: 74
End: 2019-06-30

## 2019-06-30 NOTE — TELEPHONE ENCOUNTER
States she missed a call from [facility]. Explained was calling to check on patient after discharge. She states he is doing well. Discussed will call back tomorrow. She declined any questions or issues today. States feet/ankles still slightly swollen. Will elevate. Instructed to call back with any problems/concerns.

## 2019-06-30 NOTE — OUTREACH NOTE
COPD/PN Week 1 Survey      Responses   Facility patient discharged from?  [place]   Does the patient have one of the following disease processes/diagnoses(primary or secondary)?  COPD/Pneumonia   Was the primary reason for admission:  COPD exacerbation   Week 1 attempt successful?  No   Unsuccessful attempts  Attempt 1          Sandy Herrera RN

## 2019-07-01 ENCOUNTER — READMISSION MANAGEMENT (OUTPATIENT)
Dept: CALL CENTER | Facility: HOSPITAL | Age: 74
End: 2019-07-01

## 2019-07-01 NOTE — OUTREACH NOTE
COPD/PN Week 1 Survey      Responses   Facility patient discharged from?  [place]   Does the patient have one of the following disease processes/diagnoses(primary or secondary)?  COPD/Pneumonia   Is there a successful TCM telephone encounter documented?  No   Was the primary reason for admission:  COPD exacerbation   Week 1 attempt successful?  Yes   Call start time  1212   Call end time  1220   Discharge diagnosis  COPD AE, Moderate malnutrition, Hx C. diff. colitis, acute hypercapnic resp. failure, tobacco dependence, Weight loss, Popliteal artery aneurysm, hx. colon cancer   Is patient permission given to speak with other caregiver?  Yes   List who call center can speak with  Anastasia- Wife   Person spoke with today (if not patient) and relationship  Anastasia- Wife   Meds reviewed with patient/caregiver?  Yes   Is the patient having any side effects they believe may be caused by any medication additions or changes?  No   Does the patient have all medications ordered at discharge?  Yes   Is the patient taking all medications as directed (includes completed medication regime)?  Yes   Does the patient have a primary care provider?   Yes   Does the patient have an appointment with their PCP or pulmonologist within 7 days of discharge?  Yes   Has the patient kept scheduled appointments due by today?  N/A   What is the Home health agency?   [facility]   Has home health visited the patient within 72 hours of discharge?  Unsure   What DME was ordered?  Pt has home O2 [place]   Has all DME been delivered?  Yes   Psychosocial issues?  No   Did the patient receive a copy of their discharge instructions?  Yes   Nursing interventions  Reviewed instructions with patient   What is the patient's perception of their health status since discharge?  Improving   Nursing Interventions  Nurse provided patient education   Are the patient's immunizations up to date?   Yes   Nursing interventions  Educated on importance of maintaining up to date  immunizations as advised by provider   Is the patient/caregiver able to teach back the hierarchy of who to call/visit for symptoms/problems? PCP, Specialist, Home health nurse, Urgent Care, ED, 911  Yes   Is the patient able to teach back COPD zones?  Yes   Nursing interventions  Education provided on various zones   Patient reports what zone on this call?  Green Zone   Green Zone  Appetite is good, Sleeping well, Usual amount of phlegm/mucus without difficulty coughing up   Green Zone interventions:  Take daily medications, Continue regular exercise/diet plan, Use oxygen as prescribed, Avoid indoor/outdoor triggers   Week 1 call completed?  Yes          Raquel Burch RN

## 2019-07-09 ENCOUNTER — NURSE TRIAGE (OUTPATIENT)
Dept: CALL CENTER | Facility: HOSPITAL | Age: 74
End: 2019-07-09

## 2019-07-09 NOTE — TELEPHONE ENCOUNTER
"Wanting to know when home health will visit. Emailed Adela Washington RN CM concerning the problem    Reason for Disposition  • Question about upcoming scheduled test, no triage required and triager able to answer question    Additional Information  • Negative: [1] Caller is not with the adult (patient) AND [2] reporting urgent symptoms  • Negative: Lab result questions  • Negative: Medication questions  • Negative: Caller cannot be reached by phone  • Negative: Caller has already spoken to PCP or another triager  • Negative: RN needs further essential information from caller in order to complete triage  • Negative: Requesting regular office appointment  • Negative: [1] Caller requesting NON-URGENT health information AND [2] PCP's office is the best resource  • Negative: Health Information question, no triage required and triager able to answer question  • Negative: General information question, no triage required and triager able to answer question    Answer Assessment - Initial Assessment Questions  1. REASON FOR CALL or QUESTION: \"What is your reason for calling today?\" or \"How can I best help you?\" or \"What question do you have that I can help answer?\"      Wanting to know when home health can visit    Protocols used: INFORMATION ONLY CALL-ADULT-      "

## 2019-07-10 ENCOUNTER — READMISSION MANAGEMENT (OUTPATIENT)
Dept: CALL CENTER | Facility: HOSPITAL | Age: 74
End: 2019-07-10

## 2019-07-10 NOTE — OUTREACH NOTE
COPD/PN Week 2 Survey      Responses   Facility patient discharged from?  [place]   Does the patient have one of the following disease processes/diagnoses(primary or secondary)?  COPD/Pneumonia   Was the primary reason for admission:  COPD exacerbation   Week 2 attempt successful?  Yes   Call start time  1221   Call end time  1227   Medication alerts for this patient  started on Lasix,    Meds reviewed with patient/caregiver?  Yes   Is the patient taking all medications as directed (includes completed medication regime)?  Yes   Comments regarding appointments  has seen his promary and has numerous visits coming up in August   Has the patient kept scheduled appointments due by today?  Yes   Did the patient receive a copy of their discharge instructions?  Yes   Nursing interventions  Reviewed instructions with patient   What is the patient's perception of their health status since discharge?  Improving   Is the patient able to teach back COPD zones?  Yes   Patient reports what zone on this call?  Green Zone   Green Zone  Reports doing well, Breathing without shortness of breath, Appetite is good   Green Zone interventions:  Take daily medications, Use oxygen as prescribed   Week 2 call completed?  Yes   Wrap up additional comments  wife is very informative           Valentine Weiner RN

## 2019-07-17 ENCOUNTER — READMISSION MANAGEMENT (OUTPATIENT)
Dept: CALL CENTER | Facility: HOSPITAL | Age: 74
End: 2019-07-17

## 2019-07-17 NOTE — OUTREACH NOTE
COPD/PN Week 3 Survey      Responses   Facility patient discharged from?  [place]   Does the patient have one of the following disease processes/diagnoses(primary or secondary)?  COPD/Pneumonia   Was the primary reason for admission:  COPD exacerbation   Week 3 attempt successful?  Yes   Call start time  1613   Call end time  1617   Discharge diagnosis  COPD AE, Moderate malnutrition, Hx C. diff. colitis, acute hypercapnic resp. failure, tobacco dependence, Weight loss, Popliteal artery aneurysm, hx. colon cancer   Is patient permission given to speak with other caregiver?  Yes   Person spoke with today (if not patient) and relationship  Anastasia- Wife   Medication alerts for this patient  started on Lasix,    Meds reviewed with patient/caregiver?  Yes   Is the patient having any side effects they believe may be caused by any medication additions or changes?  No   Does the patient have all medications ordered at discharge?  Yes   Is the patient taking all medications as directed (includes completed medication regime)?  Yes   Comments regarding appointments  has seen his promary and has numerous visits coming up in August   Does the patient have a primary care provider?   Yes   Does the patient have an appointment with their PCP or pulmonologist within 7 days of discharge?  Yes   Has the patient kept scheduled appointments due by today?  Yes   Did the patient receive a copy of their discharge instructions?  Yes   Nursing interventions  Reviewed instructions with patient   What is the patient's perception of their health status since discharge?  Improving   Nursing Interventions  Nurse provided patient education   Are the patient's immunizations up to date?   Yes   Nursing interventions  Educated on importance of maintaining up to date immunizations as advised by provider   Is the patient/caregiver able to teach back the hierarchy of who to call/visit for symptoms/problems? PCP, Specialist, Home health nurse, Urgent Care,  ED, 911  Yes   Is the patient able to teach back COPD zones?  Yes   Nursing interventions  Education provided on various zones   Patient reports what zone on this call?  Green Zone   Green Zone  Reports doing well, Breathing without shortness of breath, Usual activity and exercise level, Usual amount of phlegm/mucus without difficulty coughing up, Sleeping well, Appetite is good   Green Zone interventions:  Take daily medications, Continue regular exercise/diet plan, Do not smoke, Use oxygen as prescribed, Avoid indoor/outdoor triggers   Week 3 call completed?  Yes   Revoked  No further contact(revokes)-requires comment [He is doing very well. ]   Wrap up additional comments  wife is very informative           Adela Klein RN

## 2019-08-01 ENCOUNTER — OFFICE VISIT (OUTPATIENT)
Dept: VASCULAR SURGERY | Facility: CLINIC | Age: 74
End: 2019-08-01

## 2019-08-01 VITALS
OXYGEN SATURATION: 93 % | WEIGHT: 157.2 LBS | HEART RATE: 98 BPM | SYSTOLIC BLOOD PRESSURE: 134 MMHG | DIASTOLIC BLOOD PRESSURE: 78 MMHG | BODY MASS INDEX: 26.19 KG/M2 | HEIGHT: 65 IN

## 2019-08-01 DIAGNOSIS — I71.40 ABDOMINAL AORTIC ANEURYSM (AAA) WITHOUT RUPTURE (HCC): ICD-10-CM

## 2019-08-01 DIAGNOSIS — I65.23 BILATERAL CAROTID ARTERY STENOSIS: ICD-10-CM

## 2019-08-01 DIAGNOSIS — I72.4 POPLITEAL ARTERY ANEURYSM (HCC): Primary | ICD-10-CM

## 2019-08-01 PROCEDURE — 99204 OFFICE O/P NEW MOD 45 MIN: CPT | Performed by: NURSE PRACTITIONER

## 2019-08-01 RX ORDER — FUROSEMIDE 20 MG/1
20 TABLET ORAL DAILY
COMMUNITY
Start: 2019-07-08 | End: 2019-08-14 | Stop reason: SDUPTHER

## 2019-08-12 NOTE — PROGRESS NOTES
PRATEEK Gallego  [facility]   Respiratory Disease Clinic  [address] [place] [address]  Phone: [phone]  Fax: [phone]     Jonnie Sheets Jr. is a 73 y.o. male.   CC:   Chief Complaint   Patient presents with   • New Patient Established for Hospital Follow up for Copd        HPI: Mr. Sheets was seen on hospital consult at [facility] in June with acute hypercapnic and hypoxemic respiratory failure, suspected COPD and a long smoking history.  He has not resumed smoking since that hospitalization.  During the hospitalization it was suspected that he was using too much oxygen at home which was causing hypercapnia.  Since being home from the hospital he has been using his oxygen at night on 2 L and then just as needed during the day depending on his level of activity and his O2 sats.  He is on a daily regimen of Symbicort and DuoNeb's and then uses Mucinex as needed.  He is accompanied by his wife who helps manage his medications.  He has had complete pulmonary function testing done for the first time today that confirms that the patient does indeed have COPD with FEV1 of 33% predicted.  He is followed by Dr. Burton for routine medical care and stays up-to-date with an annual flu shot but says he has not had a pneumonia vaccine in many years.  I recommend that he get a Prevnar 13 vaccine and he was provided with an educational handout on that.  I also provided him with an informational book on COPD.  I refilled the patient's DuoNeb's, Symbicort and Tessalon Perles.    The following portions of the patient's history were reviewed and updated as appropriate: allergies, current medications, past family history, past medical history, past social history, past surgical history and problem list.    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)    • Umbilical hernia        Family History   Problem Relation Age of Onset   • Heart disease Father    • Colon cancer Neg Hx    • Colon polyps Neg Hx        Social History     Socioeconomic History   • Marital status:      Spouse name: Not on file   • Number of children: Not on file   • Years of education: Not on file   • Highest education level: Not on file   Tobacco Use   • Smoking status: Former Smoker     Packs/day: 0.50     Years: 55.00     Pack years: 27.50     Types: Cigarettes     Last attempt to quit: 2019     Years since quittin.2   • Smokeless tobacco: Never Used   Substance and Sexual Activity   • Alcohol use: Yes     Frequency: 2-4 times a month   • Drug use: No   • Sexual activity: Defer       Review of Systems   Constitutional: Negative for appetite change, chills, fatigue and fever.        Decreased exercise tolerance   HENT: Negative for swollen glands, trouble swallowing and voice change.    Eyes: Negative for visual disturbance.   Respiratory: Positive for cough and shortness of breath. Negative for wheezing.    Cardiovascular: Negative for chest pain and leg swelling.   Gastrointestinal: Negative for abdominal distention, abdominal pain, nausea and vomiting.   Genitourinary: Negative.    Musculoskeletal: Negative for gait problem and myalgias.   Skin: Negative.    Neurological: Negative for weakness.   Hematological: Negative.    Psychiatric/Behavioral: The patient is not nervous/anxious.        /80   Pulse 91   Ht 160 cm (63\")   Wt 74.5 kg (164 lb 3.2 oz)   SpO2 96% Comment: 2L  BMI 29.09 kg/m²     Physical Exam   Constitutional: He is oriented to " person, place, and time. He appears well-developed and well-nourished. No distress. Nasal cannula in place.   HENT:   Head: Normocephalic and atraumatic.   Eyes: Pupils are equal, round, and reactive to light. No scleral icterus.   Neck: Normal range of motion. Neck supple.   Cardiovascular: Normal rate, regular rhythm, S1 normal and S2 normal.   Pulmonary/Chest: Effort normal. No tachypnea. He has decreased breath sounds (Throughout). He has no wheezes. He has no rhonchi. He has no rales.   Abdominal: Soft. Bowel sounds are normal. He exhibits no distension.   Musculoskeletal: Normal range of motion. He exhibits edema (Right ankle).   Lymphadenopathy:     He has no cervical adenopathy.   Neurological: He is alert and oriented to person, place, and time.   Skin: Skin is warm and dry. No rash noted. No cyanosis. Nails show no clubbing.   Psychiatric: He has a normal mood and affect. His behavior is normal.   Vitals reviewed.      My interpretation of  Pulmonary Functions Testing: Spirometry shows a severe obstructive defect with FEV1 of 33% predicted.  Mid flows are decreased at 16% predicted.  Actual FEV1/FVC is 42.15.  Lung volumes show an increase in functional residual capacity and residual volume secondary to air trapping.  Diffusion capacity is severely impaired with a DLCO of 27% predicted and when corrected for alveolar volume loss is still moderately impaired.    FEV1   Date Value Ref Range Status   08/14/2019 33% liters Final     FVC   Date Value Ref Range Status   08/14/2019 61% liters Final     FEV1/FVC   Date Value Ref Range Status   08/14/2019 42.15% % Final     TLC   Date Value Ref Range Status   08/14/2019 117% liters Final     DLCO   Date Value Ref Range Status   08/14/2019 27% ml/mmHg sec Final       Jonnie was seen today for new patient established for hospital follow up for copd.    Diagnoses and all orders for this visit:    Chronic obstructive pulmonary disease, unspecified COPD type  (CMS/[facility])  -     Pulmonary Function Test  -     budesonide-formoterol (SYMBICORT) 160-4.5 MCG/ACT inhaler; Inhale 2 puffs by mouth 2 (Two) Times a Day.  -     ipratropium-albuterol (DUO-NEB) 0.5-2.5 mg/3 ml nebulizer; Take 3 mL by nebulization 4 (Four) Times a Day.  -     benzonatate (TESSALON) 200 MG capsule; Take 1 capsule by mouth 3 (Three) Times a Day As Needed for Cough.    Chronic respiratory failure with hypoxia and hypercapnia (CMS/HCC)    Smoker    Abnormal diffusion capacity determined by pulmonary function test    Pedal edema  -     furosemide (LASIX) 20 MG tablet; Take 1 tablet by mouth As Needed (PEDAL EDEMA).      Patient's Body mass index is 29.09 kg/m². BMI is above normal parameters. Recommendations include: referral to primary care.      Follow-up/Plan: Continue on COPD regimen of Symbicort, duo nebs and oxygen.  The patient was given parameters for his O2 sats.  He uses Mucinex and/or Tessalon PRN for cough and congestion.  He is doing well with smoking cessation and has been quit for greater than 2 months now.  I recommend a Prevnar 13 vaccine prior to his return to clinic in 3 months.    Porfirio Barrett, APRN  8/14/2019  4:40 PM

## 2019-08-14 ENCOUNTER — OFFICE VISIT (OUTPATIENT)
Dept: PULMONOLOGY | Facility: CLINIC | Age: 74
End: 2019-08-14

## 2019-08-14 VITALS
OXYGEN SATURATION: 96 % | BODY MASS INDEX: 29.09 KG/M2 | HEIGHT: 63 IN | WEIGHT: 164.2 LBS | SYSTOLIC BLOOD PRESSURE: 140 MMHG | DIASTOLIC BLOOD PRESSURE: 80 MMHG | HEART RATE: 91 BPM

## 2019-08-14 DIAGNOSIS — F17.200 SMOKER: ICD-10-CM

## 2019-08-14 DIAGNOSIS — R94.2 ABNORMAL DIFFUSION CAPACITY DETERMINED BY PULMONARY FUNCTION TEST: ICD-10-CM

## 2019-08-14 DIAGNOSIS — R60.0 PEDAL EDEMA: ICD-10-CM

## 2019-08-14 DIAGNOSIS — J96.12 CHRONIC RESPIRATORY FAILURE WITH HYPOXIA AND HYPERCAPNIA (HCC): ICD-10-CM

## 2019-08-14 DIAGNOSIS — J44.9 CHRONIC OBSTRUCTIVE PULMONARY DISEASE, UNSPECIFIED COPD TYPE (HCC): Primary | ICD-10-CM

## 2019-08-14 DIAGNOSIS — J96.11 CHRONIC RESPIRATORY FAILURE WITH HYPOXIA AND HYPERCAPNIA (HCC): ICD-10-CM

## 2019-08-14 LAB
DIFF CAP.CO: NORMAL ML/MMHG SEC
FEV1/FVC: NORMAL %
FEV1: NORMAL LITERS
FVC VOL RESPIRATORY: NORMAL LITERS
TLC: NORMAL LITERS

## 2019-08-14 PROCEDURE — 99214 OFFICE O/P EST MOD 30 MIN: CPT | Performed by: NURSE PRACTITIONER

## 2019-08-14 PROCEDURE — 94727 GAS DIL/WSHOT DETER LNG VOL: CPT | Performed by: NURSE PRACTITIONER

## 2019-08-14 PROCEDURE — 94729 DIFFUSING CAPACITY: CPT | Performed by: NURSE PRACTITIONER

## 2019-08-14 PROCEDURE — 94010 BREATHING CAPACITY TEST: CPT | Performed by: NURSE PRACTITIONER

## 2019-08-14 RX ORDER — IPRATROPIUM BROMIDE AND ALBUTEROL SULFATE 2.5; .5 MG/3ML; MG/3ML
3 SOLUTION RESPIRATORY (INHALATION)
Qty: 360 ML | Refills: 6 | Status: SHIPPED | OUTPATIENT
Start: 2019-08-14 | End: 2020-05-13 | Stop reason: SDUPTHER

## 2019-08-14 RX ORDER — FUROSEMIDE 20 MG/1
20 TABLET ORAL AS NEEDED
Qty: 30 TABLET | Refills: 3 | Status: SHIPPED | OUTPATIENT
Start: 2019-08-14 | End: 2019-11-13 | Stop reason: ALTCHOICE

## 2019-08-14 RX ORDER — BENZONATATE 200 MG/1
200 CAPSULE ORAL 3 TIMES DAILY PRN
Qty: 45 CAPSULE | Refills: 3 | Status: SHIPPED | OUTPATIENT
Start: 2019-08-14 | End: 2019-11-13 | Stop reason: ALTCHOICE

## 2019-08-14 RX ORDER — ALBUTEROL SULFATE 1.25 MG/3ML
1 SOLUTION RESPIRATORY (INHALATION) EVERY 6 HOURS PRN
COMMUNITY
End: 2021-03-17 | Stop reason: ALTCHOICE

## 2019-08-14 RX ORDER — BENZONATATE 200 MG/1
200 CAPSULE ORAL 3 TIMES DAILY PRN
COMMUNITY
End: 2019-08-14 | Stop reason: SDUPTHER

## 2019-08-14 RX ORDER — BUDESONIDE AND FORMOTEROL FUMARATE DIHYDRATE 160; 4.5 UG/1; UG/1
2 AEROSOL RESPIRATORY (INHALATION)
Qty: 1 INHALER | Refills: 6 | Status: SHIPPED | OUTPATIENT
Start: 2019-08-14 | End: 2020-10-26 | Stop reason: SDUPTHER

## 2019-08-14 NOTE — PATIENT INSTRUCTIONS
Pneumococcal Conjugate Vaccine suspension for injection  What is this medicine?  PNEUMOCOCCAL VACCINE (WILFREDO mo LEANNA al vak SEEN) is a vaccine used to prevent pneumococcus bacterial infections. These bacteria can cause serious infections like pneumonia, meningitis, and blood infections. This vaccine will lower your chance of getting pneumonia. If you do get pneumonia, it can make your symptoms milder and your illness shorter. This vaccine will not treat an infection and will not cause infection. This vaccine is recommended for infants and young children, adults with certain medical conditions, and adults 65 years or older.  This medicine may be used for other purposes; ask your health care provider or pharmacist if you have questions.  COMMON BRAND NAME(S): Prevnar, Prevnar 13  What should I tell my health care provider before I take this medicine?  They need to know if you have any of these conditions:  -bleeding problems  -fever  -immune system problems  -an unusual or allergic reaction to pneumococcal vaccine, diphtheria toxoid, other vaccines, latex, other medicines, foods, dyes, or preservatives  -pregnant or trying to get pregnant  -breast-feeding  How should I use this medicine?  This vaccine is for injection into a muscle. It is given by a health care professional.  A copy of Vaccine Information Statements will be given before each vaccination. Read this sheet carefully each time. The sheet may change frequently.  Talk to your pediatrician regarding the use of this medicine in children. While this drug may be prescribed for children as young as 6 weeks old for selected conditions, precautions do apply.  Overdosage: If you think you have taken too much of this medicine contact a poison control center or emergency room at once.  NOTE: This medicine is only for you. Do not share this medicine with others.  What if I miss a dose?  It is important not to miss your dose. Call your doctor or health care professional  if you are unable to keep an appointment.  What may interact with this medicine?  -medicines for cancer chemotherapy  -medicines that suppress your immune function  -steroid medicines like prednisone or cortisone  This list may not describe all possible interactions. Give your health care provider a list of all the medicines, herbs, non-prescription drugs, or dietary supplements you use. Also tell them if you smoke, drink alcohol, or use illegal drugs. Some items may interact with your medicine.  What should I watch for while using this medicine?  Mild fever and pain should go away in 3 days or less. Report any unusual symptoms to your doctor or health care professional.  What side effects may I notice from receiving this medicine?  Side effects that you should report to your doctor or health care professional as soon as possible:  -allergic reactions like skin rash, itching or hives, swelling of the face, lips, or tongue  -breathing problems  -confused  -fast or irregular heartbeat  -fever over 102 degrees F  -seizures  -unusual bleeding or bruising  -unusual muscle weakness  Side effects that usually do not require medical attention (report to your doctor or health care professional if they continue or are bothersome):  -aches and pains  -diarrhea  -fever of 102 degrees F or less  -headache  -irritable  -loss of appetite  -pain, tender at site where injected  -trouble sleeping  This list may not describe all possible side effects. Call your doctor for medical advice about side effects. You may report side effects to FDA at 1-000-FDA-2072.  Where should I keep my medicine?  This does not apply. This vaccine is given in a clinic, pharmacy, doctor's office, or other health care setting and will not be stored at home.  NOTE: This sheet is a summary. It may not cover all possible information. If you have questions about this medicine, talk to your doctor, pharmacist, or health care provider.  © 2019 Elsevier/Gold  Standard (2015-09-24 10:27:27)

## 2019-08-19 ENCOUNTER — OFFICE VISIT (OUTPATIENT)
Dept: OTOLARYNGOLOGY | Age: 74
End: 2019-08-19
Payer: MEDICARE

## 2019-08-19 VITALS
HEART RATE: 64 BPM | SYSTOLIC BLOOD PRESSURE: 110 MMHG | TEMPERATURE: 98.2 F | DIASTOLIC BLOOD PRESSURE: 74 MMHG | BODY MASS INDEX: 26.96 KG/M2 | WEIGHT: 162 LBS

## 2019-08-19 DIAGNOSIS — K13.70 LESION OF ORAL MUCOSA: ICD-10-CM

## 2019-08-19 PROCEDURE — 3017F COLORECTAL CA SCREEN DOC REV: CPT | Performed by: OTOLARYNGOLOGY

## 2019-08-19 PROCEDURE — 99212 OFFICE O/P EST SF 10 MIN: CPT | Performed by: OTOLARYNGOLOGY

## 2019-08-19 PROCEDURE — G8419 CALC BMI OUT NRM PARAM NOF/U: HCPCS | Performed by: OTOLARYNGOLOGY

## 2019-08-19 PROCEDURE — 4040F PNEUMOC VAC/ADMIN/RCVD: CPT | Performed by: OTOLARYNGOLOGY

## 2019-08-19 PROCEDURE — G8427 DOCREV CUR MEDS BY ELIG CLIN: HCPCS | Performed by: OTOLARYNGOLOGY

## 2019-08-19 PROCEDURE — 4004F PT TOBACCO SCREEN RCVD TLK: CPT | Performed by: OTOLARYNGOLOGY

## 2019-08-19 PROCEDURE — 1123F ACP DISCUSS/DSCN MKR DOCD: CPT | Performed by: OTOLARYNGOLOGY

## 2019-08-19 RX ORDER — ALBUTEROL SULFATE 1.25 MG/3ML
1 SOLUTION RESPIRATORY (INHALATION)
COMMUNITY

## 2019-08-19 RX ORDER — BENZONATATE 200 MG/1
200 CAPSULE ORAL
COMMUNITY
Start: 2019-08-14

## 2019-08-19 RX ORDER — BUDESONIDE AND FORMOTEROL FUMARATE DIHYDRATE 160; 4.5 UG/1; UG/1
2 AEROSOL RESPIRATORY (INHALATION)
COMMUNITY
Start: 2019-08-14 | End: 2019-09-13

## 2019-08-19 RX ORDER — FUROSEMIDE 20 MG/1
20 TABLET ORAL
COMMUNITY
Start: 2019-08-14

## 2019-08-19 NOTE — ASSESSMENT & PLAN NOTE
I reviewed all prior office visits and pathology report associated with his care under Dr. Drake Valentine   Oral mucosal specimens pathology benign  No obvious evidence of oral lesions on today's exam  She has quit smoking now for over 3 months  Recommend annual follow-up

## 2019-09-11 LAB
ALBUMIN SERPL-MCNC: 4.5 G/DL (ref 3.5–5.2)
ALP BLD-CCNC: 62 U/L (ref 40–130)
ALT SERPL-CCNC: 16 U/L (ref 5–41)
ANION GAP SERPL CALCULATED.3IONS-SCNC: 21 MMOL/L (ref 7–19)
AST SERPL-CCNC: 57 U/L (ref 5–40)
BASOPHILS ABSOLUTE: 0.1 K/UL (ref 0–0.2)
BASOPHILS RELATIVE PERCENT: 0.7 % (ref 0–1)
BILIRUB SERPL-MCNC: 0.4 MG/DL (ref 0.2–1.2)
BILIRUBIN URINE: NEGATIVE
BLOOD, URINE: NEGATIVE
BUN BLDV-MCNC: 11 MG/DL (ref 8–23)
CALCIUM SERPL-MCNC: 9.4 MG/DL (ref 8.8–10.2)
CHLORIDE BLD-SCNC: 103 MMOL/L (ref 98–111)
CLARITY: CLEAR
CO2: 23 MMOL/L (ref 22–29)
COLOR: YELLOW
CREAT SERPL-MCNC: 1.2 MG/DL (ref 0.5–1.2)
CREATININE URINE: 60.7 MG/DL (ref 4.2–622)
EOSINOPHILS ABSOLUTE: 0.2 K/UL (ref 0–0.6)
EOSINOPHILS RELATIVE PERCENT: 1.7 % (ref 0–5)
GFR NON-AFRICAN AMERICAN: 59
GLUCOSE BLD-MCNC: 130 MG/DL (ref 74–109)
GLUCOSE URINE: NEGATIVE MG/DL
HCT VFR BLD CALC: 41.3 % (ref 42–52)
HEMOGLOBIN: 12.9 G/DL (ref 14–18)
IMMATURE GRANULOCYTES #: 0 K/UL
KETONES, URINE: NEGATIVE MG/DL
LEUKOCYTE ESTERASE, URINE: NEGATIVE
LYMPHOCYTES ABSOLUTE: 1.9 K/UL (ref 1.1–4.5)
LYMPHOCYTES RELATIVE PERCENT: 21.3 % (ref 20–40)
MCH RBC QN AUTO: 27.8 PG (ref 27–31)
MCHC RBC AUTO-ENTMCNC: 31.2 G/DL (ref 33–37)
MCV RBC AUTO: 89 FL (ref 80–94)
MONOCYTES ABSOLUTE: 1 K/UL (ref 0–0.9)
MONOCYTES RELATIVE PERCENT: 11 % (ref 0–10)
NEUTROPHILS ABSOLUTE: 5.7 K/UL (ref 1.5–7.5)
NEUTROPHILS RELATIVE PERCENT: 64.8 % (ref 50–65)
NITRITE, URINE: NEGATIVE
PARATHYROID HORMONE INTACT: 63.3 PG/ML (ref 15–65)
PDW BLD-RTO: 14.9 % (ref 11.5–14.5)
PH UA: 6 (ref 5–8)
PLATELET # BLD: 186 K/UL (ref 130–400)
PMV BLD AUTO: 11.8 FL (ref 9.4–12.4)
POTASSIUM SERPL-SCNC: 4.1 MMOL/L (ref 3.5–5)
PROTEIN PROTEIN: 5 MG/DL (ref 15–45)
PROTEIN UA: NEGATIVE MG/DL
RBC # BLD: 4.64 M/UL (ref 4.7–6.1)
SODIUM BLD-SCNC: 147 MMOL/L (ref 136–145)
SPECIFIC GRAVITY UA: 1.01 (ref 1–1.03)
TOTAL PROTEIN: 7.6 G/DL (ref 6.6–8.7)
URIC ACID, SERUM: 9.7 MG/DL (ref 3.4–7)
UROBILINOGEN, URINE: 0.2 E.U./DL
VITAMIN D 25-HYDROXY: 21.2 NG/ML
WBC # BLD: 8.8 K/UL (ref 4.8–10.8)

## 2019-10-02 LAB
ALBUMIN SERPL-MCNC: 4.4 G/DL (ref 3.5–5.2)
ALP BLD-CCNC: 60 U/L (ref 40–130)
ALT SERPL-CCNC: 14 U/L (ref 5–41)
ANION GAP SERPL CALCULATED.3IONS-SCNC: 14 MMOL/L (ref 7–19)
AST SERPL-CCNC: 49 U/L (ref 5–40)
BASOPHILS ABSOLUTE: 0.1 K/UL (ref 0–0.2)
BASOPHILS RELATIVE PERCENT: 0.7 % (ref 0–1)
BILIRUB SERPL-MCNC: 0.5 MG/DL (ref 0.2–1.2)
BUN BLDV-MCNC: 17 MG/DL (ref 8–23)
CALCIUM SERPL-MCNC: 9.2 MG/DL (ref 8.8–10.2)
CHLORIDE BLD-SCNC: 101 MMOL/L (ref 98–111)
CO2: 26 MMOL/L (ref 22–29)
CREAT SERPL-MCNC: 1.2 MG/DL (ref 0.5–1.2)
EOSINOPHILS ABSOLUTE: 0.2 K/UL (ref 0–0.6)
EOSINOPHILS RELATIVE PERCENT: 2.1 % (ref 0–5)
GFR NON-AFRICAN AMERICAN: 59
GLUCOSE BLD-MCNC: 107 MG/DL (ref 74–109)
HBA1C MFR BLD: 5.4 % (ref 4–6)
HCT VFR BLD CALC: 40.1 % (ref 42–52)
HEMOGLOBIN: 12.6 G/DL (ref 14–18)
IMMATURE GRANULOCYTES #: 0 K/UL
LYMPHOCYTES ABSOLUTE: 2.4 K/UL (ref 1.1–4.5)
LYMPHOCYTES RELATIVE PERCENT: 26.7 % (ref 20–40)
MCH RBC QN AUTO: 28.1 PG (ref 27–31)
MCHC RBC AUTO-ENTMCNC: 31.4 G/DL (ref 33–37)
MCV RBC AUTO: 89.3 FL (ref 80–94)
MONOCYTES ABSOLUTE: 0.9 K/UL (ref 0–0.9)
MONOCYTES RELATIVE PERCENT: 9.7 % (ref 0–10)
NEUTROPHILS ABSOLUTE: 5.4 K/UL (ref 1.5–7.5)
NEUTROPHILS RELATIVE PERCENT: 60.5 % (ref 50–65)
PDW BLD-RTO: 15.2 % (ref 11.5–14.5)
PLATELET # BLD: 198 K/UL (ref 130–400)
PMV BLD AUTO: 11.3 FL (ref 9.4–12.4)
POTASSIUM SERPL-SCNC: 4.1 MMOL/L (ref 3.5–5)
RBC # BLD: 4.49 M/UL (ref 4.7–6.1)
SODIUM BLD-SCNC: 141 MMOL/L (ref 136–145)
TOTAL PROTEIN: 7.4 G/DL (ref 6.6–8.7)
WBC # BLD: 9 K/UL (ref 4.8–10.8)

## 2019-11-07 PROBLEM — J96.12 CHRONIC RESPIRATORY FAILURE WITH HYPOXIA AND HYPERCAPNIA: Status: ACTIVE | Noted: 2019-11-07

## 2019-11-07 PROBLEM — J44.1 COPD WITH ACUTE EXACERBATION: Status: RESOLVED | Noted: 2019-05-13 | Resolved: 2019-11-07

## 2019-11-07 PROBLEM — J44.9 STAGE 3 SEVERE COPD BY GOLD CLASSIFICATION: Status: ACTIVE | Noted: 2019-11-07

## 2019-11-07 PROBLEM — J43.9 PULMONARY EMPHYSEMA (HCC): Status: ACTIVE | Noted: 2019-11-07

## 2019-11-07 PROBLEM — Z87.891 FORMER SMOKER: Status: ACTIVE | Noted: 2019-11-07

## 2019-11-07 PROBLEM — J96.11 CHRONIC RESPIRATORY FAILURE WITH HYPOXIA AND HYPERCAPNIA: Status: ACTIVE | Noted: 2019-11-07

## 2019-11-08 NOTE — PROGRESS NOTES
PRATEEK Gallego  [facility]   Respiratory Disease Clinic  [address] [place] [address]  Phone: [phone]  Fax: [phone]     Jonnie Sheets Jr. is a 74 y.o. male.   CC:   Chief Complaint   Patient presents with   • COPD        HPI: He is here for management of severe COPD, chronic respiratory failure and emphysema with decreased diffusion capacity.  He is a former smoker who quit earlier this year.  He reports that currently he is only using his oxygen at night and has not been requiring it during the day.  He is on a daily regimen of Symbicort and DuoNeb treatments 3-4 times a day.  In reviewing his medication list it is noted that he is on the lower dose of Symbicort and I will change that to the 160/4.5 dose for COPD.  He is followed by Dr. Burton for routine medical care and he is up-to-date on flu and pneumonia vaccines.    The following portions of the patient's history were reviewed and updated as appropriate: allergies, current medications, past family history, past medical history, past social history, past surgical history and problem list.    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)    • Umbilical hernia        Family History   Problem Relation Age of Onset   • Heart disease Father    • Colon cancer Neg Hx    • Colon polyps Neg Hx        Social History     Socioeconomic History   • Marital status:      Spouse name: Not on file   • Number of children: Not on file   • Years of education: Not on  "file   • Highest education level: Not on file   Tobacco Use   • Smoking status: Former Smoker     Packs/day: 0.50     Years: 55.00     Pack years: 27.50     Types: Cigarettes     Last attempt to quit: 2019     Years since quittin.4   • Smokeless tobacco: Never Used   Substance and Sexual Activity   • Alcohol use: Yes     Frequency: 2-4 times a month   • Drug use: No   • Sexual activity: Defer       Review of Systems   Constitutional: Negative for appetite change, chills, fatigue and fever.   HENT: Negative for swollen glands, trouble swallowing and voice change.    Eyes: Negative for visual disturbance.   Respiratory: Positive for shortness of breath. Negative for cough and wheezing.    Cardiovascular: Negative for chest pain and leg swelling.   Gastrointestinal: Negative for abdominal distention, abdominal pain, nausea and vomiting.   Genitourinary: Negative.    Musculoskeletal: Negative for gait problem and myalgias.   Skin: Negative.    Neurological: Negative for weakness.   Hematological: Negative.    Psychiatric/Behavioral: The patient is not nervous/anxious.        /90   Pulse 91   Ht 165.1 cm (65\")   Wt 77.6 kg (171 lb)   SpO2 94% Comment: RA  BMI 28.46 kg/m²     Physical Exam   Constitutional: He is oriented to person, place, and time. He appears well-developed and well-nourished. No distress.   HENT:   Head: Normocephalic and atraumatic.   Eyes: Pupils are equal, round, and reactive to light. No scleral icterus.   Neck: Normal range of motion. Neck supple.   Cardiovascular: Normal rate, regular rhythm, S1 normal and S2 normal.   Pulmonary/Chest: Effort normal. No tachypnea. He has decreased breath sounds. He has no wheezes. He has no rhonchi. He has no rales.   Abdominal: Soft. Bowel sounds are normal. He exhibits no distension.   Musculoskeletal: Normal range of motion. He exhibits no edema.   Lymphadenopathy:     He has no cervical adenopathy.   Neurological: He is alert and oriented to " person, place, and time.   Skin: Skin is warm and dry. No rash noted. No cyanosis. Nails show no clubbing.   Psychiatric: He has a normal mood and affect. His behavior is normal.   Vitals reviewed.      No notes on file    Jonnie was seen today for copd.    Diagnoses and all orders for this visit:    Stage 3 severe COPD by GOLD classification (CMS/[facility])  -     budesonide-formoterol (SYMBICORT) 160-4.5 MCG/ACT inhaler; Inhale 2 puffs 2 (Two) Times a Day for 90 days.  -     XR Chest 2 View; Future    Pulmonary emphysema, unspecified emphysema type (CMS/[facility])  -     XR Chest 2 View; Future    Chronic respiratory failure with hypoxia and hypercapnia (CMS/[facility])    Former smoker  -     XR Chest 2 View; Future      Patient's Body mass index is 28.46 kg/m². BMI is above normal parameters. Recommendations include: referral to primary care.      Follow-up/Plan: Continue on current regimen of Symbicort and DuoNeb's.  Oxygen at night and as needed during the day.  Return to clinic in 6 months with chest x-ray at [facility].    Porfirio Barrett, PRATEEK  11/13/2019  5:10 PM

## 2019-11-12 ENCOUNTER — TELEPHONE (OUTPATIENT)
Dept: VASCULAR SURGERY | Facility: CLINIC | Age: 74
End: 2019-11-12

## 2019-11-12 NOTE — TELEPHONE ENCOUNTER
PT CALLED WANTING TO CHANGE HIS TIMES ON THE TESTING AND COME IN LATER THAT DAY. PT IS AWARE OF APPT DATES AND TIMES THAT HAS BEEN CHANGED.

## 2019-11-13 ENCOUNTER — OFFICE VISIT (OUTPATIENT)
Dept: PULMONOLOGY | Facility: CLINIC | Age: 74
End: 2019-11-13

## 2019-11-13 VITALS
BODY MASS INDEX: 28.49 KG/M2 | SYSTOLIC BLOOD PRESSURE: 120 MMHG | HEIGHT: 65 IN | HEART RATE: 91 BPM | WEIGHT: 171 LBS | OXYGEN SATURATION: 94 % | DIASTOLIC BLOOD PRESSURE: 90 MMHG

## 2019-11-13 DIAGNOSIS — J43.9 PULMONARY EMPHYSEMA, UNSPECIFIED EMPHYSEMA TYPE (HCC): ICD-10-CM

## 2019-11-13 DIAGNOSIS — Z87.891 FORMER SMOKER: ICD-10-CM

## 2019-11-13 DIAGNOSIS — J44.9 STAGE 3 SEVERE COPD BY GOLD CLASSIFICATION (HCC): Primary | ICD-10-CM

## 2019-11-13 DIAGNOSIS — J96.11 CHRONIC RESPIRATORY FAILURE WITH HYPOXIA AND HYPERCAPNIA (HCC): ICD-10-CM

## 2019-11-13 DIAGNOSIS — J96.12 CHRONIC RESPIRATORY FAILURE WITH HYPOXIA AND HYPERCAPNIA (HCC): ICD-10-CM

## 2019-11-13 PROCEDURE — 99214 OFFICE O/P EST MOD 30 MIN: CPT | Performed by: NURSE PRACTITIONER

## 2019-11-13 RX ORDER — DILTIAZEM HYDROCHLORIDE 60 MG/1
TABLET, FILM COATED ORAL
COMMUNITY
Start: 2019-10-14 | End: 2019-11-13 | Stop reason: ALTCHOICE

## 2019-11-13 RX ORDER — ERGOCALCIFEROL (VITAMIN D2) 10 MCG
400 TABLET ORAL DAILY
COMMUNITY

## 2019-11-13 RX ORDER — LISINOPRIL 10 MG/1
10 TABLET ORAL DAILY
COMMUNITY
Start: 2019-10-18

## 2019-11-13 RX ORDER — BUDESONIDE AND FORMOTEROL FUMARATE DIHYDRATE 160; 4.5 UG/1; UG/1
2 AEROSOL RESPIRATORY (INHALATION)
Qty: 3 INHALER | Refills: 3 | Status: SHIPPED | OUTPATIENT
Start: 2019-11-13 | End: 2020-10-26 | Stop reason: SDUPTHER

## 2019-11-13 NOTE — PATIENT INSTRUCTIONS
SYMBICORT/Budesonide; Formoterol Inhalation  What is this medicine?  BUDESONIDE; FORMOTEROL (byoo EMILIE oh nide; for [facility] elizabeth neely) inhalation is a combination of 2 medicines that decrease inflammation and help to open up the airways in your lungs. It is used to treat asthma. It is also used to treat chronic obstructive pulmonary disease (COPD), including chronic bronchitis or emphysema. Do NOT use for an acute asthma or COPD attack.  This medicine may be used for other purposes; ask your health care provider or pharmacist if you have questions.  COMMON BRAND NAME(S): Symbicort  What should I tell my health care provider before I take this medicine?  They need to know if you have any of these conditions:  -bone problems  -diabetes  -heart disease or irregular heartbeat  -high blood pressure  -immune system problems  -infection  -liver disease  -worsening asthma  -an unusual or allergic reaction to budesonide, formoterol, medicines, foods, dyes, or preservatives  -pregnant or trying to get pregnant  -breast-feeding  How should I use this medicine?  This medicine is inhaled through the mouth. Rinse your mouth with water after use. Make sure not to swallow the water. Follow the directions on your prescription label. Do not use more often than directed. Do not stop taking except on your doctor's advice. Make sure that you are using your inhaler correctly. Ask your doctor or health care provider if you have any questions.  A special MedGuide will be given to you by the pharmacist with each prescription and refill. Be sure to read this information carefully each time.  Talk to your pediatrician regarding the use of this medicine in children. While this drug may be prescribed for children as young as 6 years of age for selected conditions, precautions do apply.  Overdosage: If you think you have taken too much of this medicine contact a poison control center or emergency room at once.  NOTE: This medicine is only for you. Do  not share this medicine with others.  What if I miss a dose?  If you miss a dose, use it as soon as you remember. If it is almost time for your next dose, use only that dose and continue with your regular schedule, spacing doses evenly. Do not use double or extra doses.  What may interact with this medicine?  Do not take this medicine with any of the following medications:  -MAOIs like Carbex, Eldepryl, Marplan, Nardil, and Parnate  -mifepristone  -probucol  -procarbazine  -some other medicines for asthma like formoterol, salmeterol  This medicine may also interact with the following medications:  -antibiotics like clarithromycin, erythromycin  -cimetidine  -diuretics  -grapefruit juice  -itraconazole  -ketoconazole  -medicines for depression, anxiety, or psychotic disturbances  -medicines for irregular heartbeat  -methadone  -some heart medicines like atenolol, metoprolol  -some other medicines for breathing problems  -some vaccines  This list may not describe all possible interactions. Give your health care provider a list of all the medicines, herbs, non-prescription drugs, or dietary supplements you use. Also tell them if you smoke, drink alcohol, or use illegal drugs. Some items may interact with your medicine.  What should I watch for while using this medicine?  Tell your doctor or health care professional if your symptoms do not improve or get worse. Do not use this medicine more than every 12 hours.  NEVER use this medicine for an acute asthma or COPD attack. You should use your short-acting rescue inhalers for this purpose. If your symptoms get worse or if you need your short-acting inhalers more often, call your doctor right away.  This medicine may increase your risk of getting an infection. Tell your doctor or health care professional if you are around anyone with measles or chickenpox, or if you develop sores or blisters that do not heal properly.  What side effects may I notice from receiving this  medicine?  Side effects that you should report to your doctor or health care professional as soon as possible:  -allergic reactions such as skin rash or itching, hives, swelling of the face, lips or tongue  -breathing problems  -changes in vision  -chest pain  -fast, irregular heartbeat  -feeling faint or lightheaded, falls  -fever  -high blood pressure  -nervousness  -tremors  -white patches or sores in mouth  Side effects that usually do not require medical attention (report to your doctor or health care professional if they continue or are bothersome):  -cough  -different taste in mouth  -headache  -sore throat  -stuffy nose  -stomach upset  This list may not describe all possible side effects. Call your doctor for medical advice about side effects. You may report side effects to FDA at 9-101-FDA-5905.  Where should I keep my medicine?  Keep out of the reach of children.  Store in a dry place at room temperature between 20 and 25 degrees C (68 and 77 degrees F). Do not get the inhaler wet. Keep track of the number of doses used. Throw away the inhaler after using the marked number of inhalations or after the expiration date, whichever comes first. Do not burn or puncture canister.  NOTE: This sheet is a summary. It may not cover all possible information. If you have questions about this medicine, talk to your doctor, pharmacist, or health care provider.  © 2019 Elsevier/Gold Standard (2017-12-21 15:25:18)

## 2020-02-03 ENCOUNTER — TELEPHONE (OUTPATIENT)
Dept: VASCULAR SURGERY | Facility: CLINIC | Age: 75
End: 2020-02-03

## 2020-02-03 LAB
CHOLESTEROL, TOTAL: 157 MG/DL (ref 160–199)
HBA1C MFR BLD: 5.4 % (ref 4–6)
HDLC SERPL-MCNC: 55 MG/DL (ref 55–121)
LDL CHOLESTEROL CALCULATED: 84 MG/DL
TRIGL SERPL-MCNC: 88 MG/DL (ref 0–149)

## 2020-02-03 NOTE — TELEPHONE ENCOUNTER
Spoke with Mrs Sheets reminding her of Mr Sheets's appointment for Tuesday, February 4th, 2020. Reminded Mrs Sheets to have [facility] with nothing to eat or drink after midnight and follow up afterwards at 145 pm with Dr Ryan. Mrs Sheets confirmed Mr Sheets would be here.

## 2020-02-04 ENCOUNTER — OFFICE VISIT (OUTPATIENT)
Dept: VASCULAR SURGERY | Facility: CLINIC | Age: 75
End: 2020-02-04

## 2020-02-04 ENCOUNTER — HOSPITAL ENCOUNTER (OUTPATIENT)
Dept: ULTRASOUND IMAGING | Facility: HOSPITAL | Age: 75
Discharge: HOME | End: 2020-02-04

## 2020-02-04 ENCOUNTER — HOSPITAL ENCOUNTER (OUTPATIENT)
Dept: ULTRASOUND IMAGING | Facility: HOSPITAL | Age: 75
Discharge: HOME | End: 2020-02-04
Admitting: NURSE PRACTITIONER

## 2020-02-04 VITALS
WEIGHT: 166 LBS | HEART RATE: 83 BPM | BODY MASS INDEX: 27.66 KG/M2 | SYSTOLIC BLOOD PRESSURE: 128 MMHG | OXYGEN SATURATION: 94 % | HEIGHT: 65 IN | DIASTOLIC BLOOD PRESSURE: 82 MMHG

## 2020-02-04 DIAGNOSIS — I65.23 BILATERAL CAROTID ARTERY STENOSIS: ICD-10-CM

## 2020-02-04 DIAGNOSIS — I71.40 ABDOMINAL AORTIC ANEURYSM (AAA) WITHOUT RUPTURE (HCC): ICD-10-CM

## 2020-02-04 DIAGNOSIS — I72.4 POPLITEAL ARTERY ANEURYSM (HCC): ICD-10-CM

## 2020-02-04 DIAGNOSIS — E78.5 HYPERLIPIDEMIA, UNSPECIFIED HYPERLIPIDEMIA TYPE: ICD-10-CM

## 2020-02-04 DIAGNOSIS — I71.40 ABDOMINAL AORTIC ANEURYSM (AAA) WITHOUT RUPTURE (HCC): Primary | ICD-10-CM

## 2020-02-04 PROCEDURE — 93880 EXTRACRANIAL BILAT STUDY: CPT | Performed by: SURGERY

## 2020-02-04 PROCEDURE — 93925 LOWER EXTREMITY STUDY: CPT | Performed by: SURGERY

## 2020-02-04 PROCEDURE — 93923 UPR/LXTR ART STDY 3+ LVLS: CPT

## 2020-02-04 PROCEDURE — 99214 OFFICE O/P EST MOD 30 MIN: CPT | Performed by: NURSE PRACTITIONER

## 2020-02-04 PROCEDURE — 93880 EXTRACRANIAL BILAT STUDY: CPT

## 2020-02-04 PROCEDURE — 76775 US EXAM ABDO BACK WALL LIM: CPT

## 2020-02-04 NOTE — PROGRESS NOTES
"02/04/2020       Dandy Burton MD  [address] [place] [address] [address]  [place] [address]    Jonnie Sheets Jr.  1945    Chief Complaint   Patient presents with   • Follow-up     6 month f/u with US arterial doppler of BLE, US aorta and carotid testing.  Pt states that he is donig about the same and denies any stroke like symptoms.        Dear Dandy Burton MD       HPI  I had the pleasure of seeing your patient Jonnie Sheets Jr. in the office today. As you recall, Jonnie Sheets Jr. is a 74 y.o.  male who we are following after an incidental finding of a left popliteal artery aneurysm.  He denies any claudication to his lower extremities.  He does receive knee injections for his left knee, which he reports does help.  He does get knee injections to his left knee, which he reports does help.  He is maintained on Mevacor.  He was a previous smoker up until June 2019.  He did have noninvasive testing performed today, which I did review in office.    Review of Systems   Constitutional: Positive for activity change.   HENT: Negative.    Eyes: Negative.    Respiratory: Positive for shortness of breath.    Cardiovascular: Negative.    Gastrointestinal: Negative.    Endocrine: Negative.    Genitourinary: Negative.    Musculoskeletal: Positive for joint swelling.   Skin: Negative.    Allergic/Immunologic: Negative.    Neurological: Negative.    Hematological: Negative.    Psychiatric/Behavioral: Negative.    All other systems reviewed and are negative.      /82   Pulse 83   Ht 165.1 cm (65\")   Wt 75.3 kg (166 lb)   SpO2 94%   BMI 27.62 kg/m²   Physical Exam   Constitutional: He is oriented to person, place, and time. Vital signs are normal. He appears well-developed and well-nourished. No distress.   HENT:   Head: Normocephalic and atraumatic.   Mouth/Throat: Oropharynx is clear and moist and mucous membranes are normal.   Eyes: Pupils are equal, round, and reactive to light.   Neck: Normal range of motion. " Neck supple. No JVD present. Carotid bruit is not present.   Cardiovascular: Normal rate, regular rhythm, S1 normal, S2 normal, normal heart sounds, intact distal pulses and normal pulses. Exam reveals no gallop and no friction rub.   No murmur heard.  Pulses:       Femoral pulses are 2+ on the right side, and 2+ on the left side.       Popliteal pulses are 2+ on the right side, and 2+ on the left side.        Dorsalis pedis pulses are 2+ on the right side, and 2+ on the left side.        Posterior tibial pulses are 2+ on the right side, and 2+ on the left side.   Left popliteal aneurysm   Pulmonary/Chest: Effort normal. He has decreased breath sounds.   Abdominal: Soft. Normal appearance, normal aorta and bowel sounds are normal. He exhibits no abdominal bruit. There is no hepatosplenomegaly. There is no tenderness.   Musculoskeletal: Normal range of motion.     Vascular Status -  His right foot exhibits no edema. His left foot exhibits no edema.  Neurological: He is alert and oriented to person, place, and time. He has normal strength. No cranial nerve deficit.   Skin: Skin is warm, dry and intact. He is not diaphoretic.   Psychiatric: He has a normal mood and affect. His behavior is normal. Judgment and thought content normal. Cognition and memory are normal.   Nursing note and vitals reviewed.    Diagnostic data:  Us Carotid Bilateral    Result Date: 2/4/2020  Narrative: History: Carotid occlusive disease      Impression: Impression: 1. There is less than 50% stenosis of the right internal carotid artery. 2. There is less than 50% stenosis of the left internal carotid artery. 3. Antegrade flow is demonstrated in bilateral vertebral arteries.  Comments: Bilateral carotid vertebral arterial duplex scan was performed.  Grayscale imaging shows intimal thickening and calcified elements at the carotid bifurcation. The right internal carotid artery peak systolic velocity is 103 cm/sec. The end-diastolic velocity is  27.6 cm/sec. The right ICA/CCA ratio is approximately 0.98 . These findings correlate with less than 50% stenosis of the right internal carotid artery.  Grayscale imaging shows intimal thickening and calcified elements at the carotid bifurcation. The left internal carotid artery peak systolic velocity is 112 cm/sec. The end-diastolic velocity is 36.9 cm/sec. The left ICA/CCA ratio is approximately 0.82 . These findings correlate with less than 50% stenosis of the left internal carotid artery.  Antegrade flow is demonstrated in bilateral vertebral arteries. There is greater than 50% stenosis in the left common carotid artery and the right external carotid artery. This report was finalized on 02/04/2020 15:59 by Dr. Tomas Ryan MD.    Us Aorta Limited    Result Date: 2/4/2020  Narrative: EXAMINATION: US AORTA LIMITED- 2/4/2020 10:03 AM CST  HISTORY: Abdominal aortic aneurysm  COMPARISON: CT abdomen and pelvis without contrast 05/10/2019  FINDINGS: Transabdominal sonographic evaluation of the aorta was performed in the transverse and longitudinal projections.  The proximal abdominal aorta measures 2.1 x 2.4 cm. The mid abdominal aorta measures 2.9 x 3.4 cm. The distal abdominal aorta measures 1.9 x 1.9 cm. The aortic bifurcation appears normal.      Impression: Stable dilation of the mid abdominal aorta to 3.4 cm. This report was finalized on 02/04/2020 10:04 by Dr. Steve Yanez MD.    Us Arterial Doppler Lower Extremity Bilateral    Result Date: 2/4/2020  Narrative:  History: Aneurysmal disease  Comments: Arterial duplex exam of bilateral popliteal arteries was performed.  Right leg: The proximal left popliteal artery diameter is 0.84 cm.  The mid left popliteal artery diameter is 0.57 cm.  The distal left popliteal artery diameter is 0.55 cm.  The peak systolic velocity in the popliteal artery measured 56.4 cm/s.   Left leg: The proximal right popliteal artery diameter is 1.17 cm.  The mid right popliteal  artery diameter is 1.74 cm.  The distal right  popliteal artery diameter is 0.65 cm. The peak systolic velocity in the popliteal artery measured 73.1 cm/s.      Impression: Impression: Left popliteal aneurysm with measurements above.   This report was finalized on 02/04/2020 15:50 by Dr. Tomas Ryan MD.      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 difficile colitis   • Hypokalemia   • Anemia   • Hypomagnesemia   • Hypoxia   • Nausea vomiting and diarrhea   • Acute hypercapnic respiratory failure (CMS/HCC)   • Weight loss   • Tobacco dependence   • Popliteal artery aneurysm (CMS/HCC)   • History of Clostridium difficile colitis   • Moderate malnutrition (CMS/HCC)   • Stage 3 severe COPD by GOLD classification (CMS/[facility])   • Pulmonary emphysema (CMS/HCC)   • Chronic respiratory failure with hypoxia and hypercapnia (CMS/HCC)   • Former smoker   • Hyperlipidemia         ICD-10-CM ICD-9-CM   1. Abdominal aortic aneurysm (AAA) without rupture (CMS/HCC) I71.4 441.4   2. Hyperlipidemia, unspecified hyperlipidemia type E78.5 272.4   3. Popliteal artery aneurysm (CMS/[facility]) I72.4 442.3         Plan: After thoroughly evaluating Jonnie Sheets Jr., I believe the best course of action is to remain conservative from vascular surgery standpoint.  I did review his testing which shows a left popliteal artery aneurysm to be measuring 1.74 cm and abdominal aortic aneurysm stable at 3.4 cm.  His carotid duplex shows less than 50%  carotid stenosis bilaterally.  We will see him back in 6 months with repeat noninvasive testing for continued surveillance, including a CTA of the abdomen and pelvis with bilateral lower extremity runoff.   I did discuss vascular risk factors as they pertain to the progression of vascular disease including controlling his hyperlipidemia, which is stable.  He is taking Mevacor.  The patient can continue taking their current medication regimen as previously planned.  This was all discussed in full with complete understanding.    Thank you for allowing me to participate in the care of your patient.  Please do not hesitate with any questions or concerns.  I will keep you aware of any further encounters with Jonnie Sheets Jr..        Sincerely yours,         PRATEEK Saleem

## 2020-02-26 LAB
ALBUMIN SERPL-MCNC: 4.4 G/DL (ref 3.5–5.2)
ALP BLD-CCNC: 64 U/L (ref 40–130)
ALT SERPL-CCNC: 10 U/L (ref 5–41)
ANION GAP SERPL CALCULATED.3IONS-SCNC: 17 MMOL/L (ref 7–19)
AST SERPL-CCNC: 37 U/L (ref 5–40)
BASOPHILS ABSOLUTE: 0.1 K/UL (ref 0–0.2)
BASOPHILS RELATIVE PERCENT: 0.8 % (ref 0–1)
BILIRUB SERPL-MCNC: 0.4 MG/DL (ref 0.2–1.2)
BILIRUBIN URINE: NEGATIVE
BLOOD, URINE: NEGATIVE
BUN BLDV-MCNC: 9 MG/DL (ref 8–23)
CALCIUM SERPL-MCNC: 9.7 MG/DL (ref 8.8–10.2)
CHLORIDE BLD-SCNC: 94 MMOL/L (ref 98–111)
CLARITY: CLEAR
CO2: 27 MMOL/L (ref 22–29)
COLOR: YELLOW
CREAT SERPL-MCNC: 1.1 MG/DL (ref 0.5–1.2)
CREATININE URINE: 118.5 MG/DL (ref 4.2–622)
EOSINOPHILS ABSOLUTE: 0.3 K/UL (ref 0–0.6)
EOSINOPHILS RELATIVE PERCENT: 3 % (ref 0–5)
GFR NON-AFRICAN AMERICAN: >60
GLUCOSE BLD-MCNC: 111 MG/DL (ref 74–109)
GLUCOSE URINE: NEGATIVE MG/DL
HCT VFR BLD CALC: 45.9 % (ref 42–52)
HEMOGLOBIN: 14.2 G/DL (ref 14–18)
IMMATURE GRANULOCYTES #: 0.1 K/UL
KETONES, URINE: NEGATIVE MG/DL
LEUKOCYTE ESTERASE, URINE: NEGATIVE
LYMPHOCYTES ABSOLUTE: 2.5 K/UL (ref 1.1–4.5)
LYMPHOCYTES RELATIVE PERCENT: 25.5 % (ref 20–40)
MAGNESIUM: 1.6 MG/DL (ref 1.6–2.4)
MCH RBC QN AUTO: 29.6 PG (ref 27–31)
MCHC RBC AUTO-ENTMCNC: 30.9 G/DL (ref 33–37)
MCV RBC AUTO: 95.6 FL (ref 80–94)
MONOCYTES ABSOLUTE: 1 K/UL (ref 0–0.9)
MONOCYTES RELATIVE PERCENT: 10.3 % (ref 0–10)
NEUTROPHILS ABSOLUTE: 6 K/UL (ref 1.5–7.5)
NEUTROPHILS RELATIVE PERCENT: 59.8 % (ref 50–65)
NITRITE, URINE: NEGATIVE
PARATHYROID HORMONE INTACT: 41.7 PG/ML (ref 15–65)
PDW BLD-RTO: 13.8 % (ref 11.5–14.5)
PH UA: 7 (ref 5–8)
PHOSPHORUS: 3 MG/DL (ref 2.5–4.5)
PLATELET # BLD: 269 K/UL (ref 130–400)
PMV BLD AUTO: 11.3 FL (ref 9.4–12.4)
POTASSIUM SERPL-SCNC: 4.3 MMOL/L (ref 3.5–5)
PROTEIN PROTEIN: 9 MG/DL (ref 15–45)
PROTEIN UA: NEGATIVE MG/DL
RBC # BLD: 4.8 M/UL (ref 4.7–6.1)
SODIUM BLD-SCNC: 138 MMOL/L (ref 136–145)
SPECIFIC GRAVITY UA: 1.01 (ref 1–1.03)
TOTAL PROTEIN: 7.7 G/DL (ref 6.6–8.7)
URIC ACID, SERUM: 7.3 MG/DL (ref 3.4–7)
UROBILINOGEN, URINE: 0.2 E.U./DL
VITAMIN D 25-HYDROXY: 38.9 NG/ML
WBC # BLD: 10 K/UL (ref 4.8–10.8)

## 2020-05-08 NOTE — PROGRESS NOTES
" PRATEEK Gallego  [facility]   Respiratory Disease Clinic  [address] [place] [address]  Phone: [phone]  Fax: [phone]     Jonnie Sheets Jr. is a 74 y.o. male.   CC:   Chief Complaint   Patient presents with   • COPD        HPI: This visit has been rescheduled as a phone visit to comply with patient safety concerns in accordance with CDC recommendations. Total time of discussion was 10 minutes.  He says he is doing fairly well right now and feels that his pulmonary status is \"about the same\".  He has a history of severe COPD, chronic respiratory failure with hypoxia and hypercapnia and emphysema.  He had quit smoking for a short period of time in 2019 but unfortunately he has started again.  He has a greater than 30 pack year smoking history and I recommend that he get screened with a low-dose CT for lung cancer screening and he is agreeable.  He is already scheduled to have a CT of the abdomen and pelvis to monitor a AAA that is followed by vascular at [facility], [place] so we will try and schedule the CT scan in conjunction with his other scan which is anticipated to be done in August.  He wears oxygen at night and uses it as needed during the day.  He is on daily Symbicort and DuoNeb's and is needing refills on both.  He is followed by Dr. Burton for routine medical care and he stays up-to-date on flu and pneumonia vaccines.    The following portions of the patient's history were reviewed and updated as appropriate: allergies, current medications, past family history, past medical history, past social history, past surgical history and problem list.    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)    • Umbilical hernia        Family History   Problem Relation Age of Onset   • Heart disease Father    • Colon cancer Neg Hx    • Colon polyps Neg Hx        Social History     Socioeconomic History   • Marital status:      Spouse name: Not on file   • Number of children: Not on file   • Years of education: Not on file   • Highest education level: Not on file   Tobacco Use   • Smoking status: Former Smoker     Packs/day: 0.75     Years: 57.00     Pack years: 42.75     Types: Cigarettes   • Smokeless tobacco: Never Used   Substance and Sexual Activity   • Alcohol use: Yes     Frequency: 2-4 times a month   • Drug use: No   • Sexual activity: Defer       Review of Systems   Constitutional: Negative for appetite change, chills, fatigue and fever.   HENT: Negative for swollen glands, trouble swallowing and voice change.    Eyes: Negative for visual disturbance.   Respiratory: Positive for shortness of breath. Negative for cough and wheezing.    Cardiovascular: Negative for chest pain and leg swelling.   Gastrointestinal: Negative for abdominal distention, abdominal pain, nausea and vomiting.   Genitourinary: Negative.    Musculoskeletal: Negative for gait problem and myalgias.   Skin: Negative.    Neurological: Negative for weakness.   Hematological: Negative.    Psychiatric/Behavioral: The patient is not nervous/anxious.        There were no vitals taken for this visit.    Physical Exam   Constitutional: No distress.   Neurological: He is alert.   Voice clear   Psychiatric: His behavior is normal.         Jonnie was seen today for copd.    Diagnoses and all orders for this visit:    Stage 3 severe COPD by GOLD classification (CMS/[facility])  -     ipratropium-albuterol (DUO-NEB) 0.5-2.5 mg/3 ml nebulizer; Take 3 mL by nebulization 4  (Four) Times a Day for 90 days.  -     SYMBICORT 160-4.5 MCG/ACT inhaler; Inhale 2 puffs 2 (Two) Times a Day.    Chronic respiratory failure with hypoxia and hypercapnia (CMS/HCC)    Pulmonary emphysema, unspecified emphysema type (CMS/HCC)    Personal history of tobacco use, presenting hazards to health  -     CT Chest Low Dose Wo; Future    Encounter for screening for lung cancer  -     CT Chest Low Dose Wo; Future      Patient's There is no height or weight on file to calculate BMI. BMI Cannot be assessed during this visit.      Follow-up/Plan: Refills for Symbicort and DuoNeb were sent to the patient's pharmacy.  He will continue to utilize his oxygen at night and if needed during the day.  Smoking cessation is once again recommended.  We will get him set up to do a low-dose CT for lung cancer screening prior to his 75th birthday and would anticipate this being done sometime in early August.  We will plan for a routine office visit in approximately 9 months with flow volume loop.    Porfirio Barrett, PRATEEK  5/13/2020  13:20           95

## 2020-05-13 ENCOUNTER — OFFICE VISIT (OUTPATIENT)
Dept: PULMONOLOGY | Facility: CLINIC | Age: 75
End: 2020-05-13

## 2020-05-13 DIAGNOSIS — J96.11 CHRONIC RESPIRATORY FAILURE WITH HYPOXIA AND HYPERCAPNIA (HCC): ICD-10-CM

## 2020-05-13 DIAGNOSIS — Z12.2 ENCOUNTER FOR SCREENING FOR LUNG CANCER: ICD-10-CM

## 2020-05-13 DIAGNOSIS — J96.12 CHRONIC RESPIRATORY FAILURE WITH HYPOXIA AND HYPERCAPNIA (HCC): ICD-10-CM

## 2020-05-13 DIAGNOSIS — J44.9 STAGE 3 SEVERE COPD BY GOLD CLASSIFICATION (HCC): Primary | ICD-10-CM

## 2020-05-13 DIAGNOSIS — J43.9 PULMONARY EMPHYSEMA, UNSPECIFIED EMPHYSEMA TYPE (HCC): ICD-10-CM

## 2020-05-13 DIAGNOSIS — Z87.891 PERSONAL HISTORY OF TOBACCO USE, PRESENTING HAZARDS TO HEALTH: ICD-10-CM

## 2020-05-13 PROCEDURE — 99441 PR PHYS/QHP TELEPHONE EVALUATION 5-10 MIN: CPT | Performed by: NURSE PRACTITIONER

## 2020-05-13 RX ORDER — BUDESONIDE AND FORMOTEROL FUMARATE DIHYDRATE 160; 4.5 UG/1; UG/1
2 AEROSOL RESPIRATORY (INHALATION)
Qty: 3 INHALER | Refills: 3 | Status: SHIPPED | OUTPATIENT
Start: 2020-05-13 | End: 2020-10-26

## 2020-05-13 RX ORDER — BUDESONIDE AND FORMOTEROL FUMARATE DIHYDRATE 160; 4.5 UG/1; UG/1
AEROSOL RESPIRATORY (INHALATION)
COMMUNITY
Start: 2020-04-29 | End: 2020-05-13 | Stop reason: SDUPTHER

## 2020-05-13 RX ORDER — IPRATROPIUM BROMIDE AND ALBUTEROL SULFATE 2.5; .5 MG/3ML; MG/3ML
3 SOLUTION RESPIRATORY (INHALATION)
Qty: 1080 ML | Refills: 3 | Status: SHIPPED | OUTPATIENT
Start: 2020-05-13 | End: 2020-08-11

## 2020-05-13 NOTE — PATIENT INSTRUCTIONS
You will be scheduled to have a screening CT of your chest at the same time that you have a CT of your abdomen and pelvis in August.    Lung Cancer Screening  A lung cancer screening is a test that checks for lung cancer. Lung cancer screening is done to look for lung cancer in its very early stages, before it spreads and becomes harder to treat and before symptoms appear. Finding cancer early improves the chances of successful treatment. It may save your life.  Should I be screened for lung cancer?  You should be screened for lung cancer if all of these apply:  · You currently smoke or you have quit smoking within the past 15 years.  · You are 55-74 years old. Screening may be recommended up to age 80 depending on your overall health and other factors.  · You are in good general health.  · You have a smoking history of 1 pack a day for 30 years or 2 packs a day for 15 years.  Screening may also be recommended if you are at high risk for the disease. You may be at high risk if:  · You have a family history of lung cancer.  · You have been exposed to asbestos.  · You have chronic obstructive pulmonary disease (COPD).  · You have a history of previous lung cancer.  How often should I be screened for lung cancer?    If you are at risk for lung cancer, it is recommended that you are screened once a year. The recommended screening test is a low-dose CT scan.  How can I lower my risk of lung cancer?  To lower your risk of developing lung cancer:  · If you smoke, stop smoking all tobacco products.  · Avoid secondhand smoke.  · Avoid exposure to radiation.  · Avoid exposure to radon gas. Have your home checked for radon regularly.  · Avoid things that cause cancer (carcinogens).  · Avoid living or working in places with high air pollution.  Where to find more information  Ask your health care provider about the risks and benefits of screening. More information and resources are available from these  organizations:  · American Cancer Society (ACS): www.cancer.org  · American Lung Association: www.lung.org  Contact a health care provider if:  · You start to show symptoms of lung cancer, including:  ? Coughing that will not go away.  ? Wheezing.  ? Chest pain.  ? Coughing up blood.  ? Shortness of breath.  ? Weight loss that cannot be explained.  ? Constant fatigue.  Summary  · Lung cancer screening may find lung cancer before symptoms appear. Finding cancer early improves the chances of successful treatment. It may save your life.  · If you are at risk for lung cancer, it is recommended that you are screened once a year. The recommended screening test is a low-dose CT scan.  · You can make lifestyle changes to lower your risk of lung cancer.  · Ask your health care provider about the risks and benefits of screening.  This information is not intended to replace advice given to you by your health care provider. Make sure you discuss any questions you have with your health care provider.  Document Released: 11/08/2017 Document Revised: 03/12/2019 Document Reviewed: 11/08/2017  [employer] Interactive Patient Education © 2020 [employer]

## 2020-06-01 LAB
ALBUMIN SERPL-MCNC: 4.4 G/DL (ref 3.5–5.2)
ALP BLD-CCNC: 60 U/L (ref 40–130)
ALT SERPL-CCNC: 11 U/L (ref 5–41)
ANION GAP SERPL CALCULATED.3IONS-SCNC: 10 MMOL/L (ref 7–19)
AST SERPL-CCNC: 39 U/L (ref 5–40)
BASOPHILS ABSOLUTE: 0.1 K/UL (ref 0–0.2)
BASOPHILS RELATIVE PERCENT: 0.6 % (ref 0–1)
BILIRUB SERPL-MCNC: 0.5 MG/DL (ref 0.2–1.2)
BUN BLDV-MCNC: 12 MG/DL (ref 8–23)
CALCIUM SERPL-MCNC: 9.7 MG/DL (ref 8.8–10.2)
CHLORIDE BLD-SCNC: 98 MMOL/L (ref 98–111)
CHOLESTEROL, TOTAL: 183 MG/DL (ref 160–199)
CO2: 29 MMOL/L (ref 22–29)
CREAT SERPL-MCNC: 1.2 MG/DL (ref 0.5–1.2)
EOSINOPHILS ABSOLUTE: 0.3 K/UL (ref 0–0.6)
EOSINOPHILS RELATIVE PERCENT: 2.8 % (ref 0–5)
GFR NON-AFRICAN AMERICAN: 59
GLUCOSE BLD-MCNC: 114 MG/DL (ref 74–109)
HCT VFR BLD CALC: 43.2 % (ref 42–52)
HDLC SERPL-MCNC: 53 MG/DL (ref 55–121)
HEMOGLOBIN: 13.5 G/DL (ref 14–18)
IMMATURE GRANULOCYTES #: 0.1 K/UL
LDL CHOLESTEROL CALCULATED: 87 MG/DL
LYMPHOCYTES ABSOLUTE: 1.8 K/UL (ref 1.1–4.5)
LYMPHOCYTES RELATIVE PERCENT: 14.6 % (ref 20–40)
MCH RBC QN AUTO: 30.4 PG (ref 27–31)
MCHC RBC AUTO-ENTMCNC: 31.3 G/DL (ref 33–37)
MCV RBC AUTO: 97.3 FL (ref 80–94)
MONOCYTES ABSOLUTE: 1.3 K/UL (ref 0–0.9)
MONOCYTES RELATIVE PERCENT: 10.5 % (ref 0–10)
NEUTROPHILS ABSOLUTE: 8.7 K/UL (ref 1.5–7.5)
NEUTROPHILS RELATIVE PERCENT: 70.6 % (ref 50–65)
PDW BLD-RTO: 15 % (ref 11.5–14.5)
PLATELET # BLD: 263 K/UL (ref 130–400)
PMV BLD AUTO: 10.9 FL (ref 9.4–12.4)
POTASSIUM SERPL-SCNC: 4.6 MMOL/L (ref 3.5–5)
RBC # BLD: 4.44 M/UL (ref 4.7–6.1)
SODIUM BLD-SCNC: 137 MMOL/L (ref 136–145)
TOTAL PROTEIN: 7.5 G/DL (ref 6.6–8.7)
TRIGL SERPL-MCNC: 217 MG/DL (ref 0–149)
WBC # BLD: 12.3 K/UL (ref 4.8–10.8)

## 2020-08-03 ENCOUNTER — HOSPITAL ENCOUNTER (OUTPATIENT)
Dept: CT IMAGING | Facility: HOSPITAL | Age: 75
Discharge: HOME | End: 2020-08-03
Admitting: NURSE PRACTITIONER

## 2020-08-03 ENCOUNTER — DOCUMENTATION (OUTPATIENT)
Dept: CT IMAGING | Facility: HOSPITAL | Age: 75
End: 2020-08-03

## 2020-08-03 DIAGNOSIS — Z12.2 ENCOUNTER FOR SCREENING FOR LUNG CANCER: ICD-10-CM

## 2020-08-03 DIAGNOSIS — Z87.891 PERSONAL HISTORY OF TOBACCO USE, PRESENTING HAZARDS TO HEALTH: ICD-10-CM

## 2020-08-03 PROCEDURE — G0297 LDCT FOR LUNG CA SCREEN: HCPCS

## 2020-08-03 NOTE — PROGRESS NOTES
Discussed the benefits and risks associated with LDCT, the criteria for eligibility, and the importance of follow-up testing and/or annual screening.

## 2020-08-10 ENCOUNTER — HOSPITAL ENCOUNTER (OUTPATIENT)
Dept: CT IMAGING | Facility: HOSPITAL | Age: 75
Discharge: HOME | End: 2020-08-10
Admitting: NURSE PRACTITIONER

## 2020-08-10 DIAGNOSIS — I71.40 ABDOMINAL AORTIC ANEURYSM (AAA) WITHOUT RUPTURE (HCC): ICD-10-CM

## 2020-08-10 DIAGNOSIS — I72.4 POPLITEAL ARTERY ANEURYSM (HCC): ICD-10-CM

## 2020-08-10 LAB — CREAT BLDA-MCNC: 1 MG/DL (ref 0.6–1.3)

## 2020-08-10 PROCEDURE — 82565 ASSAY OF CREATININE: CPT

## 2020-08-10 PROCEDURE — 75635 CT ANGIO ABDOMINAL ARTERIES: CPT

## 2020-08-10 PROCEDURE — 0 IOPAMIDOL PER 1 ML: Performed by: NURSE PRACTITIONER

## 2020-08-10 RX ADMIN — IOPAMIDOL 150 ML: 755 INJECTION, SOLUTION INTRAVENOUS at 13:59

## 2020-08-11 ENCOUNTER — APPOINTMENT (OUTPATIENT)
Dept: CT IMAGING | Facility: HOSPITAL | Age: 75
End: 2020-08-11

## 2020-08-18 ENCOUNTER — TELEPHONE (OUTPATIENT)
Dept: VASCULAR SURGERY | Facility: CLINIC | Age: 75
End: 2020-08-18

## 2020-08-18 ENCOUNTER — OFFICE VISIT (OUTPATIENT)
Dept: VASCULAR SURGERY | Facility: CLINIC | Age: 75
End: 2020-08-18

## 2020-08-18 VITALS
HEIGHT: 65 IN | SYSTOLIC BLOOD PRESSURE: 132 MMHG | BODY MASS INDEX: 26.99 KG/M2 | WEIGHT: 162 LBS | HEART RATE: 81 BPM | OXYGEN SATURATION: 93 % | DIASTOLIC BLOOD PRESSURE: 74 MMHG

## 2020-08-18 DIAGNOSIS — E78.5 HYPERLIPIDEMIA, UNSPECIFIED HYPERLIPIDEMIA TYPE: ICD-10-CM

## 2020-08-18 DIAGNOSIS — I72.4 POPLITEAL ARTERY ANEURYSM (HCC): Primary | ICD-10-CM

## 2020-08-18 PROCEDURE — 99214 OFFICE O/P EST MOD 30 MIN: CPT | Performed by: SURGERY

## 2020-08-18 RX ORDER — ASPIRIN 81 MG/1
81 TABLET ORAL DAILY
Start: 2020-08-18

## 2020-08-18 NOTE — TELEPHONE ENCOUNTER
Spoke with Mr Sheets letting him know that Dr Ryan would like him to begin taking an 81 mg enteric-coated Aspirin daily. Mr Sheets verbalized understanding.         ----- Message from PRATEEK Saleem sent at 8/18/2020  3:44 PM CDT -----  Can you please let him know Dr. Ryan would like him to begin taking aspirin enteric-coated 81 mg daily.

## 2020-08-18 NOTE — PROGRESS NOTES
"8/18/2020       Dandy Burton MD  [address] [place] [address] [id number]  [place] [address]    Jonnie Sheets Jr.  1945    Chief Complaint   Patient presents with   • Follow-up     6 Month Follow Up For Abdominal Aortic Aneurysm without Rupture and Popliteal Artery Aneurysm. Test [id number] CT pad angio aaa bilat runoff w wo. Patient denies any stroke like symptoms.    • Med Management     Verbally verified medications with patient/wife. Mr Sudeep/Wife verbalized all medications are correct and up to date. Mr Sheets did verbalize he does not take 81 mg Aspirin.        Dear Dandy Burton MD       HPI  I had the pleasure of seeing your patient Jonnie Sheets Jr. in the office today. As you recall, Jonnie Sheets Jr. is a 74 y.o.  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   He denies any claudication to his lower extremities.  He does receive knee injections for his left knee, which he reports does help.  He is maintained on Mevacor but does not take an antiplatelet.  He was a previous smoker up until June 2019.  He did have noninvasive testing performed today, which I did review in office.      Review of Systems   Constitutional: Positive for activity change.   HENT: Negative.    Eyes: Negative.    Respiratory: Positive for shortness of breath.    Cardiovascular: Negative.    Gastrointestinal: Negative.    Endocrine: Negative.    Genitourinary: Negative.    Musculoskeletal: Positive for joint swelling.   Skin: Negative.    Allergic/Immunologic: Negative.    Neurological: Negative.    Hematological: Negative.    Psychiatric/Behavioral: Negative.    All other systems reviewed and are negative.      /74 (BP Location: Left arm, Patient Position: Sitting, Cuff Size: Adult)   Pulse 81   Ht 165.1 cm (65\")   Wt 73.5 kg (162 lb)   SpO2 93%   BMI 26.96 kg/m²   Physical Exam   Constitutional: He is oriented to person, place, and time. Vital signs are normal. He appears well-developed and " well-nourished. He is cooperative. No distress.   HENT:   Head: Normocephalic and atraumatic.   Mouth/Throat: Oropharynx is clear and moist and mucous membranes are normal.   Eyes: Pupils are equal, round, and reactive to light.   Neck: Normal range of motion. Neck supple. No JVD present. Carotid bruit is not present.   Cardiovascular: Normal rate, regular rhythm, S1 normal, S2 normal, normal heart sounds and intact distal pulses. Exam reveals no gallop and no friction rub.   No murmur heard.  Pulses:       Femoral pulses are 2+ on the right side, and 2+ on the left side.       Popliteal pulses are 2+ on the right side, and 3+ on the left side.        Dorsalis pedis pulses are 2+ on the right side, and 2+ on the left side.        Posterior tibial pulses are 2+ on the right side, and 2+ on the left side.   Left popliteal aneurysm   Pulmonary/Chest: Effort normal. He has decreased breath sounds.   Abdominal: Soft. Normal appearance, normal aorta and bowel sounds are normal. He exhibits no abdominal bruit. There is no hepatosplenomegaly. There is no tenderness.   Musculoskeletal: Normal range of motion.     Vascular Status -  His right foot exhibits no edema. His left foot exhibits no edema.  Neurological: He is alert and oriented to person, place, and time. He has normal strength. No cranial nerve deficit.   Skin: Skin is warm, dry and intact. He is not diaphoretic.   Psychiatric: He has a normal mood and affect. His behavior is normal. Judgment and thought content normal. Cognition and memory are normal.   Nursing note and vitals reviewed.    Diagnostic data:  Ct Angio Abdominal Aorta Bilateral Iliofem Runoff    Result Date: 8/10/2020  Narrative: EXAM: CT ANGIO ABDOMINAL AORTA BILAT ILIOFEM RUNOFF- - 8/10/2020 1:52 PM CDT  HISTORY: Abdominal aortic aneurysm (AAA), known, follow up; I71.4-Abdominal aortic aneurysm, without rupture; I72.4-Aneurysm of artery of lower extremity   COMPARISON: 6/24/2019.  DOSE LENGTH  PRODUCT: 518 mGy cm. Automatic exposure control was utilized to make radiation dose as low as reasonably achievable.  TECHNIQUE: Enhanced  axial images of the abdomen and pelvis with runoff to the feet obtained with multiplanar reformats. 3D postprocessing, including MIPs, performed and images saved to PACS.  FINDINGS: VISUALIZED CHEST: Motion limited evaluation of the lung bases, there appears to be emphysema. No pleural or pericardial effusion.  Arterial phase of imaging limits evaluation. Motion limited exam.  LIVER: Circumscribed low-density liver lesions, largest at this anterior liver margin measuring 1.7 cm, are stable compared to 6/24/2019. Stability favors benignity.  BILIARY: No calcified gallstone. No intrahepatic or extrahepatic bile duct dilation.  PANCREAS: Normal pancreas contour and enhancement.  SPLEEN: Normal size and contour.  ADRENAL: Normal appearance of the bilateral adrenal glands.  GENITOURINARY: No hydronephrosis, urolithiasis or solid renal lesion. Bilateral renovascular calcifications. Urinary bladder is within normal limits. Borderline prostate size.  PERITONEUM: No free air or ascites.  GI TRACT: Normal configuration of the stomach and duodenum.  Numerous colonic diverticula. No evidence of focal diverticulitis. Changes of right hemicolectomy with bowel anastomosis. No evidence of bowel obstruction.  VESSELS: Infrarenal abdominal aortic aneurysm measures 3.0 x 3.1 cm, previously similar on 5/10/2019.  Celiac, superior mesenteric, bilateral renal and inferior mesenteric arteries appear patent.  Right common iliac, internal iliac, external iliac, common femoral, deep femoral, superficial femoral, popliteal arteries are patent. Right anterior tibial and peroneal artery patent to the ankle. Right posterior tibial artery patent into the foot.  Left common iliac, internal iliac, external iliac, common femoral, deep femoral, superficial femoral, popliteal arteries are patent. Popliteal artery  measures up to 2.0 cm, aneurysmal. Left anterior tibial, peroneal and posterior tibial arteries appear patent to the ankle.  Bilateral lower extremities with multifocal mild to moderate stenoses. No critical stenosis.  RETROPERITONEUM: No mass, lymphadenopathy or hemorrhage.  SOFT TISSUES: Normal appearance of the overlying abdominal soft tissues.   BONES: Similar severe height loss of L1 with retropulsion of the posterior vertebral body margin and probable moderate to severe spinal canal stenosis.       Impression: 1. Infrarenal abdominal aortic aneurysm measures 3.0 x 3.1 cm, similar compared to 5/10/2019. 2. Left popliteal artery aneurysm measuring up to 2.0 cm. 3. Left leg with three-vessel runoff to the level of the ankle. Right posterior tibial artery patent into the foot. 4. Similar severe height loss of L1 with retropulsion of the posterior vertebral body margin and probable moderate to severe spinal canal stenosis, not optimally evaluated on this exam. This report was finalized on 08/10/2020 15:21 by Dr Debra Tinajero MD.    Ct Chest Low Dose Wo    Result Date: 8/3/2020  Narrative: CT CHEST LOW DOSE WO- 8/3/2020 1:13 PM CDT  HISTORY: Lung cancer annual screening, asymptomatic, current smoker (min. 30 pack-yrs); Z87.891-Personal history of nicotine dependence; Z12.2-Encounter for screening for malignant neoplasm of respiratory organs  COMPARISON: 6/24/2019  DOSE LENGTH PRODUCT: 45 mGy cm. Automated exposure control was also utilized to decrease patient radiation dose.  TECHNIQUE: Axial images the chest are obtained without contrast.  FINDINGS:  There are calcified mediastinal and right hilar lymph nodes. No pathologic intrathoracic or axillary lymphadenopathy identified. Mild symmetrical gynecomastia. Mild cardiomegaly. Normal caliber thoracic aorta with scattered calcification. LAD calcification and/or stent.  Limited images the upper abdomen degraded by the low-dose protocol. Stable 1.8 cm hypodense  lesion in left hepatic lobe favorable for cyst. Calcified granuloma within the spleen.  Moderate emphysematous changes of lungs. Right upper lobe calcified granuloma. Scarring and atelectasis of the lung bases and inferior lingula. No suspicious pulmonary nodules. No pneumothorax. No discrete endobronchial mass.  No focal destructive osseous lesions.      Impression: 1. Moderate emphysema. Granulomatous calcifications. No suspicious pulmonary nodules. Dense LAD coronary artery calcification and/or stent. Mild cardiomegaly. Stable hypodense left liver lesion favorable for cyst. 2. Lung RADS category 1-negative exam. Continued screening with low-dose CT chest in 12 months recommended. This report was finalized on 08/03/2020 14:20 by Dr. Rylee Apodaca MD.      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 difficile colitis   • Hypokalemia   • Anemia   • Hypomagnesemia   • Hypoxia   • Nausea vomiting and diarrhea   • Acute hypercapnic respiratory failure (CMS/HCC)   • Weight loss   • Tobacco dependence   • Popliteal artery aneurysm (CMS/HCC)   • History of Clostridium difficile colitis   • Moderate malnutrition (CMS/HCC)   • Stage 3 severe COPD by GOLD classification (CMS/HCC)   • Pulmonary emphysema (CMS/HCC)   • Chronic respiratory failure with hypoxia and hypercapnia (CMS/HCC)   • Former smoker   • Hyperlipidemia         ICD-10-CM ICD-9-CM   1. Popliteal artery aneurysm (CMS/HCC) I72.4 442.3   2. Hyperlipidemia,  unspecified hyperlipidemia type E78.5 272.4         Plan: After thoroughly evaluating Jonnie Sheets Jr., I believe the best course of action is to proceed with endovascular repair of his left popliteal artery aneurysm.  He does have severe COPD and would like to hold off on repair at this time.  I did review his testing and his left popliteal artery aneurysm is currently measuring 2.1 cm.  He is having no difficulty at this time.  I did explain symptoms to be aware of and potential for loss of limb if this were to thrombose.  I explained he should be seen urgently.  His abdominal aortic aneurysm is currently measuring 3.1 cm.  I will tentatively put him in for follow-up in 6 months with repeat noninvasive testing including a left lower extremity arterial duplex.  He will think about this at home and I explained if he changes mind and would like to proceed with repair to call our office.  We will repeat ultrasound of the aorta and a carotid duplex in 1 year.  I will ask him to begin taking enteric-coated aspirin 81 mg daily.  I did discuss vascular risk factors as they pertain to the progression of vascular disease including controlling his hyperlipidemia, which is stable.  He is taking Mevacor.  The patient can continue taking their current medication regimen as previously planned.  This was all discussed in full with complete understanding.    Thank you for allowing me to participate in the care of your patient.  Please do not hesitate with any questions or concerns.  I will keep you aware of any further encounters with Jonnie Sheets Jr..        Sincerely yours,         PRATEEK Saleem

## 2020-09-15 ENCOUNTER — TELEPHONE (OUTPATIENT)
Dept: VASCULAR SURGERY | Facility: CLINIC | Age: 75
End: 2020-09-15

## 2020-09-15 ENCOUNTER — PREP FOR SURGERY (OUTPATIENT)
Dept: OTHER | Facility: HOSPITAL | Age: 75
End: 2020-09-15

## 2020-09-15 DIAGNOSIS — Z51.81 ENCOUNTER FOR MONITORING ANTIPLATELET THERAPY: ICD-10-CM

## 2020-09-15 DIAGNOSIS — Z79.02 ENCOUNTER FOR MONITORING ANTIPLATELET THERAPY: ICD-10-CM

## 2020-09-15 DIAGNOSIS — I72.4 POPLITEAL ARTERY ANEURYSM (HCC): Primary | ICD-10-CM

## 2020-09-15 DIAGNOSIS — Z01.818 PREOP TESTING: ICD-10-CM

## 2020-09-15 DIAGNOSIS — I73.9 PERIPHERAL VASCULAR DISEASE, UNSPECIFIED (HCC): ICD-10-CM

## 2020-09-15 RX ORDER — BUPIVACAINE HCL/0.9 % NACL/PF 0.1 %
2 PLASTIC BAG, INJECTION (ML) EPIDURAL ONCE
Status: CANCELLED | OUTPATIENT
Start: 2020-09-15 | End: 2020-09-15

## 2020-09-15 NOTE — TELEPHONE ENCOUNTER
Returned pts called and spoke to his wife. Wife stated patient was now ready to schedule his surgery with Dr. Ryan. Spouse voiced her concern about patients stage 4 COPD and requested surgery to be later in the day . I will discuss with Dr. Ryan about wife's concerns.   I explained I would need to get this authorized and once I did I would call back to scheduled.

## 2020-09-17 ENCOUNTER — TELEPHONE (OUTPATIENT)
Dept: VASCULAR SURGERY | Facility: CLINIC | Age: 75
End: 2020-09-17

## 2020-09-17 PROBLEM — Z01.818 PREOP TESTING: Status: ACTIVE | Noted: 2020-09-17

## 2020-09-17 NOTE — TELEPHONE ENCOUNTER
Spoke with spouse about patients upcoming procedure with Dr. Ryan. Both expressed understanding on all instructions. Spouse stated she would call me back if she had anymore questions.

## 2020-09-22 ENCOUNTER — TRANSCRIBE ORDERS (OUTPATIENT)
Dept: ADMINISTRATIVE | Facility: HOSPITAL | Age: 75
End: 2020-09-22

## 2020-09-22 DIAGNOSIS — Z01.818 PREOP TESTING: Primary | ICD-10-CM

## 2020-09-23 ENCOUNTER — APPOINTMENT (OUTPATIENT)
Dept: PREADMISSION TESTING | Facility: HOSPITAL | Age: 75
End: 2020-09-23

## 2020-09-23 VITALS
WEIGHT: 169.53 LBS | HEIGHT: 64 IN | SYSTOLIC BLOOD PRESSURE: 133 MMHG | DIASTOLIC BLOOD PRESSURE: 65 MMHG | BODY MASS INDEX: 28.94 KG/M2 | RESPIRATION RATE: 18 BRPM | HEART RATE: 80 BPM | OXYGEN SATURATION: 93 %

## 2020-09-23 DIAGNOSIS — Z01.818 PREOP TESTING: ICD-10-CM

## 2020-09-23 DIAGNOSIS — Z51.81 ENCOUNTER FOR MONITORING ANTIPLATELET THERAPY: ICD-10-CM

## 2020-09-23 DIAGNOSIS — I73.9 PERIPHERAL VASCULAR DISEASE, UNSPECIFIED (HCC): ICD-10-CM

## 2020-09-23 DIAGNOSIS — Z79.02 ENCOUNTER FOR MONITORING ANTIPLATELET THERAPY: ICD-10-CM

## 2020-09-23 DIAGNOSIS — I72.4 POPLITEAL ARTERY ANEURYSM (HCC): ICD-10-CM

## 2020-09-23 LAB
ANION GAP SERPL CALCULATED.3IONS-SCNC: 13 MMOL/L (ref 5–15)
APTT PPP: 34.2 SECONDS (ref 24.1–35)
BASOPHILS # BLD AUTO: 0.06 10*3/MM3 (ref 0–0.2)
BASOPHILS NFR BLD AUTO: 0.6 % (ref 0–1.5)
BUN SERPL-MCNC: 9 MG/DL (ref 8–23)
BUN/CREAT SERPL: 9.2 (ref 7–25)
CALCIUM SPEC-SCNC: 9.5 MG/DL (ref 8.6–10.5)
CHLORIDE SERPL-SCNC: 93 MMOL/L (ref 98–107)
CO2 SERPL-SCNC: 28 MMOL/L (ref 22–29)
CREAT SERPL-MCNC: 0.98 MG/DL (ref 0.76–1.27)
DEPRECATED RDW RBC AUTO: 46.8 FL (ref 37–54)
EOSINOPHIL # BLD AUTO: 0.2 10*3/MM3 (ref 0–0.4)
EOSINOPHIL NFR BLD AUTO: 1.9 % (ref 0.3–6.2)
ERYTHROCYTE [DISTWIDTH] IN BLOOD BY AUTOMATED COUNT: 13.8 % (ref 12.3–15.4)
GFR SERPL CREATININE-BSD FRML MDRD: 74 ML/MIN/1.73
GLUCOSE SERPL-MCNC: 100 MG/DL (ref 65–99)
HCT VFR BLD AUTO: 38.5 % (ref 37.5–51)
HGB BLD-MCNC: 12.5 G/DL (ref 13–17.7)
IMM GRANULOCYTES # BLD AUTO: 0.08 10*3/MM3 (ref 0–0.05)
IMM GRANULOCYTES NFR BLD AUTO: 0.8 % (ref 0–0.5)
INR PPP: 1.1 (ref 0.91–1.09)
LYMPHOCYTES # BLD AUTO: 1.99 10*3/MM3 (ref 0.7–3.1)
LYMPHOCYTES NFR BLD AUTO: 18.8 % (ref 19.6–45.3)
MCH RBC QN AUTO: 30.3 PG (ref 26.6–33)
MCHC RBC AUTO-ENTMCNC: 32.5 G/DL (ref 31.5–35.7)
MCV RBC AUTO: 93.2 FL (ref 79–97)
MONOCYTES # BLD AUTO: 1.15 10*3/MM3 (ref 0.1–0.9)
MONOCYTES NFR BLD AUTO: 10.8 % (ref 5–12)
NEUTROPHILS NFR BLD AUTO: 67.1 % (ref 42.7–76)
NEUTROPHILS NFR BLD AUTO: 7.13 10*3/MM3 (ref 1.7–7)
NRBC BLD AUTO-RTO: 0 /100 WBC (ref 0–0.2)
PLATELET # BLD AUTO: 272 10*3/MM3 (ref 140–450)
PMV BLD AUTO: 11.5 FL (ref 6–12)
POTASSIUM SERPL-SCNC: 4 MMOL/L (ref 3.5–5.2)
PROTHROMBIN TIME: 13.8 SECONDS (ref 11.9–14.6)
RBC # BLD AUTO: 4.13 10*6/MM3 (ref 4.14–5.8)
SODIUM SERPL-SCNC: 134 MMOL/L (ref 136–145)
WBC # BLD AUTO: 10.61 10*3/MM3 (ref 3.4–10.8)

## 2020-09-23 PROCEDURE — 93010 ELECTROCARDIOGRAM REPORT: CPT | Performed by: INTERNAL MEDICINE

## 2020-09-23 PROCEDURE — 85730 THROMBOPLASTIN TIME PARTIAL: CPT | Performed by: NURSE PRACTITIONER

## 2020-09-23 PROCEDURE — 85025 COMPLETE CBC W/AUTO DIFF WBC: CPT | Performed by: NURSE PRACTITIONER

## 2020-09-23 PROCEDURE — 85610 PROTHROMBIN TIME: CPT | Performed by: NURSE PRACTITIONER

## 2020-09-23 PROCEDURE — 80048 BASIC METABOLIC PNL TOTAL CA: CPT | Performed by: NURSE PRACTITIONER

## 2020-09-23 PROCEDURE — 36415 COLL VENOUS BLD VENIPUNCTURE: CPT

## 2020-09-23 PROCEDURE — 93005 ELECTROCARDIOGRAM TRACING: CPT

## 2020-09-23 NOTE — DISCHARGE INSTRUCTIONS
DAY OF SURGERY INSTRUCTIONS        YOUR SURGEON: dr rubio    PROCEDURE: ***left lower extremity angiogram/endovascular popliteal artery aneurysm repair    DATE OF SURGERY: ***9/30/2020    ARRIVAL TIME: AS DIRECTED BY OFFICE    YOU MAY TAKE THE FOLLOWING MEDICATION(S) THE MORNING OF SURGERY WITH A SIP OF WATER: ***no medications      ALL OTHER HOME MEDICATION CHECK WITH YOUR PHYSICIAN      DO NOT TAKE ANY ERECTILE DYSFUNCTION MEDICATIONS (EX: CIALIS, VIAGRA) 24 HOURS PRIOR TO SURGERY                      MANAGING PAIN AFTER SURGERY    We know you are probably wondering what your pain will be like after surgery.  Following surgery it is unrealistic to expect you will not have pain.   Pain is how our bodies let us know that something is wrong or cautions us to be careful.  That said, our goal is to make your pain tolerable.    Methods we may use to treat your pain include (oral or IV medications, PCAs, epidurals, nerve blocks, etc.)   While some procedures require IV pain medications for a short time after surgery, transitioning to pain medications by mouth allows for better management of pain.   Your nurse will encourage you to take oral pain medications whenever possible.  IV medications work almost immediately, but only last a short while.  Taking medications by mouth allows for a more constant level of medication in your blood stream for a longer period of time.      Once your pain is out of control it is harder to get back under control.  It is important you are aware when your next dose of pain medication is due.  If you are admitted, your nurse may write the time of your next dose on the white board in your room to help you remember.      We are interested in your pain and encourage you to inform us about aggravating factors during your visit.   Many times a simple repositioning every few hours can make a big difference.    If your physician says it is okay, do not let your pain prevent you from getting out  of bed. Be sure to call your nurse for assistance prior to getting up so you do not fall.      Before surgery, please decide your tolerable pain goal.  These faces help describe the pain ratings we use on a 0-10 scale.   Be prepared to tell us your goal and whether or not you take pain or anxiety medications at home.          BEFORE YOU COME TO THE HOSPITAL  (Pre-op instructions)  • Do not eat, drink, smoke or chew gum after midnight the night before surgery.  This also includes no mints.  • Morning of surgery take only the medicines you have been instructed with a sip of water unless otherwise instructed  by your physician.  • Do not shave, wear makeup or dark nail polish.  • Remove all jewelry including rings.  • Leave anything you consider valuable at home.  • Leave your suitcase in the car until after your surgery.  • Bring the following with you if applicable:  o Picture ID and insurance, Medicare or Medicaid cards  o Co-pay/deductible required by insurance (cash, check, credit card)  o Copy of advance directive, living will or power-of- documents if not brought to PAT  o CPAP or BIPAP mask and tubing  o Relaxation aids ( book, magazine), etc.  o Hearing aids                        ON THE DAY OF SURGERY  · On the day of surgery check in at registration located at the main entrance of the hospital.   ? You will be registered and given a beeper with instructions where to wait in the main lobby.  ? When your beeper lights up and vibrates a member of the Outpatient Surgery staff will meet you at the double doors under the stair steps and escort you to your preoperative room.   · You may have cloth compression devices placed on your legs. These help to prevent blood clots and reduce swelling in your legs.  · An IV may be inserted into one of your veins.  · In the operating room, you may be given one or more of the following:  ? A medicine to help you relax (sedative).  ? A medicine to numb the area (local  "anesthetic).  ? A medicine to make you fall asleep (general anesthetic).  ? A medicine that is injected into an area of your body to numb everything below the injection site (regional anesthetic).  · Your surgical site will be marked or identified.  · You may be given an antibiotic through your IV to help prevent infection.  Contact a health care provider if you:  · Develop a fever of more than 100.4°F (38°C) or other feelings of illness during the 48 hours before your surgery.  · Have symptoms that get worse.  Have questions or concerns about your surgery    General Anesthesia/Surgery, Adult  General anesthesia is the use of medicines to make a person \"go to sleep\" (unconscious) for a medical procedure. General anesthesia must be used for certain procedures, and is often recommended for procedures that:  · Last a long time.  · Require you to be still or in an unusual position.  · Are major and can cause blood loss.  The medicines used for general anesthesia are called general anesthetics. As well as making you unconscious for a certain amount of time, these medicines:  · Prevent pain.  · Control your blood pressure.  · Relax your muscles.  Tell a health care provider about:  · Any allergies you have.  · All medicines you are taking, including vitamins, herbs, eye drops, creams, and over-the-counter medicines.  · Any problems you or family members have had with anesthetic medicines.  · Types of anesthetics you have had in the past.  · Any blood disorders you have.  · Any surgeries you have had.  · Any medical conditions you have.  · Any recent upper respiratory, chest, or ear infections.  · Any history of:  ? Heart or lung conditions, such as heart failure, sleep apnea, asthma, or chronic obstructive pulmonary disease (COPD).  ?  service.  ? Depression or anxiety.  · Any tobacco or drug use, including marijuana or alcohol use.  · Whether you are pregnant or may be pregnant.  What are the risks?  Generally, " this is a safe procedure. However, problems may occur, including:  · Allergic reaction.  · Lung and heart problems.  · Inhaling food or liquid from the stomach into the lungs (aspiration).  · Nerve injury.  · Air in the bloodstream, which can lead to stroke.  · Extreme agitation or confusion (delirium) when you wake up from the anesthetic.  · Waking up during your procedure and being unable to move. This is rare.  These problems are more likely to develop if you are having a major surgery or if you have an advanced or serious medical condition. You can prevent some of these complications by answering all of your health care provider's questions thoroughly and by following all instructions before your procedure.  General anesthesia can cause side effects, including:  · Nausea or vomiting.  · A sore throat from the breathing tube.  · Hoarseness.  · Wheezing or coughing.  · Shaking chills.  · Tiredness.  · Body aches.  · Anxiety.  · Sleepiness or drowsiness.  · Confusion or agitation.  RISKS AND COMPLICATIONS OF SURGERY  Your health care provider will discuss possible risks and complications with you before surgery. Common risks and complications include:    · Problems due to the use of anesthetics.  · Blood loss and replacement (does not apply to minor surgical procedures).  · Temporary increase in pain due to surgery.  · Uncorrected pain or problems that the surgery was meant to correct.  · Infection.  · New damage.    What happens before the procedure?    Medicines  Ask your health care provider about:  · Changing or stopping your regular medicines. This is especially important if you are taking diabetes medicines or blood thinners.  · Taking medicines such as aspirin and ibuprofen. These medicines can thin your blood. Do not take these medicines unless your health care provider tells you to take them.  · Taking over-the-counter medicines, vitamins, herbs, and supplements. Do not take these during the week before  your procedure unless your health care provider approves them.  General instructions  · Starting 3-6 weeks before the procedure, do not use any products that contain nicotine or tobacco, such as cigarettes and e-cigarettes. If you need help quitting, ask your health care provider.  · If you brush your teeth on the morning of the procedure, make sure to spit out all of the toothpaste.  · Tell your health care provider if you become ill or develop a cold, cough, or fever.  · If instructed by your health care provider, bring your sleep apnea device with you on the day of your surgery (if applicable).  · Ask your health care provider if you will be going home the same day, the following day, or after a longer hospital stay.  ? Plan to have someone take you home from the hospital or clinic.  ? Plan to have a responsible adult care for you for at least 24 hours after you leave the hospital or clinic. This is important.  What happens during the procedure?  · You will be given anesthetics through both of the following:  ? A mask placed over your nose and mouth.  ? An IV in one of your veins.  · You may receive a medicine to help you relax (sedative).  · After you are unconscious, a breathing tube may be inserted down your throat to help you breathe. This will be removed before you wake up.  · An anesthesia specialist will stay with you throughout your procedure. He or she will:  ? Keep you comfortable and safe by continuing to give you medicines and adjusting the amount of medicine that you get.  ? Monitor your blood pressure, pulse, and oxygen levels to make sure that the anesthetics do not cause any problems.  The procedure may vary among health care providers and hospitals.  What happens after the procedure?  · Your blood pressure, temperature, heart rate, breathing rate, and blood oxygen level will be monitored until the medicines you were given have worn off.  · You will wake up in a recovery area. You may wake up  slowly.  · If you feel anxious or agitated, you may be given medicine to help you calm down.  · If you will be going home the same day, your health care provider may check to make sure you can walk, drink, and urinate.  · Your health care provider will treat any pain or side effects you have before you go home.  · Do not drive for 24 hours if you were given a sedative.  Summary  · General anesthesia is used to keep you still and prevent pain during a procedure.  · It is important to tell your healthcare provider about your medical history and any surgeries you have had, and previous experience with anesthesia.  · Follow your healthcare provider’s instructions about when to stop eating, drinking, or taking certain medicines before your procedure.  · Plan to have someone take you home from the hospital or clinic.  This information is not intended to replace advice given to you by your health care provider. Make sure you discuss any questions you have with your health care provider.  Document Released: 03/26/2009 Document Revised: 08/03/2018 Document Reviewed: 08/03/2018  [employer] Interactive Patient Education © 2019 [employer]       Fall Prevention in Hospitals, Adult  As a hospital patient, your condition and the treatments you receive can increase your risk for falls. Some additional risk factors for falls in a hospital include:  · Being in an unfamiliar environment.  · Being on bed rest.  · Your surgery.  · Taking certain medicines.  · Your tubing requirements, such as intravenous (IV) therapy or catheters.  It is important that you learn how to decrease fall risks while at the hospital. Below are important tips that can help prevent falls.  SAFETY TIPS FOR PREVENTING FALLS  Talk about your risk of falling.  · Ask your health care provider why you are at risk for falling. Is it your medicine, illness, tubing placement, or something else?  · Make a plan with your health care provider to keep you safe from  falls.  · Ask your health care provider or pharmacist about side effects of your medicines. Some medicines can make you dizzy or affect your coordination.  Ask for help.  · Ask for help before getting out of bed. You may need to press your call button.  · Ask for assistance in getting safely to the toilet.  · Ask for a walker or cane to be put at your bedside. Ask that most of the side rails on your bed be placed up before your health care provider leaves the room.  · Ask family or friends to sit with you.  · Ask for things that are out of your reach, such as your glasses, hearing aids, telephone, bedside table, or call button.  Follow these tips to avoid falling:  · Stay lying or seated, rather than standing, while waiting for help.  · Wear rubber-soled slippers or shoes whenever you walk in the hospital.  · Avoid quick, sudden movements.  ¨ Change positions slowly.  ¨ Sit on the side of your bed before standing.  ¨ Stand up slowly and wait before you start to walk.  · Let your health care provider know if there is a spill on the floor.  · Pay careful attention to the medical equipment, electrical cords, and tubes around you.  · When you need help, use your call button by your bed or in the bathroom. Wait for one of your health care providers to help you.  · If you feel dizzy or unsure of your footing, return to bed and wait for assistance.  · Avoid being distracted by the TV, telephone, or another person in your room.  · Do not lean or support yourself on rolling objects, such as IV poles or bedside tables.     This information is not intended to replace advice given to you by your health care provider. Make sure you discuss any questions you have with your health care provider.     Document Released: 12/15/2001 Document Revised: 01/08/2016 Document Reviewed: 08/25/2013  [employer] Interactive Patient Education ©2016 [employer]       [facility] 4% Patient Instruction Sheet    Chlorhexidine Before  Surgery  Chlorhexidine gluconate (CHG) is a germ-killing (antiseptic) solution that is used to clean the skin. It gets rid of the bacteria that normally live on the skin. Cleaning your skin with CHG before surgery helps lower the risk for infection after surgery.    How to use CHG solution  · You will take 2 showers, one shower the night before surgery, the second shower the morning of surgery before coming to the hospital.  · Use CHG only as told by your health care provider, and follow the instructions on the label.  · Use CHG solution while taking a shower. Follow these steps when using CHG solution (unless your health care provider gives you different instructions):  1. Start the shower.  2. Use your normal soap and shampoo to wash your face and hair.  3. Turn off the shower or move out of the shower stream.  4. Pour the CHG onto a clean washcloth. Do not use any type of brush or rough-edged sponge.  5. Starting at your neck, lather your body down to your toes. Make sure you:  6. Pay special attention to the part of your body where you will be having surgery. Scrub this area for at least 1 minute.  7. Use the full amount of CHG as directed. Usually, this is one half bottle for each shower.  8. Do not use CHG on your head or face. If the solution gets into your ears or eyes, rinse them well with water.  9. Avoid your genital area.  10. Avoid any areas of skin that have broken skin, cuts, or scrapes.  11. Scrub your back and under your arms. Make sure to wash skin folds.  12. Let the lather sit on your skin for 1-2 minutes or as long as told by your health care  provider.  13. Thoroughly rinse your entire body in the shower. Make sure that all body creases and crevices are rinsed well.  14. Dry off with a clean towel. Do not put any substances on your body afterward, such as powder, lotion, or perfume.  15. Put on clean clothes or pajamas.  16. If it is the night before your surgery, sleep in clean sheets.    What  are the risks?  Risks of using CHG include:  · A skin reaction.  · Hearing loss, if CHG gets in your ears.  · Eye injury, if CHG gets in your eyes and is not rinsed out.  · The CHG product catching fire.  Make sure that you avoid smoking and flames after applying CHG to your skin.  Do not use CHG:  · If you have a chlorhexidine allergy or have previously reacted to chlorhexidine.  · On babies younger than 2 months of age.      On the day of surgery, when you are taken to your room in Outpatient Surgery you will be given a CHG prepackaged cloth to wipe the site for your surgery.  How to use CHG prepackaged cloths  · Follow the instructions on the label.  · Use the CHG cloth on clean, dry skin. Follow these steps when using a CHG cloth (unless your health care provider gives you different instructions):  1. Using the CHG cloth, vigorously scrub the part of your body where you will be having surgery. Scrub using a back-and-forth motion for 3 minutes. The area on your body should be completely wet with CHG when you are finished scrubbing.  2. Do not rinse. Discard the cloth and let the area air-dry for 1 minute. Do not put any substances on your body afterward, such as powder, lotion, or perfume.  Contact a health care provider if:  · Your skin gets irritated after scrubbing.  · You have questions about using your solution or cloth.  Get help right away if:  · Your eyes become very red or swollen.  · Your eyes itch badly.  · Your skin itches badly and is red or swollen.  · Your hearing changes.  · You have trouble seeing.  · You have swelling or tingling in your mouth or throat.  · You have trouble breathing.  · You swallow any chlorhexidine.  Summary  · Chlorhexidine gluconate (CHG) is a germ-killing (antiseptic) solution that is used to clean the skin. Cleaning your skin with CHG before surgery helps lower the risk for infection after surgery.  · You may be given CHG to use at home. It may be in a bottle or in a  prepackaged cloth to use on your skin. Carefully follow your health care provider's instructions and the instructions on the product label.  · Do not use CHG if you have a chlorhexidine allergy.  · Contact your health care provider if your skin gets irritated after scrubbing.  This information is not intended to replace advice given to you by your health care provider. Make sure you discuss any questions you have with your health care provider.  Document Released: 09/11/2013 Document Revised: 11/15/2018 Document Reviewed: 11/15/2018  [employer] Interactive Patient Education © 2019 [employer]          PATIENT/FAMILY/RESPONSIBLE PARTY VERBALIZES UNDERSTANDING OF ABOVE EDUCATION.  COPY OF PAIN SCALE GIVEN AND REVIEWED WITH VERBALIZED UNDERSTANDING.

## 2020-09-28 ENCOUNTER — LAB (OUTPATIENT)
Dept: LAB | Facility: HOSPITAL | Age: 75
End: 2020-09-28

## 2020-09-28 PROCEDURE — 87635 SARS-COV-2 COVID-19 AMP PRB: CPT | Performed by: SURGERY

## 2020-09-28 PROCEDURE — C9803 HOPD COVID-19 SPEC COLLECT: HCPCS | Performed by: SURGERY

## 2020-09-29 ENCOUNTER — TELEPHONE (OUTPATIENT)
Dept: VASCULAR SURGERY | Facility: CLINIC | Age: 75
End: 2020-09-29

## 2020-09-29 LAB — SARS-COV-2 N GENE RESP QL NAA+PROBE: NOT DETECTED

## 2020-09-29 NOTE — TELEPHONE ENCOUNTER
Spoke with patient and reminded of 600 am arrival time tomorrow for his procedure with Dr. Ryan.  Patient expressed understanding for all that was discussed.

## 2020-09-30 ENCOUNTER — APPOINTMENT (OUTPATIENT)
Dept: INTERVENTIONAL RADIOLOGY/VASCULAR | Facility: HOSPITAL | Age: 75
End: 2020-09-30

## 2020-09-30 ENCOUNTER — ANESTHESIA EVENT (OUTPATIENT)
Dept: PERIOP | Facility: HOSPITAL | Age: 75
End: 2020-09-30

## 2020-09-30 ENCOUNTER — ANESTHESIA (OUTPATIENT)
Dept: PERIOP | Facility: HOSPITAL | Age: 75
End: 2020-09-30

## 2020-09-30 ENCOUNTER — HOSPITAL ENCOUNTER (OUTPATIENT)
Facility: HOSPITAL | Age: 75
Setting detail: HOSPITAL OUTPATIENT SURGERY
Discharge: HOME | End: 2020-09-30
Attending: SURGERY | Admitting: SURGERY

## 2020-09-30 VITALS
TEMPERATURE: 98.8 F | DIASTOLIC BLOOD PRESSURE: 82 MMHG | SYSTOLIC BLOOD PRESSURE: 155 MMHG | HEART RATE: 75 BPM | OXYGEN SATURATION: 95 % | RESPIRATION RATE: 16 BRPM

## 2020-09-30 DIAGNOSIS — Z01.818 PREOP TESTING: ICD-10-CM

## 2020-09-30 DIAGNOSIS — I72.4 POPLITEAL ARTERY ANEURYSM (HCC): ICD-10-CM

## 2020-09-30 LAB
ABO GROUP BLD: NORMAL
BLD GP AB SCN SERPL QL: NEGATIVE
RH BLD: POSITIVE
T&S EXPIRATION DATE: NORMAL

## 2020-09-30 PROCEDURE — C1894 INTRO/SHEATH, NON-LASER: HCPCS | Performed by: SURGERY

## 2020-09-30 PROCEDURE — 25010000002 HEPARIN (PORCINE) PER 1000 UNITS: Performed by: NURSE ANESTHETIST, CERTIFIED REGISTERED

## 2020-09-30 PROCEDURE — 86850 RBC ANTIBODY SCREEN: CPT | Performed by: NURSE PRACTITIONER

## 2020-09-30 PROCEDURE — 25010000002 IOPAMIDOL 61 % SOLUTION: Performed by: SURGERY

## 2020-09-30 PROCEDURE — 37226 PR REVSC OPN/PRQ FEM/POP W/STNT/ANGIOP SM VSL: CPT | Performed by: SURGERY

## 2020-09-30 PROCEDURE — C1725 CATH, TRANSLUMIN NON-LASER: HCPCS | Performed by: SURGERY

## 2020-09-30 PROCEDURE — C1760 CLOSURE DEV, VASC: HCPCS | Performed by: SURGERY

## 2020-09-30 PROCEDURE — 94799 UNLISTED PULMONARY SVC/PX: CPT

## 2020-09-30 PROCEDURE — 25010000002 HEPARIN (PORCINE) PER 1000 UNITS: Performed by: SURGERY

## 2020-09-30 PROCEDURE — 86900 BLOOD TYPING SEROLOGIC ABO: CPT | Performed by: NURSE PRACTITIONER

## 2020-09-30 PROCEDURE — C1769 GUIDE WIRE: HCPCS | Performed by: SURGERY

## 2020-09-30 PROCEDURE — 25010000002 FENTANYL CITRATE (PF) 100 MCG/2ML SOLUTION: Performed by: NURSE ANESTHETIST, CERTIFIED REGISTERED

## 2020-09-30 PROCEDURE — 75625 CONTRAST EXAM ABDOMINL AORTA: CPT

## 2020-09-30 PROCEDURE — 25010000002 PROPOFOL 10 MG/ML EMULSION: Performed by: NURSE ANESTHETIST, CERTIFIED REGISTERED

## 2020-09-30 PROCEDURE — C1887 CATHETER, GUIDING: HCPCS | Performed by: SURGERY

## 2020-09-30 PROCEDURE — 75710 ARTERY X-RAYS ARM/LEG: CPT | Performed by: SURGERY

## 2020-09-30 PROCEDURE — 76000 FLUOROSCOPY <1 HR PHYS/QHP: CPT

## 2020-09-30 PROCEDURE — 86901 BLOOD TYPING SEROLOGIC RH(D): CPT | Performed by: NURSE PRACTITIONER

## 2020-09-30 PROCEDURE — 75710 ARTERY X-RAYS ARM/LEG: CPT

## 2020-09-30 PROCEDURE — 25010000002 PROTAMINE SULFATE PER 10 MG: Performed by: NURSE ANESTHETIST, CERTIFIED REGISTERED

## 2020-09-30 PROCEDURE — 75625 CONTRAST EXAM ABDOMINL AORTA: CPT | Performed by: SURGERY

## 2020-09-30 PROCEDURE — C1874 STENT, COATED/COV W/DEL SYS: HCPCS | Performed by: SURGERY

## 2020-09-30 DEVICE — STENTGR ENDOPROSTH VIABAHN RO HEP 7F 7MM 10X120CM: Type: IMPLANTABLE DEVICE | Site: LEG | Status: FUNCTIONAL

## 2020-09-30 RX ORDER — ONDANSETRON 2 MG/ML
4 INJECTION INTRAMUSCULAR; INTRAVENOUS ONCE AS NEEDED
Status: DISCONTINUED | OUTPATIENT
Start: 2020-09-30 | End: 2020-09-30 | Stop reason: HOSPADM

## 2020-09-30 RX ORDER — SODIUM CHLORIDE, SODIUM LACTATE, POTASSIUM CHLORIDE, CALCIUM CHLORIDE 600; 310; 30; 20 MG/100ML; MG/100ML; MG/100ML; MG/100ML
9 INJECTION, SOLUTION INTRAVENOUS CONTINUOUS
Status: DISCONTINUED | OUTPATIENT
Start: 2020-09-30 | End: 2020-09-30 | Stop reason: HOSPADM

## 2020-09-30 RX ORDER — OXYCODONE AND ACETAMINOPHEN 7.5; 325 MG/1; MG/1
2 TABLET ORAL EVERY 4 HOURS PRN
Status: DISCONTINUED | OUTPATIENT
Start: 2020-09-30 | End: 2020-09-30 | Stop reason: HOSPADM

## 2020-09-30 RX ORDER — BUPIVACAINE HYDROCHLORIDE 5 MG/ML
INJECTION, SOLUTION EPIDURAL; INTRACAUDAL AS NEEDED
Status: DISCONTINUED | OUTPATIENT
Start: 2020-09-30 | End: 2020-09-30 | Stop reason: HOSPADM

## 2020-09-30 RX ORDER — SODIUM CHLORIDE, SODIUM LACTATE, POTASSIUM CHLORIDE, CALCIUM CHLORIDE 600; 310; 30; 20 MG/100ML; MG/100ML; MG/100ML; MG/100ML
1000 INJECTION, SOLUTION INTRAVENOUS CONTINUOUS
Status: DISCONTINUED | OUTPATIENT
Start: 2020-09-30 | End: 2020-09-30 | Stop reason: HOSPADM

## 2020-09-30 RX ORDER — FENTANYL CITRATE 50 UG/ML
INJECTION, SOLUTION INTRAMUSCULAR; INTRAVENOUS AS NEEDED
Status: DISCONTINUED | OUTPATIENT
Start: 2020-09-30 | End: 2020-09-30 | Stop reason: SURG

## 2020-09-30 RX ORDER — IBUPROFEN 600 MG/1
600 TABLET ORAL ONCE AS NEEDED
Status: DISCONTINUED | OUTPATIENT
Start: 2020-09-30 | End: 2020-09-30 | Stop reason: HOSPADM

## 2020-09-30 RX ORDER — LIDOCAINE HYDROCHLORIDE 10 MG/ML
0.5 INJECTION, SOLUTION EPIDURAL; INFILTRATION; INTRACAUDAL; PERINEURAL ONCE AS NEEDED
Status: DISCONTINUED | OUTPATIENT
Start: 2020-09-30 | End: 2020-09-30 | Stop reason: HOSPADM

## 2020-09-30 RX ORDER — PROPOFOL 10 MG/ML
VIAL (ML) INTRAVENOUS AS NEEDED
Status: DISCONTINUED | OUTPATIENT
Start: 2020-09-30 | End: 2020-09-30 | Stop reason: SURG

## 2020-09-30 RX ORDER — HYDROCODONE BITARTRATE AND ACETAMINOPHEN 5; 325 MG/1; MG/1
1 TABLET ORAL ONCE AS NEEDED
Status: DISCONTINUED | OUTPATIENT
Start: 2020-09-30 | End: 2020-09-30 | Stop reason: HOSPADM

## 2020-09-30 RX ORDER — FLUMAZENIL 0.1 MG/ML
0.2 INJECTION INTRAVENOUS AS NEEDED
Status: DISCONTINUED | OUTPATIENT
Start: 2020-09-30 | End: 2020-09-30 | Stop reason: HOSPADM

## 2020-09-30 RX ORDER — SODIUM CHLORIDE 0.9 % (FLUSH) 0.9 %
10 SYRINGE (ML) INJECTION EVERY 12 HOURS SCHEDULED
Status: DISCONTINUED | OUTPATIENT
Start: 2020-09-30 | End: 2020-09-30 | Stop reason: HOSPADM

## 2020-09-30 RX ORDER — DEXTROSE MONOHYDRATE 25 G/50ML
12.5 INJECTION, SOLUTION INTRAVENOUS AS NEEDED
Status: DISCONTINUED | OUTPATIENT
Start: 2020-09-30 | End: 2020-09-30 | Stop reason: HOSPADM

## 2020-09-30 RX ORDER — CLOPIDOGREL BISULFATE 75 MG/1
150 TABLET ORAL ONCE
Status: COMPLETED | OUTPATIENT
Start: 2020-09-30 | End: 2020-09-30

## 2020-09-30 RX ORDER — LABETALOL HYDROCHLORIDE 5 MG/ML
5 INJECTION, SOLUTION INTRAVENOUS
Status: DISCONTINUED | OUTPATIENT
Start: 2020-09-30 | End: 2020-09-30 | Stop reason: HOSPADM

## 2020-09-30 RX ORDER — NALOXONE HCL 0.4 MG/ML
0.4 VIAL (ML) INJECTION AS NEEDED
Status: DISCONTINUED | OUTPATIENT
Start: 2020-09-30 | End: 2020-09-30 | Stop reason: HOSPADM

## 2020-09-30 RX ORDER — BUPIVACAINE HCL/0.9 % NACL/PF 0.1 %
2 PLASTIC BAG, INJECTION (ML) EPIDURAL ONCE
Status: COMPLETED | OUTPATIENT
Start: 2020-09-30 | End: 2020-09-30

## 2020-09-30 RX ORDER — SODIUM CHLORIDE 0.9 % (FLUSH) 0.9 %
3 SYRINGE (ML) INJECTION AS NEEDED
Status: DISCONTINUED | OUTPATIENT
Start: 2020-09-30 | End: 2020-09-30 | Stop reason: HOSPADM

## 2020-09-30 RX ORDER — FENTANYL CITRATE 50 UG/ML
25 INJECTION, SOLUTION INTRAMUSCULAR; INTRAVENOUS
Status: DISCONTINUED | OUTPATIENT
Start: 2020-09-30 | End: 2020-09-30 | Stop reason: HOSPADM

## 2020-09-30 RX ORDER — OXYCODONE AND ACETAMINOPHEN 10; 325 MG/1; MG/1
1 TABLET ORAL ONCE AS NEEDED
Status: DISCONTINUED | OUTPATIENT
Start: 2020-09-30 | End: 2020-09-30 | Stop reason: HOSPADM

## 2020-09-30 RX ORDER — HEPARIN SODIUM 1000 [USP'U]/ML
INJECTION, SOLUTION INTRAVENOUS; SUBCUTANEOUS AS NEEDED
Status: DISCONTINUED | OUTPATIENT
Start: 2020-09-30 | End: 2020-09-30 | Stop reason: SURG

## 2020-09-30 RX ORDER — SODIUM CHLORIDE 0.9 % (FLUSH) 0.9 %
10 SYRINGE (ML) INJECTION AS NEEDED
Status: DISCONTINUED | OUTPATIENT
Start: 2020-09-30 | End: 2020-09-30 | Stop reason: HOSPADM

## 2020-09-30 RX ORDER — PROTAMINE SULFATE 10 MG/ML
INJECTION, SOLUTION INTRAVENOUS AS NEEDED
Status: DISCONTINUED | OUTPATIENT
Start: 2020-09-30 | End: 2020-09-30 | Stop reason: SURG

## 2020-09-30 RX ORDER — CLOPIDOGREL BISULFATE 75 MG/1
75 TABLET ORAL DAILY
Qty: 30 TABLET | Refills: 5 | Status: SHIPPED | OUTPATIENT
Start: 2020-09-30 | End: 2020-10-30

## 2020-09-30 RX ADMIN — LIDOCAINE HYDROCHLORIDE 100 MG: 20 INJECTION, SOLUTION INTRAVENOUS at 11:05

## 2020-09-30 RX ADMIN — PROPOFOL 50 MG: 10 INJECTION, EMULSION INTRAVENOUS at 11:05

## 2020-09-30 RX ADMIN — PROPOFOL 100 MCG/KG/MIN: 10 INJECTION, EMULSION INTRAVENOUS at 11:05

## 2020-09-30 RX ADMIN — HEPARIN SODIUM 1000 UNITS: 1000 INJECTION, SOLUTION INTRAVENOUS; SUBCUTANEOUS at 11:18

## 2020-09-30 RX ADMIN — FENTANYL CITRATE 50 MCG: 50 INJECTION, SOLUTION INTRAMUSCULAR; INTRAVENOUS at 11:06

## 2020-09-30 RX ADMIN — HEPARIN SODIUM 4000 UNITS: 1000 INJECTION, SOLUTION INTRAVENOUS; SUBCUTANEOUS at 11:23

## 2020-09-30 RX ADMIN — PROTAMINE SULFATE 10 MG: 10 INJECTION, SOLUTION INTRAVENOUS at 11:34

## 2020-09-30 RX ADMIN — FENTANYL CITRATE 50 MCG: 50 INJECTION, SOLUTION INTRAMUSCULAR; INTRAVENOUS at 11:35

## 2020-09-30 RX ADMIN — SODIUM CHLORIDE, POTASSIUM CHLORIDE, SODIUM LACTATE AND CALCIUM CHLORIDE 1000 ML: 600; 310; 30; 20 INJECTION, SOLUTION INTRAVENOUS at 08:24

## 2020-09-30 RX ADMIN — Medication 2 G: at 11:12

## 2020-09-30 RX ADMIN — CLOPIDOGREL BISULFATE 150 MG: 75 TABLET ORAL at 12:35

## 2020-09-30 NOTE — ANESTHESIA PREPROCEDURE EVALUATION
Anesthesia Evaluation     Patient summary reviewed   history of anesthetic complications (pseudocholinesterase deficiency):  NPO Solid Status: > 8 hours             Airway   Mallampati: II  TM distance: >3 FB  Neck ROM: full  Dental      Pulmonary    (+) a smoker, COPD, home oxygen (qhs),   (-) asthma, sleep apnea  Cardiovascular   Exercise tolerance: good (4-7 METS)    ECG reviewed    (+) hypertension, hyperlipidemia,   (-) pacemaker, past MI, angina, cardiac stents      Neuro/Psych  (-) seizures, TIA, CVA  GI/Hepatic/Renal/Endo    (+)  GERD,    (-) liver disease, no renal disease, diabetes    Musculoskeletal     Abdominal    Substance History      OB/GYN          Other                        Anesthesia Plan    ASA 3     MAC       Anesthetic plan, all risks, benefits, and alternatives have been provided, discussed and informed consent has been obtained with: patient.

## 2020-09-30 NOTE — ANESTHESIA POSTPROCEDURE EVALUATION
Patient: Jonnie Sheets Jr.    Procedure Summary     Date: 09/30/20 Room / Location: [facility] OR  /  PAD HYBRID OR 12    Anesthesia Start: 1101 Anesthesia Stop: 1159    Procedure: left lower extremity angiogram/endovascular popliteal artery aneurysm repair (Left Groin) Diagnosis:       Popliteal artery aneurysm (CMS/HCC)      Preop testing      (Popliteal artery aneurysm (CMS/HCC) [I72.4])      (Preop testing [Z01.818])    Surgeon: Tomas Ryan DO Provider: Zina Suárez CRNA    Anesthesia Type: MAC ASA Status: 3          Anesthesia Type: MAC    Vitals  Vitals Value Taken Time   /78 09/30/20 1229   Temp 98.8 °F (37.1 °C) 09/30/20 1225   Pulse 78 09/30/20 1236   Resp 16 09/30/20 1225   SpO2 94 % 09/30/20 1236   Vitals shown include unvalidated device data.        Post Anesthesia Care and Evaluation    Patient location during evaluation: PACU  Patient participation: complete - patient participated  Level of consciousness: awake and alert  Pain management: adequate  Airway patency: patent  Anesthetic complications: No anesthetic complications    Cardiovascular status: acceptable  Respiratory status: acceptable  Hydration status: acceptable    Comments: Blood pressure 113/78, pulse 75, temperature 98.8 °F (37.1 °C), temperature source Temporal, resp. rate 16, SpO2 93 %.    Pt discharged from PACU based on robbie score >8

## 2020-10-13 ENCOUNTER — TELEPHONE (OUTPATIENT)
Dept: VASCULAR SURGERY | Facility: CLINIC | Age: 75
End: 2020-10-13

## 2020-10-13 NOTE — TELEPHONE ENCOUNTER
Spoke with Mr Sheets reminding him of his appointment for Wednesday, October 14th, 2020 at 1115 am with Elizabeth CHANDRA. Mr Sheets confirmed he would be here.

## 2020-10-14 ENCOUNTER — OFFICE VISIT (OUTPATIENT)
Dept: VASCULAR SURGERY | Facility: CLINIC | Age: 75
End: 2020-10-14

## 2020-10-14 VITALS
HEIGHT: 65 IN | OXYGEN SATURATION: 95 % | DIASTOLIC BLOOD PRESSURE: 82 MMHG | SYSTOLIC BLOOD PRESSURE: 136 MMHG | HEART RATE: 99 BPM | WEIGHT: 169 LBS | BODY MASS INDEX: 28.16 KG/M2

## 2020-10-14 DIAGNOSIS — E78.5 HYPERLIPIDEMIA, UNSPECIFIED HYPERLIPIDEMIA TYPE: ICD-10-CM

## 2020-10-14 DIAGNOSIS — I72.4 POPLITEAL ARTERY ANEURYSM (HCC): Primary | ICD-10-CM

## 2020-10-14 DIAGNOSIS — I65.23 BILATERAL CAROTID ARTERY STENOSIS: ICD-10-CM

## 2020-10-14 DIAGNOSIS — I71.40 ABDOMINAL AORTIC ANEURYSM (AAA) WITHOUT RUPTURE (HCC): ICD-10-CM

## 2020-10-14 PROCEDURE — 99213 OFFICE O/P EST LOW 20 MIN: CPT | Performed by: NURSE PRACTITIONER

## 2020-10-14 NOTE — PROGRESS NOTES
"10/14/2020       Dandy Burton MD  [address] [place] [address] [address]  [place] [address]    Jonnie Sheets Jr.  1945    Chief Complaint   Patient presents with   • Follow-up     2 Week Post-Op Follow Up For left lower extremity angiogram/endovascular popliteal artery aneurysm repair. Patient denies any stroke like symptoms.    • Smoker/Non-Smoker     Patient is Current Everyday Smoker   • Med Management     Verbally verified medications with patient/wife. Mr Sudeep/Wife verbalized all medicaitons are correct and up to date.        Dear Dandy Burton MD       HPI  I had the pleasure of seeing your patient Jonnie Sheets Jr. in the office today. As you recall, Jonnie Sheets Jr. is a 75 y.o.  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   He denies any claudication to his lower extremities.  He does receive knee injections for his left knee, which he reports does help.  He is maintained on aspirin, Lasix, and Mevacor.   Unfortunately, he is smoking every now and again.  He did undergo endovascular repair of left popliteal artery aneurysm with Viabahn stent graft.  He is doing well and right groin has healed.    Review of Systems   Constitutional: Negative for activity change.   HENT: Negative.    Eyes: Negative.    Respiratory: Positive for shortness of breath.    Cardiovascular: Negative.    Gastrointestinal: Negative.    Endocrine: Negative.    Genitourinary: Negative.    Musculoskeletal: Positive for joint swelling.   Skin: Negative.    Allergic/Immunologic: Negative.    Neurological: Negative.    Hematological: Negative.    Psychiatric/Behavioral: Negative.    All other systems reviewed and are negative.      /82 (BP Location: Left arm, Patient Position: Sitting, Cuff Size: Adult)   Pulse 99   Ht 165.1 cm (65\")   Wt 76.7 kg (169 lb)   SpO2 95%   BMI 28.12 kg/m²   Physical Exam  Constitutional:       General: He is not in acute distress.     Appearance: Normal appearance. He is " well-developed. He is not diaphoretic.   HENT:      Head: Normocephalic and atraumatic.   Neck:      Vascular: No carotid bruit or JVD.   Cardiovascular:      Rate and Rhythm: Normal rate and regular rhythm.      Pulses: Normal pulses.           Femoral pulses are 2+ on the right side and 2+ on the left side.       Popliteal pulses are 2+ on the right side and 2+ on the left side.        Dorsalis pedis pulses are 2+ on the right side and 2+ on the left side.        Posterior tibial pulses are 2+ on the right side and 2+ on the left side.      Heart sounds: Normal heart sounds, S1 normal and S2 normal. No murmur. No friction rub. No gallop.       Comments: Right groin healed  Pulmonary:      Effort: Pulmonary effort is normal.      Breath sounds: Normal breath sounds.   Abdominal:      General: Bowel sounds are normal. There is no abdominal bruit.      Palpations: Abdomen is soft.      Tenderness: There is no abdominal tenderness.   Musculoskeletal: Normal range of motion.   Skin:     General: Skin is warm and dry.   Neurological:      General: No focal deficit present.      Mental Status: He is alert and oriented to person, place, and time.      Cranial Nerves: No cranial nerve deficit.   Psychiatric:         Mood and Affect: Mood normal.         Behavior: Behavior normal.         Thought Content: Thought content normal.         Judgment: Judgment normal.         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 difficile  colitis   • Hypokalemia   • Anemia   • Hypomagnesemia   • Hypoxia   • Nausea vomiting and diarrhea   • Acute hypercapnic respiratory failure (CMS/[facility])   • Weight loss   • Tobacco dependence   • Popliteal artery aneurysm (CMS/[facility])   • History of Clostridium difficile colitis   • Moderate malnutrition (CMS/[facility])   • Stage 3 severe COPD by GOLD classification (CMS/[facility])   • Pulmonary emphysema (CMS/[facility])   • Chronic respiratory failure with hypoxia and hypercapnia (CMS/[facility])   • Former smoker   • Hyperlipidemia   • Preop testing         ICD-10-CM ICD-9-CM   1. Popliteal artery aneurysm (CMS/HCC)  I72.4 442.3   2. Hyperlipidemia, unspecified hyperlipidemia type  E78.5 272.4   3. Abdominal aortic aneurysm (AAA) without rupture (CMS/[facility])  I71.4 441.4   4. Bilateral carotid artery stenosis  I65.23 433.10     433.30       Plan: After thoroughly evaluating Jonnie Sheets , I am pleased to report he is doing well status post endovascular repair of his left popliteal artery aneurysm.  His right groin has healed.  At this point he is free to resume normal activity.  I did instruct him on no squatting.  We will see him back in 6 months with repeat noninvasive testing for continued surveillance, including a left lower extremity arterial duplex as well as ABIs.  His abdominal aortic aneurysm can be followed annually, this only measured 3.1 cm.  We will also repeat his carotid duplex in 1 year we will repeat ultrasound of the aorta and a carotid duplex in 1 year. I did discuss vascular risk factors as they pertain to the progression of vascular disease including controlling his hyperlipidemia, which is stable.  He is taking Mevacor.  Did encourage him on complete smoking cessation as he has resumed smoking a couple cigarettes per day.  The patient can continue taking their current medication regimen as previously planned.  This was all discussed in full with complete understanding.    Thank you for allowing me to participate in the  care of your patient.  Please do not hesitate with any questions or concerns.  I will keep you aware of any further encounters with Jonnie Sheets Jr..        Sincerely yours,         PRATEEK Saleem

## 2020-10-24 DIAGNOSIS — J44.9 STAGE 3 SEVERE COPD BY GOLD CLASSIFICATION (HCC): ICD-10-CM

## 2020-10-26 RX ORDER — BUDESONIDE AND FORMOTEROL FUMARATE DIHYDRATE 160; 4.5 UG/1; UG/1
AEROSOL RESPIRATORY (INHALATION)
Qty: 30.6 G | Refills: 11 | Status: SHIPPED | OUTPATIENT
Start: 2020-10-26 | End: 2021-03-22 | Stop reason: SDUPTHER

## 2020-10-26 NOTE — TELEPHONE ENCOUNTER
Pharmacy sent a request for refills on Symbicort. Patient was last seen on 5/13/20 and has a return appointment on 3/17/21.   Refill request sent to [facility].

## 2020-10-27 ENCOUNTER — TELEPHONE (OUTPATIENT)
Dept: PULMONOLOGY | Facility: CLINIC | Age: 75
End: 2020-10-27

## 2021-02-01 RX ORDER — CLOPIDOGREL BISULFATE 75 MG/1
TABLET ORAL
Qty: 30 TABLET | Refills: 5 | Status: SHIPPED | OUTPATIENT
Start: 2021-02-01 | End: 2021-08-30

## 2021-02-18 ENCOUNTER — APPOINTMENT (OUTPATIENT)
Dept: ULTRASOUND IMAGING | Facility: HOSPITAL | Age: 76
End: 2021-02-18

## 2021-02-24 LAB
ALBUMIN SERPL-MCNC: 4.2 G/DL (ref 3.5–5.2)
ALP BLD-CCNC: 62 U/L (ref 40–130)
ALT SERPL-CCNC: 8 U/L (ref 5–41)
ANION GAP SERPL CALCULATED.3IONS-SCNC: 11 MMOL/L (ref 7–19)
AST SERPL-CCNC: 35 U/L (ref 5–40)
BASOPHILS ABSOLUTE: 0.1 K/UL (ref 0–0.2)
BASOPHILS RELATIVE PERCENT: 0.9 % (ref 0–1)
BILIRUB SERPL-MCNC: 0.4 MG/DL (ref 0.2–1.2)
BILIRUBIN URINE: NEGATIVE
BLOOD, URINE: NEGATIVE
BUN BLDV-MCNC: 9 MG/DL (ref 8–23)
CALCIUM SERPL-MCNC: 8.9 MG/DL (ref 8.8–10.2)
CHLORIDE BLD-SCNC: 101 MMOL/L (ref 98–111)
CLARITY: CLEAR
CO2: 29 MMOL/L (ref 22–29)
COLOR: YELLOW
CREAT SERPL-MCNC: 1.1 MG/DL (ref 0.5–1.2)
CREATININE URINE: 292.4 MG/DL (ref 4.2–622)
EOSINOPHILS ABSOLUTE: 0.3 K/UL (ref 0–0.6)
EOSINOPHILS RELATIVE PERCENT: 3 % (ref 0–5)
GFR AFRICAN AMERICAN: >59
GFR NON-AFRICAN AMERICAN: >60
GLUCOSE BLD-MCNC: 119 MG/DL (ref 74–109)
GLUCOSE URINE: NEGATIVE MG/DL
HCT VFR BLD CALC: 41.5 % (ref 42–52)
HEMOGLOBIN: 13.2 G/DL (ref 14–18)
IMMATURE GRANULOCYTES #: 0.1 K/UL
KETONES, URINE: ABNORMAL MG/DL
LEUKOCYTE ESTERASE, URINE: NEGATIVE
LYMPHOCYTES ABSOLUTE: 2.4 K/UL (ref 1.1–4.5)
LYMPHOCYTES RELATIVE PERCENT: 23.6 % (ref 20–40)
MCH RBC QN AUTO: 30.6 PG (ref 27–31)
MCHC RBC AUTO-ENTMCNC: 31.8 G/DL (ref 33–37)
MCV RBC AUTO: 96.3 FL (ref 80–94)
MONOCYTES ABSOLUTE: 1.1 K/UL (ref 0–0.9)
MONOCYTES RELATIVE PERCENT: 10.6 % (ref 0–10)
NEUTROPHILS ABSOLUTE: 6.3 K/UL (ref 1.5–7.5)
NEUTROPHILS RELATIVE PERCENT: 61.4 % (ref 50–65)
NITRITE, URINE: NEGATIVE
PDW BLD-RTO: 13.4 % (ref 11.5–14.5)
PH UA: 6 (ref 5–8)
PLATELET # BLD: 293 K/UL (ref 130–400)
PMV BLD AUTO: 11.1 FL (ref 9.4–12.4)
POTASSIUM SERPL-SCNC: 3.8 MMOL/L (ref 3.5–5)
PROTEIN PROTEIN: 22 MG/DL (ref 15–45)
PROTEIN UA: NEGATIVE MG/DL
RBC # BLD: 4.31 M/UL (ref 4.7–6.1)
SODIUM BLD-SCNC: 141 MMOL/L (ref 136–145)
SPECIFIC GRAVITY UA: 1.02 (ref 1–1.03)
TOTAL PROTEIN: 7.1 G/DL (ref 6.6–8.7)
UROBILINOGEN, URINE: 1 E.U./DL
WBC # BLD: 10.3 K/UL (ref 4.8–10.8)

## 2021-03-02 ENCOUNTER — TELEPHONE (OUTPATIENT)
Dept: GASTROENTEROLOGY | Facility: CLINIC | Age: 76
End: 2021-03-02

## 2021-03-02 NOTE — TELEPHONE ENCOUNTER
SPOKE WITH PT'S SPOUSE ABOUT PT BEING DUE FOR A REPEAT COLONOSCOPY. SHE STATES THAT PT HAS STAGE 4 COPD AND VERY CONCERNED ABOUT HIM GETTING COVID. SHE WILL TALK TO HIM WHEN HE COMES HOME AND LET HIM DECIDE IF HE WOULD LIKE TO GO THROUGH WITH AN OV AND SCOPE.  IF NOT HE WILL CALL IN A FEW MONTHS TO MAKE THE APPT.  I EXPLAINED TO THE PATIENT THE IMPORTANCE OF HAVING A REPEAT SCOPE DONE FOR THEIR HEALTH.

## 2021-03-17 ENCOUNTER — OFFICE VISIT (OUTPATIENT)
Dept: PULMONOLOGY | Facility: CLINIC | Age: 76
End: 2021-03-17

## 2021-03-17 VITALS
BODY MASS INDEX: 27.99 KG/M2 | OXYGEN SATURATION: 94 % | DIASTOLIC BLOOD PRESSURE: 82 MMHG | SYSTOLIC BLOOD PRESSURE: 123 MMHG | HEIGHT: 65 IN | TEMPERATURE: 97.1 F | WEIGHT: 168 LBS | HEART RATE: 84 BPM

## 2021-03-17 DIAGNOSIS — Z12.2 ENCOUNTER FOR SCREENING FOR LUNG CANCER: ICD-10-CM

## 2021-03-17 DIAGNOSIS — J44.9 STAGE 3 SEVERE COPD BY GOLD CLASSIFICATION (HCC): Primary | Chronic | ICD-10-CM

## 2021-03-17 DIAGNOSIS — J43.9 PULMONARY EMPHYSEMA, UNSPECIFIED EMPHYSEMA TYPE (HCC): Chronic | ICD-10-CM

## 2021-03-17 DIAGNOSIS — Z87.891 PERSONAL HISTORY OF TOBACCO USE, PRESENTING HAZARDS TO HEALTH: ICD-10-CM

## 2021-03-17 PROCEDURE — 99214 OFFICE O/P EST MOD 30 MIN: CPT | Performed by: NURSE PRACTITIONER

## 2021-03-17 RX ORDER — IPRATROPIUM BROMIDE AND ALBUTEROL SULFATE 2.5; .5 MG/3ML; MG/3ML
3 SOLUTION RESPIRATORY (INHALATION) EVERY 4 HOURS
COMMUNITY
End: 2021-08-25

## 2021-03-17 NOTE — PROGRESS NOTES
"Chief Complaint  Stage 3 Severe COPD (no hopsitalizations since Sept; no exposure; tested and negative)    Subjective    History of Present Illness      Jonnie Sheets Jr. presents to [facility] for:    Management of severe COPD, emphysema, chronic respiratory failure and being a daily longtime smoker.  He tells me that he stopped using his oxygen a few months ago.  Previously he had been utilizing it at night and as needed during the day.  He reports daily cough and shortness of breath with exertion.  He is asking about using Mucinex daily and I told him he could take up to 1200 mg twice daily to help with mucus clearance.  He is using DuoNeb treatments 3 times a day and Symbicort when he can afford it.  He reports that his recent co-pays have been very expensive and I recommend he call his insurance company to see if there is something more affordable and that drug class.  After he speaks with his insurance company, he is encouraged to call the office to let us know so that a new prescription can be sent.  He will be due for his annual low-dose CT for lung cancer screening this summer.  He is age 75 so this will be his last year of eligibility.  He stays up-to-date on vaccines and he has already received his Covid vaccine.       Objective   Vital Signs:   /82   Pulse 84   Temp 97.1 °F (36.2 °C)   Ht 165.1 cm (65\")   Wt 76.2 kg (168 lb)   SpO2 94% Comment: RA  BMI 27.96 kg/m²     Physical Exam  Vitals reviewed.   Constitutional:       General: He is not in acute distress.     Appearance: Normal appearance.   HENT:      Head: Normocephalic and atraumatic.      Comments: Wearing a mask  Cardiovascular:      Rate and Rhythm: Normal rate and regular rhythm.   Pulmonary:      Breath sounds: Decreased breath sounds present.   Musculoskeletal:      Cervical back: Normal range of motion.   Skin:     General: Skin is warm and dry.   Neurological:      Mental " Status: He is alert and oriented to person, place, and time.   Psychiatric:         Mood and Affect: Mood normal.         Behavior: Behavior normal.        Result Review :     PFT Values        Some values may be hidden. Unless noted otherwise, only the newest values recorded on each date are displayed.         Old Values PFT Results 8/14/19   FVC 61%   FEV1 33%   FEV1/FVC 42.15%   DLCO 27%   %      Pre Drug PFT Results 8/14/19   No data to display.      Post Drug PFT Results 8/14/19   No data to display.      Other Tests PFT Results 8/14/19   No data to display.                    Assessment and Plan      Diagnoses and all orders for this visit:    1. Stage 3 severe COPD by GOLD classification (CMS/HCC) (Primary)  Comments:  Stable.  Unfortunately continues to smoke.  Call insurance company to find out which maintenance inhaler would be more affordable.  Continue DuoNebs & Symbicort    2. Encounter for screening for lung cancer  Comments:  Next LDCT due August 2021  Orders:  -      CT Chest Low Dose Cancer Screening WO; Future    3. Pulmonary emphysema, unspecified emphysema type (CMS/[facility])  Comments:  Continue current regimen.    4. Personal history of tobacco use, presenting hazards to health  Comments:  Smoking cessation is recommended.  LDCT for lung cancer screening purposes has been ordered for August.  Orders:  -      CT Chest Low Dose Cancer Screening WO; Future      Patient's Body mass index is 27.96 kg/m². BMI is above normal parameters. Recommendations include: referral to primary care.        Porfirio Barrett, APRN  3/17/2021  16:42 CDT    Follow Up   Return in about 9 months (around 12/17/2021) for FVL on return.    Patient was given instructions and counseling regarding his condition or for health maintenance advice. Please see specific information pulled into the AVS if appropriate.

## 2021-03-17 NOTE — PATIENT INSTRUCTIONS
MUCINEX/Guaifenesin oral ER tablets  You can take up to 1200 mg twice as day with water. Typical dosing is 600 mg twice a day.  What is this medicine?  GUAIFENESIN (gwye FEN e sin) is an expectorant. It helps to thin mucous and make coughs more productive. This medicine is used to treat coughs caused by colds or the flu. It is not intended to treat chronic cough caused by smoking, asthma, emphysema, or heart failure.  This medicine may be used for other purposes; ask your health care provider or pharmacist if you have questions.  COMMON BRAND NAME(S): Humibid, Mucinex  What should I tell my health care provider before I take this medicine?  They need to know if you have any of these conditions:  · fever  · kidney disease  · an unusual or allergic reaction to guaifenesin, other medicines, foods, dyes, or preservatives  · pregnant or trying to get pregnant  · breast-feeding  How should I use this medicine?  Take this medicine by mouth with a full glass of water. Follow the directions on the prescription label. Do not break, chew or crush this medicine. You may take with food or on an empty stomach. Take your medicine at regular intervals. Do not take your medicine more often than directed.  Talk to your pediatrician regarding the use of this medicine in children. While this drug may be prescribed for children as young as 12 years old for selected conditions, precautions do apply.  Overdosage: If you think you have taken too much of this medicine contact a poison control center or emergency room at once.  NOTE: This medicine is only for you. Do not share this medicine with others.  What if I miss a dose?  If you miss a dose, take it as soon as you can. If it is almost time for your next dose, take only that dose. Do not take double or extra doses.  What may interact with this medicine?  Interactions are not expected.  This list may not describe all possible interactions. Give your health care provider a list of all the  medicines, herbs, non-prescription drugs, or dietary supplements you use. Also tell them if you smoke, drink alcohol, or use illegal drugs. Some items may interact with your medicine.  What should I watch for while using this medicine?  Do not treat a cough for more than 1 week without consulting your doctor or health care professional. If you also have a high fever, skin rash, continuing headache, or sore throat, see your doctor.  For best results, drink 6 to 8 glasses water daily while you are taking this medicine.  What side effects may I notice from receiving this medicine?  Side effects that you should report to your doctor or health care professional as soon as possible:  · allergic reactions like skin rash, itching or hives, swelling of the face, lips, or tongue  Side effects that usually do not require medical attention (report to your doctor or health care professional if they continue or are bothersome):  · dizziness  · headache  · stomach upset  This list may not describe all possible side effects. Call your doctor for medical advice about side effects. You may report side effects to FDA at 7-978-FDA-6117.  Where should I keep my medicine?  Keep out of the reach of children.  Store at room temperature between 20 and 25 degrees C (68 and 77 degrees F). Keep container tightly closed. Throw away any unused medicine after the expiration date.  NOTE: This sheet is a summary. It may not cover all possible information. If you have questions about this medicine, talk to your doctor, pharmacist, or health care provider.  © 2021 [employer]/Gold Standard (2009-04-29 12:14:14)

## 2021-03-22 DIAGNOSIS — J44.9 STAGE 3 SEVERE COPD BY GOLD CLASSIFICATION (HCC): ICD-10-CM

## 2021-03-22 RX ORDER — BUDESONIDE AND FORMOTEROL FUMARATE DIHYDRATE 160; 4.5 UG/1; UG/1
2 AEROSOL RESPIRATORY (INHALATION) 2 TIMES DAILY
Qty: 30.6 G | Refills: 3 | Status: SHIPPED | OUTPATIENT
Start: 2021-03-22 | End: 2021-03-24 | Stop reason: SDUPTHER

## 2021-03-22 NOTE — TELEPHONE ENCOUNTER
Patient's wife states they can get Symbicort, Dulera or Breo for $20 for a 3 months supply at [facility].   He wants to stay on Symbicort.   Refill request sent to [facility].

## 2021-03-24 DIAGNOSIS — J44.9 STAGE 3 SEVERE COPD BY GOLD CLASSIFICATION (HCC): ICD-10-CM

## 2021-03-24 RX ORDER — BUDESONIDE AND FORMOTEROL FUMARATE DIHYDRATE 160; 4.5 UG/1; UG/1
2 AEROSOL RESPIRATORY (INHALATION) 2 TIMES DAILY
Qty: 30.6 G | Refills: 3 | Status: SHIPPED | OUTPATIENT
Start: 2021-03-24 | End: 2022-04-13

## 2021-03-24 NOTE — TELEPHONE ENCOUNTER
[facility] informed the patient they were fill the generic for Symbicort, which would cost over $400. The patient's wife was told that his plan would cover the brand for $20.  I put on this prescription to fill with the brand name. Would you please sign this prescription and review it to make sure it is correct, thank you.

## 2021-04-14 ENCOUNTER — HOSPITAL ENCOUNTER (OUTPATIENT)
Dept: ULTRASOUND IMAGING | Facility: HOSPITAL | Age: 76
Discharge: HOME | End: 2021-04-14

## 2021-04-14 ENCOUNTER — OFFICE VISIT (OUTPATIENT)
Dept: VASCULAR SURGERY | Facility: CLINIC | Age: 76
End: 2021-04-14

## 2021-04-14 VITALS
BODY MASS INDEX: 27.82 KG/M2 | WEIGHT: 167 LBS | OXYGEN SATURATION: 91 % | DIASTOLIC BLOOD PRESSURE: 86 MMHG | HEIGHT: 65 IN | SYSTOLIC BLOOD PRESSURE: 142 MMHG | HEART RATE: 84 BPM

## 2021-04-14 DIAGNOSIS — I73.9 PAD (PERIPHERAL ARTERY DISEASE) (HCC): ICD-10-CM

## 2021-04-14 DIAGNOSIS — I72.4 POPLITEAL ARTERY ANEURYSM (HCC): ICD-10-CM

## 2021-04-14 DIAGNOSIS — I71.40 ABDOMINAL AORTIC ANEURYSM (AAA) WITHOUT RUPTURE (HCC): ICD-10-CM

## 2021-04-14 DIAGNOSIS — I10 ESSENTIAL HYPERTENSION: ICD-10-CM

## 2021-04-14 DIAGNOSIS — I65.23 BILATERAL CAROTID ARTERY STENOSIS: ICD-10-CM

## 2021-04-14 DIAGNOSIS — E78.5 HYPERLIPIDEMIA, UNSPECIFIED HYPERLIPIDEMIA TYPE: ICD-10-CM

## 2021-04-14 DIAGNOSIS — I72.4 POPLITEAL ARTERY ANEURYSM (HCC): Primary | ICD-10-CM

## 2021-04-14 DIAGNOSIS — Z72.0 TOBACCO ABUSE: ICD-10-CM

## 2021-04-14 PROCEDURE — 93923 UPR/LXTR ART STDY 3+ LVLS: CPT

## 2021-04-14 PROCEDURE — 93923 UPR/LXTR ART STDY 3+ LVLS: CPT | Performed by: SURGERY

## 2021-04-14 PROCEDURE — 93926 LOWER EXTREMITY STUDY: CPT | Performed by: SURGERY

## 2021-04-14 PROCEDURE — 99214 OFFICE O/P EST MOD 30 MIN: CPT | Performed by: NURSE PRACTITIONER

## 2021-04-14 PROCEDURE — 93926 LOWER EXTREMITY STUDY: CPT

## 2021-04-16 NOTE — PROGRESS NOTES
"04/14/2021       Dandy Burton MD  [address] [place] [address] [address]  [place] [address]    Jonnie Sheets Jr.  1945    Chief Complaint   Patient presents with   • Follow-up     6 Month Follow Up FOr Popliteal Artery Aneurysm. Test [id number] US pad lower ext arteries left and US pad ankle / brach ind ext comp. Patient denies any stroke like symptoms.    • Smoker     Patient is a Current Everyday Smoker    • Med Management     Verbally verified medications with patient        Dear Dandy Burton MD       HPI  I had the pleasure of seeing your patient Jonnie Sheets Jr. in the office today. As you recall, Jonnie Sheets Jr. is a 75 y.o.  male who we are following for left popliteal artery aneurysm.  He denies any claudication to his lower extremities.  He does continue to receive knee injections for his left knee which is helping.  Unfortunately he does continue to smoke.  He did undergo endovascular repair of his left popliteal artery aneurysm with Viabahn stent graft.  He is maintained on aspirin, Plavix, and Mevacor.  He did have noninvasive testing performed today, which I did review in office.    Review of Systems   Constitutional: Negative.    HENT: Negative.    Eyes: Negative.    Respiratory: Negative.    Cardiovascular: Negative.    Gastrointestinal: Negative.    Endocrine: Negative.    Genitourinary: Negative.    Musculoskeletal: Positive for joint swelling.   Skin: Negative.    Allergic/Immunologic: Negative.    Neurological: Negative.    Hematological: Negative.    Psychiatric/Behavioral: Negative.    All other systems reviewed and are negative.       /86 (BP Location: Left arm, Patient Position: Sitting, Cuff Size: Adult)   Pulse 84   Ht 165.1 cm (65\")   Wt 75.8 kg (167 lb)   SpO2 91%   BMI 27.79 kg/m²   Physical Exam  Vitals and nursing note reviewed.   Constitutional:       General: He is not in acute distress.     Appearance: Normal appearance. He is well-developed. He is not diaphoretic. "   HENT:      Head: Normocephalic and atraumatic.   Eyes:      Pupils: Pupils are equal, round, and reactive to light.   Neck:      Vascular: No carotid bruit or JVD.   Cardiovascular:      Rate and Rhythm: Normal rate and regular rhythm.      Pulses: Normal pulses.           Femoral pulses are 2+ on the right side and 2+ on the left side.       Popliteal pulses are 2+ on the right side and 2+ on the left side.        Dorsalis pedis pulses are 2+ on the right side and 2+ on the left side.        Posterior tibial pulses are 2+ on the right side and 2+ on the left side.      Heart sounds: Normal heart sounds, S1 normal and S2 normal. No murmur heard.   No friction rub. No gallop.    Pulmonary:      Effort: Pulmonary effort is normal.      Breath sounds: Normal breath sounds.   Abdominal:      General: Bowel sounds are normal. There is no abdominal bruit.      Palpations: Abdomen is soft.      Tenderness: There is no abdominal tenderness.   Musculoskeletal:         General: Normal range of motion.      Cervical back: Neck supple.   Skin:     General: Skin is warm and dry.   Neurological:      General: No focal deficit present.      Mental Status: He is alert and oriented to person, place, and time.      Cranial Nerves: No cranial nerve deficit.   Psychiatric:         Mood and Affect: Mood normal.         Behavior: Behavior normal.         Thought Content: Thought content normal.         Judgment: Judgment normal.     Diagnostic data:  US Arterial Doppler Lower Extremity Left    Result Date: 4/14/2021  Narrative:  History: PAD.  Comments: Grayscale imaging as well as color flow duplex were used to evaluate the left lower extremity popliteal artery stent.  The peak systolic velocity in the proximal stent measured 37 cm/s. In the mid stent measured 40.4 cm/s. In the distal stent measured 35.9 cm/s. The aneurysm appears well excluded and no evidence of endoleak.      Impression: Patent left popliteal artery stent with  complete exclusion of the popliteal aneurysm. This report was finalized on 04/14/2021 14:10 by Dr. Tomas Ryan MD.    US Ankle / Brachial Indices Extremity Complete    Result Date: 4/14/2021  Narrative:  History: PAD  Comments: Bilateral lower extremity arterial with multi-level pulse volume recordings and segmental pressures were performed at rest and stress.  The right ankle/brachial index is 1.02. The waveforms are triphasic without dampening.These findings are consistent with no significant arterial insufficiency of the right lower extremity at rest.  The left ankle/brachial index is 0.8. The waveforms are triphasic without dampening. These findings are consistent with mild arterial insufficiency of the left lower extremity at rest.      Impression: Impression: 1. No significant arterial insufficiency of the right lower extremity at rest. 2. Mild arterial insufficiency of the left lower extremity at rest.   This report was finalized on 04/14/2021 14:08 by Dr. Tomas Ryan MD.       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 difficile colitis   • Hypokalemia   • Anemia   • Hypomagnesemia   • Hypoxia   • Nausea vomiting and diarrhea   • Acute hypercapnic respiratory failure (CMS/HCC)   • Weight loss   • Tobacco dependence   • Popliteal artery aneurysm (CMS/HCC)   • History of Clostridium difficile colitis   • Moderate malnutrition (CMS/HCC)   • Stage 3 severe COPD by GOLD classification (CMS/HCC)   •  Pulmonary emphysema (CMS/[facility])   • Chronic respiratory failure with hypoxia and hypercapnia (CMS/[facility])   • Former smoker   • Hyperlipidemia   • Preop testing   • Hypertension         ICD-10-CM ICD-9-CM   1. Popliteal artery aneurysm (CMS/[facility])  I72.4 442.3   2. Bilateral carotid artery stenosis  I65.23 433.10     433.30   3. Abdominal aortic aneurysm (AAA) without rupture (CMS/[facility])  I71.4 441.4   4. PAD (peripheral artery disease) (CMS/[facility])  I73.9 443.9   5. Hyperlipidemia, unspecified hyperlipidemia type  E78.5 272.4   6. Essential hypertension  I10 401.9   7. Tobacco abuse  Z72.0 305.1       Plan: After thoroughly evaluating Jonnie MEMO Sheets Jr., I believe the best course of action is to remain conservative from vascular surgery standpoint.  Currently he is doing well and denies any claudication to his lower extremities.  He denies any strokelike symptoms.  I did review his testing which shows his left popliteal artery stent to be patent with no stenosis and complete exclusion of the aneurysm.  There is no arterial insufficiency of the right lower extremity and mild in the left.  We will see him back in 6 months with repeat noninvasive testing for continued surveillance, including a left lower extremity duplex, ABIs, and an ultrasound of the aorta and carotid duplex.  I did discuss vascular risk factors as they pertain to the progression of vascular disease including controlling his hyperlipidemia, which is stable.  He is taking Mevacor.  I did  extensively on smoking cessation, and the patient was advised of the continued risks of smoking.  I provided over 3 minutes counseling on this matter.  He does not have a desire to quit smoking at this time.  Patient's Body mass index is 27.79 kg/m². BMI is above normal parameters. Recommendations include: educational material. The patient can continue taking their current medication regimen as previously planned.  This was all discussed in full with complete  understanding.    Thank you for allowing me to participate in the care of your patient.  Please do not hesitate with any questions or concerns.  I will keep you aware of any further encounters with Jonnie Sheets Jr..        Sincerely yours,         PRATEEK Saleem

## 2021-08-04 ENCOUNTER — HOSPITAL ENCOUNTER (OUTPATIENT)
Dept: CT IMAGING | Facility: HOSPITAL | Age: 76
Discharge: HOME | End: 2021-08-04
Admitting: NURSE PRACTITIONER

## 2021-08-04 DIAGNOSIS — Z87.891 PERSONAL HISTORY OF TOBACCO USE, PRESENTING HAZARDS TO HEALTH: ICD-10-CM

## 2021-08-04 DIAGNOSIS — Z12.2 ENCOUNTER FOR SCREENING FOR LUNG CANCER: ICD-10-CM

## 2021-08-04 PROCEDURE — 71271 CT THORAX LUNG CANCER SCR C-: CPT

## 2021-08-06 DIAGNOSIS — R91.1 LUNG NODULE: Primary | ICD-10-CM

## 2021-08-06 NOTE — PROGRESS NOTES
Spoke with patient and relayed test results. Patient voiced understanding. Patient is agreeable to the PET scan.

## 2021-08-20 ENCOUNTER — HOSPITAL ENCOUNTER (OUTPATIENT)
Dept: CT IMAGING | Facility: HOSPITAL | Age: 76
Discharge: HOME | End: 2021-08-20

## 2021-08-20 DIAGNOSIS — R91.1 LUNG NODULE: ICD-10-CM

## 2021-08-20 PROCEDURE — 78815 PET IMAGE W/CT SKULL-THIGH: CPT

## 2021-08-20 PROCEDURE — A9552 F18 FDG: HCPCS | Performed by: NURSE PRACTITIONER

## 2021-08-20 PROCEDURE — 0 FLUDEOXYGLUCOSE F18 SOLUTION: Performed by: NURSE PRACTITIONER

## 2021-08-20 RX ADMIN — FLUDEOXYGLUCOSE F18 1 DOSE: 300 INJECTION INTRAVENOUS at 10:38

## 2021-08-24 DIAGNOSIS — R91.1 LUNG NODULE: Primary | ICD-10-CM

## 2021-08-25 DIAGNOSIS — J44.9 STAGE 3 SEVERE COPD BY GOLD CLASSIFICATION (HCC): Primary | ICD-10-CM

## 2021-08-25 RX ORDER — IPRATROPIUM BROMIDE AND ALBUTEROL SULFATE 2.5; .5 MG/3ML; MG/3ML
SOLUTION RESPIRATORY (INHALATION)
Qty: 360 ML | Refills: 11 | Status: SHIPPED | OUTPATIENT
Start: 2021-08-25 | End: 2022-09-15

## 2021-08-25 NOTE — TELEPHONE ENCOUNTER
Rx Refill Note  Requested Prescriptions     Pending Prescriptions Disp Refills   • ipratropium-albuterol (DUO-NEB) 0.5-2.5 mg/3 ml nebulizer [Pharmacy Med Name: IPRATROPI/ALB 0.5/3MG INH SL [id number]] 360 mL 11     Sig: USE 3 ML VIA NEBULIZER FOUR TIMES DAILY      Last office visit with prescribing clinician: 3/17/2021      Next office visit with prescribing clinician: 12/15/2021            Tonya Mauricio CMA  08/25/21, 14:46 CDT

## 2021-08-30 RX ORDER — CLOPIDOGREL BISULFATE 75 MG/1
TABLET ORAL
Qty: 30 TABLET | Refills: 5 | Status: SHIPPED | OUTPATIENT
Start: 2021-08-30 | End: 2022-02-16

## 2021-10-12 ENCOUNTER — HOSPITAL ENCOUNTER (OUTPATIENT)
Dept: ULTRASOUND IMAGING | Facility: HOSPITAL | Age: 76
Discharge: HOME | End: 2021-10-12

## 2021-10-12 ENCOUNTER — TELEPHONE (OUTPATIENT)
Dept: VASCULAR SURGERY | Facility: CLINIC | Age: 76
End: 2021-10-12

## 2021-10-12 DIAGNOSIS — I71.40 ABDOMINAL AORTIC ANEURYSM (AAA) WITHOUT RUPTURE (HCC): ICD-10-CM

## 2021-10-12 DIAGNOSIS — I73.9 PAD (PERIPHERAL ARTERY DISEASE) (HCC): ICD-10-CM

## 2021-10-12 DIAGNOSIS — I72.4 POPLITEAL ARTERY ANEURYSM (HCC): ICD-10-CM

## 2021-10-12 DIAGNOSIS — I65.23 BILATERAL CAROTID ARTERY STENOSIS: ICD-10-CM

## 2021-10-12 PROCEDURE — 76775 US EXAM ABDO BACK WALL LIM: CPT

## 2021-10-12 PROCEDURE — 93923 UPR/LXTR ART STDY 3+ LVLS: CPT | Performed by: SURGERY

## 2021-10-12 PROCEDURE — 93880 EXTRACRANIAL BILAT STUDY: CPT | Performed by: SURGERY

## 2021-10-12 PROCEDURE — 93880 EXTRACRANIAL BILAT STUDY: CPT

## 2021-10-12 PROCEDURE — 93926 LOWER EXTREMITY STUDY: CPT | Performed by: SURGERY

## 2021-10-12 PROCEDURE — 93923 UPR/LXTR ART STDY 3+ LVLS: CPT

## 2021-10-12 PROCEDURE — 93926 LOWER EXTREMITY STUDY: CPT

## 2021-10-12 NOTE — TELEPHONE ENCOUNTER
Attempted to contact patient and remind of appointment with Elizabeth tomorrow.  There was no VM set up.

## 2021-10-13 ENCOUNTER — OFFICE VISIT (OUTPATIENT)
Dept: VASCULAR SURGERY | Facility: CLINIC | Age: 76
End: 2021-10-13

## 2021-10-13 VITALS
SYSTOLIC BLOOD PRESSURE: 124 MMHG | OXYGEN SATURATION: 93 % | DIASTOLIC BLOOD PRESSURE: 76 MMHG | BODY MASS INDEX: 27.82 KG/M2 | HEIGHT: 65 IN | HEART RATE: 60 BPM | WEIGHT: 167 LBS

## 2021-10-13 DIAGNOSIS — I10 ESSENTIAL HYPERTENSION: ICD-10-CM

## 2021-10-13 DIAGNOSIS — I73.9 PAD (PERIPHERAL ARTERY DISEASE) (HCC): Primary | ICD-10-CM

## 2021-10-13 DIAGNOSIS — Z72.0 TOBACCO ABUSE: ICD-10-CM

## 2021-10-13 DIAGNOSIS — E78.5 HYPERLIPIDEMIA, UNSPECIFIED HYPERLIPIDEMIA TYPE: ICD-10-CM

## 2021-10-13 DIAGNOSIS — I71.40 ABDOMINAL AORTIC ANEURYSM (AAA) WITHOUT RUPTURE (HCC): ICD-10-CM

## 2021-10-13 DIAGNOSIS — I72.4 POPLITEAL ARTERY ANEURYSM (HCC): ICD-10-CM

## 2021-10-13 DIAGNOSIS — I65.23 BILATERAL CAROTID ARTERY STENOSIS: ICD-10-CM

## 2021-10-13 PROCEDURE — 99214 OFFICE O/P EST MOD 30 MIN: CPT | Performed by: NURSE PRACTITIONER

## 2021-10-13 NOTE — PROGRESS NOTES
"10/13/2021       Dandy Burton MD  [address] [place] [address] [address]  [place] [address]    Jonnie Sheets Jr.  1945    Chief Complaint   Patient presents with   • Follow-up     6 Month Follow Up For Bilateral Carotid Artery Stenosis, Peripheral Artery Disease, Popliteal Artery Aneurysm, and Abdominal Aortic Aneurysm without Rupture. Test [id number] US pad ankle / brach ind ext comp, US pad lower ext arteries left, US pad carotid bilateral, and US pad aorta highotwer. Patient denies any stroke like symptoms.    • Smoker     Patient is a Current Some Day Smoker    • other     patient states he had seen a thing in regards to taking Aspirin and was wondering should he continue taking it    • Med Management     Verbally verified medications with patient/wife        Dear Dandy Burton MD       HPI  I had the pleasure of seeing your patient Jonnie Sheets Jr. in the office today. As you recall, Jonnie Sheets Jr. is a 76 y.o.  male who we are following for left popliteal artery aneurysm.  Overall he is doing well.  He denies any claudication to his lower extremities.  Unfortunately he does continue to smoke.  He did undergo endovascular repair of his left popliteal artery aneurysm with Viabahn stent graft.  He is maintained on aspirin, Plavix, and Mevacor.  He did have noninvasive testing performed today, which I did review in office.    Review of Systems   Constitutional: Negative.    HENT: Negative.    Eyes: Negative.    Respiratory: Negative.    Cardiovascular: Negative.    Gastrointestinal: Negative.    Endocrine: Negative.    Genitourinary: Negative.    Musculoskeletal: Negative.    Skin: Negative.    Allergic/Immunologic: Negative.    Neurological: Negative.    Hematological: Negative.    Psychiatric/Behavioral: Negative.    All other systems reviewed and are negative.       /76 (BP Location: Right arm, Patient Position: Sitting, Cuff Size: Adult)   Pulse 60   Ht 165.1 cm (65\")   Wt 75.8 kg (167 lb)   SpO2 " 93%   BMI 27.79 kg/m²   Physical Exam  Constitutional:       General: He is not in acute distress.     Appearance: Normal appearance. He is well-developed. He is not diaphoretic.   HENT:      Head: Normocephalic and atraumatic.   Neck:      Vascular: No carotid bruit or JVD.   Cardiovascular:      Rate and Rhythm: Normal rate and regular rhythm.      Pulses: Normal pulses.           Femoral pulses are 2+ on the right side and 2+ on the left side.       Popliteal pulses are 2+ on the right side and 2+ on the left side.        Dorsalis pedis pulses are 2+ on the right side and 2+ on the left side.        Posterior tibial pulses are 2+ on the right side and 2+ on the left side.      Heart sounds: Normal heart sounds, S1 normal and S2 normal. No murmur heard.  No friction rub. No gallop.    Pulmonary:      Effort: Pulmonary effort is normal.      Breath sounds: Normal breath sounds.   Abdominal:      General: Bowel sounds are normal. There is no abdominal bruit.      Palpations: Abdomen is soft.      Tenderness: There is no abdominal tenderness.   Musculoskeletal:         General: Normal range of motion.   Skin:     General: Skin is warm and dry.   Neurological:      Mental Status: He is alert and oriented to person, place, and time.      Cranial Nerves: No cranial nerve deficit.   Psychiatric:         Behavior: Behavior normal.         Thought Content: Thought content normal.         Judgment: Judgment normal.            Diagnostic data:  US Arterial Doppler Lower Extremity Left    Result Date: 10/12/2021  Narrative:  History: PAD.  Comments: Grayscale imaging as well as color flow duplex were used to evaluate the left lower extremity popliteal stent.  The peak systolic velocity in the proximal stent measured 31.4 cm/s. In the mid stent measured 30.1 cm/s. In the distal stent measured 216.9 cm/s.      Impression: Patent left popliteal artery stent without evidence of significant stenosis.  This report was finalized on  10/12/2021 15:52 by Dr. Tomas Ryan MD.    US Carotid Bilateral    Result Date: 10/12/2021  Narrative: History: Carotid occlusive disease      Impression: Impression: 1. There is less than 50% stenosis of the right internal carotid artery. 2. There is less than 50% stenosis of the left internal carotid artery. 3. Antegrade flow is demonstrated in bilateral vertebral arteries.  Comments: Bilateral carotid vertebral arterial duplex scan was performed.  Grayscale imaging shows intimal thickening and calcified elements at the carotid bifurcation. The right internal carotid artery peak systolic velocity is 81.1 cm/sec. The end-diastolic velocity is 14.9 cm/sec. The right ICA/CCA ratio is approximately 1.1 . These findings correlate with less than 50% stenosis of the right internal carotid artery.  Grayscale imaging shows intimal thickening and calcified elements at the carotid bifurcation. The left internal carotid artery peak systolic velocity is 71.1 cm/sec. The end-diastolic velocity is 16.7 cm/sec. The left ICA/CCA ratio is approximately 0.6 . These findings correlate with less than 50% stenosis of the left internal carotid artery.  Antegrade flow is demonstrated in bilateral vertebral arteries.  This report was finalized on 10/12/2021 15:59 by Dr. Tomas Ryan MD.    US Ankle / Brachial Indices Extremity Complete    Result Date: 10/12/2021  Narrative:  History: PAD  Comments: Bilateral lower extremity arterial with multi-level pulse volume recordings and segmental pressures were performed at rest and stress.  The right ankle/brachial index is 1.16. The waveforms are triphasic without dampening.These findings are consistent with no significant arterial insufficiency of the right lower extremity at rest.  The left ankle/brachial index is 1.04. The waveforms are triphasic without dampening. These findings are consistent with no significant arterial insufficiency of the left lower extremity at rest.       Impression: Impression: 1. No significant arterial insufficiency of the right lower extremity at rest. 2. No significant arterial insufficiency of the left lower extremity at rest.   This report was finalized on 10/12/2021 15:48 by Dr. Tomas Ryan MD.    US Aorta Limited    Result Date: 10/12/2021  Narrative: ULTRASOUND AORTA 10/12/2021 1:45 PM CDT  HISTORY: Abdominal aortic aneurysm  COMPARISON: 2/4/2020  TECHNIQUE: Transverse and longitudinal sonographic images of the abdominal aorta were obtained.  FINDINGS:  The proximal aorta measures 2.1 x 2.4 cm (previously 2.1 x 2.4 cm).  The mid aorta measures 2.9 x 3.4 cm (previously 2.9 x 3.4 cm).  The distal aorta measures 1.6 x 1.6 cm (previously 1.9 x 1.9 cm).  The aortoiliac bifurcation is normal in appearance.      Impression:  Unchanged aneurysmal dilatation of the abdominal aorta measuring up to 3.4 cm. This report was finalized on 10/12/2021 14:36 by Dr. Saqib Ryan MD.       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 difficile colitis   • Hypokalemia   • Anemia   • Hypomagnesemia   • Hypoxia   • Nausea vomiting and diarrhea   • Acute hypercapnic respiratory failure (HCC)   • Weight loss   • Tobacco dependence   • Popliteal artery aneurysm (HCC)   • History of Clostridium difficile colitis   • Moderate malnutrition (HCC)   • Stage 3 severe COPD by GOLD classification ([facility])   • Pulmonary emphysema (HCC)   • Chronic respiratory failure with hypoxia and  hypercapnia (HCC)   • Former smoker   • Hyperlipidemia   • Preop testing   • Hypertension         ICD-10-CM ICD-9-CM   1. PAD (peripheral artery disease) ([facility])  I73.9 443.9   2. Popliteal artery aneurysm (HCC)  I72.4 442.3   3. Bilateral carotid artery stenosis  I65.23 433.10     433.30   4. Abdominal aortic aneurysm (AAA) without rupture ([facility])  I71.4 441.4   5. Hyperlipidemia, unspecified hyperlipidemia type  E78.5 272.4   6. Essential hypertension  I10 401.9   7. Tobacco abuse  Z72.0 305.1       Plan: After thoroughly evaluating Jonnie Sheets JrGonzalo, I believe the best course of action is to remain conservative from vascular surgery standpoint.  Currently he is doing well denies any claudication to his lower extremities.  He also denies any strokelike symptoms.  I did review his testing which shows his left popliteal artery graft patent with no significant stenosis.  His ABIs are within normal limits.  His carotid duplex shows less than 50% carotid stenosis bilaterally.  His abdominal aortic aneurysm is unchanged measuring 3.4 cm.  We will see him back in 6 months with repeat noninvasive testing for continued surveillance, including ABIs and a left lower extremity arterial duplex.  We will repeat ultrasound of the aorta and a carotid duplex in 1 year.  I did discuss vascular risk factors as it pertain to the progression of vascular disease including controlling his hypertension, hyperlipidemia, and smoking cessation.  His blood pressure stable on his current medication regimen.  He is maintained on Mevacor for his hyperlipidemia.  Unfortunately he does continue to smoke a couple cigarettes per day.   Patient's Body mass index is 27.79 kg/m². BMI is above normal parameters. Recommendations include: educational material. The patient can continue taking their current medication regimen as previously planned.  This was all discussed in full with complete understanding.    Thank you for allowing me to participate in the  care of your patient.  Please do not hesitate with any questions or concerns.  I will keep you aware of any further encounters with Jonnie Sheets Jr..        Sincerely yours,         PRATEEK Saleem

## 2021-12-03 DIAGNOSIS — J44.9 STAGE 3 SEVERE COPD BY GOLD CLASSIFICATION (HCC): Primary | ICD-10-CM

## 2021-12-03 NOTE — PROGRESS NOTES
Pt's wife left voicemail asking for order to be sent to [employer] for nebulizer cups.

## 2021-12-08 ENCOUNTER — HOSPITAL ENCOUNTER (OUTPATIENT)
Dept: CT IMAGING | Facility: HOSPITAL | Age: 76
Discharge: HOME | End: 2021-12-08
Admitting: NURSE PRACTITIONER

## 2021-12-08 DIAGNOSIS — R91.1 LUNG NODULE: ICD-10-CM

## 2021-12-08 PROCEDURE — 71250 CT THORAX DX C-: CPT

## 2021-12-13 ENCOUNTER — LAB (OUTPATIENT)
Dept: LAB | Facility: HOSPITAL | Age: 76
End: 2021-12-13

## 2021-12-13 DIAGNOSIS — Z01.812 ENCOUNTER FOR PREOPERATIVE SCREENING LABORATORY TESTING FOR COVID-19 VIRUS: ICD-10-CM

## 2021-12-13 DIAGNOSIS — Z20.822 ENCOUNTER FOR PREOPERATIVE SCREENING LABORATORY TESTING FOR COVID-19 VIRUS: ICD-10-CM

## 2021-12-13 LAB — SARS-COV-2 ORF1AB RESP QL NAA+PROBE: NOT DETECTED

## 2021-12-13 PROCEDURE — U0004 COV-19 TEST NON-CDC HGH THRU: HCPCS

## 2021-12-13 PROCEDURE — C9803 HOPD COVID-19 SPEC COLLECT: HCPCS

## 2021-12-15 ENCOUNTER — OFFICE VISIT (OUTPATIENT)
Dept: PULMONOLOGY | Facility: CLINIC | Age: 76
End: 2021-12-15

## 2021-12-15 VITALS
BODY MASS INDEX: 24.99 KG/M2 | WEIGHT: 150 LBS | DIASTOLIC BLOOD PRESSURE: 70 MMHG | HEART RATE: 96 BPM | HEIGHT: 65 IN | OXYGEN SATURATION: 98 % | SYSTOLIC BLOOD PRESSURE: 126 MMHG

## 2021-12-15 DIAGNOSIS — Z20.822 ENCOUNTER FOR PREOPERATIVE SCREENING LABORATORY TESTING FOR COVID-19 VIRUS: ICD-10-CM

## 2021-12-15 DIAGNOSIS — J44.9 STAGE 2 MODERATE COPD BY GOLD CLASSIFICATION (HCC): Primary | Chronic | ICD-10-CM

## 2021-12-15 DIAGNOSIS — R91.1 LUNG NODULE: ICD-10-CM

## 2021-12-15 DIAGNOSIS — Z87.891 PERSONAL HISTORY OF TOBACCO USE, PRESENTING HAZARDS TO HEALTH: ICD-10-CM

## 2021-12-15 DIAGNOSIS — J30.9 ALLERGIC RHINITIS, UNSPECIFIED SEASONALITY, UNSPECIFIED TRIGGER: ICD-10-CM

## 2021-12-15 DIAGNOSIS — Z01.812 ENCOUNTER FOR PREOPERATIVE SCREENING LABORATORY TESTING FOR COVID-19 VIRUS: ICD-10-CM

## 2021-12-15 PROCEDURE — 99214 OFFICE O/P EST MOD 30 MIN: CPT | Performed by: NURSE PRACTITIONER

## 2021-12-15 PROCEDURE — 94010 BREATHING CAPACITY TEST: CPT | Performed by: NURSE PRACTITIONER

## 2021-12-15 RX ORDER — IPRATROPIUM BROMIDE 42 UG/1
2 SPRAY, METERED NASAL 4 TIMES DAILY
Qty: 1 EACH | Refills: 3 | Status: SHIPPED | OUTPATIENT
Start: 2021-12-15

## 2021-12-15 NOTE — PROCEDURES
Pulmonary Function Test  Performed by: Bro Bray CMA  Authorized by: Porfirio Barrett APRN      Pre Drug % Predicted    FVC: 94%   FEV1: 51%   FEF 25-75%: 24%   FEV1/FVC: 41.85%    Interpretation   Spirometry   Spirometry shows moderate obstruction. There is reduced midflow suggesting small airway/airflow obstruction.   Review of FVL curve   Patient's effort is normal.   Overall comments: Improvement in FEV1 compared to 2019.
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
## 2021-12-15 NOTE — PATIENT INSTRUCTIONS
Guaifenesin oral solution   1 teaspoon up to 4 times a day to help thin secretions.   What is this medicine?  GUAIFENESIN (yanet FEN e sin) is an expectorant. It helps to thin mucous and make coughs more productive. This medicine is used to treat coughs caused by colds or the flu. It is not intended to treat chronic cough caused by smoking, asthma, emphysema, or heart failure.  This medicine may be used for other purposes; ask your health care provider or pharmacist if you have questions.  COMMON BRAND NAME(S): Altarussin, Altorant, Chest Congestion Relief, Cough, Diabetic Tussin, Diabetic Tussin EX, Diabetic Tussin Mucus Relief, ElixSure EX, Ganidin NR, CHICHO-TUSSIN, Guiatuss, Iophen-NR, Miltuss EX, Mucinex Children's, Mucinex Fast-Max Chest Congestion, Mucus + Chest Congestion, Mucus Relief Children's, Naldecon, Organidin NR, Q-Tussin, Robafen, Robafen Congestion, Robitussin, Robitussin Mucus + Chest Congestion, Scot-Tussin Expectorant, Siltussin MCPHERSON, Siltussin Diabetic MCPHERSON-Na, Siltussin SA  What should I tell my health care provider before I take this medicine?  They need to know if you have any of these conditions:  · diabetes  · fever  · kidney disease  · an unusual or allergic reaction to guaifenesin, other medicines, foods, dyes, or preservatives  · pregnant or trying to get pregnant  · breast-feeding  How should I use this medicine?  Take this medicine by mouth. Follow the directions on the prescription label. Use a specially marked spoon or container to measure your dose. Household spoons are not accurate. Take your medicine at regular intervals. Do not take it more often than directed.  Talk to your pediatrician regarding the use of this medicine in children. Special care may be needed.  Overdosage: If you think you have taken too much of this medicine contact a poison control center or emergency room at once.  NOTE: This medicine is only for you. Do not share this medicine with others.  What if I miss a  dose?  If you miss a dose, take it as soon as you can. If it is almost time for your next dose, take only that dose. Do not take double or extra doses.  What may interact with this medicine?  Interactions are not expected.  This list may not describe all possible interactions. Give your health care provider a list of all the medicines, herbs, non-prescription drugs, or dietary supplements you use. Also tell them if you smoke, drink alcohol, or use illegal drugs. Some items may interact with your medicine.  What should I watch for while using this medicine?  Do not treat a cough for more than 1 week without consulting your doctor or health care professional. If you also have a high fever, skin rash, continuing headache, or sore throat, see your doctor.  For best results, drink 6 to 8 glasses water daily while you are taking this medicine.  What side effects may I notice from receiving this medicine?  Side effects that you should report to your doctor or health care professional as soon as possible:  · allergic reactions like skin rash, itching or hives, swelling of the face, lips, or tongue  Side effects that usually do not require medical attention (report to your doctor or health care professional if they continue or are bothersome):  · dizziness  · headache  · stomach upset  This list may not describe all possible side effects. Call your doctor for medical advice about side effects. You may report side effects to FDA at 7-251-FDA-5477.  Where should I keep my medicine?  Keep out of the reach of children.  Store at room temperature between 20 and 25 degrees C (68 and 77 degrees F). Do not freeze. Keep container tightly closed. Throw away any unused medicine after the expiration date.  NOTE: This sheet is a summary. It may not cover all possible information. If you have questions about this medicine, talk to your doctor, pharmacist, or health care provider.  © 2021 [employer]vier/Gold Standard (2009-04-29 11:48:29)

## 2022-02-16 RX ORDER — CLOPIDOGREL BISULFATE 75 MG/1
TABLET ORAL
Qty: 30 TABLET | Refills: 5 | Status: SHIPPED | OUTPATIENT
Start: 2022-02-16 | End: 2022-08-15

## 2022-02-28 LAB
ALBUMIN SERPL-MCNC: 4.1 G/DL (ref 3.5–5.2)
ALP BLD-CCNC: 66 U/L (ref 40–130)
ALT SERPL-CCNC: 9 U/L (ref 5–41)
ANION GAP SERPL CALCULATED.3IONS-SCNC: 9 MMOL/L (ref 7–19)
AST SERPL-CCNC: 35 U/L (ref 5–40)
BACTERIA: NEGATIVE /HPF
BASOPHILS ABSOLUTE: 0.1 K/UL (ref 0–0.2)
BASOPHILS RELATIVE PERCENT: 0.5 % (ref 0–1)
BILIRUB SERPL-MCNC: 0.7 MG/DL (ref 0.2–1.2)
BILIRUBIN URINE: NEGATIVE
BLOOD, URINE: NEGATIVE
BUN BLDV-MCNC: 9 MG/DL (ref 8–23)
CALCIUM SERPL-MCNC: 9.3 MG/DL (ref 8.8–10.2)
CHLORIDE BLD-SCNC: 98 MMOL/L (ref 98–111)
CLARITY: CLEAR
CO2: 28 MMOL/L (ref 22–29)
COLOR: YELLOW
CREAT SERPL-MCNC: 1 MG/DL (ref 0.5–1.2)
CREATININE URINE: 225.7 MG/DL (ref 4.2–622)
CRYSTALS, UA: ABNORMAL /HPF
EOSINOPHILS ABSOLUTE: 0.3 K/UL (ref 0–0.6)
EOSINOPHILS RELATIVE PERCENT: 2.6 % (ref 0–5)
EPITHELIAL CELLS, UA: 1 /HPF (ref 0–5)
GFR AFRICAN AMERICAN: >59
GFR NON-AFRICAN AMERICAN: >60
GLUCOSE BLD-MCNC: 123 MG/DL (ref 74–109)
GLUCOSE URINE: NEGATIVE MG/DL
HCT VFR BLD CALC: 42.6 % (ref 42–52)
HEMOGLOBIN: 13.2 G/DL (ref 14–18)
HYALINE CASTS: 5 /HPF (ref 0–8)
IMMATURE GRANULOCYTES #: 0.1 K/UL
KETONES, URINE: ABNORMAL MG/DL
LEUKOCYTE ESTERASE, URINE: ABNORMAL
LYMPHOCYTES ABSOLUTE: 2 K/UL (ref 1.1–4.5)
LYMPHOCYTES RELATIVE PERCENT: 18.7 % (ref 20–40)
MCH RBC QN AUTO: 30.1 PG (ref 27–31)
MCHC RBC AUTO-ENTMCNC: 31 G/DL (ref 33–37)
MCV RBC AUTO: 97 FL (ref 80–94)
MICROALBUMIN UR-MCNC: 2.7 MG/DL (ref 0–19)
MICROALBUMIN/CREAT UR-RTO: 12 MG/G
MONOCYTES ABSOLUTE: 1.1 K/UL (ref 0–0.9)
MONOCYTES RELATIVE PERCENT: 10.2 % (ref 0–10)
NEUTROPHILS ABSOLUTE: 7.3 K/UL (ref 1.5–7.5)
NEUTROPHILS RELATIVE PERCENT: 67.4 % (ref 50–65)
NITRITE, URINE: NEGATIVE
PDW BLD-RTO: 14.4 % (ref 11.5–14.5)
PH UA: 7 (ref 5–8)
PLATELET # BLD: 267 K/UL (ref 130–400)
PMV BLD AUTO: 11.3 FL (ref 9.4–12.4)
POTASSIUM SERPL-SCNC: 4.5 MMOL/L (ref 3.5–5)
PROTEIN PROTEIN: 24 MG/DL (ref 15–45)
PROTEIN UA: ABNORMAL MG/DL
RBC # BLD: 4.39 M/UL (ref 4.7–6.1)
RBC UA: 3 /HPF (ref 0–4)
SODIUM BLD-SCNC: 135 MMOL/L (ref 136–145)
SPECIFIC GRAVITY UA: 1.02 (ref 1–1.03)
TOTAL PROTEIN: 7.4 G/DL (ref 6.6–8.7)
UROBILINOGEN, URINE: 1 E.U./DL
WBC # BLD: 10.8 K/UL (ref 4.8–10.8)
WBC UA: 2 /HPF (ref 0–5)

## 2022-03-07 ENCOUNTER — HOSPITAL ENCOUNTER (OUTPATIENT)
Dept: CT IMAGING | Facility: HOSPITAL | Age: 77
Discharge: HOME | End: 2022-03-07
Admitting: NURSE PRACTITIONER

## 2022-03-07 ENCOUNTER — DOCUMENTATION (OUTPATIENT)
Dept: CT IMAGING | Facility: HOSPITAL | Age: 77
End: 2022-03-07

## 2022-03-07 DIAGNOSIS — Z87.891 PERSONAL HISTORY OF TOBACCO USE, PRESENTING HAZARDS TO HEALTH: ICD-10-CM

## 2022-03-07 DIAGNOSIS — R91.1 LUNG NODULE: ICD-10-CM

## 2022-03-07 PROCEDURE — 71250 CT THORAX DX C-: CPT
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
## 2022-03-18 ENCOUNTER — OFFICE VISIT (OUTPATIENT)
Dept: PULMONOLOGY | Facility: CLINIC | Age: 77
End: 2022-03-18

## 2022-03-18 VITALS
WEIGHT: 154 LBS | OXYGEN SATURATION: 93 % | BODY MASS INDEX: 25.66 KG/M2 | SYSTOLIC BLOOD PRESSURE: 120 MMHG | HEIGHT: 65 IN | DIASTOLIC BLOOD PRESSURE: 70 MMHG | HEART RATE: 86 BPM

## 2022-03-18 DIAGNOSIS — J44.9 STAGE 2 MODERATE COPD BY GOLD CLASSIFICATION: Primary | Chronic | ICD-10-CM

## 2022-03-18 DIAGNOSIS — J30.9 ALLERGIC RHINITIS, UNSPECIFIED SEASONALITY, UNSPECIFIED TRIGGER: ICD-10-CM

## 2022-03-18 DIAGNOSIS — R91.1 NODULE OF UPPER LOBE OF LEFT LUNG: ICD-10-CM

## 2022-03-18 DIAGNOSIS — J96.11 CHRONIC RESPIRATORY FAILURE WITH HYPOXIA AND HYPERCAPNIA: Chronic | ICD-10-CM

## 2022-03-18 DIAGNOSIS — J43.9 PULMONARY EMPHYSEMA, UNSPECIFIED EMPHYSEMA TYPE: ICD-10-CM

## 2022-03-18 DIAGNOSIS — J96.12 CHRONIC RESPIRATORY FAILURE WITH HYPOXIA AND HYPERCAPNIA: Chronic | ICD-10-CM

## 2022-03-18 DIAGNOSIS — Z87.891 PERSONAL HISTORY OF TOBACCO USE, PRESENTING HAZARDS TO HEALTH: ICD-10-CM

## 2022-03-18 PROCEDURE — 99214 OFFICE O/P EST MOD 30 MIN: CPT | Performed by: NURSE PRACTITIONER

## 2022-04-12 ENCOUNTER — TELEPHONE (OUTPATIENT)
Dept: VASCULAR SURGERY | Facility: CLINIC | Age: 77
End: 2022-04-12

## 2022-04-12 NOTE — TELEPHONE ENCOUNTER
Left message reminding Mr Sheets of his appointments for Wednesday, April 13th, 2022. Rem[facility] for 9 o'clock testing and follow up afterwards at 1130 am with Elizabeth CHANDRA. Also advised Mr Sheets if he has any questions, concerns or needs to reschedule to please call the office at [phone].

## 2022-04-13 ENCOUNTER — OFFICE VISIT (OUTPATIENT)
Dept: VASCULAR SURGERY | Facility: CLINIC | Age: 77
End: 2022-04-13

## 2022-04-13 ENCOUNTER — HOSPITAL ENCOUNTER (OUTPATIENT)
Dept: ULTRASOUND IMAGING | Facility: HOSPITAL | Age: 77
Discharge: HOME | End: 2022-04-13

## 2022-04-13 VITALS
SYSTOLIC BLOOD PRESSURE: 122 MMHG | HEIGHT: 65 IN | OXYGEN SATURATION: 95 % | HEART RATE: 81 BPM | DIASTOLIC BLOOD PRESSURE: 78 MMHG | BODY MASS INDEX: 25.66 KG/M2 | WEIGHT: 154 LBS

## 2022-04-13 DIAGNOSIS — Z72.0 TOBACCO ABUSE: ICD-10-CM

## 2022-04-13 DIAGNOSIS — E78.5 HYPERLIPIDEMIA, UNSPECIFIED HYPERLIPIDEMIA TYPE: ICD-10-CM

## 2022-04-13 DIAGNOSIS — I72.4 POPLITEAL ARTERY ANEURYSM: ICD-10-CM

## 2022-04-13 DIAGNOSIS — I65.23 BILATERAL CAROTID ARTERY STENOSIS: ICD-10-CM

## 2022-04-13 DIAGNOSIS — I73.9 PAD (PERIPHERAL ARTERY DISEASE): ICD-10-CM

## 2022-04-13 DIAGNOSIS — I10 ESSENTIAL HYPERTENSION: ICD-10-CM

## 2022-04-13 DIAGNOSIS — J44.9 STAGE 3 SEVERE COPD BY GOLD CLASSIFICATION: ICD-10-CM

## 2022-04-13 DIAGNOSIS — I71.40 ABDOMINAL AORTIC ANEURYSM (AAA) WITHOUT RUPTURE: ICD-10-CM

## 2022-04-13 DIAGNOSIS — I73.9 PAD (PERIPHERAL ARTERY DISEASE): Primary | ICD-10-CM

## 2022-04-13 PROCEDURE — 93923 UPR/LXTR ART STDY 3+ LVLS: CPT

## 2022-04-13 PROCEDURE — 93926 LOWER EXTREMITY STUDY: CPT | Performed by: SURGERY

## 2022-04-13 PROCEDURE — 99214 OFFICE O/P EST MOD 30 MIN: CPT | Performed by: NURSE PRACTITIONER

## 2022-04-13 PROCEDURE — 93926 LOWER EXTREMITY STUDY: CPT

## 2022-04-13 PROCEDURE — 93923 UPR/LXTR ART STDY 3+ LVLS: CPT | Performed by: SURGERY

## 2022-04-13 RX ORDER — BUDESONIDE AND FORMOTEROL FUMARATE DIHYDRATE 160; 4.5 UG/1; UG/1
AEROSOL RESPIRATORY (INHALATION)
Qty: 30.6 G | Refills: 3 | Status: SHIPPED | OUTPATIENT
Start: 2022-04-13

## 2022-04-13 NOTE — TELEPHONE ENCOUNTER
Rx Refill Note  Requested Prescriptions     Pending Prescriptions Disp Refills   • Symbicort 160-4.5 MCG/ACT inhaler [Pharmacy Med Name: SYMBICORT INH AEROSOL 160/4.5MCG] 30.6 g 3     Sig: USE 2 INHALATIONS TWICE A DAY      Last office visit with prescribing clinician: 3/18/2022      Next office visit with prescribing clinician: 9/23/2022            Tonya Mauricio CMA  04/13/22, 09:16 CDT

## 2022-08-15 RX ORDER — CLOPIDOGREL BISULFATE 75 MG/1
TABLET ORAL
Qty: 30 TABLET | Refills: 5 | Status: SHIPPED | OUTPATIENT
Start: 2022-08-15 | End: 2023-03-20

## 2022-09-15 DIAGNOSIS — J44.9 STAGE 3 SEVERE COPD BY GOLD CLASSIFICATION: ICD-10-CM

## 2022-09-15 RX ORDER — IPRATROPIUM BROMIDE AND ALBUTEROL SULFATE 2.5; .5 MG/3ML; MG/3ML
SOLUTION RESPIRATORY (INHALATION)
Qty: 360 ML | Refills: 11 | Status: SHIPPED | OUTPATIENT
Start: 2022-09-15

## 2022-09-15 NOTE — TELEPHONE ENCOUNTER
Rx Refill Note  Requested Prescriptions     Pending Prescriptions Disp Refills   • ipratropium-albuterol (DUO-NEB) 0.5-2.5 mg/3 ml nebulizer [Pharmacy Med Name: IPRATROPI/ALB 0.5/3MG INH SL 60X3ML] 360 mL 11     Sig: USE 3 ML VIA NEBULIZER FOUR TIMES DAILY      Last office visit with prescribing clinician: 3/18/2022      Next office visit with prescribing clinician: 9/23/2022            Tonya Mauricio CMA  09/15/22, 07:47 CDT

## 2022-09-19 ENCOUNTER — HOSPITAL ENCOUNTER (OUTPATIENT)
Dept: CT IMAGING | Facility: HOSPITAL | Age: 77
Discharge: HOME | End: 2022-09-19
Admitting: NURSE PRACTITIONER

## 2022-09-19 DIAGNOSIS — R91.1 NODULE OF UPPER LOBE OF LEFT LUNG: ICD-10-CM

## 2022-09-19 PROCEDURE — 71250 CT THORAX DX C-: CPT

## 2022-09-23 ENCOUNTER — OFFICE VISIT (OUTPATIENT)
Dept: PULMONOLOGY | Facility: CLINIC | Age: 77
End: 2022-09-23

## 2022-09-23 VITALS
HEIGHT: 65 IN | BODY MASS INDEX: 27.32 KG/M2 | DIASTOLIC BLOOD PRESSURE: 74 MMHG | SYSTOLIC BLOOD PRESSURE: 126 MMHG | WEIGHT: 164 LBS | OXYGEN SATURATION: 91 % | HEART RATE: 82 BPM

## 2022-09-23 DIAGNOSIS — J44.9 STAGE 2 MODERATE COPD BY GOLD CLASSIFICATION: Primary | Chronic | ICD-10-CM

## 2022-09-23 DIAGNOSIS — R91.1 NODULE OF UPPER LOBE OF LEFT LUNG: ICD-10-CM

## 2022-09-23 DIAGNOSIS — J96.12 CHRONIC RESPIRATORY FAILURE WITH HYPOXIA AND HYPERCAPNIA: Chronic | ICD-10-CM

## 2022-09-23 DIAGNOSIS — J96.11 CHRONIC RESPIRATORY FAILURE WITH HYPOXIA AND HYPERCAPNIA: Chronic | ICD-10-CM

## 2022-09-23 DIAGNOSIS — Z87.891 PERSONAL HISTORY OF TOBACCO USE, PRESENTING HAZARDS TO HEALTH: ICD-10-CM

## 2022-09-23 PROCEDURE — 99213 OFFICE O/P EST LOW 20 MIN: CPT | Performed by: NURSE PRACTITIONER

## 2022-09-23 NOTE — PROGRESS NOTES
"Chief Complaint  COPD    Subjective    History of Present Illness      Jonnie Sheets Jr. presents to [facility] for:    History of Present Illness   Management of COPD, emphysema, chronic respiratory failure and a left upper lobe lung nodule that has been under surveillance.  He is a daily smoker.  He had a follow-up CT 9/19/2022 with no changes to the 8 mm left upper lobe nodule read as lung RADS 2.  He will need a follow-up CT in 1 year to assure stability.  He reports that his breathing status is stable.  He is on daily Symbicort and DuoNebs.  He uses Mucinex and oxygen as needed.  He is up-to-date on vaccines.  Objective   Vital Signs:   /74   Pulse 82   Ht 165.1 cm (65\")   Wt 74.4 kg (164 lb)   SpO2 91% Comment: RA  BMI 27.29 kg/m²     Physical Exam  Vitals reviewed.   Constitutional:       General: He is not in acute distress.     Appearance: Normal appearance.      Interventions: Nasal cannula in place.   HENT:      Head: Normocephalic and atraumatic.      Comments: Wearing a mask  Cardiovascular:      Rate and Rhythm: Normal rate and regular rhythm.   Pulmonary:      Breath sounds: Decreased breath sounds present.   Musculoskeletal:      Cervical back: Normal range of motion.   Skin:     General: Skin is warm and dry.   Neurological:      Mental Status: He is alert and oriented to person, place, and time.   Psychiatric:         Mood and Affect: Mood normal.         Behavior: Behavior normal.        Result Review :   The following data was reviewed by: PRATEEK Gallego on 09/23/2022:  CT Chest Low Dose Follow Up Without Contrast (09/19/2022 10:42)    Results for orders placed in visit on 12/15/21    Pulmonary Function Test    Narrative  Pulmonary Function Test  Performed by: Bro Bray CMA  Authorized by: Porfirio Barrett APRN    Pre Drug % Predicted  FVC: 94%  FEV1: 51%  FEF 25-75%: 24%  FEV1/FVC: " 41.85%    Interpretation  Spirometry  Spirometry shows moderate obstruction. There is reduced midflow suggesting small airway/airflow obstruction.  Review of FVL curve  Patient's effort is normal.  Overall comments: Improvement in FEV1 compared to 2019.      Results for orders placed in visit on 08/14/19    Pulmonary Function Test               Assessment and Plan      Diagnoses and all orders for this visit:    1. Stage 2 moderate COPD by GOLD classification ([facility]) (Primary)  Comments:  Stable continue Symbicort and DuoNebs daily.  Mucinex and oxygen as needed.  Flow-volume loop on return.    2. Chronic respiratory failure with hypoxia and hypercapnia ([facility])  Comments:  Stable.  Continue supplemental oxygen as needed.    3. Nodule of upper lobe of left lung  Comments:  Showed stability on follow-up scan 9/19/2022.  Follow-up CT in 1 year.  Smoking cessation recommended.    4. Personal history of tobacco use, presenting hazards to health  Comments:  Smoking cessation recommended.  Follow-up CT scan in 1 year.        Porfirio Barrett, PRATEEK  9/23/2022  09:18 CDT    Follow Up   Return in about 6 months (around 3/23/2023) for FVL.    Patient was given instructions and counseling regarding his condition or for health maintenance advice. Please see specific information pulled into the AVS if appropriate.

## 2022-10-24 ENCOUNTER — HOSPITAL ENCOUNTER (OUTPATIENT)
Dept: ULTRASOUND IMAGING | Facility: HOSPITAL | Age: 77
Discharge: HOME | End: 2022-10-24

## 2022-10-24 ENCOUNTER — OFFICE VISIT (OUTPATIENT)
Dept: VASCULAR SURGERY | Facility: CLINIC | Age: 77
End: 2022-10-24

## 2022-10-24 VITALS
SYSTOLIC BLOOD PRESSURE: 126 MMHG | OXYGEN SATURATION: 93 % | BODY MASS INDEX: 27.29 KG/M2 | DIASTOLIC BLOOD PRESSURE: 82 MMHG | HEIGHT: 65 IN | HEART RATE: 96 BPM

## 2022-10-24 DIAGNOSIS — I72.4 POPLITEAL ARTERY ANEURYSM: ICD-10-CM

## 2022-10-24 DIAGNOSIS — E78.5 HYPERLIPIDEMIA, UNSPECIFIED HYPERLIPIDEMIA TYPE: ICD-10-CM

## 2022-10-24 DIAGNOSIS — Z72.0 TOBACCO ABUSE: ICD-10-CM

## 2022-10-24 DIAGNOSIS — I73.9 PAD (PERIPHERAL ARTERY DISEASE): ICD-10-CM

## 2022-10-24 DIAGNOSIS — I71.40 ABDOMINAL AORTIC ANEURYSM (AAA) WITHOUT RUPTURE: ICD-10-CM

## 2022-10-24 DIAGNOSIS — I65.23 BILATERAL CAROTID ARTERY STENOSIS: ICD-10-CM

## 2022-10-24 DIAGNOSIS — I10 ESSENTIAL HYPERTENSION: ICD-10-CM

## 2022-10-24 DIAGNOSIS — I73.9 PAD (PERIPHERAL ARTERY DISEASE): Primary | ICD-10-CM

## 2022-10-24 DIAGNOSIS — I71.43 INFRARENAL ABDOMINAL AORTIC ANEURYSM (AAA) WITHOUT RUPTURE: ICD-10-CM

## 2022-10-24 PROCEDURE — 93880 EXTRACRANIAL BILAT STUDY: CPT | Performed by: SURGERY

## 2022-10-24 PROCEDURE — 93923 UPR/LXTR ART STDY 3+ LVLS: CPT

## 2022-10-24 PROCEDURE — 99214 OFFICE O/P EST MOD 30 MIN: CPT | Performed by: NURSE PRACTITIONER

## 2022-10-24 PROCEDURE — 93926 LOWER EXTREMITY STUDY: CPT

## 2022-10-24 PROCEDURE — 93923 UPR/LXTR ART STDY 3+ LVLS: CPT | Performed by: SURGERY

## 2022-10-24 PROCEDURE — 93880 EXTRACRANIAL BILAT STUDY: CPT

## 2022-10-24 PROCEDURE — 76775 US EXAM ABDO BACK WALL LIM: CPT

## 2022-10-24 PROCEDURE — 93926 LOWER EXTREMITY STUDY: CPT | Performed by: SURGERY

## 2022-10-24 NOTE — PROGRESS NOTES
"10/24/2022       Dandy Burton MD  [address] [place] [address] [address]  [place] [address]    Jonnie Sheets Jr.  1945    Chief Complaint   Patient presents with   • Follow-up     6 month f/u with ABIs,carotids, US of the aorta and US Arterial Doppler Lower Extremity Left.  Pt denies any changes.  Last seen 4/13/22.       Dear Dandy Burton MD       HPI  I had the pleasure of seeing your patient Jonnie Sheets Jr. in the office today. As you recall, Jonnie Sheets Jr. is a 77 y.o.  male who we are following for left popliteal artery aneurysm, asymptomatic carotid occlusive disease and a small abdominal aortic aneurysm.  He did undergo endovascular repair of his left popliteal artery aneurysm with Viabahn stent graft on 9/30/2020.  Currently he is doing well denies any claudication.  Unfortunately he does continue to smoke 1 to 2 cigarettes/day.  He is maintained on aspirin, Plavix, and Mevacor.  He did have noninvasive testing performed today, which I did review in office.       Review of Systems   Constitutional: Negative.    HENT: Negative.    Eyes: Negative.    Respiratory: Negative.    Cardiovascular: Negative.    Gastrointestinal: Negative.    Endocrine: Negative.    Genitourinary: Negative.    Musculoskeletal: Negative.    Skin: Negative.    Allergic/Immunologic: Negative.    Neurological: Negative.    Hematological: Negative.    Psychiatric/Behavioral: Negative.    All other systems reviewed and are negative.       /82   Pulse 96   Ht 165.1 cm (65\")   SpO2 93%   BMI 27.29 kg/m²   Physical Exam  Vitals and nursing note reviewed.   Constitutional:       Appearance: Normal appearance. He is well-developed.   HENT:      Head: Normocephalic and atraumatic.   Eyes:      General: No scleral icterus.     Pupils: Pupils are equal, round, and reactive to light.   Neck:      Thyroid: No thyromegaly.   Cardiovascular:      Rate and Rhythm: Normal rate and regular rhythm.      Heart sounds: Normal heart " sounds.   Pulmonary:      Effort: Pulmonary effort is normal.      Breath sounds: Normal breath sounds.   Abdominal:      General: Bowel sounds are normal.      Palpations: Abdomen is soft.   Musculoskeletal:         General: Normal range of motion.      Cervical back: Normal range of motion and neck supple.   Skin:     General: Skin is warm and dry.   Neurological:      General: No focal deficit present.      Mental Status: He is alert and oriented to person, place, and time.   Psychiatric:         Mood and Affect: Mood normal.         Behavior: Behavior normal.         Thought Content: Thought content normal.         Judgment: Judgment normal.             Diagnostic data:  Noninvasive testing including ABIs show right MARYCHUY of 1.05 and a left MARYCHUY of 0.86.  Left lower extremity arterial duplex shows patent left popliteal stent.  Carotid duplex shows less than 50% carotid stenosis bilaterally.  Ultrasound of the aorta shows the aorta to be 3.4 cm in greatest dimension.    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 difficile colitis   • Hypokalemia   • Anemia   • Hypomagnesemia   • Hypoxia   • Nausea vomiting and diarrhea   • Acute hypercapnic respiratory failure ([facility])   • Weight loss   • Tobacco dependence   • Popliteal artery aneurysm ([facility])   • History of Clostridium difficile colitis   • Moderate malnutrition ([facility])   • Stage 3 severe COPD by GOLD classification ([facility])   • Pulmonary emphysema ([facility])   • Chronic respiratory  failure with hypoxia and hypercapnia (HCC)   • Former smoker   • Hyperlipidemia   • Preop testing   • Hypertension         ICD-10-CM ICD-9-CM   1. PAD (peripheral artery disease) (HCC)  I73.9 443.9   2. Popliteal artery aneurysm (HCC)  I72.4 442.3   3. Bilateral carotid artery stenosis  I65.23 433.10     433.30   4. Infrarenal abdominal aortic aneurysm (AAA) without rupture  I71.43 441.4   5. Hyperlipidemia, unspecified hyperlipidemia type  E78.5 272.4   6. Essential hypertension  I10 401.9   7. Tobacco abuse  Z72.0 305.1       Plan: After thoroughly evaluating Jonnie Sheets , I believe the best course of action is to remain conservative from vascular surgery standpoint.  Currently he is doing well denies any claudication to his lower extremities.  He also denies any strokelike symptoms.  I did review his testing which shows no arterial insufficiency to his right lower extremity mild to the left.  His left arterial duplex showed patent left popliteal artery stent.  His carotid duplex showed less than 50% carotid stenosis bilaterally.  His ultrasound of the aorta shows his abdominal aorta measuring 3.4 cm.  I will see him back in 6 months with repeat noninvasive testing for continued surveillance, including ABIs and a left lower extremity arterial duplex.  I will recheck his carotid duplex and ultrasound of the aorta in 1 year.  I did discuss vascular risk factors as a pertains to the progression of vascular disease including controlling his hypertension, hyperlipidemia, and smoking cessation.  His blood pressure stable on his current medications.  He is maintained on Mevacor for his hyperlipidemia.  Unfortunately he is a current daily smoker and reports he is down to a couple cigarettes per day.  He does not have a desire to quit smoking completely.  The patient can continue taking their current medication regimen as previously planned.  This was all discussed in full with complete understanding.    Thank you for  allowing me to participate in the care of your patient.  Please do not hesitate with any questions or concerns.  I will keep you aware of any further encounters with Jonnie Sheets Jr..        Sincerely yours,         PRATEEK Saleem

## 2022-12-05 LAB
ALBUMIN SERPL-MCNC: 4.3 G/DL (ref 3.5–5.2)
ALP BLD-CCNC: 66 U/L (ref 40–130)
ALT SERPL-CCNC: 7 U/L (ref 5–41)
ANION GAP SERPL CALCULATED.3IONS-SCNC: 13 MMOL/L (ref 7–19)
AST SERPL-CCNC: 31 U/L (ref 5–40)
BASOPHILS ABSOLUTE: 0.1 K/UL (ref 0–0.2)
BASOPHILS RELATIVE PERCENT: 0.4 % (ref 0–1)
BILIRUB SERPL-MCNC: 0.5 MG/DL (ref 0.2–1.2)
BUN BLDV-MCNC: 18 MG/DL (ref 8–23)
CALCIUM SERPL-MCNC: 9.5 MG/DL (ref 8.8–10.2)
CHLORIDE BLD-SCNC: 98 MMOL/L (ref 98–111)
CHOLESTEROL, TOTAL: 158 MG/DL (ref 160–199)
CO2: 27 MMOL/L (ref 22–29)
CREAT SERPL-MCNC: 1.2 MG/DL (ref 0.5–1.2)
EOSINOPHILS ABSOLUTE: 0.3 K/UL (ref 0–0.6)
EOSINOPHILS RELATIVE PERCENT: 1.9 % (ref 0–5)
GFR SERPL CREATININE-BSD FRML MDRD: >60 ML/MIN/{1.73_M2}
GLUCOSE BLD-MCNC: 116 MG/DL (ref 74–109)
HBA1C MFR BLD: 5.4 % (ref 4–6)
HCT VFR BLD CALC: 41.6 % (ref 42–52)
HDLC SERPL-MCNC: 60 MG/DL (ref 55–121)
HEMOGLOBIN: 13 G/DL (ref 14–18)
IMMATURE GRANULOCYTES #: 0.1 K/UL
LDL CHOLESTEROL CALCULATED: 72 MG/DL
LYMPHOCYTES ABSOLUTE: 1.6 K/UL (ref 1.1–4.5)
LYMPHOCYTES RELATIVE PERCENT: 12.2 % (ref 20–40)
MCH RBC QN AUTO: 30.7 PG (ref 27–31)
MCHC RBC AUTO-ENTMCNC: 31.3 G/DL (ref 33–37)
MCV RBC AUTO: 98.3 FL (ref 80–94)
MONOCYTES ABSOLUTE: 1.6 K/UL (ref 0–0.9)
MONOCYTES RELATIVE PERCENT: 11.7 % (ref 0–10)
NEUTROPHILS ABSOLUTE: 9.8 K/UL (ref 1.5–7.5)
NEUTROPHILS RELATIVE PERCENT: 73.3 % (ref 50–65)
PDW BLD-RTO: 12.9 % (ref 11.5–14.5)
PLATELET # BLD: 307 K/UL (ref 130–400)
PMV BLD AUTO: 11.6 FL (ref 9.4–12.4)
POTASSIUM SERPL-SCNC: 4.1 MMOL/L (ref 3.5–5)
RBC # BLD: 4.23 M/UL (ref 4.7–6.1)
SODIUM BLD-SCNC: 138 MMOL/L (ref 136–145)
TOTAL PROTEIN: 7.6 G/DL (ref 6.6–8.7)
TRIGL SERPL-MCNC: 130 MG/DL (ref 0–149)
TSH SERPL DL<=0.05 MIU/L-ACNC: 2.32 UIU/ML (ref 0.27–4.2)
WBC # BLD: 13.3 K/UL (ref 4.8–10.8)

## 2023-02-27 LAB
ALBUMIN SERPL-MCNC: 4.3 G/DL (ref 3.5–5.2)
ALP BLD-CCNC: 66 U/L (ref 40–130)
ALT SERPL-CCNC: 9 U/L (ref 5–41)
ANION GAP SERPL CALCULATED.3IONS-SCNC: 13 MMOL/L (ref 7–19)
AST SERPL-CCNC: 34 U/L (ref 5–40)
BASOPHILS ABSOLUTE: 0.1 K/UL (ref 0–0.2)
BASOPHILS RELATIVE PERCENT: 0.7 % (ref 0–1)
BILIRUB SERPL-MCNC: 0.5 MG/DL (ref 0.2–1.2)
BILIRUBIN URINE: NEGATIVE
BLOOD, URINE: NEGATIVE
BUN BLDV-MCNC: 30 MG/DL (ref 8–23)
CALCIUM SERPL-MCNC: 9.4 MG/DL (ref 8.8–10.2)
CHLORIDE BLD-SCNC: 101 MMOL/L (ref 98–111)
CLARITY: CLEAR
CO2: 25 MMOL/L (ref 22–29)
COLOR: YELLOW
CREAT SERPL-MCNC: 1.2 MG/DL (ref 0.5–1.2)
CREATININE URINE: 204.7 MG/DL (ref 4.2–622)
EOSINOPHILS ABSOLUTE: 0.3 K/UL (ref 0–0.6)
EOSINOPHILS RELATIVE PERCENT: 2.9 % (ref 0–5)
GFR SERPL CREATININE-BSD FRML MDRD: >60 ML/MIN/{1.73_M2}
GLUCOSE BLD-MCNC: 129 MG/DL (ref 74–109)
GLUCOSE URINE: NEGATIVE MG/DL
HCT VFR BLD CALC: 38.3 % (ref 42–52)
HEMOGLOBIN: 12.5 G/DL (ref 14–18)
IMMATURE GRANULOCYTES #: 0 K/UL
KETONES, URINE: ABNORMAL MG/DL
LEUKOCYTE ESTERASE, URINE: NEGATIVE
LYMPHOCYTES ABSOLUTE: 2.2 K/UL (ref 1.1–4.5)
LYMPHOCYTES RELATIVE PERCENT: 21.5 % (ref 20–40)
MCH RBC QN AUTO: 30.8 PG (ref 27–31)
MCHC RBC AUTO-ENTMCNC: 32.6 G/DL (ref 33–37)
MCV RBC AUTO: 94.3 FL (ref 80–94)
MONOCYTES ABSOLUTE: 1.1 K/UL (ref 0–0.9)
MONOCYTES RELATIVE PERCENT: 11.1 % (ref 0–10)
NEUTROPHILS ABSOLUTE: 6.5 K/UL (ref 1.5–7.5)
NEUTROPHILS RELATIVE PERCENT: 63.4 % (ref 50–65)
NITRITE, URINE: NEGATIVE
PDW BLD-RTO: 14.1 % (ref 11.5–14.5)
PH UA: 6 (ref 5–8)
PLATELET # BLD: 256 K/UL (ref 130–400)
PMV BLD AUTO: 12.1 FL (ref 9.4–12.4)
POTASSIUM SERPL-SCNC: 3.9 MMOL/L (ref 3.5–5)
PROTEIN PROTEIN: 14 MG/DL (ref 15–45)
PROTEIN UA: NEGATIVE MG/DL
RBC # BLD: 4.06 M/UL (ref 4.7–6.1)
SODIUM BLD-SCNC: 139 MMOL/L (ref 136–145)
SPECIFIC GRAVITY UA: 1.02 (ref 1–1.03)
TOTAL PROTEIN: 7.7 G/DL (ref 6.6–8.7)
UROBILINOGEN, URINE: 0.2 E.U./DL
WBC # BLD: 10.2 K/UL (ref 4.8–10.8)

## 2023-03-20 RX ORDER — CLOPIDOGREL BISULFATE 75 MG/1
TABLET ORAL
Qty: 30 TABLET | Refills: 5 | Status: SHIPPED | OUTPATIENT
Start: 2023-03-20

## 2023-04-04 NOTE — OUTREACH NOTE
Prep Survey      Responses   Facility patient discharged from?  [facility]   Is patient eligible?  Yes   Discharge diagnosis  COPD with AE, N/V/D, hypoxia   Does the patient have one of the following disease processes/diagnoses(primary or secondary)?  COPD/Pneumonia   Does the patient have Home health ordered?  No   Is there a DME ordered?  Yes   What DME was ordered?  [facility] for nebulizer machine   Comments regarding appointments  See AVS   Prep survey completed?  Yes          Anastasia Larson RN        
No

## 2023-04-10 DIAGNOSIS — J44.9 STAGE 3 SEVERE COPD BY GOLD CLASSIFICATION: ICD-10-CM

## 2023-04-10 RX ORDER — BUDESONIDE AND FORMOTEROL FUMARATE DIHYDRATE 160; 4.5 UG/1; UG/1
AEROSOL RESPIRATORY (INHALATION)
Qty: 30.6 G | Refills: 3 | Status: SHIPPED | OUTPATIENT
Start: 2023-04-10

## 2023-04-10 NOTE — TELEPHONE ENCOUNTER
Rx Refill Note  Requested Prescriptions     Pending Prescriptions Disp Refills   • Symbicort 160-4.5 MCG/ACT inhaler [Pharmacy Med Name: SYMBICORT INH AEROSOL 160/4.5MCG] 30.6 g 3     Sig: USE 2 INHALATIONS TWICE A DAY      Last office visit with prescribing clinician: 9/23/2022   Last telemedicine visit with prescribing clinician: 4/19/2023   Next office visit with prescribing clinician: 4/19/2023                         Would you like a call back once the refill request has been completed: [] Yes [] No    If the office needs to give you a call back, can they leave a voicemail: [] Yes [] No    Tonya Mauricio CMA  04/10/23, 09:18 CDT

## 2023-04-19 ENCOUNTER — OFFICE VISIT (OUTPATIENT)
Dept: PULMONOLOGY | Facility: CLINIC | Age: 78
End: 2023-04-19
Payer: MEDICARE

## 2023-04-19 VITALS
WEIGHT: 165 LBS | DIASTOLIC BLOOD PRESSURE: 74 MMHG | OXYGEN SATURATION: 94 % | SYSTOLIC BLOOD PRESSURE: 124 MMHG | HEART RATE: 88 BPM | HEIGHT: 63 IN | BODY MASS INDEX: 29.23 KG/M2

## 2023-04-19 DIAGNOSIS — R91.1 NODULE OF UPPER LOBE OF LEFT LUNG: ICD-10-CM

## 2023-04-19 DIAGNOSIS — J44.9 STAGE 3 SEVERE COPD BY GOLD CLASSIFICATION: Primary | Chronic | ICD-10-CM

## 2023-04-19 DIAGNOSIS — Z87.891 PERSONAL HISTORY OF TOBACCO USE, PRESENTING HAZARDS TO HEALTH: ICD-10-CM

## 2023-04-19 DIAGNOSIS — J96.12 CHRONIC RESPIRATORY FAILURE WITH HYPOXIA AND HYPERCAPNIA: Chronic | ICD-10-CM

## 2023-04-19 DIAGNOSIS — J96.11 CHRONIC RESPIRATORY FAILURE WITH HYPOXIA AND HYPERCAPNIA: Chronic | ICD-10-CM

## 2023-04-19 RX ORDER — REVEFENACIN 175 UG/3ML
175 SOLUTION RESPIRATORY (INHALATION)
Qty: 42 ML | Refills: 0 | COMMUNITY
Start: 2023-04-19 | End: 2023-05-03

## 2023-04-19 NOTE — PATIENT INSTRUCTIONS
Start YUPELRI once a day in place of your regular breathing treatments (duonebs). Call the office in 2 weeks and report how you're doing on this. Continue to take your Symbicort 2 puffs twice a day.

## 2023-04-19 NOTE — PROCEDURES
Pulmonary Function Test  Performed by: Tonya Candelaria, RRT  Authorized by: Porfirio Barrett APRN      Pre Drug % Predicted    FVC: 79%   FEV1: 33%   FEF 25-75%: 12%   FEV1/FVC: 32%    Interpretation   Spirometry   Spirometry shows severe obstruction. There is reduced midflow suggesting small airway/airflow obstruction.   Overall comments: Severe/worsening obstruction.

## 2023-04-19 NOTE — PROGRESS NOTES
"Chief Complaint  COPD    Subjective    History of Present Illness      Jonnie Sheets Jr. presents to [facility] PULMON[facility]IT[facility]ARE MEDICINE for:    History of Present Illness   Management of COPD, emphysema, chronic respiratory failure and a left upper lobe lung nodule that is under surveillance.  He is a daily smoker.  He had updated spirometry today that shows a fairly significant drop in FEV1, currently at 33% predicted.  He reports some dyspnea with walking but does not feel that his symptoms are out of the ordinary for his baseline function or significantly different from when I last saw him in September.  He is on daily Symbicort, DuoNebs and oxygen.  I recommended that we step up his bronchodilator therapy however, he reported that he just received a 90-day supply of Symbicort and would rather stay on it.  I recommend that he add daily Yupelri in place of DuoNebs and he was agreeable.  He was provided with a 14-day supply of Yupelri and instructed to call the office in about a week to let us know if he is tolerating it.  We will plan to recheck his lung function when he returns in 6 months.   He is up-to-date on vaccines.  He will be due for a surveillance scan in September.  Objective   Vital Signs:   /74   Pulse 88   Ht 160 cm (63\")   Wt 74.8 kg (165 lb)   SpO2 94% Comment: RA  BMI 29.23 kg/m²     Physical Exam  Vitals reviewed.   Constitutional:       General: He is not in acute distress.     Appearance: Normal appearance.      Interventions: Nasal cannula in place.   HENT:      Head: Normocephalic and atraumatic.   Cardiovascular:      Rate and Rhythm: Normal rate and regular rhythm.   Pulmonary:      Breath sounds: Decreased breath sounds present.   Musculoskeletal:      Cervical back: Normal range of motion.   Skin:     General: Skin is warm and dry.   Neurological:      Mental Status: He is alert and oriented to person, place, and time.   Psychiatric:         Mood " and Affect: Mood normal. Affect is flat.         Behavior: Behavior normal.        Result Review :       Results for orders placed in visit on 04/19/23    Pulmonary Function Test    Narrative  Pulmonary Function Test  Performed by: Tonya Candelaria RRT  Authorized by: Porfirio Barrett APRN    Pre Drug % Predicted  FVC: 79%  FEV1: 33%  FEF 25-75%: 12%  FEV1/FVC: 32%    Interpretation  Spirometry  Spirometry shows severe obstruction. There is reduced midflow suggesting small airway/airflow obstruction.  Overall comments: Severe/worsening obstruction.      Results for orders placed in visit on 12/15/21    Pulmonary Function Test    Narrative  Pulmonary Function Test  Performed by: Bro Bray CMA  Authorized by: Porfirio Barrett APRN    Pre Drug % Predicted  FVC: 94%  FEV1: 51%  FEF 25-75%: 24%  FEV1/FVC: 41.85%    Interpretation  Spirometry  Spirometry shows moderate obstruction. There is reduced midflow suggesting small airway/airflow obstruction.  Review of FVL curve  Patient's effort is normal.  Overall comments: Improvement in FEV1 compared to 2019.      Results for orders placed in visit on 08/14/19    Pulmonary Function Test               Assessment and Plan      Diagnoses and all orders for this visit:    1. Stage 3 severe COPD by GOLD classification (Primary)  Comments:  Worsening FEV1. Add Yupelri to current regimen.  Patient instructed to not use DuoNebs while trying Yupelri.  Continue Symbicort.  Flow-volume loop on return.   Orders:  -     Pulmonary Function Test  -     revefenacin (Yupelri) 175 MCG/3ML nebulizer solution; Take 3 mL by nebulization Daily for 14 days.  Dispense: 42 mL; Refill: 0    2. Chronic respiratory failure with hypoxia and hypercapnia  Comments:  Continue oxygen at night and as needed during the day.    3. Nodule of upper lobe of left lung  Comments:  Continue surveillance.  Next CT due in September.  Orders:  -     CT Chest Without Contrast Diagnostic;  Future    4. Personal history of tobacco use, presenting hazards to health  Comments:  Smoking cessation recommended.  Continue surveillance CTs.  Orders:  -     CT Chest Without Contrast Diagnostic; Future      Porfirio Barrett, APRN  4/19/2023  15:44 CDT    Follow Up   Return for CT scan and FVL on return .    Patient was given instructions and counseling regarding his condition or for health maintenance advice. Please see specific information pulled into the AVS if appropriate.

## 2023-04-20 ENCOUNTER — TELEPHONE (OUTPATIENT)
Dept: PULMONOLOGY | Facility: CLINIC | Age: 78
End: 2023-04-20
Payer: MEDICARE

## 2023-04-20 NOTE — TELEPHONE ENCOUNTER
"----- Message from PRATEEK Gallego sent at 4/19/2023  4:05 PM CDT -----  Thank you.  Can you please pass this information on to his wife.    ----- Message -----  From: Tonya Candelaria, FERNANDO  Sent: 4/19/2023   1:41 PM CDT  To: PRATEEK Gallego    Per [facility], nebulizers are covered by insurance once every 5 years. Patient did get a nebulizer in Feb '23.  He did not have a script for this so had to pay for it himself.  Not sure where the \"a doctor can no longer authorize that\".  He may be able to call his insurance to see if they would cover it now and back pay.....  ----- Message -----  From: Porfirio Barrett APRN  Sent: 4/19/2023   1:31 PM CDT  To: Tonya Candelaria RRT    [facility] and [facility] that this patient had to \"buy\" a new nebulizer machine? He said they told him that \"a doctor can no longer authorize that\"?? If he did have to buy it, please ask them why it wasn't covered on his insurance.         "

## 2023-04-21 ENCOUNTER — TELEPHONE (OUTPATIENT)
Dept: VASCULAR SURGERY | Facility: CLINIC | Age: 78
End: 2023-04-21
Payer: MEDICARE

## 2023-04-24 ENCOUNTER — HOSPITAL ENCOUNTER (OUTPATIENT)
Dept: ULTRASOUND IMAGING | Facility: HOSPITAL | Age: 78
Discharge: HOME | End: 2023-04-24
Payer: MEDICARE

## 2023-04-24 ENCOUNTER — OFFICE VISIT (OUTPATIENT)
Dept: VASCULAR SURGERY | Facility: CLINIC | Age: 78
End: 2023-04-24
Payer: MEDICARE

## 2023-04-24 VITALS
BODY MASS INDEX: 29.77 KG/M2 | DIASTOLIC BLOOD PRESSURE: 78 MMHG | SYSTOLIC BLOOD PRESSURE: 130 MMHG | HEART RATE: 89 BPM | OXYGEN SATURATION: 98 % | HEIGHT: 63 IN | WEIGHT: 168 LBS

## 2023-04-24 DIAGNOSIS — I71.43 INFRARENAL ABDOMINAL AORTIC ANEURYSM (AAA) WITHOUT RUPTURE: ICD-10-CM

## 2023-04-24 DIAGNOSIS — F17.200 TOBACCO DEPENDENCE: ICD-10-CM

## 2023-04-24 DIAGNOSIS — I73.9 PAD (PERIPHERAL ARTERY DISEASE): Primary | ICD-10-CM

## 2023-04-24 DIAGNOSIS — I72.4 POPLITEAL ARTERY ANEURYSM: ICD-10-CM

## 2023-04-24 DIAGNOSIS — I65.23 BILATERAL CAROTID ARTERY STENOSIS: ICD-10-CM

## 2023-04-24 DIAGNOSIS — I10 PRIMARY HYPERTENSION: ICD-10-CM

## 2023-04-24 DIAGNOSIS — I73.9 PAD (PERIPHERAL ARTERY DISEASE): ICD-10-CM

## 2023-04-24 DIAGNOSIS — E78.5 HYPERLIPIDEMIA, UNSPECIFIED HYPERLIPIDEMIA TYPE: ICD-10-CM

## 2023-04-24 PROCEDURE — 93926 LOWER EXTREMITY STUDY: CPT

## 2023-04-24 PROCEDURE — 93923 UPR/LXTR ART STDY 3+ LVLS: CPT

## 2023-04-24 NOTE — LETTER
April 24, 2023       No Recipients    Patient: Jonnie Sheets Jr.   YOB: 1945   Date of Visit: 4/24/2023       Dear Dr. Villalpando Recipients:    Thank you for referring Jonnie Sheets to me for evaluation. Below are the relevant portions of my assessment and plan of care.    If you have questions, please do not hesitate to call me. I look forward to following Jonnie along with you.         Sincerely,        PRATEEK Saleem        CC:   No Recipients    Elizabeth Mitchell APRN  04/24/23 0957  Signed  4/24/2023       Dandy Burton MD  [address] [place] [address] [address]  [place] [address]    Jonnie Sheets Jr.  1945    Chief Complaint   Patient presents with   • Follow-up     6 Month follow up with testing for MARYCHUY and left arterial duplex. Pt last seen in office 10/24/22. Pt states there has been no change within the last 6 months.        Dear Dandy Burton MD       HPI  I had the pleasure of seeing your patient Jonnie Sheets Jr. in the office today. As you recall, Jonnie Sheets Jr. is a 77 y.o.  male who we are following for left popliteal artery aneurysm, asymptomatic carotid occlusive disease and a small abdominal aortic aneurysm.  He did undergo endovascular repair of his left popliteal artery aneurysm with Viabahn stent graft on 9/30/2020.  Currently he is doing well and denies any claudication.  He also denies any strokelike symptoms.  He does continue to smoke a couple cigarettes per day.  He is maintained on aspirin, Plavix, and Mevacor.  He did have noninvasive testing performed today, which I did review in office.         Review of Systems   Constitutional: Negative.    HENT: Negative.    Eyes: Negative.    Respiratory: Negative.    Cardiovascular: Negative.    Gastrointestinal: Negative.    Endocrine: Negative.    Genitourinary: Negative.    Musculoskeletal: Negative.    Skin: Negative.    Allergic/Immunologic: Negative.    Neurological: Negative.    Hematological: Negative.   "  Psychiatric/Behavioral: Negative.    All other systems reviewed and are negative.        /78   Pulse 89   Ht 160 cm (63\")   Wt 76.2 kg (168 lb)   SpO2 98%   BMI 29.76 kg/m²   Physical Exam  Vitals and nursing note reviewed.   Constitutional:       Appearance: Normal appearance. He is well-developed.   HENT:      Head: Normocephalic and atraumatic.   Eyes:      General: No scleral icterus.     Pupils: Pupils are equal, round, and reactive to light.   Neck:      Thyroid: No thyromegaly.   Cardiovascular:      Rate and Rhythm: Normal rate and regular rhythm.      Heart sounds: Normal heart sounds.   Pulmonary:      Effort: Pulmonary effort is normal.      Breath sounds: Normal breath sounds.   Abdominal:      General: Bowel sounds are normal.      Palpations: Abdomen is soft.   Musculoskeletal:         General: Normal range of motion.      Cervical back: Normal range of motion and neck supple.   Skin:     General: Skin is warm and dry.   Neurological:      General: No focal deficit present.      Mental Status: He is alert and oriented to person, place, and time.   Psychiatric:         Mood and Affect: Mood normal.         Behavior: Behavior normal.         Thought Content: Thought content normal.         Judgment: Judgment normal.              Diagnostic data:  Noninvasive testing including ABIs show no arterial insufficiency to lower extremities.  His left lower extremity arterial duplex shows a patent left popliteal stent.      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 difficile colitis   • Hypokalemia   • Anemia   • Hypomagnesemia   • Hypoxia   • Nausea vomiting and diarrhea   • Acute hypercapnic respiratory failure   • Weight loss   • Tobacco dependence   • Popliteal artery aneurysm   • History of Clostridium difficile colitis   • Moderate malnutrition   • Stage 3 severe COPD by GOLD classification   • Pulmonary emphysema   • Chronic respiratory failure with hypoxia and hypercapnia   • Former smoker   • Hyperlipidemia   • Preop testing   • Hypertension         ICD-10-CM ICD-9-CM   1. PAD (peripheral artery disease)  I73.9 443.9   2. Popliteal artery aneurysm  I72.4 442.3   3. Bilateral carotid artery stenosis  I65.23 433.10     433.30   4. Infrarenal abdominal aortic aneurysm (AAA) without rupture  I71.43 441.4   5. Hyperlipidemia, unspecified hyperlipidemia type  E78.5 272.4   6. Primary hypertension  I10 401.9   7. Tobacco dependence  F17.200 305.1       Plan: After thoroughly evaluating Jonnie Sheets , I believe the best course of action is to remain conservative from vascular surgery standpoint.  Currently he is doing well and denies any claudication to his lower extremities.  I did review his testing which show normal ABIs.  His left arterial duplex shows his popliteal stent to be patent.  We will see him back in 6 months with repeat noninvasive testing for continued surveillance, including ABIs, left lower extremity arterial duplex, carotid duplex, and ultrasound of the aorta.  I did discuss vascular risk factors as they pertain to the progression of vascular disease including controlling his hypertension, hyperlipidemia, and smoking.  His blood pressure stable on his current medications.  He should continue on his Mevacor 40 mg nightly, aspirin 81 mg daily, and Plavix 75 mg daily.  Unfortunately he is a current daily smoker and reports he is down to a couple cigarettes per day.  He does not have a desire to quit  smoking completely.  The patient can continue taking their current medication regimen as previously planned.  This was all discussed in full with complete understanding.    Thank you for allowing me to participate in the care of your patient.  Please do not hesitate with any questions or concerns.  I will keep you aware of any further encounters with Jonnie Sheets Jr..        Sincerely yours,         PRATEEK Saleem

## 2023-04-24 NOTE — PROGRESS NOTES
"4/24/2023       Dandy Burton MD  [address] [place] [address] DR [address]  [place] [address]    Jonnie Sheets Jr.  1945    Chief Complaint   Patient presents with   • Follow-up     6 Month follow up with testing for MARYCHUY and left arterial duplex. Pt last seen in office 10/24/22. Pt states there has been no change within the last 6 months.        Dear Dandy Burton MD       HPI  I had the pleasure of seeing your patient Jonnie Sheets Jr. in the office today. As you recall, Jonnie Sheets Jr. is a 77 y.o.  male who we are following for left popliteal artery aneurysm, asymptomatic carotid occlusive disease and a small abdominal aortic aneurysm.  He did undergo endovascular repair of his left popliteal artery aneurysm with Viabahn stent graft on 9/30/2020.  Currently he is doing well and denies any claudication.  He also denies any strokelike symptoms.  He does continue to smoke a couple cigarettes per day.  He is maintained on aspirin, Plavix, and Mevacor.  He did have noninvasive testing performed today, which I did review in office.         Review of Systems   Constitutional: Negative.    HENT: Negative.    Eyes: Negative.    Respiratory: Negative.    Cardiovascular: Negative.    Gastrointestinal: Negative.    Endocrine: Negative.    Genitourinary: Negative.    Musculoskeletal: Negative.    Skin: Negative.    Allergic/Immunologic: Negative.    Neurological: Negative.    Hematological: Negative.    Psychiatric/Behavioral: Negative.    All other systems reviewed and are negative.        /78   Pulse 89   Ht 160 cm (63\")   Wt 76.2 kg (168 lb)   SpO2 98%   BMI 29.76 kg/m²   Physical Exam  Vitals and nursing note reviewed.   Constitutional:       Appearance: Normal appearance. He is well-developed.   HENT:      Head: Normocephalic and atraumatic.   Eyes:      General: No scleral icterus.     Pupils: Pupils are equal, round, and reactive to light.   Neck:      Thyroid: No thyromegaly.   Cardiovascular:      " Rate and Rhythm: Normal rate and regular rhythm.      Heart sounds: Normal heart sounds.   Pulmonary:      Effort: Pulmonary effort is normal.      Breath sounds: Normal breath sounds.   Abdominal:      General: Bowel sounds are normal.      Palpations: Abdomen is soft.   Musculoskeletal:         General: Normal range of motion.      Cervical back: Normal range of motion and neck supple.   Skin:     General: Skin is warm and dry.   Neurological:      General: No focal deficit present.      Mental Status: He is alert and oriented to person, place, and time.   Psychiatric:         Mood and Affect: Mood normal.         Behavior: Behavior normal.         Thought Content: Thought content normal.         Judgment: Judgment normal.              Diagnostic data:  Noninvasive testing including ABIs show no arterial insufficiency to lower extremities.  His left lower extremity arterial duplex shows a patent left popliteal stent.      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 difficile colitis   • Hypokalemia   • Anemia   • Hypomagnesemia   • Hypoxia   • Nausea vomiting and diarrhea   • Acute hypercapnic respiratory failure   • Weight loss   • Tobacco dependence   • Popliteal artery aneurysm   • History of Clostridium difficile colitis   • Moderate malnutrition   • Stage 3 severe COPD by GOLD classification   • Pulmonary emphysema   • Chronic respiratory failure with hypoxia and hypercapnia   • Former smoker   • Hyperlipidemia   • Preop testing    • Hypertension         ICD-10-CM ICD-9-CM   1. PAD (peripheral artery disease)  I73.9 443.9   2. Popliteal artery aneurysm  I72.4 442.3   3. Bilateral carotid artery stenosis  I65.23 433.10     433.30   4. Infrarenal abdominal aortic aneurysm (AAA) without rupture  I71.43 441.4   5. Hyperlipidemia, unspecified hyperlipidemia type  E78.5 272.4   6. Primary hypertension  I10 401.9   7. Tobacco dependence  F17.200 305.1       Plan: After thoroughly evaluating Jonnie Schreiberd Gonzalo, I believe the best course of action is to remain conservative from vascular surgery standpoint.  Currently he is doing well and denies any claudication to his lower extremities.  I did review his testing which show normal ABIs.  His left arterial duplex shows his popliteal stent to be patent.  We will see him back in 6 months with repeat noninvasive testing for continued surveillance, including ABIs, left lower extremity arterial duplex, carotid duplex, and ultrasound of the aorta.  I did discuss vascular risk factors as they pertain to the progression of vascular disease including controlling his hypertension, hyperlipidemia, and smoking.  His blood pressure stable on his current medications.  He should continue on his Mevacor 40 mg nightly, aspirin 81 mg daily, and Plavix 75 mg daily.  Unfortunately he is a current daily smoker and reports he is down to a couple cigarettes per day.  He does not have a desire to quit smoking completely.  The patient can continue taking their current medication regimen as previously planned.  This was all discussed in full with complete understanding.    Thank you for allowing me to participate in the care of your patient.  Please do not hesitate with any questions or concerns.  I will keep you aware of any further encounters with Jonnie Schreiberaannt Goodrich.        Sincerely yours,         PRATEEK Saleem

## 2023-05-03 ENCOUNTER — TELEPHONE (OUTPATIENT)
Dept: PULMONOLOGY | Facility: CLINIC | Age: 78
End: 2023-05-03
Payer: MEDICARE

## 2023-05-03 NOTE — TELEPHONE ENCOUNTER
Wife called stating he can't tell any difference on the yupelri.    He took his last one today.       Do you want him to go back on his duoneb tomorrow?

## 2023-09-01 RX ORDER — CLOPIDOGREL BISULFATE 75 MG/1
TABLET ORAL
Qty: 30 TABLET | Refills: 5 | Status: SHIPPED | OUTPATIENT
Start: 2023-09-01

## 2023-09-15 ENCOUNTER — HOSPITAL ENCOUNTER (OUTPATIENT)
Dept: CT IMAGING | Facility: HOSPITAL | Age: 78
Discharge: HOME | End: 2023-09-15
Payer: MEDICARE

## 2023-09-20 ENCOUNTER — OFFICE VISIT (OUTPATIENT)
Dept: PULMONOLOGY | Facility: CLINIC | Age: 78
End: 2023-09-20
Payer: MEDICARE

## 2023-09-20 VITALS
SYSTOLIC BLOOD PRESSURE: 158 MMHG | OXYGEN SATURATION: 93 % | HEIGHT: 63 IN | DIASTOLIC BLOOD PRESSURE: 68 MMHG | HEART RATE: 89 BPM | BODY MASS INDEX: 29.73 KG/M2 | WEIGHT: 167.8 LBS

## 2023-09-20 DIAGNOSIS — J96.12 CHRONIC RESPIRATORY FAILURE WITH HYPOXIA AND HYPERCAPNIA: Chronic | ICD-10-CM

## 2023-09-20 DIAGNOSIS — J96.11 CHRONIC RESPIRATORY FAILURE WITH HYPOXIA AND HYPERCAPNIA: Chronic | ICD-10-CM

## 2023-09-20 DIAGNOSIS — J44.9 STAGE 3 SEVERE COPD BY GOLD CLASSIFICATION: Primary | Chronic | ICD-10-CM

## 2023-09-20 DIAGNOSIS — Z87.891 PERSONAL HISTORY OF TOBACCO USE, PRESENTING HAZARDS TO HEALTH: ICD-10-CM

## 2023-09-20 DIAGNOSIS — R91.1 NODULE OF UPPER LOBE OF LEFT LUNG: ICD-10-CM

## 2023-09-20 PROCEDURE — 3077F SYST BP >= 140 MM HG: CPT | Performed by: NURSE PRACTITIONER

## 2023-09-20 PROCEDURE — 99214 OFFICE O/P EST MOD 30 MIN: CPT | Performed by: NURSE PRACTITIONER

## 2023-09-20 PROCEDURE — 3078F DIAST BP <80 MM HG: CPT | Performed by: NURSE PRACTITIONER

## 2023-09-20 RX ORDER — IPRATROPIUM BROMIDE AND ALBUTEROL SULFATE 2.5; .5 MG/3ML; MG/3ML
SOLUTION RESPIRATORY (INHALATION)
Qty: 360 ML | Refills: 11 | Status: SHIPPED | OUTPATIENT
Start: 2023-09-20

## 2023-09-20 NOTE — PROGRESS NOTES
PFT not performed due to patient's inability to complete maneuver.  Attempted x 3.  Patient aware no results will be available.

## 2023-09-20 NOTE — PROGRESS NOTES
"Chief Complaint  COPD    Subjective    History of Present Illness      Jonnie Sheets Jr. presents to [facility] for:    History of Present Illness   Management of severe COPD, emphysema, chronic respiratory failure and a left upper lobe nodule that is under surveillance.  He missed his follow-up CT that was scheduled for last week and states \"if it is not on a piece of paper I do not know to do it\".  Our  has gotten the CT rescheduled for early October.  He unfortunately continues to smoke.  He had a drop in lung function and April but has not wanted to change his inhalers.  He was scheduled for an updated flow-volume loop today but was unable to perform the maneuvers and says that he does not want to have PFTs anymore.  He is on daily Symbicort and DuoNebs.  He has oxygen to use at night and as needed during the day.  He does have a portable concentrator but reports that he generally does not need it when the weather cools down.  He has daily shortness of breath.  He is agreeable to the follow-up CT scan and we will call him with those results.  He stays up-to-date on vaccines.  Objective   Vital Signs:   /68   Pulse 89   Ht 160 cm (63\")   Wt 76.1 kg (167 lb 12.8 oz)   SpO2 93% Comment: RA  BMI 29.72 kg/m²     Physical Exam  Vitals reviewed.   Constitutional:       General: He is not in acute distress.     Appearance: Normal appearance.      Interventions: Nasal cannula in place.   HENT:      Head: Normocephalic and atraumatic.   Cardiovascular:      Rate and Rhythm: Normal rate and regular rhythm.   Pulmonary:      Breath sounds: Decreased breath sounds present.   Musculoskeletal:      Cervical back: Normal range of motion.   Skin:     General: Skin is warm and dry.   Neurological:      Mental Status: He is alert and oriented to person, place, and time.   Psychiatric:         Mood and Affect: Mood normal. Affect is flat.         " Behavior: Behavior normal.      Result Review :       Results for orders placed in visit on 04/19/23    Pulmonary Function Test    Narrative  Pulmonary Function Test  Performed by: Tonya Candelaria RRT  Authorized by: Porfirio Barrett APRN    Pre Drug % Predicted  FVC: 79%  FEV1: 33%  FEF 25-75%: 12%  FEV1/FVC: 32%    Interpretation  Spirometry  Spirometry shows severe obstruction. There is reduced midflow suggesting small airway/airflow obstruction.  Overall comments: Severe/worsening obstruction.      Results for orders placed in visit on 12/15/21    Pulmonary Function Test    Narrative  Pulmonary Function Test  Performed by: Bro Bray CMA  Authorized by: Porfirio Barrett APRN    Pre Drug % Predicted  FVC: 94%  FEV1: 51%  FEF 25-75%: 24%  FEV1/FVC: 41.85%    Interpretation  Spirometry  Spirometry shows moderate obstruction. There is reduced midflow suggesting small airway/airflow obstruction.  Review of FVL curve  Patient's effort is normal.  Overall comments: Improvement in FEV1 compared to 2019.      Results for orders placed in visit on 08/14/19    Pulmonary Function Test               Assessment and Plan      Diagnoses and all orders for this visit:    1. Stage 3 severe COPD by GOLD classification (Primary)  Comments:  With daily shortness of breath.  He is unable to perform maneuvers needed for PFT.  Continue Symbicort and DuoNeb.  Smoking cessation recommended.  Orders:  -     ipratropium-albuterol (DUO-NEB) 0.5-2.5 mg/3 ml nebulizer; Use up to four times a day for shortness of breath, wheezing and/or cough  Dispense: 360 mL; Refill: 11    2. Chronic respiratory failure with hypoxia and hypercapnia  Comments:  Stable.  He utilizes oxygen at night and as needed during the day.    3. Nodule of upper lobe of left lung  Comments:  Continue surveillance with CT scan.  He missed his CT that was scheduled 9/15/2023.  It has now been rescheduled to be done in Oct.  We will call with  results.    4. Personal history of tobacco use, presenting hazards to health  Comments:  Smoking cessation recommended.  Continue surveillance scans as warranted.        Porfirio Barrett, APRN  9/20/2023  15:01 CDT    Follow Up   Return in about 9 months (around 6/20/2024).    Patient was given instructions and counseling regarding his condition or for health maintenance advice. Please see specific information pulled into the AVS if appropriate.

## 2023-10-04 ENCOUNTER — HOSPITAL ENCOUNTER (OUTPATIENT)
Dept: CT IMAGING | Facility: HOSPITAL | Age: 78
Discharge: HOME | End: 2023-10-04
Admitting: NURSE PRACTITIONER
Payer: MEDICARE

## 2023-10-04 DIAGNOSIS — R91.1 NODULE OF UPPER LOBE OF LEFT LUNG: ICD-10-CM

## 2023-10-04 DIAGNOSIS — Z87.891 PERSONAL HISTORY OF TOBACCO USE, PRESENTING HAZARDS TO HEALTH: ICD-10-CM

## 2023-10-04 PROCEDURE — 71250 CT THORAX DX C-: CPT

## 2023-10-26 ENCOUNTER — TELEPHONE (OUTPATIENT)
Dept: VASCULAR SURGERY | Facility: CLINIC | Age: 78
End: 2023-10-26
Payer: MEDICARE

## 2023-10-27 ENCOUNTER — HOSPITAL ENCOUNTER (OUTPATIENT)
Dept: ULTRASOUND IMAGING | Facility: HOSPITAL | Age: 78
Discharge: HOME | End: 2023-10-27
Payer: MEDICARE

## 2023-10-27 ENCOUNTER — OFFICE VISIT (OUTPATIENT)
Dept: VASCULAR SURGERY | Facility: CLINIC | Age: 78
End: 2023-10-27
Payer: MEDICARE

## 2023-10-27 VITALS
SYSTOLIC BLOOD PRESSURE: 142 MMHG | DIASTOLIC BLOOD PRESSURE: 80 MMHG | HEIGHT: 65 IN | OXYGEN SATURATION: 94 % | BODY MASS INDEX: 27.32 KG/M2 | HEART RATE: 75 BPM | WEIGHT: 164 LBS

## 2023-10-27 DIAGNOSIS — I10 PRIMARY HYPERTENSION: ICD-10-CM

## 2023-10-27 DIAGNOSIS — I65.23 BILATERAL CAROTID ARTERY STENOSIS: ICD-10-CM

## 2023-10-27 DIAGNOSIS — I72.4 POPLITEAL ARTERY ANEURYSM: ICD-10-CM

## 2023-10-27 DIAGNOSIS — E78.5 HYPERLIPIDEMIA, UNSPECIFIED HYPERLIPIDEMIA TYPE: ICD-10-CM

## 2023-10-27 DIAGNOSIS — I71.43 INFRARENAL ABDOMINAL AORTIC ANEURYSM (AAA) WITHOUT RUPTURE: ICD-10-CM

## 2023-10-27 DIAGNOSIS — I73.9 PAD (PERIPHERAL ARTERY DISEASE): ICD-10-CM

## 2023-10-27 DIAGNOSIS — I73.9 PAD (PERIPHERAL ARTERY DISEASE): Primary | ICD-10-CM

## 2023-10-27 DIAGNOSIS — F17.200 TOBACCO DEPENDENCE: ICD-10-CM

## 2023-10-27 PROCEDURE — 1160F RVW MEDS BY RX/DR IN RCRD: CPT | Performed by: NURSE PRACTITIONER

## 2023-10-27 PROCEDURE — 99214 OFFICE O/P EST MOD 30 MIN: CPT | Performed by: NURSE PRACTITIONER

## 2023-10-27 PROCEDURE — 93880 EXTRACRANIAL BILAT STUDY: CPT

## 2023-10-27 PROCEDURE — 93923 UPR/LXTR ART STDY 3+ LVLS: CPT

## 2023-10-27 PROCEDURE — 3077F SYST BP >= 140 MM HG: CPT | Performed by: NURSE PRACTITIONER

## 2023-10-27 PROCEDURE — 1159F MED LIST DOCD IN RCRD: CPT | Performed by: NURSE PRACTITIONER

## 2023-10-27 PROCEDURE — 76775 US EXAM ABDO BACK WALL LIM: CPT

## 2023-10-27 PROCEDURE — 93926 LOWER EXTREMITY STUDY: CPT

## 2023-10-27 PROCEDURE — 3079F DIAST BP 80-89 MM HG: CPT | Performed by: NURSE PRACTITIONER

## 2023-10-27 NOTE — LETTER
November 7, 2023       No Recipients    Patient: Jonnie Sheets Jr.   YOB: 1945   Date of Visit: 10/27/2023     Dear Dandy Burton MD:       Thank you for referring Jonnie Sheets to me for evaluation. Below are the relevant portions of my assessment and plan of care.    If you have questions, please do not hesitate to call me. I look forward to following Jonnie along with you.         Sincerely,        Elizabeth PRATEEK Mitchell        CC:   No Recipients    EddiedayanaraElizabeth APRN  11/07/23 1815  Sign when Signing Visit  10/27/2023       Dandy Burton MD  [address] [place] [address] [address] [place] [address]    Jonnie Sheets Jr.  1945    Chief Complaint   Patient presents with   • Follow-up     6 month follow up w/ testing. Last seen 4/24/23. Patient denies any issues with legs or stroke type symptoms.        Dear Dandy Burton MD       HPI  I had the pleasure of seeing your patient Jonnie Sheets Jr. in the office today. As you recall, Jonnie Sheets Jr. is a 78 y.o.  male who we are following for left popliteal artery aneurysm, asymptomatic carotid occlusive disease and a small abdominal aortic aneurysm.  He did undergo endovascular repair of his left popliteal artery aneurysm with Viabahn stent graft on 9/30/2020.  Currently he is doing well and denies any strokelike symptoms.  He also denies any changes to his lower extremities.  He is maintained on aspirin, Plavix, and Mevacor.  He did have noninvasive testing performed which I did review in office.         Review of Systems   Constitutional: Negative.    HENT: Negative.     Eyes: Negative.    Respiratory: Negative.     Cardiovascular: Negative.    Gastrointestinal: Negative.    Endocrine: Negative.    Genitourinary: Negative.    Musculoskeletal: Negative.    Skin: Negative.    Allergic/Immunologic: Negative.    Neurological: Negative.    Hematological: Negative.    Psychiatric/Behavioral: Negative.     All other systems reviewed and are  "negative.         /80   Pulse 75   Ht 165.1 cm (65\")   Wt 74.4 kg (164 lb)   SpO2 94%   BMI 27.29 kg/m²   Physical Exam  Vitals and nursing note reviewed.   Constitutional:       Appearance: Normal appearance. He is well-developed and overweight.   HENT:      Head: Normocephalic and atraumatic.   Eyes:      General: No scleral icterus.     Pupils: Pupils are equal, round, and reactive to light.   Neck:      Thyroid: No thyromegaly.   Cardiovascular:      Rate and Rhythm: Normal rate and regular rhythm.      Heart sounds: Normal heart sounds.   Pulmonary:      Effort: Pulmonary effort is normal.      Breath sounds: Normal breath sounds.   Abdominal:      General: Bowel sounds are normal.      Palpations: Abdomen is soft.   Musculoskeletal:         General: Normal range of motion.      Cervical back: Normal range of motion and neck supple.   Skin:     General: Skin is warm and dry.   Neurological:      General: No focal deficit present.      Mental Status: He is alert and oriented to person, place, and time.   Psychiatric:         Mood and Affect: Mood normal.         Behavior: Behavior normal. Behavior is cooperative.         Thought Content: Thought content normal.         Judgment: Judgment normal.             Diagnostic data:  US Carotid Bilateral    Result Date: 10/27/2023  Narrative: History: Carotid occlusive disease      Impression: Impression: 1. There is less than 50% stenosis of the right internal carotid artery. 2. There is less than 50% stenosis of the left internal carotid artery. 3. Antegrade flow is demonstrated in bilateral vertebral arteries.  Comments: Bilateral carotid vertebral arterial duplex scan was performed.  Grayscale imaging shows intimal thickening and calcified elements at the carotid bifurcation. The right internal carotid artery peak systolic velocity is 78.5 cm/sec. The end-diastolic velocity is 16.4 cm/sec. The right ICA/CCA ratio is approximately 1.0. These findings " correlate with less than 50% stenosis of the right internal carotid artery.  Grayscale imaging shows intimal thickening and calcified elements at the carotid bifurcation. The left internal carotid artery peak systolic velocity is 79.9 cm/sec. The end-diastolic velocity is 13.5 cm/sec. The left ICA/CCA ratio is approximately 0.8. These findings correlate with less than 50% stenosis of the left internal carotid artery.  Antegrade flow is demonstrated in bilateral vertebral arteries.   This report was signed and finalized on 10/27/2023 1:26 PM CDT by Dr. Tomas Ryan MD.      US Arterial Doppler Lower Extremity Left    Result Date: 10/27/2023  Narrative:  History: Left popliteal artery aneurysm.  Comments: Grayscale imaging as well as color flow duplex were used to evaluate the left lower extremity arterial system and popliteal stent graft.  The peak systolic velocity in the proximal stent measured 30 cm/s. In the mid stent measured 30.3 cm/s. In the distal stent measured 28.6 cm/s. In the distal popliteal artery measured 36.2 cm/s.      Impression: Patent left lower extremity popliteal artery stent graft without evidence of significant stenosis.   This report was signed and finalized on 10/27/2023 1:20 PM CDT by Dr. Tomas Ryan MD.      US Ankle / Brachial Indices Extremity Complete    Result Date: 10/27/2023  Narrative:  History: PAD  Comments: Bilateral lower extremity arterial with multi-level pulse volume recordings and segmental pressures were performed at rest and stress.  The right ankle/brachial index is not obtainable due to noncompressibility of the vessels. The waveforms are triphasic and well maintained at the ankle. The toe brachial index 0.99.  The left ankle/brachial index is 1.03. The waveforms are triphasic without dampening. These findings are consistent with no significant arterial insufficiency of the left lower extremity at rest.    The toe brachial index is 0.89.      Impression:  Impression: 1. The right ankle/brachial index was not obtainable due to noncompressibility of the vessels. 2. No significant arterial insufficiency of the left lower extremity at rest.    This report was signed and finalized on 10/27/2023 1:17 PM CDT by Dr. Tomas Ryan MD.      US Aorta Limited    Result Date: 10/27/2023  Narrative: EXAM: US AORTA LIMITED- - 10/27/2023 8:12 AM CDT  HISTORY: AAA; I71.43-Infrarenal abdominal aortic aneurysm, without rupture   COMPARISON: 10/24/2022  TECHNIQUE: Real-time grayscale and color ultrasound performed with representative images saved to PACS.  FINDINGS:  Proximal aorta measures 2.0 x 2.2 cm, previously 1.9 x 1.9 cm. Mid aorta measures 2.3 x 2.6 cm, previously 2.6 x 3.4 cm. Distal aorta measures 2.8 x 3.0 cm, previously 1.7 x 1.8 cm.  Right common iliac measures 0.8 cm. Left common iliac measures 0.8 cm.       Impression: 1. Abdominal aortic aneurysm with distal abdominal aorta measuring 3.0 cm.   This report was signed and finalized on 10/27/2023 10:46 AM CDT by Dr Debra Tinajero MD.        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 difficile colitis   • Hypokalemia   • Anemia   • Hypomagnesemia   • Hypoxia   • Nausea vomiting and diarrhea   • Acute hypercapnic respiratory failure   • Weight loss   • Tobacco dependence   • Popliteal artery aneurysm   • History of Clostridium difficile colitis   • Moderate malnutrition   • Stage 3 severe COPD by GOLD classification   • Pulmonary emphysema    • Chronic respiratory failure with hypoxia and hypercapnia   • Former smoker   • Hyperlipidemia   • Preop testing   • Hypertension         ICD-10-CM ICD-9-CM   1. PAD (peripheral artery disease)  I73.9 443.9   2. Popliteal artery aneurysm  I72.4 442.3   3. Infrarenal abdominal aortic aneurysm (AAA) without rupture  I71.43 441.4   4. Bilateral carotid artery stenosis  I65.23 433.10     433.30   5. Primary hypertension  I10 401.9   6. Hyperlipidemia, unspecified hyperlipidemia type  E78.5 272.4   7. Tobacco dependence  F17.200 305.1         Plan: After thoroughly evaluating Jonnie Sheets JrGonzalo, I believe the best course of action is to remain conservative from vascular surgery standpoint.  I did review his testing which shows his left popliteal stent to be patent and ABIs on the right noncompressible left with no arterial insufficiency noted.  His ultrasound of the aorta measures 3 cm.  His carotid duplex shows less than 50% carotid stenosis bilaterally.  We will see him back in 6 months with repeat noninvasive testing for continued surveillance ABIs and a popliteal duplex.  We can repeat his carotid duplex in 1 year.   I did discuss vascular risk factors as they pertain to the progression of vascular disease including controlling his hypertension, hyperlipidemia, and smoking.  His blood pressure stable on his current medications.  He should continue on his Mevacor 40 mg nightly, aspirin 81 mg daily, and Plavix 75 mg daily.  Unfortunately he is a current daily smoker and reports he is down to a couple cigarettes per day.  He does not have a desire to quit smoking completely.  The patient can continue taking their current medication regimen as previously planned.  This was all discussed in full with complete understanding.    Thank you for allowing me to participate in the care of your patient.  Please do not hesitate with any questions or concerns.  I will keep you aware of any further encounters with Jonnie Sheets  .        Sincerely yours,         PRATEEK Saleem

## 2023-10-27 NOTE — PROGRESS NOTES
"10/27/2023       Dadny Burton MD  [address] [place] [address] DR RUIZ  [place] [address]    Jonnie Sheets Jr.  1945    Chief Complaint   Patient presents with    Follow-up     6 month follow up w/ testing. Last seen 4/24/23. Patient denies any issues with legs or stroke type symptoms.        Dear Dandy Burton MD       HPI  I had the pleasure of seeing your patient Jonnie Sheets Jr. in the office today. As you recall, Jonnie Sheets Jr. is a 78 y.o.  male who we are following for left popliteal artery aneurysm, asymptomatic carotid occlusive disease and a small abdominal aortic aneurysm.  He did undergo endovascular repair of his left popliteal artery aneurysm with Viabahn stent graft on 9/30/2020.  Currently he is doing well and denies any strokelike symptoms.  He also denies any changes to his lower extremities.  He is maintained on aspirin, Plavix, and Mevacor.  He did have noninvasive testing performed which I did review in office.         Review of Systems   Constitutional: Negative.    HENT: Negative.     Eyes: Negative.    Respiratory: Negative.     Cardiovascular: Negative.    Gastrointestinal: Negative.    Endocrine: Negative.    Genitourinary: Negative.    Musculoskeletal: Negative.    Skin: Negative.    Allergic/Immunologic: Negative.    Neurological: Negative.    Hematological: Negative.    Psychiatric/Behavioral: Negative.     All other systems reviewed and are negative.         /80   Pulse 75   Ht 165.1 cm (65\")   Wt 74.4 kg (164 lb)   SpO2 94%   BMI 27.29 kg/m²   Physical Exam  Vitals and nursing note reviewed.   Constitutional:       Appearance: Normal appearance. He is well-developed and overweight.   HENT:      Head: Normocephalic and atraumatic.   Eyes:      General: No scleral icterus.     Pupils: Pupils are equal, round, and reactive to light.   Neck:      Thyroid: No thyromegaly.   Cardiovascular:      Rate and Rhythm: Normal rate and regular rhythm.      Heart sounds: Normal " heart sounds.   Pulmonary:      Effort: Pulmonary effort is normal.      Breath sounds: Normal breath sounds.   Abdominal:      General: Bowel sounds are normal.      Palpations: Abdomen is soft.   Musculoskeletal:         General: Normal range of motion.      Cervical back: Normal range of motion and neck supple.   Skin:     General: Skin is warm and dry.   Neurological:      General: No focal deficit present.      Mental Status: He is alert and oriented to person, place, and time.   Psychiatric:         Mood and Affect: Mood normal.         Behavior: Behavior normal. Behavior is cooperative.         Thought Content: Thought content normal.         Judgment: Judgment normal.             Diagnostic data:  US Carotid Bilateral    Result Date: 10/27/2023  Narrative: History: Carotid occlusive disease      Impression: Impression: 1. There is less than 50% stenosis of the right internal carotid artery. 2. There is less than 50% stenosis of the left internal carotid artery. 3. Antegrade flow is demonstrated in bilateral vertebral arteries.  Comments: Bilateral carotid vertebral arterial duplex scan was performed.  Grayscale imaging shows intimal thickening and calcified elements at the carotid bifurcation. The right internal carotid artery peak systolic velocity is 78.5 cm/sec. The end-diastolic velocity is 16.4 cm/sec. The right ICA/CCA ratio is approximately 1.0. These findings correlate with less than 50% stenosis of the right internal carotid artery.  Grayscale imaging shows intimal thickening and calcified elements at the carotid bifurcation. The left internal carotid artery peak systolic velocity is 79.9 cm/sec. The end-diastolic velocity is 13.5 cm/sec. The left ICA/CCA ratio is approximately 0.8. These findings correlate with less than 50% stenosis of the left internal carotid artery.  Antegrade flow is demonstrated in bilateral vertebral arteries.   This report was signed and finalized on 10/27/2023 1:26 PM CDT  by Dr. Tomas Ryan MD.      US Arterial Doppler Lower Extremity Left    Result Date: 10/27/2023  Narrative:  History: Left popliteal artery aneurysm.  Comments: Grayscale imaging as well as color flow duplex were used to evaluate the left lower extremity arterial system and popliteal stent graft.  The peak systolic velocity in the proximal stent measured 30 cm/s. In the mid stent measured 30.3 cm/s. In the distal stent measured 28.6 cm/s. In the distal popliteal artery measured 36.2 cm/s.      Impression: Patent left lower extremity popliteal artery stent graft without evidence of significant stenosis.   This report was signed and finalized on 10/27/2023 1:20 PM CDT by Dr. Tomas Ryan MD.      US Ankle / Brachial Indices Extremity Complete    Result Date: 10/27/2023  Narrative:  History: PAD  Comments: Bilateral lower extremity arterial with multi-level pulse volume recordings and segmental pressures were performed at rest and stress.  The right ankle/brachial index is not obtainable due to noncompressibility of the vessels. The waveforms are triphasic and well maintained at the ankle. The toe brachial index 0.99.  The left ankle/brachial index is 1.03. The waveforms are triphasic without dampening. These findings are consistent with no significant arterial insufficiency of the left lower extremity at rest.    The toe brachial index is 0.89.      Impression: Impression: 1. The right ankle/brachial index was not obtainable due to noncompressibility of the vessels. 2. No significant arterial insufficiency of the left lower extremity at rest.    This report was signed and finalized on 10/27/2023 1:17 PM CDT by Dr. Tomas Ryan MD.      US Aorta Limited    Result Date: 10/27/2023  Narrative: EXAM: US AORTA LIMITED- - 10/27/2023 8:12 AM CDT  HISTORY: AAA; I71.43-Infrarenal abdominal aortic aneurysm, without rupture   COMPARISON: 10/24/2022  TECHNIQUE: Real-time grayscale and color ultrasound performed with  representative images saved to PACS.  FINDINGS:  Proximal aorta measures 2.0 x 2.2 cm, previously 1.9 x 1.9 cm. Mid aorta measures 2.3 x 2.6 cm, previously 2.6 x 3.4 cm. Distal aorta measures 2.8 x 3.0 cm, previously 1.7 x 1.8 cm.  Right common iliac measures 0.8 cm. Left common iliac measures 0.8 cm.       Impression: 1. Abdominal aortic aneurysm with distal abdominal aorta measuring 3.0 cm.   This report was signed and finalized on 10/27/2023 10:46 AM CDT by Dr Debra Tinajero MD.        Patient Active Problem List   Diagnosis    Colon cancer    Personal history of colon cancer    Anorexia    Black stool    Acute renal failure    Diverticular disease of large intestine with complication    Septic shock (CMS/HCC), resolved    Diverticulitis large intestine w/o perforation or abscess w/o bleeding    Diarrhea, improved    Acute kidney injury (CMS/HCC), secondary to sepsis, dialysis initiated     High anion gap metabolic acidosis, improved    Lactic acidosis, resolved    Leukocytosis, resolved    C. difficile colitis    Hypokalemia    Anemia    Hypomagnesemia    Hypoxia    Nausea vomiting and diarrhea    Acute hypercapnic respiratory failure    Weight loss    Tobacco dependence    Popliteal artery aneurysm    History of Clostridium difficile colitis    Moderate malnutrition    Stage 3 severe COPD by GOLD classification    Pulmonary emphysema    Chronic respiratory failure with hypoxia and hypercapnia    Former smoker    Hyperlipidemia    Preop testing    Hypertension         ICD-10-CM ICD-9-CM   1. PAD (peripheral artery disease)  I73.9 443.9   2. Popliteal artery aneurysm  I72.4 442.3   3. Infrarenal abdominal aortic aneurysm (AAA) without rupture  I71.43 441.4   4. Bilateral carotid artery stenosis  I65.23 433.10     433.30   5. Primary hypertension  I10 401.9   6. Hyperlipidemia, unspecified hyperlipidemia type  E78.5 272.4   7. Tobacco dependence  F17.200 305.1         Plan: After thoroughly evaluating Jonnie HAMPTON  Sudeep Goodrich, I believe the best course of action is to remain conservative from vascular surgery standpoint.  I did review his testing which shows his left popliteal stent to be patent and ABIs on the right noncompressible left with no arterial insufficiency noted.  His ultrasound of the aorta measures 3 cm.  His carotid duplex shows less than 50% carotid stenosis bilaterally.  We will see him back in 6 months with repeat noninvasive testing for continued surveillance ABIs and a popliteal duplex.  We can repeat his carotid duplex in 1 year.   I did discuss vascular risk factors as they pertain to the progression of vascular disease including controlling his hypertension, hyperlipidemia, and smoking.  His blood pressure stable on his current medications.  He should continue on his Mevacor 40 mg nightly, aspirin 81 mg daily, and Plavix 75 mg daily.  Unfortunately he is a current daily smoker and reports he is down to a couple cigarettes per day.  He does not have a desire to quit smoking completely.  The patient can continue taking their current medication regimen as previously planned.  This was all discussed in full with complete understanding.    Thank you for allowing me to participate in the care of your patient.  Please do not hesitate with any questions or concerns.  I will keep you aware of any further encounters with Jonnie Sheets Jr..        Sincerely yours,         PRATEEK Saleem

## 2023-11-27 LAB
ALBUMIN SERPL-MCNC: 4.8 G/DL (ref 3.5–5.2)
ALP SERPL-CCNC: 60 U/L (ref 40–130)
ALT SERPL-CCNC: 9 U/L (ref 5–41)
ANION GAP SERPL CALCULATED.3IONS-SCNC: 15 MMOL/L (ref 7–19)
AST SERPL-CCNC: 38 U/L (ref 5–40)
BASOPHILS # BLD: 0.1 K/UL (ref 0–0.2)
BASOPHILS NFR BLD: 0.7 % (ref 0–1)
BILIRUB SERPL-MCNC: 0.7 MG/DL (ref 0.2–1.2)
BUN SERPL-MCNC: 16 MG/DL (ref 8–23)
CALCIUM SERPL-MCNC: 10 MG/DL (ref 8.8–10.2)
CHLORIDE SERPL-SCNC: 97 MMOL/L (ref 98–111)
CHOLEST SERPL-MCNC: 183 MG/DL (ref 160–199)
CO2 SERPL-SCNC: 26 MMOL/L (ref 22–29)
CREAT SERPL-MCNC: 1.4 MG/DL (ref 0.5–1.2)
EOSINOPHIL # BLD: 0.2 K/UL (ref 0–0.6)
EOSINOPHIL NFR BLD: 2.7 % (ref 0–5)
ERYTHROCYTE [DISTWIDTH] IN BLOOD BY AUTOMATED COUNT: 13.6 % (ref 11.5–14.5)
GLUCOSE SERPL-MCNC: 126 MG/DL (ref 74–109)
HBA1C MFR BLD: 5.3 % (ref 4–6)
HCT VFR BLD AUTO: 38 % (ref 42–52)
HDLC SERPL-MCNC: 74 MG/DL (ref 55–121)
HGB BLD-MCNC: 12.1 G/DL (ref 14–18)
IMM GRANULOCYTES # BLD: 0 K/UL
LDLC SERPL CALC-MCNC: 73 MG/DL
LYMPHOCYTES # BLD: 1.8 K/UL (ref 1.1–4.5)
LYMPHOCYTES NFR BLD: 19.6 % (ref 20–40)
MCH RBC QN AUTO: 31.8 PG (ref 27–31)
MCHC RBC AUTO-ENTMCNC: 31.8 G/DL (ref 33–37)
MCV RBC AUTO: 100 FL (ref 80–94)
MONOCYTES # BLD: 0.9 K/UL (ref 0–0.9)
MONOCYTES NFR BLD: 10.5 % (ref 0–10)
NEUTROPHILS # BLD: 6 K/UL (ref 1.5–7.5)
NEUTS SEG NFR BLD: 66.1 % (ref 50–65)
PLATELET # BLD AUTO: 233 K/UL (ref 130–400)
PMV BLD AUTO: 11.7 FL (ref 9.4–12.4)
POTASSIUM SERPL-SCNC: 4.4 MMOL/L (ref 3.5–5)
PROT SERPL-MCNC: 8 G/DL (ref 6.6–8.7)
RBC # BLD AUTO: 3.8 M/UL (ref 4.7–6.1)
SODIUM SERPL-SCNC: 138 MMOL/L (ref 136–145)
TRIGL SERPL-MCNC: 180 MG/DL (ref 0–149)
WBC # BLD AUTO: 9 K/UL (ref 4.8–10.8)

## 2024-02-27 LAB
ALBUMIN SERPL-MCNC: 4.9 G/DL (ref 3.5–5.2)
ALP SERPL-CCNC: 60 U/L (ref 40–130)
ALT SERPL-CCNC: 12 U/L (ref 5–41)
ANION GAP SERPL CALCULATED.3IONS-SCNC: 18 MMOL/L (ref 7–19)
AST SERPL-CCNC: 37 U/L (ref 5–40)
BASOPHILS # BLD: 0.1 K/UL (ref 0–0.2)
BASOPHILS NFR BLD: 0.5 % (ref 0–1)
BILIRUB SERPL-MCNC: 0.4 MG/DL (ref 0.2–1.2)
BUN SERPL-MCNC: 19 MG/DL (ref 8–23)
CALCIUM SERPL-MCNC: 9.6 MG/DL (ref 8.8–10.2)
CHLORIDE SERPL-SCNC: 97 MMOL/L (ref 98–111)
CO2 SERPL-SCNC: 23 MMOL/L (ref 22–29)
CREAT SERPL-MCNC: 1.5 MG/DL (ref 0.5–1.2)
EOSINOPHIL # BLD: 0.2 K/UL (ref 0–0.6)
EOSINOPHIL NFR BLD: 1.8 % (ref 0–5)
ERYTHROCYTE [DISTWIDTH] IN BLOOD BY AUTOMATED COUNT: 14.3 % (ref 11.5–14.5)
GLUCOSE SERPL-MCNC: 98 MG/DL (ref 74–109)
HCT VFR BLD AUTO: 38 % (ref 42–52)
HGB BLD-MCNC: 12 G/DL (ref 14–18)
IMM GRANULOCYTES # BLD: 0.1 K/UL
LYMPHOCYTES # BLD: 2.4 K/UL (ref 1.1–4.5)
LYMPHOCYTES NFR BLD: 19.6 % (ref 20–40)
MCH RBC QN AUTO: 31 PG (ref 27–31)
MCHC RBC AUTO-ENTMCNC: 31.6 G/DL (ref 33–37)
MCV RBC AUTO: 98.2 FL (ref 80–94)
MONOCYTES # BLD: 1.3 K/UL (ref 0–0.9)
MONOCYTES NFR BLD: 10.9 % (ref 0–10)
NEUTROPHILS # BLD: 8.1 K/UL (ref 1.5–7.5)
NEUTS SEG NFR BLD: 66.7 % (ref 50–65)
PLATELET # BLD AUTO: 227 K/UL (ref 130–400)
PMV BLD AUTO: 11.4 FL (ref 9.4–12.4)
POTASSIUM SERPL-SCNC: 4.7 MMOL/L (ref 3.5–5)
PROT SERPL-MCNC: 8.1 G/DL (ref 6.6–8.7)
RBC # BLD AUTO: 3.87 M/UL (ref 4.7–6.1)
SODIUM SERPL-SCNC: 138 MMOL/L (ref 136–145)
WBC # BLD AUTO: 12.1 K/UL (ref 4.8–10.8)

## 2024-04-02 DIAGNOSIS — J44.9 STAGE 3 SEVERE COPD BY GOLD CLASSIFICATION: ICD-10-CM

## 2024-04-02 RX ORDER — BUDESONIDE AND FORMOTEROL FUMARATE DIHYDRATE 160; 4.5 UG/1; UG/1
2 AEROSOL RESPIRATORY (INHALATION) 2 TIMES DAILY
Qty: 30.6 G | Refills: 3 | Status: SHIPPED | OUTPATIENT
Start: 2024-04-02

## 2024-04-02 NOTE — TELEPHONE ENCOUNTER
Rx Refill Note  Requested Prescriptions     Pending Prescriptions Disp Refills    budesonide-formoterol (SYMBICORT) 160-4.5 MCG/ACT inhaler [Pharmacy Med Name: BUDESONIDE/FORM INHALER 10.2GM 160/4.5MCG] 30.6 g 3     Sig: USE 2 INHALATIONS TWICE A DAY      Last office visit with prescribing clinician: 09/20/2023  Last telemedicine visit with prescribing clinician: Visit date not found   Next office visit with prescribing clinician: 06/26/2024                        Would you like a call back once the refill request has been completed: [] Yes [] No    If the office needs to give you a call back, can they leave a voicemail: [] Yes [] No    Tonya Mauricio CMA  04/02/24, 08:19 CDT

## 2024-04-03 ENCOUNTER — TELEPHONE (OUTPATIENT)
Dept: VASCULAR SURGERY | Facility: CLINIC | Age: 79
End: 2024-04-03
Payer: MEDICARE

## 2024-04-03 DIAGNOSIS — I71.43 INFRARENAL ABDOMINAL AORTIC ANEURYSM (AAA) WITHOUT RUPTURE: ICD-10-CM

## 2024-04-03 DIAGNOSIS — I65.23 BILATERAL CAROTID ARTERY STENOSIS: Primary | ICD-10-CM

## 2024-04-03 RX ORDER — CLOPIDOGREL BISULFATE 75 MG/1
75 TABLET ORAL DAILY
Qty: 30 TABLET | Refills: 5 | Status: SHIPPED | OUTPATIENT
Start: 2024-04-03

## 2024-04-03 RX ORDER — CLOPIDOGREL BISULFATE 75 MG/1
75 TABLET ORAL DAILY
Qty: 30 TABLET | Refills: 5 | Status: SHIPPED | OUTPATIENT
Start: 2024-04-03 | End: 2024-04-03

## 2024-04-03 NOTE — TELEPHONE ENCOUNTER
Patient called stating he was at his pharmacy needing a refill for Plavix. Patient states the pharmacy has called 3 times I see no documentation of any phone calls or faxes. I informed the patient I would send the request to PRATEEK Whitehead and send it in as soon as possible. Patient verbalized understanding and will call with any questions or concerns.

## 2024-04-12 ENCOUNTER — TELEPHONE (OUTPATIENT)
Dept: VASCULAR SURGERY | Facility: CLINIC | Age: 79
End: 2024-04-12

## 2024-04-12 ENCOUNTER — TELEPHONE (OUTPATIENT)
Dept: VASCULAR SURGERY | Facility: CLINIC | Age: 79
End: 2024-04-12
Payer: MEDICARE

## 2024-04-12 NOTE — TELEPHONE ENCOUNTER
Spoke with patient's wife informing that [facility]. Appointment afterwards 10:15 @ the vascular office. Patient's wife voiced understanding.

## 2024-04-12 NOTE — TELEPHONE ENCOUNTER
Caller: Anastasia Sheets    Relationship: Emergency Contact    Best call back number: [phone]    What is the best time to reach you: ANY    Who are you requesting to speak with (clinical staff, provider,  specific staff member): CLINICAL    Do you know the name of the person who called: PTS WIFE    What was the call regarding: PTS WIFE IS WANTING TO MAKE SURE SHE KNOWS WHERE THEY ARE GOING FOR THE PT TESTING AND WOULD LIKE THE PHONE NUMBER FOR THEM. PLEASE GIVE HER A CALL BACK TO DISCUSS. THANK YOU    Is it okay if the provider responds through [employer]: NO

## 2024-04-23 ENCOUNTER — TELEPHONE (OUTPATIENT)
Dept: VASCULAR SURGERY | Facility: CLINIC | Age: 79
End: 2024-04-23
Payer: MEDICARE

## 2024-04-24 ENCOUNTER — OFFICE VISIT (OUTPATIENT)
Dept: VASCULAR SURGERY | Facility: CLINIC | Age: 79
End: 2024-04-24
Payer: MEDICARE

## 2024-04-24 ENCOUNTER — HOSPITAL ENCOUNTER (OUTPATIENT)
Dept: ULTRASOUND IMAGING | Facility: HOSPITAL | Age: 79
Discharge: HOME | End: 2024-04-24
Payer: MEDICARE

## 2024-04-24 VITALS
HEIGHT: 65 IN | DIASTOLIC BLOOD PRESSURE: 76 MMHG | SYSTOLIC BLOOD PRESSURE: 140 MMHG | WEIGHT: 163 LBS | BODY MASS INDEX: 27.16 KG/M2 | OXYGEN SATURATION: 91 % | HEART RATE: 84 BPM

## 2024-04-24 DIAGNOSIS — I72.4 POPLITEAL ARTERY ANEURYSM: ICD-10-CM

## 2024-04-24 DIAGNOSIS — I73.9 PAD (PERIPHERAL ARTERY DISEASE): ICD-10-CM

## 2024-04-24 DIAGNOSIS — I73.9 PAD (PERIPHERAL ARTERY DISEASE): Primary | ICD-10-CM

## 2024-04-24 DIAGNOSIS — I10 PRIMARY HYPERTENSION: ICD-10-CM

## 2024-04-24 DIAGNOSIS — E78.5 HYPERLIPIDEMIA, UNSPECIFIED HYPERLIPIDEMIA TYPE: ICD-10-CM

## 2024-04-24 DIAGNOSIS — F17.200 TOBACCO DEPENDENCE: ICD-10-CM

## 2024-04-24 DIAGNOSIS — I71.43 INFRARENAL ABDOMINAL AORTIC ANEURYSM (AAA) WITHOUT RUPTURE: ICD-10-CM

## 2024-04-24 DIAGNOSIS — I65.23 BILATERAL CAROTID ARTERY STENOSIS: ICD-10-CM

## 2024-04-24 PROCEDURE — 3078F DIAST BP <80 MM HG: CPT | Performed by: NURSE PRACTITIONER

## 2024-04-24 PROCEDURE — 93926 LOWER EXTREMITY STUDY: CPT

## 2024-04-24 PROCEDURE — 1159F MED LIST DOCD IN RCRD: CPT | Performed by: NURSE PRACTITIONER

## 2024-04-24 PROCEDURE — 1160F RVW MEDS BY RX/DR IN RCRD: CPT | Performed by: NURSE PRACTITIONER

## 2024-04-24 PROCEDURE — 99214 OFFICE O/P EST MOD 30 MIN: CPT | Performed by: NURSE PRACTITIONER

## 2024-04-24 PROCEDURE — 3077F SYST BP >= 140 MM HG: CPT | Performed by: NURSE PRACTITIONER

## 2024-04-24 PROCEDURE — 93923 UPR/LXTR ART STDY 3+ LVLS: CPT

## 2024-06-26 ENCOUNTER — OFFICE VISIT (OUTPATIENT)
Dept: PULMONOLOGY | Facility: CLINIC | Age: 79
End: 2024-06-26
Payer: MEDICARE

## 2024-06-26 VITALS
WEIGHT: 160 LBS | HEART RATE: 88 BPM | OXYGEN SATURATION: 92 % | HEIGHT: 65 IN | SYSTOLIC BLOOD PRESSURE: 122 MMHG | BODY MASS INDEX: 26.66 KG/M2 | DIASTOLIC BLOOD PRESSURE: 70 MMHG

## 2024-06-26 DIAGNOSIS — J44.9 STAGE 3 SEVERE COPD BY GOLD CLASSIFICATION: Primary | Chronic | ICD-10-CM

## 2024-06-26 DIAGNOSIS — R91.1 NODULE OF LEFT LUNG: Chronic | ICD-10-CM

## 2024-06-26 DIAGNOSIS — J43.9 PULMONARY EMPHYSEMA, UNSPECIFIED EMPHYSEMA TYPE: Chronic | ICD-10-CM

## 2024-06-26 DIAGNOSIS — J96.12 CHRONIC RESPIRATORY FAILURE WITH HYPOXIA AND HYPERCAPNIA: Chronic | ICD-10-CM

## 2024-06-26 DIAGNOSIS — J96.11 CHRONIC RESPIRATORY FAILURE WITH HYPOXIA AND HYPERCAPNIA: Chronic | ICD-10-CM

## 2024-06-26 DIAGNOSIS — F17.200 SMOKER: ICD-10-CM

## 2024-06-26 PROCEDURE — 3078F DIAST BP <80 MM HG: CPT | Performed by: NURSE PRACTITIONER

## 2024-06-26 PROCEDURE — 3074F SYST BP LT 130 MM HG: CPT | Performed by: NURSE PRACTITIONER

## 2024-06-26 PROCEDURE — 1160F RVW MEDS BY RX/DR IN RCRD: CPT | Performed by: NURSE PRACTITIONER

## 2024-06-26 PROCEDURE — 99214 OFFICE O/P EST MOD 30 MIN: CPT | Performed by: NURSE PRACTITIONER

## 2024-06-26 PROCEDURE — G2211 COMPLEX E/M VISIT ADD ON: HCPCS | Performed by: NURSE PRACTITIONER

## 2024-06-26 PROCEDURE — 1159F MED LIST DOCD IN RCRD: CPT | Performed by: NURSE PRACTITIONER

## 2024-06-26 RX ORDER — IPRATROPIUM BROMIDE AND ALBUTEROL SULFATE 2.5; .5 MG/3ML; MG/3ML
SOLUTION RESPIRATORY (INHALATION)
Qty: 360 ML | Refills: 11 | Status: SHIPPED | OUTPATIENT
Start: 2024-06-26

## 2024-06-26 NOTE — PROGRESS NOTES
"Chief Complaint  COPD    Subjective    History of Present Illness      Jonnie Sheets Jr. presents to [facility] for:    History of Present Illness   Management of severe COPD, emphysema, chronic respiratory failure and a left upper lobe lung nodule.  He is a daily smoker.  He no longer wishes to have pulmonary function testing done.  His last CT of the chest for lung nodule surveillance was October 2023.  He is somewhat reluctant to agree to any additional follow-up CTs stating \"I am really too tired to do anything\".  I told him I would go ahead and order the follow-up CT scan to be done in early October and then if he changes his mind and decides not to follow the lung nodule then his wishes would be respected.  He uses DuoNebs and Symbicort daily.  He needs a refill on DuoNebs.  He utilizes oxygen on an as-needed basis..  He stays up-to-date on vaccines.  Objective   Vital Signs:   /70   Pulse 88   Ht 165.1 cm (65\")   Wt 72.6 kg (160 lb)   SpO2 92% Comment: RA  BMI 26.63 kg/m²     Physical Exam  Vitals reviewed.   Constitutional:       General: He is not in acute distress.     Appearance: Normal appearance.      Interventions: Nasal cannula in place.   HENT:      Head: Normocephalic and atraumatic.   Cardiovascular:      Rate and Rhythm: Normal rate and regular rhythm.   Pulmonary:      Breath sounds: Decreased breath sounds present.   Musculoskeletal:      Cervical back: Normal range of motion.   Skin:     General: Skin is warm and dry.   Neurological:      Mental Status: He is alert and oriented to person, place, and time.   Psychiatric:         Mood and Affect: Mood normal. Affect is flat.         Behavior: Behavior normal.        Result Review :   The following data was reviewed by: PRATEEK Gallego on 06/26/2024:  CT Chest Without Contrast Diagnostic (10/04/2023 11:14)   Results for orders placed in visit on 04/19/23    Pulmonary " Function Test    Narrative  Pulmonary Function Test  Performed by: Tonya Candelaria RRT  Authorized by: Porfirio Barrett APRN    Pre Drug % Predicted  FVC: 79%  FEV1: 33%  FEF 25-75%: 12%  FEV1/FVC: 32%    Interpretation  Spirometry  Spirometry shows severe obstruction. There is reduced midflow suggesting small airway/airflow obstruction.  Overall comments: Severe/worsening obstruction.      Results for orders placed in visit on 12/15/21    Pulmonary Function Test    Narrative  Pulmonary Function Test  Performed by: Bro Bray CMA  Authorized by: Porfirio Barrett APRN    Pre Drug % Predicted  FVC: 94%  FEV1: 51%  FEF 25-75%: 24%  FEV1/FVC: 41.85%    Interpretation  Spirometry  Spirometry shows moderate obstruction. There is reduced midflow suggesting small airway/airflow obstruction.  Review of FVL curve  Patient's effort is normal.  Overall comments: Improvement in FEV1 compared to 2019.      Results for orders placed in visit on 08/14/19    Pulmonary Function Test               Assessment and Plan      Diagnoses and all orders for this visit:    1. Stage 3 severe COPD by GOLD classification (Primary)  Comments:  With chronic dyspnea but no worse than last year.  Continue Symbicort and DuoNebs.  He no longer wishes to have pulmonary function testing done.  Orders:  -     ipratropium-albuterol (DUO-NEB) 0.5-2.5 mg/3 ml nebulizer; Use up to four times a day for shortness of breath, wheezing and/or cough  Dispense: 360 mL; Refill: 11    2. Nodule of left lung  Comments:  Currently under surveillance but patient is not sure he wants to continue monitoring.  Tentative plan for CT scan in early October 2024.  Orders:  -     CT Chest Without Contrast Diagnostic; Future    3. Pulmonary emphysema, unspecified emphysema type  Comments:  With daily shortness of breath.  Continue Symbicort and DuoNebs.  Oxygen as needed.  Smoking cessation recommended.    4. Chronic respiratory failure with hypoxia  and hypercapnia  Comments:  Stable.  Continue oxygen therapy as needed.    5. Smoker  Comments:  Smoking cessation recommended.  CT scan tentatively planned for October 2024.  Orders:  -     CT Chest Without Contrast Diagnostic; Future      Porfirio Barrett, APRN  6/26/2024  15:05 CDT    Follow Up   Return in about 9 months (around 3/26/2025) for CT scan in October .    Patient was given instructions and counseling regarding his condition or for health maintenance advice. Please see specific information pulled into the AVS if appropriate.

## 2024-10-04 ENCOUNTER — HOSPITAL ENCOUNTER (OUTPATIENT)
Dept: CT IMAGING | Facility: HOSPITAL | Age: 79
Discharge: HOME | End: 2024-10-04
Payer: MEDICARE

## 2024-10-04 DIAGNOSIS — R91.8 MASS OF LOWER LOBE OF LEFT LUNG: Primary | ICD-10-CM

## 2024-10-04 DIAGNOSIS — F17.200 SMOKER: ICD-10-CM

## 2024-10-04 DIAGNOSIS — R91.1 NODULE OF LEFT LUNG: Chronic | ICD-10-CM

## 2024-10-04 PROCEDURE — 71250 CT THORAX DX C-: CPT

## 2024-10-16 ENCOUNTER — HOSPITAL ENCOUNTER (OUTPATIENT)
Dept: CT IMAGING | Facility: HOSPITAL | Age: 79
Discharge: HOME | End: 2024-10-16
Payer: MEDICARE

## 2024-10-16 DIAGNOSIS — R91.8 MASS OF LOWER LOBE OF LEFT LUNG: ICD-10-CM

## 2024-10-16 DIAGNOSIS — R91.8 MASS OF LOWER LOBE OF LEFT LUNG: Primary | ICD-10-CM

## 2024-10-16 DIAGNOSIS — F17.200 SMOKER: ICD-10-CM

## 2024-10-16 PROCEDURE — 78815 PET IMAGE W/CT SKULL-THIGH: CPT

## 2024-10-16 PROCEDURE — A9552 F18 FDG: HCPCS | Performed by: NURSE PRACTITIONER

## 2024-10-16 PROCEDURE — 0 FLUDEOXYGLUCOSE F18 SOLUTION: Performed by: NURSE PRACTITIONER

## 2024-10-16 RX ADMIN — FLUDEOXYGLUCOSE F18 1 DOSE: 300 INJECTION INTRAVENOUS at 10:21

## 2024-11-01 LAB
25(OH)D3 SERPL-MCNC: 80.9 NG/ML
ALBUMIN SERPL-MCNC: 4.6 G/DL (ref 3.5–5.2)
ALP SERPL-CCNC: 60 U/L (ref 40–129)
ALT SERPL-CCNC: 6 U/L (ref 5–41)
ANION GAP SERPL CALCULATED.3IONS-SCNC: 14 MMOL/L (ref 7–19)
AST SERPL-CCNC: 25 U/L (ref 5–40)
BACTERIA URNS QL MICRO: NEGATIVE /HPF
BASOPHILS # BLD: 0.1 K/UL (ref 0–0.2)
BASOPHILS NFR BLD: 0.6 % (ref 0–1)
BILIRUB SERPL-MCNC: 0.4 MG/DL (ref 0.2–1.2)
BILIRUB UR QL STRIP: NEGATIVE
BUN SERPL-MCNC: 34 MG/DL (ref 8–23)
CALCIUM SERPL-MCNC: 9.4 MG/DL (ref 8.8–10.2)
CHLORIDE SERPL-SCNC: 103 MMOL/L (ref 98–111)
CHOLEST SERPL-MCNC: 150 MG/DL (ref 0–199)
CLARITY UR: CLEAR
CO2 SERPL-SCNC: 26 MMOL/L (ref 22–29)
COLOR UR: YELLOW
CREAT SERPL-MCNC: 1.5 MG/DL (ref 0.7–1.2)
CREAT UR-MCNC: 196.9 MG/DL (ref 39–259)
CRYSTALS URNS MICRO: NORMAL /HPF
EOSINOPHIL # BLD: 0.3 K/UL (ref 0–0.6)
EOSINOPHIL NFR BLD: 2.8 % (ref 0–5)
EPI CELLS #/AREA URNS AUTO: 1 /HPF (ref 0–5)
ERYTHROCYTE [DISTWIDTH] IN BLOOD BY AUTOMATED COUNT: 13.8 % (ref 11.5–14.5)
FERRITIN SERPL-MCNC: 480.2 NG/ML (ref 30–400)
GLUCOSE SERPL-MCNC: 109 MG/DL (ref 70–99)
GLUCOSE UR STRIP.AUTO-MCNC: NEGATIVE MG/DL
HBA1C MFR BLD: 5.4 % (ref 4–5.6)
HCT VFR BLD AUTO: 38.5 % (ref 42–52)
HDLC SERPL-MCNC: 45 MG/DL (ref 40–60)
HGB BLD-MCNC: 11.9 G/DL (ref 14–18)
HGB UR STRIP.AUTO-MCNC: NEGATIVE MG/L
HYALINE CASTS #/AREA URNS AUTO: 0 /HPF (ref 0–8)
IMM GRANULOCYTES # BLD: 0 K/UL
IRON SERPL-MCNC: 73 UG/DL (ref 59–158)
KETONES UR STRIP.AUTO-MCNC: ABNORMAL MG/DL
LDLC SERPL CALC-MCNC: 81 MG/DL
LEUKOCYTE ESTERASE UR QL STRIP.AUTO: ABNORMAL
LYMPHOCYTES # BLD: 2.3 K/UL (ref 1.1–4.5)
LYMPHOCYTES NFR BLD: 25.2 % (ref 20–40)
MAGNESIUM SERPL-MCNC: 1.7 MG/DL (ref 1.6–2.4)
MCH RBC QN AUTO: 30.6 PG (ref 27–31)
MCHC RBC AUTO-ENTMCNC: 30.9 G/DL (ref 33–37)
MCV RBC AUTO: 99 FL (ref 80–94)
MICROALBUMIN UR-MCNC: <1.2 MG/DL (ref 0–1.99)
MICROALBUMIN/CREAT UR-RTO: NORMAL MG/G
MONOCYTES # BLD: 1 K/UL (ref 0–0.9)
MONOCYTES NFR BLD: 10.9 % (ref 0–10)
NEUTROPHILS # BLD: 5.5 K/UL (ref 1.5–7.5)
NEUTS SEG NFR BLD: 60.3 % (ref 50–65)
NITRITE UR QL STRIP.AUTO: NEGATIVE
PH UR STRIP.AUTO: 5.5 [PH] (ref 5–8)
PHOSPHATE SERPL-MCNC: 2.7 MG/DL (ref 2.5–4.5)
PLATELET # BLD AUTO: 210 K/UL (ref 130–400)
PMV BLD AUTO: 13 FL (ref 9.4–12.4)
POTASSIUM SERPL-SCNC: 4.4 MMOL/L (ref 3.5–5)
PROT SERPL-MCNC: 7.7 G/DL (ref 6.4–8.3)
PROT UR STRIP.AUTO-MCNC: NEGATIVE MG/DL
PROT UR-MCNC: 17 MG/DL (ref 0–12)
PSA SERPL-MCNC: 3.87 NG/ML (ref 0–4)
PTH-INTACT SERPL-MCNC: 67.2 PG/ML (ref 15–65)
RBC # BLD AUTO: 3.89 M/UL (ref 4.7–6.1)
RBC #/AREA URNS AUTO: 2 /HPF (ref 0–4)
SODIUM SERPL-SCNC: 143 MMOL/L (ref 136–145)
SP GR UR STRIP.AUTO: 1.02 (ref 1–1.03)
TIBC SERPL-MCNC: 259 UG/DL (ref 250–400)
TRIGL SERPL-MCNC: 122 MG/DL (ref 0–149)
URATE SERPL-MCNC: 8.9 MG/DL (ref 3.4–7)
UROBILINOGEN UR STRIP.AUTO-MCNC: 1 E.U./DL
WBC # BLD AUTO: 9 K/UL (ref 4.8–10.8)
WBC #/AREA URNS AUTO: 1 /HPF (ref 0–5)

## 2024-11-27 ENCOUNTER — HOSPITAL ENCOUNTER (OUTPATIENT)
Dept: CT IMAGING | Facility: HOSPITAL | Age: 79
Discharge: HOME | End: 2024-11-27
Admitting: NURSE PRACTITIONER
Payer: MEDICARE

## 2024-11-27 ENCOUNTER — TELEPHONE (OUTPATIENT)
Dept: PULMONOLOGY | Facility: CLINIC | Age: 79
End: 2024-11-27
Payer: MEDICARE

## 2024-11-27 DIAGNOSIS — R91.8 MASS OF LOWER LOBE OF LEFT LUNG: ICD-10-CM

## 2024-11-27 PROCEDURE — 71250 CT THORAX DX C-: CPT

## 2024-11-27 NOTE — TELEPHONE ENCOUNTER
----- Message from Porfirio Barrett sent at 11/27/2024  1:29 PM CST -----  Please call him and let him know that I reviewed his CT scan from today and it's very good news that the area in the left lower lobe has nearly resolved itself.  We are still watching a small 6 mm nodule in the left upper lobe but it has stayed stable since March 2022.  This is all positive news and we will not plan on another CT scan at this point until next November.  Thank you.

## 2024-12-10 ENCOUNTER — TELEPHONE (OUTPATIENT)
Dept: VASCULAR SURGERY | Facility: CLINIC | Age: 79
End: 2024-12-10
Payer: MEDICARE

## 2024-12-11 ENCOUNTER — HOSPITAL ENCOUNTER (OUTPATIENT)
Dept: ULTRASOUND IMAGING | Facility: HOSPITAL | Age: 79
Discharge: HOME | End: 2024-12-11
Payer: MEDICARE

## 2024-12-11 ENCOUNTER — OFFICE VISIT (OUTPATIENT)
Dept: VASCULAR SURGERY | Facility: CLINIC | Age: 79
End: 2024-12-11
Payer: MEDICARE

## 2024-12-11 VITALS
WEIGHT: 152.2 LBS | HEIGHT: 65 IN | HEART RATE: 78 BPM | OXYGEN SATURATION: 96 % | SYSTOLIC BLOOD PRESSURE: 132 MMHG | DIASTOLIC BLOOD PRESSURE: 74 MMHG | BODY MASS INDEX: 25.36 KG/M2

## 2024-12-11 DIAGNOSIS — E78.5 HYPERLIPIDEMIA, UNSPECIFIED HYPERLIPIDEMIA TYPE: ICD-10-CM

## 2024-12-11 DIAGNOSIS — I10 PRIMARY HYPERTENSION: ICD-10-CM

## 2024-12-11 DIAGNOSIS — I73.9 PAD (PERIPHERAL ARTERY DISEASE): Primary | ICD-10-CM

## 2024-12-11 DIAGNOSIS — I65.23 BILATERAL CAROTID ARTERY STENOSIS: ICD-10-CM

## 2024-12-11 DIAGNOSIS — I71.43 INFRARENAL ABDOMINAL AORTIC ANEURYSM (AAA) WITHOUT RUPTURE: ICD-10-CM

## 2024-12-11 DIAGNOSIS — F17.200 TOBACCO DEPENDENCE: ICD-10-CM

## 2024-12-11 DIAGNOSIS — I73.9 PAD (PERIPHERAL ARTERY DISEASE): ICD-10-CM

## 2024-12-11 PROCEDURE — 93880 EXTRACRANIAL BILAT STUDY: CPT

## 2024-12-11 PROCEDURE — 3078F DIAST BP <80 MM HG: CPT | Performed by: NURSE PRACTITIONER

## 2024-12-11 PROCEDURE — 1159F MED LIST DOCD IN RCRD: CPT | Performed by: NURSE PRACTITIONER

## 2024-12-11 PROCEDURE — 99214 OFFICE O/P EST MOD 30 MIN: CPT | Performed by: NURSE PRACTITIONER

## 2024-12-11 PROCEDURE — 1160F RVW MEDS BY RX/DR IN RCRD: CPT | Performed by: NURSE PRACTITIONER

## 2024-12-11 PROCEDURE — 93923 UPR/LXTR ART STDY 3+ LVLS: CPT

## 2024-12-11 PROCEDURE — 76775 US EXAM ABDO BACK WALL LIM: CPT

## 2024-12-11 PROCEDURE — 3075F SYST BP GE 130 - 139MM HG: CPT | Performed by: NURSE PRACTITIONER

## 2024-12-11 NOTE — LETTER
December 11, 2024     Boni Storey MD  [address] [facility] [place] [address]    Patient: Jonnie Sheets Jr.   YOB: 1945   Date of Visit: 12/11/2024     Dear Boni Storey MD:       Thank you for referring Jonnie Sheets to me for evaluation. Below are the relevant portions of my assessment and plan of care.    If you have questions, please do not hesitate to call me. I look forward to following Jonnie along with you.         Sincerely,        PRATEEK Saleem        CC: No Recipients    Elizabeth Mitchell APRN  12/11/24 1711  Sign when Signing Visit  12/11/2024       Boni Storey MD  [address] [facility] [address]    Jonnie Sheets Jr.  1945    Chief Complaint   Patient presents with   • SET PAD   • infrarenal AAA   • bilateral carotid artery disease     Testing here this AM,no complaints and states nothing has changed, no stroke symptoms       Dear Boni Storey MD       HPI  I had the pleasure of seeing your patient Jonnie Sheets Jr. in the office today. As you recall, Jonnie Sheets Jr. is a 79 y.o.  male who we are following for left popliteal artery aneurysm, asymptomatic carotid occlusive disease and a small abdominal aortic aneurysm.  He did undergo endovascular repair of his left popliteal artery aneurysm with Viabahn stent graft on 9/30/2020.  Currently he is doing well and denies any changes since his last visit.  He is maintained on aspirin, Plavix, and Mevacor.  He did have noninvasive testing performed today, which I did review in office.       Review of Systems   Constitutional: Negative.    HENT: Negative.     Eyes: Negative.    Respiratory: Negative.     Cardiovascular: Negative.    Gastrointestinal: Negative.    Endocrine: Negative.    Genitourinary: Negative.    Musculoskeletal: Negative.    Skin: Negative.    Allergic/Immunologic: Negative.    Neurological: Negative.    Hematological: Negative.    Psychiatric/Behavioral: Negative.     All other systems  "reviewed and are negative.          /74   Pulse 78   Ht 165.1 cm (65\")   Wt 69 kg (152 lb 3.2 oz)   SpO2 96%   BMI 25.33 kg/m²   Physical Exam  Vitals and nursing note reviewed.   Constitutional:       General: He is not in acute distress.     Appearance: Normal appearance. He is well-developed and overweight. He is not diaphoretic.   HENT:      Head: Normocephalic and atraumatic.   Neck:      Vascular: No carotid bruit or JVD.   Cardiovascular:      Rate and Rhythm: Normal rate and regular rhythm.      Pulses: Normal pulses.           Femoral pulses are 2+ on the right side and 2+ on the left side.       Popliteal pulses are 2+ on the right side and 2+ on the left side.        Dorsalis pedis pulses are 2+ on the right side and 2+ on the left side.        Posterior tibial pulses are 2+ on the right side and 2+ on the left side.      Heart sounds: Normal heart sounds, S1 normal and S2 normal. No murmur heard.     No friction rub. No gallop.   Pulmonary:      Effort: Pulmonary effort is normal.      Breath sounds: Normal breath sounds.   Abdominal:      General: Bowel sounds are normal. There is no abdominal bruit.      Palpations: Abdomen is soft.      Tenderness: There is no abdominal tenderness.   Musculoskeletal:         General: Normal range of motion.   Skin:     General: Skin is warm and dry.   Neurological:      General: No focal deficit present.      Mental Status: He is alert and oriented to person, place, and time.      Cranial Nerves: No cranial nerve deficit.   Psychiatric:         Mood and Affect: Mood normal.         Behavior: Behavior normal. Behavior is cooperative.         Thought Content: Thought content normal.         Judgment: Judgment normal.              Diagnostic data:    US Aorta Limited    Result Date: 12/11/2024  Narrative: EXAM/TECHNIQUE: US AORTA LIMITED-  INDICATION: AAA; I71.43-Infrarenal abdominal aortic aneurysm, without rupture  COMPARISON: 10/27/2023  FINDINGS:  Proximal " abdominal aorta: 2.6 x 2.6 cm (previously 2.0 x 2.2 cm).  Mid abdominal aorta: 2.5 x 2.5 cm (previously 2.3 x 2.6 cm).  Distal abdominal aorta: 3.1 x 3.5 cm (previously 2.8 x 3.0 cm).  Right common iliac artery: 11 mm. Left common iliac artery: 12 mm.      Impression:  3.5 cm infrarenal abdominal aortic aneurysm, increased in size from prior exam.  This report was signed and finalized on 12/11/2024 4:27 PM by Dr. Saqib Ryan MD.      US Carotid Bilateral    Result Date: 12/11/2024  Narrative: History: Carotid occlusive disease      Impression: Impression: 1. There is less than 50% stenosis of the right internal carotid artery. 2. There is less than 50% stenosis of the left internal carotid artery. 3. Antegrade flow is demonstrated in bilateral vertebral arteries.  Comments: Bilateral carotid vertebral arterial duplex scan was performed.  Grayscale imaging shows intimal thickening and calcified elements at the carotid bifurcation. The right internal carotid artery peak systolic velocity is 83.5 cm/sec. The end-diastolic velocity is 24.2 cm/sec. The right ICA/CCA ratio is approximately 0.9. These findings correlate with less than 50% stenosis of the right internal carotid artery.  Grayscale imaging shows intimal thickening and calcified elements at the carotid bifurcation. The left internal carotid artery peak systolic velocity is 82.8 cm/sec. The end-diastolic velocity is 21.4 cm/sec. The left ICA/CCA ratio is approximately 0.9. These findings correlate with less than 50% stenosis of the left internal carotid artery.  Antegrade flow is demonstrated in bilateral vertebral arteries.   This report was signed and finalized on 12/11/2024 4:00 PM by Dr. Tomas Ryan MD.      US Ankle / Brachial Indices Extremity Complete    Result Date: 12/11/2024  Narrative:  History: PAD  Comments: Bilateral lower extremity arterial with multi-level pulse volume recordings and segmental pressures were performed at rest and  stress.  The right ankle/brachial index is 1.2. The waveforms are triphasic without dampening. These findings are consistent with no significant arterial insufficiency of the right lower extremity at rest.  The left ankle/brachial index is 1.06. The waveforms are triphasic without dampening. These findings are consistent with no significant arterial insufficiency of the left lower extremity at rest.      Impression: Impression: 1. No significant arterial insufficiency of the right lower extremity at rest. 2. No significant arterial insufficiency of the left lower extremity at rest.    This report was signed and finalized on 12/11/2024 3:50 PM by Dr. Tomas Ryan MD.      CT Chest Without Contrast Diagnostic    Result Date: 11/27/2024  Narrative: CT CHEST WO CONTRAST DIAGNOSTIC- 11/27/2024 8:17 AM  HISTORY: LLL mass f/u after PET scan; R91.8-Other nonspecific abnormal finding of lung field  COMPARISON: Chest CT dated 10/4/2024, chest CT dated 10/4/2023, PET scan dated 10/16/2024, chest CT dated 3/7/2022  DOSE LENGTH PRODUCT: 364.67 mGy.cm. Automated exposure control was also utilized to decrease patient radiation dose.  TECHNIQUE: Serial helical tomographic images of the chest were acquired without contrast. Multiplanar reformatted images were provided for review.  FINDINGS:  Visualized neck base: The imaged portion of the base of the neck appears unremarkable.  Lungs: Underlying lung emphysema. There is a 6 mm noncalcified left upper lobe nodular opacity on axial image 71. This is stable since March 2022. Recent chest CT demonstrated a 3 cm superior segment left lower lobe consolidation. This is near completely resolved on today's exam with only a faint groundglass opacity in this area as well as a slight retraction along the major fissure and mild interstitial coarsening. Mild debris identified in the basal segmental airways in the left lower lobe. Lungs are otherwise clear. Lungs are well expanded. No pleural  effusion.  Heart: The heart is normal in size. There is no pericardial effusion. Advanced coronary artery atheromatous calcification.  Vasculature: Thoracic aorta is normal in caliber. The pulmonary arteries are normal in caliber.  Mediastinum and lymph nodes: No suspicious hilar or mediastinal adenopathy. Incidental granulomatous calcification..  Skeletal and soft tissues: Chest wall soft tissues are unremarkable. No acute bony abnormality. Remote L1 burst fracture..  Upper abdomen: The imaged portion of the upper abdomen demonstrates no acute process. Infrarenal AAA measuring up to 3.8 cm in diameter.       Impression: 1.  Previously described 3 cm superior segment left lower lobe consolidation is near completely resolved. There is only a faint groundglass opacity and mild interstitial coarsening in this area, appearance suggesting a resolving pneumonia. 2.  There is a 6 mm left upper lobe nodule which is stable when compared to the earlier March 2022 CT scan, considered benign. Lungs are otherwise clear. 3.  Underlying lung emphysema. Consider annual low-dose screening. 4.  Advanced coronary atheromatous calcification. 5.  Remote L1 burst fracture. 6.  Infrarenal AAA with aneurysm sac measuring up to 3.8 cm in diameter.    This report was signed and finalized on 11/27/2024 10:15 AM by Dr Jez Edwards.        Patient Active Problem List   Diagnosis   • Colon cancer   • Personal history of colon cancer   • Anorexia   • Black stool   • Acute renal failure   • Diverticular disease of large intestine with complication   • Septic shock, resolved   • Diverticulitis large intestine w/o perforation or abscess w/o bleeding   • Diarrhea, improved   • Acute kidney injury, secondary to sepsis, dialysis initiated   • High anion gap metabolic acidosis, improved   • Lactic acidosis, resolved   • Leukocytosis, resolved   • C. difficile colitis   • Hypokalemia   • Anemia   • Hypomagnesemia   • Hypoxia   • Nausea vomiting and  diarrhea   • Acute hypercapnic respiratory failure   • Weight loss   • Smoker   • Popliteal artery aneurysm   • History of Clostridium difficile colitis   • Moderate malnutrition   • Stage 3 severe COPD by GOLD classification   • Pulmonary emphysema   • Chronic respiratory failure with hypoxia and hypercapnia   • Hyperlipidemia   • Preop testing   • Hypertension         ICD-10-CM ICD-9-CM   1. PAD (peripheral artery disease)  I73.9 443.9   2. Bilateral carotid artery stenosis  I65.23 433.10     433.30   3. Infrarenal abdominal aortic aneurysm (AAA) without rupture  I71.43 441.4   4. Primary hypertension  I10 401.9   5. Hyperlipidemia, unspecified hyperlipidemia type  E78.5 272.4   6. Tobacco dependence  F17.200 305.1             Plan: After thoroughly evaluating Jonnie MEMO Sheets Jr., I believe the best course of action is to remain conservative from a vascular surgery standpoint.  I did review his testing which shows less than 50% carotid stenosis bilaterally.  ABIs show no arterial insufficiency to bilateral lowe extremities.  Testing shows AAA unchanged with 3.7 cm.  We will see him back in 1 year with repeat CTA of the abdomen and pelvis, carotid duplex, and MARYCHUY.   I did discuss vascular risk factors as they pertain to the progression of vascular disease including controlling his hypertension, hyperlipidemia, and smoking cessation.  His blood pressure stable on his current medication.  He should continue on his aspirin 81 mg daily, Plavix 75 mg daily, and Mevacor 40 mg daily in addition to his other medications.   Unfortunately he is a current daily smoker and reports he is down to a couple cigarettes per day.  He does not have a desire to quit smoking completely. Body mass index is 25.33 kg/m².  The patient can continue taking their current medication regimen as previously planned.  This was all discussed in full with complete understanding.    Thank you for allowing me to participate in the care of your patient.   Please do not hesitate with any questions or concerns.  I will keep you aware of any further encounters with Jonnie Sheets Jr..        Sincerely yours,         PRATEEK Saleem

## 2024-12-11 NOTE — PROGRESS NOTES
"12/11/2024       Boni Storey MD  [address] [facility] [place] [address]    Jonnie Sheets Jr.  1945    Chief Complaint   Patient presents with    SET PAD    infrarenal AAA    bilateral carotid artery disease     Testing here this AM,no complaints and states nothing has changed, no stroke symptoms       Dear Boni Storey MD       HPI  I had the pleasure of seeing your patient Jonnie Sheets Jr. in the office today. As you recall, Jonnie Sheets Jr. is a 79 y.o.  male who we are following for left popliteal artery aneurysm, asymptomatic carotid occlusive disease and a small abdominal aortic aneurysm.  He did undergo endovascular repair of his left popliteal artery aneurysm with Viabahn stent graft on 9/30/2020.  Currently he is doing well and denies any changes since his last visit.  He is maintained on aspirin, Plavix, and Mevacor.  He did have noninvasive testing performed today, which I did review in office.       Review of Systems   Constitutional: Negative.    HENT: Negative.     Eyes: Negative.    Respiratory: Negative.     Cardiovascular: Negative.    Gastrointestinal: Negative.    Endocrine: Negative.    Genitourinary: Negative.    Musculoskeletal: Negative.    Skin: Negative.    Allergic/Immunologic: Negative.    Neurological: Negative.    Hematological: Negative.    Psychiatric/Behavioral: Negative.     All other systems reviewed and are negative.          /74   Pulse 78   Ht 165.1 cm (65\")   Wt 69 kg (152 lb 3.2 oz)   SpO2 96%   BMI 25.33 kg/m²   Physical Exam  Vitals and nursing note reviewed.   Constitutional:       General: He is not in acute distress.     Appearance: Normal appearance. He is well-developed and overweight. He is not diaphoretic.   HENT:      Head: Normocephalic and atraumatic.   Neck:      Vascular: No carotid bruit or JVD.   Cardiovascular:      Rate and Rhythm: Normal rate and regular rhythm.      Pulses: Normal pulses.           Femoral pulses are 2+ on the right " side and 2+ on the left side.       Popliteal pulses are 2+ on the right side and 2+ on the left side.        Dorsalis pedis pulses are 2+ on the right side and 2+ on the left side.        Posterior tibial pulses are 2+ on the right side and 2+ on the left side.      Heart sounds: Normal heart sounds, S1 normal and S2 normal. No murmur heard.     No friction rub. No gallop.   Pulmonary:      Effort: Pulmonary effort is normal.      Breath sounds: Normal breath sounds.   Abdominal:      General: Bowel sounds are normal. There is no abdominal bruit.      Palpations: Abdomen is soft.      Tenderness: There is no abdominal tenderness.   Musculoskeletal:         General: Normal range of motion.   Skin:     General: Skin is warm and dry.   Neurological:      General: No focal deficit present.      Mental Status: He is alert and oriented to person, place, and time.      Cranial Nerves: No cranial nerve deficit.   Psychiatric:         Mood and Affect: Mood normal.         Behavior: Behavior normal. Behavior is cooperative.         Thought Content: Thought content normal.         Judgment: Judgment normal.              Diagnostic data:    US Aorta Limited    Result Date: 12/11/2024  Narrative: EXAM/TECHNIQUE: US AORTA LIMITED-  INDICATION: AAA; I71.43-Infrarenal abdominal aortic aneurysm, without rupture  COMPARISON: 10/27/2023  FINDINGS:  Proximal abdominal aorta: 2.6 x 2.6 cm (previously 2.0 x 2.2 cm).  Mid abdominal aorta: 2.5 x 2.5 cm (previously 2.3 x 2.6 cm).  Distal abdominal aorta: 3.1 x 3.5 cm (previously 2.8 x 3.0 cm).  Right common iliac artery: 11 mm. Left common iliac artery: 12 mm.      Impression:  3.5 cm infrarenal abdominal aortic aneurysm, increased in size from prior exam.  This report was signed and finalized on 12/11/2024 4:27 PM by Dr. Saqib Ryan MD.      US Carotid Bilateral    Result Date: 12/11/2024  Narrative: History: Carotid occlusive disease      Impression: Impression: 1. There is less  than 50% stenosis of the right internal carotid artery. 2. There is less than 50% stenosis of the left internal carotid artery. 3. Antegrade flow is demonstrated in bilateral vertebral arteries.  Comments: Bilateral carotid vertebral arterial duplex scan was performed.  Grayscale imaging shows intimal thickening and calcified elements at the carotid bifurcation. The right internal carotid artery peak systolic velocity is 83.5 cm/sec. The end-diastolic velocity is 24.2 cm/sec. The right ICA/CCA ratio is approximately 0.9. These findings correlate with less than 50% stenosis of the right internal carotid artery.  Grayscale imaging shows intimal thickening and calcified elements at the carotid bifurcation. The left internal carotid artery peak systolic velocity is 82.8 cm/sec. The end-diastolic velocity is 21.4 cm/sec. The left ICA/CCA ratio is approximately 0.9. These findings correlate with less than 50% stenosis of the left internal carotid artery.  Antegrade flow is demonstrated in bilateral vertebral arteries.   This report was signed and finalized on 12/11/2024 4:00 PM by Dr. Tomas Ryan MD.      US Ankle / Brachial Indices Extremity Complete    Result Date: 12/11/2024  Narrative:  History: PAD  Comments: Bilateral lower extremity arterial with multi-level pulse volume recordings and segmental pressures were performed at rest and stress.  The right ankle/brachial index is 1.2. The waveforms are triphasic without dampening. These findings are consistent with no significant arterial insufficiency of the right lower extremity at rest.  The left ankle/brachial index is 1.06. The waveforms are triphasic without dampening. These findings are consistent with no significant arterial insufficiency of the left lower extremity at rest.      Impression: Impression: 1. No significant arterial insufficiency of the right lower extremity at rest. 2. No significant arterial insufficiency of the left lower extremity at rest.     This report was signed and finalized on 12/11/2024 3:50 PM by Dr. Tomas Ryan MD.      CT Chest Without Contrast Diagnostic    Result Date: 11/27/2024  Narrative: CT CHEST WO CONTRAST DIAGNOSTIC- 11/27/2024 8:17 AM  HISTORY: LLL mass f/u after PET scan; R91.8-Other nonspecific abnormal finding of lung field  COMPARISON: Chest CT dated 10/4/2024, chest CT dated 10/4/2023, PET scan dated 10/16/2024, chest CT dated 3/7/2022  DOSE LENGTH PRODUCT: 364.67 mGy.cm. Automated exposure control was also utilized to decrease patient radiation dose.  TECHNIQUE: Serial helical tomographic images of the chest were acquired without contrast. Multiplanar reformatted images were provided for review.  FINDINGS:  Visualized neck base: The imaged portion of the base of the neck appears unremarkable.  Lungs: Underlying lung emphysema. There is a 6 mm noncalcified left upper lobe nodular opacity on axial image 71. This is stable since March 2022. Recent chest CT demonstrated a 3 cm superior segment left lower lobe consolidation. This is near completely resolved on today's exam with only a faint groundglass opacity in this area as well as a slight retraction along the major fissure and mild interstitial coarsening. Mild debris identified in the basal segmental airways in the left lower lobe. Lungs are otherwise clear. Lungs are well expanded. No pleural effusion.  Heart: The heart is normal in size. There is no pericardial effusion. Advanced coronary artery atheromatous calcification.  Vasculature: Thoracic aorta is normal in caliber. The pulmonary arteries are normal in caliber.  Mediastinum and lymph nodes: No suspicious hilar or mediastinal adenopathy. Incidental granulomatous calcification..  Skeletal and soft tissues: Chest wall soft tissues are unremarkable. No acute bony abnormality. Remote L1 burst fracture..  Upper abdomen: The imaged portion of the upper abdomen demonstrates no acute process. Infrarenal AAA measuring up to  3.8 cm in diameter.       Impression: 1.  Previously described 3 cm superior segment left lower lobe consolidation is near completely resolved. There is only a faint groundglass opacity and mild interstitial coarsening in this area, appearance suggesting a resolving pneumonia. 2.  There is a 6 mm left upper lobe nodule which is stable when compared to the earlier March 2022 CT scan, considered benign. Lungs are otherwise clear. 3.  Underlying lung emphysema. Consider annual low-dose screening. 4.  Advanced coronary atheromatous calcification. 5.  Remote L1 burst fracture. 6.  Infrarenal AAA with aneurysm sac measuring up to 3.8 cm in diameter.    This report was signed and finalized on 11/27/2024 10:15 AM by Dr Jez Edwards.        Patient Active Problem List   Diagnosis    Colon cancer    Personal history of colon cancer    Anorexia    Black stool    Acute renal failure    Diverticular disease of large intestine with complication    Septic shock, resolved    Diverticulitis large intestine w/o perforation or abscess w/o bleeding    Diarrhea, improved    Acute kidney injury, secondary to sepsis, dialysis initiated    High anion gap metabolic acidosis, improved    Lactic acidosis, resolved    Leukocytosis, resolved    C. difficile colitis    Hypokalemia    Anemia    Hypomagnesemia    Hypoxia    Nausea vomiting and diarrhea    Acute hypercapnic respiratory failure    Weight loss    Smoker    Popliteal artery aneurysm    History of Clostridium difficile colitis    Moderate malnutrition    Stage 3 severe COPD by GOLD classification    Pulmonary emphysema    Chronic respiratory failure with hypoxia and hypercapnia    Hyperlipidemia    Preop testing    Hypertension         ICD-10-CM ICD-9-CM   1. PAD (peripheral artery disease)  I73.9 443.9   2. Bilateral carotid artery stenosis  I65.23 433.10     433.30   3. Infrarenal abdominal aortic aneurysm (AAA) without rupture  I71.43 441.4   4. Primary hypertension  I10 401.9   5.  Hyperlipidemia, unspecified hyperlipidemia type  E78.5 272.4   6. Tobacco dependence  F17.200 305.1             Plan: After thoroughly evaluating Jonnie Sheets Jr., I believe the best course of action is to remain conservative from a vascular surgery standpoint.  I did review his testing which shows less than 50% carotid stenosis bilaterally.  ABIs show no arterial insufficiency to bilateral lowe extremities.  Testing shows AAA unchanged with 3.7 cm.  We will see him back in 1 year with repeat CTA of the abdomen and pelvis, carotid duplex, and MARYCHUY.   I did discuss vascular risk factors as they pertain to the progression of vascular disease including controlling his hypertension, hyperlipidemia, and smoking cessation.  His blood pressure stable on his current medication.  He should continue on his aspirin 81 mg daily, Plavix 75 mg daily, and Mevacor 40 mg daily in addition to his other medications.   Unfortunately he is a current daily smoker and reports he is down to a couple cigarettes per day.  He does not have a desire to quit smoking completely. Body mass index is 25.33 kg/m².  The patient can continue taking their current medication regimen as previously planned.  This was all discussed in full with complete understanding.    Thank you for allowing me to participate in the care of your patient.  Please do not hesitate with any questions or concerns.  I will keep you aware of any further encounters with Jonnie Sheets Jr..        Sincerely yours,         PRATEEK Saleem

## 2025-03-28 DIAGNOSIS — J44.9 STAGE 3 SEVERE COPD BY GOLD CLASSIFICATION: ICD-10-CM

## 2025-03-28 RX ORDER — BUDESONIDE AND FORMOTEROL FUMARATE DIHYDRATE 160; 4.5 UG/1; UG/1
2 AEROSOL RESPIRATORY (INHALATION) 2 TIMES DAILY
Qty: 30.6 G | Refills: 3 | OUTPATIENT
Start: 2025-03-28

## 2025-03-28 NOTE — TELEPHONE ENCOUNTER
Pharmacy sent a request for refills on Symbicort. Tried to call patient and his mailbox is full.   Rx Refill Note  Requested Prescriptions     Pending Prescriptions Disp Refills    budesonide-formoterol (SYMBICORT) 160-4.5 MCG/ACT inhaler [Pharmacy Med Name: BUDESONIDE/FORM INHALER 10.2GM 160/4.5MCG] 30.6 g 3     Sig: USE 2 INHALATIONS TWICE A DAY      Last office visit with prescribing clinician: 6/26/2024   Last telemedicine visit with prescribing clinician: Visit date not found   Next office visit with prescribing clinician: Visit date not found                         Would you like a call back once the refill request has been completed: [] Yes [] No    If the office needs to give you a call back, can they leave a voicemail: [] Yes [] No    Bro Bray, NEO  03/28/25, 08:07 CDT

## 2025-03-31 DIAGNOSIS — I71.43 INFRARENAL ABDOMINAL AORTIC ANEURYSM (AAA) WITHOUT RUPTURE: ICD-10-CM

## 2025-03-31 DIAGNOSIS — I65.23 BILATERAL CAROTID ARTERY STENOSIS: ICD-10-CM

## 2025-03-31 RX ORDER — CLOPIDOGREL BISULFATE 75 MG/1
75 TABLET ORAL DAILY
Qty: 30 TABLET | Refills: 5 | Status: SHIPPED | OUTPATIENT
Start: 2025-03-31

## 2025-05-07 LAB
25(OH)D3 SERPL-MCNC: 85.6 NG/ML
ALBUMIN SERPL-MCNC: 4.4 G/DL (ref 3.5–5.2)
ALP SERPL-CCNC: 65 U/L (ref 40–129)
ALT SERPL-CCNC: 7 U/L (ref 10–50)
ANION GAP SERPL CALCULATED.3IONS-SCNC: 12 MMOL/L (ref 8–16)
AST SERPL-CCNC: 38 U/L (ref 10–50)
BASOPHILS # BLD: 0.1 K/UL (ref 0–0.2)
BASOPHILS NFR BLD: 0.7 % (ref 0–1)
BILIRUB SERPL-MCNC: 0.7 MG/DL (ref 0.2–1.2)
BILIRUB UR QL STRIP: NEGATIVE
BUN SERPL-MCNC: 22 MG/DL (ref 8–23)
CALCIUM SERPL-MCNC: 9 MG/DL (ref 8.8–10.2)
CHLORIDE SERPL-SCNC: 103 MMOL/L (ref 98–107)
CLARITY UR: CLEAR
CO2 SERPL-SCNC: 26 MMOL/L (ref 22–29)
COLOR UR: YELLOW
CREAT SERPL-MCNC: 1.4 MG/DL (ref 0.7–1.2)
CREAT UR-MCNC: 212 MG/DL (ref 39–259)
EOSINOPHIL # BLD: 0.3 K/UL (ref 0–0.6)
EOSINOPHIL NFR BLD: 3.5 % (ref 0–5)
ERYTHROCYTE [DISTWIDTH] IN BLOOD BY AUTOMATED COUNT: 14.6 % (ref 11.5–14.5)
FERRITIN SERPL-MCNC: 532 NG/ML (ref 30–400)
GLUCOSE SERPL-MCNC: 113 MG/DL (ref 70–99)
GLUCOSE UR STRIP.AUTO-MCNC: NEGATIVE MG/DL
HBA1C MFR BLD: 5.6 % (ref 4–5.6)
HCT VFR BLD AUTO: 39 % (ref 42–52)
HGB BLD-MCNC: 12.4 G/DL (ref 14–18)
HGB UR STRIP.AUTO-MCNC: NEGATIVE MG/L
IMM GRANULOCYTES # BLD: 0.1 K/UL
IRON SATN MFR SERPL: 43 % (ref 20–50)
IRON SERPL-MCNC: 112 UG/DL (ref 59–158)
KETONES UR STRIP.AUTO-MCNC: ABNORMAL MG/DL
LEUKOCYTE ESTERASE UR QL STRIP.AUTO: NEGATIVE
LYMPHOCYTES # BLD: 2.7 K/UL (ref 1.1–4.5)
LYMPHOCYTES NFR BLD: 28.8 % (ref 20–40)
MAGNESIUM SERPL-MCNC: 1.1 MG/DL (ref 1.6–2.4)
MCH RBC QN AUTO: 30.2 PG (ref 27–31)
MCHC RBC AUTO-ENTMCNC: 31.8 G/DL (ref 33–37)
MCV RBC AUTO: 94.9 FL (ref 80–94)
MICROALBUMIN UR-MCNC: 1.92 MG/DL (ref 0–1.99)
MICROALBUMIN/CREAT UR-RTO: 9.1 MG/G (ref 0–29)
MONOCYTES # BLD: 1.1 K/UL (ref 0–0.9)
MONOCYTES NFR BLD: 11.1 % (ref 0–10)
NEUTROPHILS # BLD: 5.3 K/UL (ref 1.5–7.5)
NEUTS SEG NFR BLD: 55.4 % (ref 50–65)
NITRITE UR QL STRIP.AUTO: NEGATIVE
PH UR STRIP.AUTO: 6 [PH] (ref 5–8)
PHOSPHATE SERPL-MCNC: 2.4 MG/DL (ref 2.5–4.5)
PLATELET # BLD AUTO: 205 K/UL (ref 130–400)
PMV BLD AUTO: 12.5 FL (ref 9.4–12.4)
POTASSIUM SERPL-SCNC: 4.5 MMOL/L (ref 3.5–5.1)
PROT SERPL-MCNC: 7.4 G/DL (ref 6.4–8.3)
PROT UR STRIP.AUTO-MCNC: NEGATIVE MG/DL
PROT UR-MCNC: 23 MG/DL (ref 0–12)
PSA SERPL-MCNC: 4.05 NG/ML (ref 0–4)
PTH-INTACT SERPL-MCNC: 53.7 PG/ML (ref 15–65)
RBC # BLD AUTO: 4.11 M/UL (ref 4.7–6.1)
SODIUM SERPL-SCNC: 141 MMOL/L (ref 136–145)
SP GR UR STRIP.AUTO: 1.02 (ref 1–1.03)
T4 FREE SERPL-MCNC: 1.36 NG/DL (ref 0.93–1.7)
TIBC SERPL-MCNC: 263 UG/DL (ref 250–400)
TSH SERPL DL<=0.005 MIU/L-ACNC: 2.37 UIU/ML (ref 0.27–4.2)
URATE SERPL-MCNC: 8.7 MG/DL (ref 3.4–7)
UROBILINOGEN UR STRIP.AUTO-MCNC: 1 E.U./DL
WBC # BLD AUTO: 9.5 K/UL (ref 4.8–10.8)

## 2025-07-15 DIAGNOSIS — J44.9 STAGE 3 SEVERE COPD BY GOLD CLASSIFICATION: ICD-10-CM

## 2025-07-15 NOTE — TELEPHONE ENCOUNTER
Patient stopped by the office to request refills on his Symbicort. He states he is going to be running out before his office visit on 7/25/25.  Rx Refill Note  Requested Prescriptions     Pending Prescriptions Disp Refills    budesonide-formoterol (SYMBICORT) 160-4.5 MCG/ACT inhaler 30.6 g 3     Sig: Inhale 2 puffs 2 (Two) Times a Day.      Last office visit with prescribing clinician: 6/26/2024   Last telemedicine visit with prescribing clinician: Visit date not found   Next office visit with prescribing clinician: 7/25/2025                         Would you like a call back once the refill request has been completed: [] Yes [] No    If the office needs to give you a call back, can they leave a voicemail: [] Yes [] No    Bro Bray, [facility]  07/15/25, 13:05 CDT

## 2025-07-16 RX ORDER — BUDESONIDE AND FORMOTEROL FUMARATE DIHYDRATE 160; 4.5 UG/1; UG/1
2 AEROSOL RESPIRATORY (INHALATION) 2 TIMES DAILY
Qty: 30.6 G | Refills: 3 | Status: SHIPPED | OUTPATIENT
Start: 2025-07-16

## 2025-07-25 ENCOUNTER — OFFICE VISIT (OUTPATIENT)
Dept: PULMONOLOGY | Facility: CLINIC | Age: 80
End: 2025-07-25
Payer: MEDICARE

## 2025-07-25 VITALS
SYSTOLIC BLOOD PRESSURE: 122 MMHG | HEIGHT: 65 IN | OXYGEN SATURATION: 92 % | BODY MASS INDEX: 24.99 KG/M2 | HEART RATE: 84 BPM | WEIGHT: 150 LBS | DIASTOLIC BLOOD PRESSURE: 74 MMHG

## 2025-07-25 DIAGNOSIS — J96.12 CHRONIC RESPIRATORY FAILURE WITH HYPOXIA AND HYPERCAPNIA: Chronic | ICD-10-CM

## 2025-07-25 DIAGNOSIS — R91.1 NODULE OF LEFT LUNG: Chronic | ICD-10-CM

## 2025-07-25 DIAGNOSIS — J44.9 STAGE 3 SEVERE COPD BY GOLD CLASSIFICATION: Primary | Chronic | ICD-10-CM

## 2025-07-25 DIAGNOSIS — J96.11 CHRONIC RESPIRATORY FAILURE WITH HYPOXIA AND HYPERCAPNIA: Chronic | ICD-10-CM

## 2025-07-25 DIAGNOSIS — F17.200 SMOKER: Chronic | ICD-10-CM

## 2025-07-25 NOTE — PROGRESS NOTES
"Chief Complaint  COPD    Subjective    History of Present Illness      Jonnie Sheets Jr. presents to [facility] for:    History of Present Illness   Management of severe COPD, emphysema, chronic respiratory failure and lung nodules.  He had a CT of the chest in November 2024 showing a resolving 3 cm left lower lobe consolidation and a stable 6 mm left upper lobe nodule as well as emphysema.  At this point, Mr. Sheets is not interested in continuing surveillance CT scans.  He also does not wish to have pulmonary function testing done anymore.  He is a daily smoker.  He utilizes Symbicort daily and breathing treatments and oxygen as needed.  He has no new or worsening pulmonary complaints.  He is up-to-date on vaccines.  Objective   Vital Signs:   /74   Pulse 84   Ht 165.1 cm (65\")   Wt 68 kg (150 lb)   SpO2 92% Comment: RA  BMI 24.96 kg/m²     Physical Exam  Vitals reviewed.   Constitutional:       General: He is not in acute distress.     Appearance: Normal appearance.      Interventions: Nasal cannula in place.   HENT:      Head: Normocephalic and atraumatic.   Cardiovascular:      Rate and Rhythm: Normal rate and regular rhythm.   Pulmonary:      Breath sounds: Decreased breath sounds present.   Musculoskeletal:      Cervical back: Normal range of motion.   Skin:     General: Skin is warm and dry.   Neurological:      Mental Status: He is alert and oriented to person, place, and time.   Psychiatric:         Mood and Affect: Mood normal. Affect is flat.         Behavior: Behavior normal.        Result Review :   The following data was reviewed by: PRATEEK Gallego on 07/25/2025:  CT Chest Without Contrast Diagnostic (11/27/2024 09:26)   Results for orders placed in visit on 04/19/23    Pulmonary Function Test    Narrative  Pulmonary Function Test  Performed by: Tonya Candelaria, RRT  Authorized by: Porfirio Barrett APRN    Pre " Drug % Predicted  FVC: 79%  FEV1: 33%  FEF 25-75%: 12%  FEV1/FVC: 32%    Interpretation  Spirometry  Spirometry shows severe obstruction. There is reduced midflow suggesting small airway/airflow obstruction.  Overall comments: Severe/worsening obstruction.      Results for orders placed in visit on 12/15/21    Pulmonary Function Test    Narrative  Pulmonary Function Test  Performed by: Bro Bray CMA  Authorized by: Porfirio Barrett APRN    Pre Drug % Predicted  FVC: 94%  FEV1: 51%  FEF 25-75%: 24%  FEV1/FVC: 41.85%    Interpretation  Spirometry  Spirometry shows moderate obstruction. There is reduced midflow suggesting small airway/airflow obstruction.  Review of FVL curve  Patient's effort is normal.  Overall comments: Improvement in FEV1 compared to 2019.      Results for orders placed in visit on 08/14/19    Pulmonary Function Test             Assessment and Plan      Diagnoses and all orders for this visit:    1. Stage 3 severe COPD by GOLD classification (Primary)  Comments:  Currently stable.  Continue Symbicort, DuoNebs and use oxygen as needed.  He no longer wishes to have pulmonary function testing done    2. Smoker  Comments:  He does not intend on quitting smoking.    3. Nodule of left lung  Comments:  6 mm and stable.  A previous left lower lobe mass was resolving on the last CT scan in November 2024.  He no longer wishes to have follow-up CTs.    4. Chronic respiratory failure with hypoxia and hypercapnia  Comments:  Stable.  He uses oxygen as needed.      PRATEEK Gallego  7/25/2025  15:27 CDT    Follow Up   Return in about 1 year (around 7/25/2026).    Patient was given instructions and counseling regarding his condition or for health maintenance advice. Please see specific information pulled into the AVS if appropriate.

## (undated) DEVICE — MODEL BT2000 P/N 700287-012KIT CONTENTS: MANIFOLD WITH SALINE AND CONTRAST PORTS, SALINE TUBING WITH SPIKE AND HAND SYRINGE, TRANSDUCER: Brand: BT2000 AUTOMATED MANIFOLD KIT

## (undated) DEVICE — SUT SILK 3/0 SUTUPAK TIES 24IN SA74H

## (undated) DEVICE — PAD MINOR UNIVERSAL: Brand: MEDLINE INDUSTRIES, INC.

## (undated) DEVICE — MINI ENDOCUT SCISSOR TIP, DISPOSABLE: Brand: RENEW

## (undated) DEVICE — BG BANDED WRUBBER BAND AND TP 36X54IN

## (undated) DEVICE — PK TURNOVER RM ADV

## (undated) DEVICE — CUFF,BP,DISP,1 TUBE,ADULT,HP: Brand: MEDLINE

## (undated) DEVICE — PAD ENDOVASCULAR: Brand: MEDLINE INDUSTRIES, INC.

## (undated) DEVICE — DRSNG SURESITE WNDW 4X4.5

## (undated) DEVICE — SPNG GZ WOVN 4X4IN 12PLY 10/BX STRL

## (undated) DEVICE — LAPAROSCOPIC MONOPOLAR CORD: Brand: VALLEYLAB

## (undated) DEVICE — TBG SMPL FLTR LINE NASL 02/C02 A/ BX/100

## (undated) DEVICE — PINNACLE R/O II INTRODUCER SHEATH WITH RADIOPAQUE MARKER: Brand: PINNACLE

## (undated) DEVICE — MODEL AT P65, P/N 701554-001KIT CONTENTS: HAND CONTROLLER, 3-WAY HIGH-PRESSURE STOPCOCK WITH ROTATING END AND PREMIUM HIGH-PRESSURE TUBING: Brand: ANGIOTOUCH® KIT

## (undated) DEVICE — TOWEL,OR,DSP,ST,BLUE,DLX,10/PK,8PK/CS: Brand: MEDLINE

## (undated) DEVICE — ENSEAL LAPAROSCOPIC TISSUE SEALER G2 ARTICULATING STRAIGHT JAW FOR USE WITH G2 GENERATOR 5MM DIAMETER 35CM SHAFT LENGTH: Brand: ENSEAL

## (undated) DEVICE — THE CHANNEL CLEANING BRUSH IS A NYLON FLEXI BRUSH ATTACHED TO A FLEXIBLE PLASTIC SHEATH DESIGNED TO SAFELY REMOVE DEBRIS FROM FLEXIBLE ENDOSCOPES.

## (undated) DEVICE — Device: Brand: DEFENDO AIR/WATER/SUCTION AND BIOPSY VALVE

## (undated) DEVICE — SUT SILK 2/0 SUTUPAK TIES 24IN SA75H

## (undated) DEVICE — THE SINGLE USE ETRAP – POLYP TRAP IS USED FOR SUCTION RETRIEVAL OF ENDOSCOPICALLY REMOVED POLYPS.: Brand: ETRAP

## (undated) DEVICE — CONMED SCOPE SAVER BITE BLOCK, 20X27 MM: Brand: SCOPE SAVER

## (undated) DEVICE — ENDOGATOR AUXILIARY WATER JET CONNECTOR: Brand: ENDOGATOR

## (undated) DEVICE — SHEET, T, LAPAROTOMY, STERILE: Brand: MEDLINE

## (undated) DEVICE — SENSR O2 OXIMAX FNGR A/ 18IN NONSTR

## (undated) DEVICE — SUT VIC 0 UR6 27IN VCP603H

## (undated) DEVICE — GLV SURG TRIUMPH MICRO PF LTX 7.5 STRL

## (undated) DEVICE — ST MIC/INTRO ACC SHRP/NDL TUNG/TP NITNL 5F 45CM 7CM

## (undated) DEVICE — FRCP BX RADJAW4 NDL 2.8 240 STD OG

## (undated) DEVICE — SUT VIC 3/0 SH 36IN VCP527H

## (undated) DEVICE — ELECTRD BLD EDGE/INSUL1P 2.4X5.1MM STRL

## (undated) DEVICE — PRESSURE SYSTEM DISPOSABLE SUCTION/IRRIGATOR, DUAL SPIKE TUBING: Brand: STRYKEFLOW

## (undated) DEVICE — POOLE SUCTION INSTRUMENT WITH REMOVABLE SHEATH: Brand: POOLE

## (undated) DEVICE — SUT PROLN 1 CT1 30IN 8425H

## (undated) DEVICE — TRY PREP SCRB VAG PVP

## (undated) DEVICE — A2000 MULTI-USE SYRINGE KIT, P/N 701277-003KIT CONTENTS: 100ML CONTRAST RESERVOIR AND TUBING WITH CONTRAST SPIKE AND CLAMP: Brand: A2000 MULTI-USE SYRINGE KIT

## (undated) DEVICE — ADHS LIQ MASTISOL 2/3ML

## (undated) DEVICE — SUT SILK 2/0 SH CR8 18IN CR8 C012D

## (undated) DEVICE — TOTAL TRAY, URNMTR, SILV, LF, 16FR 10ML: Brand: MEDLINE

## (undated) DEVICE — SPNG GZ 2S 2X2 8PLY STRL PK/2

## (undated) DEVICE — UTILITY MARKER W/MED LABELS: Brand: MEDLINE

## (undated) DEVICE — ANES CIRC 60 CORR-LF: Brand: MEDLINE INDUSTRIES, INC.

## (undated) DEVICE — CATH FLSH OMNI SFT 5F 90CM

## (undated) DEVICE — 3M™ IOBAN™ 2 ANTIMICROBIAL INCISE DRAPE 6650EZ: Brand: IOBAN™ 2

## (undated) DEVICE — SUT PROLN 0 MO6 30IN 8418H

## (undated) DEVICE — SNAR POLYP SENSATION MICRO OVL 13 240X40

## (undated) DEVICE — MASK SM ADLT ANES MEDICHOICE

## (undated) DEVICE — SUT VIC 4/0 P3 18IN UD VCP494H

## (undated) DEVICE — GOWN,NON-REINFORCED,SIRUS,SET IN SLV,XL: Brand: MEDLINE

## (undated) DEVICE — PAD LAPAROSCOPIC MAJOR: Brand: MEDLINE INDUSTRIES, INC.

## (undated) DEVICE — MSK O2 MD CONCENTR A/ LF 7FT 1P/U

## (undated) DEVICE — Device: Brand: ASAHI GLADIUS18

## (undated) DEVICE — BALN EVERCROSS OTW .035 6F 7X100MM 135CM

## (undated) DEVICE — BARRIER, ABSORBABLE, ADHESION: Brand: SEPRAFILM®

## (undated) DEVICE — DEV CLS VASC MYNXCONTROL 6FTO7F

## (undated) DEVICE — DESTINATION PERIPHERAL GUIDING SHEATH: Brand: DESTINATION

## (undated) DEVICE — ANTIBACTERIAL VIOLET BRAIDED (POLYGLACTIN 910), SYNTHETIC ABSORBABLE SUTURE: Brand: COATED VICRYL

## (undated) DEVICE — 3M™ STERI-STRIP™ REINFORCED ADHESIVE SKIN CLOSURES, R1546, 1/4 IN X 4 IN (6 MM X 100 MM), 10 STRIPS/ENVELOPE: Brand: 3M™ STERI-STRIP™

## (undated) DEVICE — GOWN,SIRUS,NON REINFRCD,LARGE,SET IN SL: Brand: MEDLINE

## (undated) DEVICE — RADIFOCUS GLIDEWIRE ADVANTAGE GUIDEWIRE: Brand: GLIDEWIRE ADVANTAGE

## (undated) DEVICE — SUT SILK 3/0 SH CR8 18IN C013D

## (undated) DEVICE — SUT VIC 3/0 RB1 27IN UD VCP215H

## (undated) DEVICE — GLV SURG BIOGEL LTX PF 7 1/2

## (undated) DEVICE — PROXIMATE RH ROTATING HEAD SKIN STAPLERS (35 WIDE) CONTAINS 35 STAINLESS STEEL STAPLES: Brand: PROXIMATE